# Patient Record
Sex: FEMALE | Race: WHITE | Employment: UNEMPLOYED | URBAN - METROPOLITAN AREA
[De-identification: names, ages, dates, MRNs, and addresses within clinical notes are randomized per-mention and may not be internally consistent; named-entity substitution may affect disease eponyms.]

---

## 2017-08-18 ENCOUNTER — HOSPITAL ENCOUNTER (OUTPATIENT)
Dept: SLEEP MEDICINE | Age: 47
Discharge: HOME | End: 2017-08-18
Payer: MEDICARE

## 2017-08-18 PROCEDURE — 95806 SLEEP STUDY UNATT&RESP EFFT: CPT

## 2017-10-05 ENCOUNTER — HOSPITAL ENCOUNTER (OUTPATIENT)
Dept: SLEEP MEDICINE | Age: 47
Discharge: HOME | End: 2017-10-05
Payer: MEDICARE

## 2017-10-05 PROCEDURE — 95806 SLEEP STUDY UNATT&RESP EFFT: CPT

## 2018-07-06 PROBLEM — E55.9 VITAMIN D DEFICIENCY: Status: ACTIVE | Noted: 2018-07-06

## 2018-12-17 ENCOUNTER — TELEPHONE (OUTPATIENT)
Dept: DIABETES SERVICES | Age: 48
End: 2018-12-17

## 2018-12-17 NOTE — TELEPHONE ENCOUNTER
No show today for diabetes assessment. Tried calling twice but voice mail is not set up. Emailed pt with our phone number asking for a return call to r/s.

## 2019-01-02 ENCOUNTER — TELEPHONE (OUTPATIENT)
Dept: DIABETES SERVICES | Age: 49
End: 2019-01-02

## 2019-01-02 NOTE — TELEPHONE ENCOUNTER
Patient was a now show for her Diabetes Assessment middle of December. We have attempted to reach patient to reschedule with no response. Will close diabetes file.

## 2019-01-02 NOTE — TELEPHONE ENCOUNTER
Patient contacted  after seeing Reyes Counts and now wants Diabetes Education. Diabetes File reopen as appointment was made for 1-.

## 2019-01-29 RX ORDER — INSULIN ASPART 100 [IU]/ML
INJECTION, SOLUTION INTRAVENOUS; SUBCUTANEOUS
COMMUNITY
End: 2019-02-25 | Stop reason: SDUPTHER

## 2019-01-29 RX ORDER — GLUCOSAMINE SULFATE 1500 MG
5000 POWDER IN PACKET (EA) ORAL
COMMUNITY
End: 2019-02-25 | Stop reason: SDUPTHER

## 2019-01-30 ENCOUNTER — HOSPITAL ENCOUNTER (OUTPATIENT)
Dept: CARDIAC CATH/INVASIVE PROCEDURES | Age: 49
Discharge: HOME | End: 2019-01-30
Attending: INTERNAL MEDICINE | Admitting: INTERNAL MEDICINE
Payer: MEDICARE

## 2019-01-30 VITALS
DIASTOLIC BLOOD PRESSURE: 65 MMHG | HEART RATE: 87 BPM | WEIGHT: 230 LBS | BODY MASS INDEX: 39.27 KG/M2 | RESPIRATION RATE: 18 BRPM | TEMPERATURE: 98 F | HEIGHT: 64 IN | SYSTOLIC BLOOD PRESSURE: 140 MMHG | OXYGEN SATURATION: 96 %

## 2019-01-30 DIAGNOSIS — R60.9 EDEMA, UNSPECIFIED TYPE: ICD-10-CM

## 2019-01-30 LAB
ANION GAP SERPL CALC-SCNC: 11 MMOL/L (ref 7–16)
BUN SERPL-MCNC: 11 MG/DL (ref 6–23)
CALCIUM SERPL-MCNC: 8.6 MG/DL (ref 8.3–10.4)
CHLORIDE SERPL-SCNC: 108 MMOL/L (ref 98–107)
CO2 SERPL-SCNC: 24 MMOL/L (ref 21–32)
CREAT SERPL-MCNC: 0.65 MG/DL (ref 0.6–1)
ERYTHROCYTE [DISTWIDTH] IN BLOOD BY AUTOMATED COUNT: 12.9 % (ref 11.9–14.6)
GLUCOSE SERPL-MCNC: 153 MG/DL (ref 65–100)
HCT VFR BLD AUTO: 42.4 % (ref 35.8–46.3)
HGB BLD-MCNC: 14 G/DL (ref 11.7–15.4)
INR PPP: 0.9
MCH RBC QN AUTO: 28.6 PG (ref 26.1–32.9)
MCHC RBC AUTO-ENTMCNC: 33 G/DL (ref 31.4–35)
MCV RBC AUTO: 86.5 FL (ref 79.6–97.8)
NRBC # BLD: 0 K/UL (ref 0–0.2)
PLATELET # BLD AUTO: 320 K/UL (ref 150–450)
PMV BLD AUTO: 8.9 FL (ref 9.4–12.3)
POTASSIUM SERPL-SCNC: 3.9 MMOL/L (ref 3.5–5.1)
PROTHROMBIN TIME: 11.8 SEC (ref 11.7–14.5)
RBC # BLD AUTO: 4.9 M/UL (ref 4.05–5.2)
SODIUM SERPL-SCNC: 143 MMOL/L (ref 136–145)
WBC # BLD AUTO: 8.4 K/UL (ref 4.3–11.1)

## 2019-01-30 PROCEDURE — C1769 GUIDE WIRE: HCPCS

## 2019-01-30 PROCEDURE — 77030004534 HC CATH ANGI DX INFN CARD -A

## 2019-01-30 PROCEDURE — 80048 BASIC METABOLIC PNL TOTAL CA: CPT

## 2019-01-30 PROCEDURE — 99152 MOD SED SAME PHYS/QHP 5/>YRS: CPT

## 2019-01-30 PROCEDURE — 85610 PROTHROMBIN TIME: CPT

## 2019-01-30 PROCEDURE — 74011000250 HC RX REV CODE- 250: Performed by: INTERNAL MEDICINE

## 2019-01-30 PROCEDURE — 74011250636 HC RX REV CODE- 250/636: Performed by: INTERNAL MEDICINE

## 2019-01-30 PROCEDURE — 77030015766

## 2019-01-30 PROCEDURE — 74011636320 HC RX REV CODE- 636/320: Performed by: INTERNAL MEDICINE

## 2019-01-30 PROCEDURE — 93458 L HRT ARTERY/VENTRICLE ANGIO: CPT

## 2019-01-30 PROCEDURE — 85027 COMPLETE CBC AUTOMATED: CPT

## 2019-01-30 PROCEDURE — 77030019569 HC BND COMPR RAD TERU -B

## 2019-01-30 PROCEDURE — 74011250636 HC RX REV CODE- 250/636

## 2019-01-30 PROCEDURE — C1894 INTRO/SHEATH, NON-LASER: HCPCS

## 2019-01-30 RX ORDER — GUAIFENESIN 100 MG/5ML
81-324 LIQUID (ML) ORAL
Status: DISCONTINUED | OUTPATIENT
Start: 2019-01-30 | End: 2019-01-30 | Stop reason: HOSPADM

## 2019-01-30 RX ORDER — LIDOCAINE HYDROCHLORIDE 10 MG/ML
2-10 INJECTION INFILTRATION; PERINEURAL
Status: DISCONTINUED | OUTPATIENT
Start: 2019-01-30 | End: 2019-01-30 | Stop reason: HOSPADM

## 2019-01-30 RX ORDER — FENTANYL CITRATE 50 UG/ML
25-100 INJECTION, SOLUTION INTRAMUSCULAR; INTRAVENOUS
Status: DISCONTINUED | OUTPATIENT
Start: 2019-01-30 | End: 2019-01-30 | Stop reason: HOSPADM

## 2019-01-30 RX ORDER — HEPARIN SODIUM 200 [USP'U]/100ML
2 INJECTION, SOLUTION INTRAVENOUS CONTINUOUS
Status: DISCONTINUED | OUTPATIENT
Start: 2019-01-30 | End: 2019-01-30 | Stop reason: HOSPADM

## 2019-01-30 RX ORDER — FUROSEMIDE 40 MG/1
40 TABLET ORAL 2 TIMES DAILY
Qty: 90 TAB | Refills: 1 | Status: SHIPPED | OUTPATIENT
Start: 2019-01-30 | End: 2019-02-25 | Stop reason: SDUPTHER

## 2019-01-30 RX ORDER — SODIUM CHLORIDE 9 MG/ML
75 INJECTION, SOLUTION INTRAVENOUS CONTINUOUS
Status: DISCONTINUED | OUTPATIENT
Start: 2019-01-30 | End: 2019-01-30 | Stop reason: HOSPADM

## 2019-01-30 RX ORDER — METOPROLOL TARTRATE 5 MG/5ML
5 INJECTION INTRAVENOUS
Status: DISCONTINUED | OUTPATIENT
Start: 2019-01-30 | End: 2019-01-30 | Stop reason: HOSPADM

## 2019-01-30 RX ORDER — SODIUM CHLORIDE 0.9 % (FLUSH) 0.9 %
5-40 SYRINGE (ML) INJECTION AS NEEDED
Status: DISCONTINUED | OUTPATIENT
Start: 2019-01-30 | End: 2019-01-30 | Stop reason: HOSPADM

## 2019-01-30 RX ORDER — SODIUM CHLORIDE 0.9 % (FLUSH) 0.9 %
5-40 SYRINGE (ML) INJECTION EVERY 8 HOURS
Status: DISCONTINUED | OUTPATIENT
Start: 2019-01-30 | End: 2019-01-30 | Stop reason: HOSPADM

## 2019-01-30 RX ORDER — MIDAZOLAM HYDROCHLORIDE 1 MG/ML
.5-2 INJECTION, SOLUTION INTRAMUSCULAR; INTRAVENOUS
Status: DISCONTINUED | OUTPATIENT
Start: 2019-01-30 | End: 2019-01-30 | Stop reason: HOSPADM

## 2019-01-30 RX ORDER — METOPROLOL SUCCINATE 50 MG/1
50 TABLET, EXTENDED RELEASE ORAL DAILY
Qty: 90 TAB | Refills: 3 | Status: SHIPPED | OUTPATIENT
Start: 2019-01-30 | End: 2019-02-25 | Stop reason: SDUPTHER

## 2019-01-30 RX ADMIN — LIDOCAINE HYDROCHLORIDE 3 ML: 10 INJECTION, SOLUTION INFILTRATION; PERINEURAL at 13:01

## 2019-01-30 RX ADMIN — SODIUM CHLORIDE 75 ML/HR: 900 INJECTION, SOLUTION INTRAVENOUS at 07:48

## 2019-01-30 RX ADMIN — HEPARIN SODIUM 2 ML/HR: 5000 INJECTION, SOLUTION INTRAVENOUS; SUBCUTANEOUS at 12:32

## 2019-01-30 RX ADMIN — IOPAMIDOL 90 ML: 755 INJECTION, SOLUTION INTRAVENOUS at 13:18

## 2019-01-30 RX ADMIN — MIDAZOLAM HYDROCHLORIDE 1 MG: 1 INJECTION, SOLUTION INTRAMUSCULAR; INTRAVENOUS at 13:07

## 2019-01-30 RX ADMIN — HEPARIN SODIUM 2 ML: 10000 INJECTION, SOLUTION INTRAVENOUS; SUBCUTANEOUS at 13:04

## 2019-01-30 RX ADMIN — FENTANYL CITRATE 25 MCG: 50 INJECTION, SOLUTION INTRAMUSCULAR; INTRAVENOUS at 13:07

## 2019-01-30 RX ADMIN — METOPROLOL TARTRATE 5 MG: 1 INJECTION, SOLUTION INTRAVENOUS at 13:09

## 2019-01-30 RX ADMIN — FENTANYL CITRATE 25 MCG: 50 INJECTION, SOLUTION INTRAMUSCULAR; INTRAVENOUS at 13:01

## 2019-01-30 RX ADMIN — MIDAZOLAM HYDROCHLORIDE 2 MG: 1 INJECTION, SOLUTION INTRAMUSCULAR; INTRAVENOUS at 13:01

## 2019-01-30 NOTE — PROGRESS NOTES
R band deflation completed. Right radial dressed with gauze and tegaderm using sterile technique. No bleeding or hematoma. Tolerated without difficulty

## 2019-01-30 NOTE — PROGRESS NOTES
Patient received to [address] [place] [address] room # 7  Ambulatory from [facility]. Patient scheduled for [facility] today with Dr Nova Lincoln. Procedure reviewed & questions answered, voiced good understanding consent obtained & placed on chart. All medications and medical history reviewed. Will prep patient per orders. Patient & family updated on plan of care. The patient has a fraility score of 3-MANAGING WELL, based on independent of ADLs/ambulation. Increased symptoms with exertion.

## 2019-01-30 NOTE — PROCEDURES
Brief Cardiac Procedure Note    Patient: Billy Soto MRN: [id number]  SSN: [id number]    YOB: 1970  Age: 53 y.o. Sex: female      Date of Procedure: 1/30/2019     Pre-procedure Diagnosis: Typical Angina    Post-procedure Diagnosis: Coronary Artery Disease    Reason for Procedure: New Onset Angina < or = 2 Months    Procedure: Left Heart Catheterization    Brief Description of Procedure: lhc via right radial, tr    Performed By: Bala King MD     Assistants: none    Anesthesia: Moderate Sedation    Estimated Blood Loss: Less than 10 mL      Specimens: None    Implants: None    Findings: moderate cad, nl lvef, edp 27- no change from 7055    Complications: None    Recommendations: Continue medical therapy. add toprol and lasix    Signed By: Bala King MD     January 30, 2019

## 2019-01-30 NOTE — PROCEDURES
[address]    Orlean Claude  MR#: [id number]  : 1970  ACCOUNT #: [de-identified]   DATE OF SERVICE: 2019    CLINICAL INFORMATION:  The patient with worsening chest pain and shortness of breath consistent with [place] class 4 angina, referred for catheterization. DESCRIPTION OF PROCEDURE:  After informed consent was obtained, a 6-Bruneian sheath was placed in the right radial artery. Radial cocktail was given by protocol. A 5-Bruneian Tiger catheter and angled pigtail were used for diagnostic angiography as well as a JL3.5. Conscious sedation was given by Elmer Roman RN beginning at 1:00 concluding at 1320. A total of 3 mg Versed and 50 mcg fentanyl were given. Vital signs and saturation were stable throughout. FINDINGS:  1. Left ventricle normal size, normal systolic function, ejection fraction 65%, left ventricular end diastolic pressure is 30 mmHg with an opening aortic pressure 185/100. No gradient across the aortic valve. The right coronary artery is a moderate size dominant vessel, normal anatomic position. 30% proximal, 30% mid narrowing. The distal PDA has an anatomic kinking of 30% narrowing, but this does not appear to be atherosclerotic or flow limiting. The left main coronary artery is in normal anatomic position, free of significant disease, bifurcates to LAD and circumflex systems. The LAD becomes quite small. In the mid portion, there is a 50% narrowing distal to the bifurcation of a small obtuse marginal branch. The obtuse marginal branch has 50-60% narrowing and appears unimpaired change from previous catheterization films. The circumflex has moderate midvessel narrowing up to 30-40% and there is a 50% narrowing in an obtuse marginal ostium that has an inferior takeoff and goes under the AV groove circumflex.   This appears to be a lot of motion artifact and hinge point in this vessel, but it does not appear to be flow limiting and once again is unchanged from previous catheterization films. CONCLUSION:  1. Moderate diffuse coronary atherosclerotic heart disease with no changes from catheterization from 2016. 2.  Hypertension with elevated filling pressures. RECOMMENDATIONS:  The patient's Lasix will be increased to 40 mg twice daily and Toprol-XL 50 mg will be added to her medical therapy and she will be followed in the outpatient setting.       MD ERICH Ruvalcaba / [place]  D: 01/30/2019 13:39     T: 01/30/2019 14:59  JOB #: [id number]

## 2019-01-30 NOTE — PROGRESS NOTES
TRANSFER - IN REPORT: 
 
Verbal report received from Corrine Mack RN(name) on Aleksandra William  being received from cath lab(unit) for routine progression of care Report consisted of patients Situation, Background, Assessment and  
Recommendations(SBAR). Information from the following report(s) Procedure Summary was reviewed with the receiving nurse. Opportunity for questions and clarification was provided. Assessment completed upon patients arrival to unit and care assumed.

## 2019-01-30 NOTE — PROGRESS NOTES
TRANSFER - OUT REPORT: 
 
Verbal report given to RN(name) on Jenelle Darling  being transferred to CPRU(unit) for routine progression of care Report consisted of patients Situation, Background, Assessment and  
Recommendations(SBAR). Information from the following report(s) Procedure Summary, MAR and Cardiac Rhythm NSR was reviewed with the receiving nurse. Lines:  
Peripheral IV 01/30/19 Left Antecubital (Active) Peripheral IV 01/30/19 Posterior;Right Hand (Active) Opportunity for questions and clarification was provided. Patient transported with: 
 Registered Nurse [facility] Dr Loyd Jensen Diagnostic only Right radial access - TR band @ 1320 w/ 14 ml in band = site C/D/i Lopressor 5mg IV Versed 3 mg IV Fent 50 mcg IV

## 2019-01-30 NOTE — DISCHARGE INSTRUCTIONS
HEART CATHETERIZATION/ANGIOGRAPHY DISCHARGE INSTRUCTIONS    1. Check puncture site frequently for swelling or bleeding. If there is any bleeding, lie down and apply pressure over the area with a clean towel or washcloth. Call 911. Notify your doctor for any redness, swelling, drainage, or oozing from the puncture site. Notify your doctor for any fever or chills. 2. If the extremity becomes cold, numb, or painful call [facility] at [phone].  3. Activity should be limited for the next 48 hours. Climb stairs as little as possible and avoid any stooping, bending, or strenuous activity for 48 hours. No heavy lifting (anything over 10 pounds) for 3 days. 4. You may resume your usual diet. Drink more fluids than usual.  5. Have a responsible person drive you home and stay with you for at least 24 hours after your heart catheterization/angiography. Do not drive for the next 24 hours. 6. You may remove bandage from your Right wrist in 24 hours. You may shower in 24 hours. No tub baths, hot tubs, or swimming for 1 week. Do not place any lotions, creams, powders, or ointments over puncture site for 1 week. You may place a clean band-aid over the puncture site each day for 5 days. Change daily. 7. Please continue your medications as prescribed by your physician. I have read the above instructions and have had the opportunity to ask questions.       Patient: ________________________   Date: 1/30/2019    Witness: _______________________   Date: 1/30/2019

## 2019-01-30 NOTE — PROGRESS NOTES
Patient pre-assessment complete for [facility] poss with Dr Valerie Blanton scheduled for 19 at 11am, arrival time 9am. Patient verified using . Patient instructed to bring all home medications in labeled bottles on the day of procedure. NPO status reinforced. Patient informed to take a full dose aspirin 325mg  or 81 mg x 4 on the day of procedure. Patient instructed to HOLD lasix tonight & take 1/2 insulin tonight & no insulin in am. Instructed they can take all other medications excluding vitamins & supplements. Patient verbalizes understanding of all instructions & denies any questions at this time.

## 2019-01-30 NOTE — PROGRESS NOTES
Assisted OOB & ambulated to BR & on unit. Tolerated activity without difficulty. Right radial without bleeding or hematoma

## 2019-02-11 ENCOUNTER — TELEPHONE (OUTPATIENT)
Dept: DIABETES SERVICES | Age: 49
End: 2019-02-11

## 2019-02-11 NOTE — TELEPHONE ENCOUNTER
She again was a no show again today for her Diabetes Assessment. We will not call and reschedule due to multiple no shows.

## 2019-02-27 PROBLEM — R53.83 OTHER FATIGUE: Status: ACTIVE | Noted: 2019-02-27

## 2019-02-27 PROBLEM — I50.32 DIASTOLIC CHF, CHRONIC (HCC): Status: ACTIVE | Noted: 2019-02-27

## 2019-03-05 ENCOUNTER — HOSPITAL ENCOUNTER (OUTPATIENT)
Dept: DIABETES SERVICES | Age: 49
Discharge: HOME | End: 2019-03-05
Payer: MEDICARE

## 2019-03-05 PROCEDURE — G0108 DIAB MANAGE TRN  PER INDIV: HCPCS

## 2019-03-05 NOTE — PROGRESS NOTES
Came for diabetes educational assessment today. Provided basic information on carbohydrates, proteins and fats. Educational need/plan: Will attend 2 nutrition/2 diabetes sessions to address the following: diabetes disease process, nutritional management, physical activity, using medications, preventing complications, psychosocial adjustment, goal setting, problem solving, monitoring, behavior change strategies. Has 70 % hearing loss right ear and wears glasses. Okay in group setting. When first diagnosed with diabetes, patient reports she ate correctly, but stopped. Reports old habits returned.

## 2019-03-11 ENCOUNTER — HOSPITAL ENCOUNTER (OUTPATIENT)
Dept: DIABETES SERVICES | Age: 49
Discharge: HOME | End: 2019-03-11
Payer: MEDICARE

## 2019-03-11 PROCEDURE — G0109 DIAB MANAGE TRN IND/GROUP: HCPCS

## 2019-04-01 ENCOUNTER — HOSPITAL ENCOUNTER (OUTPATIENT)
Dept: DIABETES SERVICES | Age: 49
Discharge: HOME | End: 2019-04-01
Payer: MEDICARE

## 2019-04-01 PROCEDURE — G0109 DIAB MANAGE TRN IND/GROUP: HCPCS

## 2019-04-01 NOTE — PROGRESS NOTES
Attended nutrition diabetes #2 group session today. Topics included: plate method for portion control; fiber and sodium guidelines; sugar substitutes; alcohol; eating out; recipe modification; label reading. Participant's nutrition goal: To live to see grandkids get , she will eat a 7 gram protein with carbs and re-evaluate in July 2019. Participant's nutrition support plan: Use the daily meal planning guide, read food labels and use the [employer]. She will join [employer] and attend diabetes follow up class. Barriers identified by participant: not to cheat Voiced/demonstrated understanding of material covered. Anticipated adherence is good. Plan for follow up is attend diabetes class.

## 2019-04-09 ENCOUNTER — HOSPITAL ENCOUNTER (OUTPATIENT)
Dept: DIABETES SERVICES | Age: 49
Discharge: HOME | End: 2019-04-09
Payer: MEDICARE

## 2019-04-09 PROCEDURE — G0109 DIAB MANAGE TRN IND/GROUP: HCPCS

## 2019-04-30 ENCOUNTER — HOSPITAL ENCOUNTER (OUTPATIENT)
Dept: DIABETES SERVICES | Age: 49
Discharge: HOME | End: 2019-04-30
Payer: MEDICARE

## 2019-04-30 PROCEDURE — G0109 DIAB MANAGE TRN IND/GROUP: HCPCS

## 2019-04-30 NOTE — PROGRESS NOTES
Participant attended Diabetes #2 session today. Topics included: Prevention/detection/treatment of chronic complications; sleep apnea; Developing strategies to promote health/change behavior/recommended screenings; Developing strategies to address psychosocial issues; Goal setting. Participants goal/support plan includes  Diabetes Goal:  To tests sugars more. I will test four times a day starting 5-1-19 for 6 month. I will join [employer] and eat healthier. I will exercise 2-3 times a week for one hour and increase as tolerated. Diabetes Plan: Have testing supplies and Same time to exercise. . Problems/barriers may be:none anticipated Plan for follow up/Recommendations: mail follow up survey in 3 months

## 2019-06-10 PROBLEM — R42 DIZZINESS: Status: ACTIVE | Noted: 2019-06-10

## 2019-06-10 PROBLEM — G43.809 VESTIBULAR MIGRAINE: Status: ACTIVE | Noted: 2019-06-10

## 2019-06-25 ENCOUNTER — HOSPITAL ENCOUNTER (OUTPATIENT)
Dept: DIABETES SERVICES | Age: 49
Discharge: HOME | End: 2019-06-25
Payer: MEDICARE

## 2019-06-25 PROCEDURE — G0109 DIAB MANAGE TRN IND/GROUP: HCPCS

## 2019-06-25 NOTE — PROGRESS NOTES
Attended diabetes follow up group class. Open forum for clients to ask questions based on entire diabetes self management education program. Education topics are driven in this class based on clients' individual questions and needs. Topics included: proteins, meal planning, label reading, weight loss, eating out, sugar substitutes, Hgb A1C, stress/depression, glucometer testing, and signs/symptoms high and low blood sugars and treatment.

## 2019-07-16 ENCOUNTER — PATIENT OUTREACH (OUTPATIENT)
Dept: CASE MANAGEMENT | Age: 49
End: 2019-07-16

## 2019-07-16 NOTE — PROGRESS NOTES
Medication Adherence Outreach    Date/Time of Call:  7/16/2019   3:50 pm    Name of medication and dosage:   * Lisinopril 10 mg 1 every day     * Atorvastatin 40 mg 1 every day    Does the patient know the purpose and dosage of medication? * Yes    Are you getting a #30 day or #90 day supply of your medication? * 30 day supply    * Discussed potential cost savings by obtaining a 90 day supply of medication. Mrs. Delmar Warner is in agreement that a 90 day supply of her medication would help her financially and also aide with her adherence. Are you still taking this medication? If not, why? * Yes ,Mrs. Delmar Warner states she was not taking her medication for about 6 weeks to 2 months  due to severe migraines  and nausea and vomiting. She resumed the medication 1 week ago and has been taking daily with no problems or noted side effects. Transportation issues or any problems paying for the medication or other reason? * No    What pharmacy are you using to fill your medication and do you know the last time that you got your medication filled? [id number]   * Last refill Lisinopril 6/26/2019   * Last refill Atorvastatin 5/4/2019   * Mrs. Delmar Warner has medication on hand due to inability  to take. Are you having any side effects from taking your medication? * No          Any other questions or concerns expressed by the patient. * No    Comments:  * Medication adherence discussed with Mrs. Delmar Warner and she states she has no current  barriers to prevent her from obtaining or taking her medication. She has an appointment scheduled for 7/29/2019 with Dr. Cher Kahn and will discuss obtaining a 90 day Rx at scheduled appointment .           Call Completed By:  Jewel Johnson

## 2019-09-10 PROBLEM — G43.019 INTRACTABLE MIGRAINE WITHOUT AURA AND WITHOUT STATUS MIGRAINOSUS: Status: ACTIVE | Noted: 2019-09-10

## 2020-01-16 ENCOUNTER — HOSPITAL ENCOUNTER (OUTPATIENT)
Dept: SLEEP MEDICINE | Age: 50
Discharge: HOME | End: 2020-01-16
Payer: COMMERCIAL

## 2020-01-16 PROCEDURE — 95806 SLEEP STUDY UNATT&RESP EFFT: CPT

## 2020-04-20 PROBLEM — G43.019 INTRACTABLE MIGRAINE WITHOUT AURA AND WITHOUT STATUS MIGRAINOSUS: Status: RESOLVED | Noted: 2019-09-10 | Resolved: 2020-04-20

## 2020-04-20 PROBLEM — Z79.899 ENCOUNTER FOR MEDICATION MANAGEMENT: Status: ACTIVE | Noted: 2020-04-20

## 2020-04-20 PROBLEM — G45.9 TIA (TRANSIENT ISCHEMIC ATTACK): Status: ACTIVE | Noted: 2020-04-20

## 2020-04-20 PROBLEM — R29.3 POSTURAL IMBALANCE: Status: ACTIVE | Noted: 2020-04-20

## 2020-06-11 ENCOUNTER — HOSPITAL ENCOUNTER (OUTPATIENT)
Dept: ULTRASOUND IMAGING | Age: 50
Discharge: HOME | End: 2020-06-11
Attending: INTERNAL MEDICINE
Payer: COMMERCIAL

## 2020-06-11 DIAGNOSIS — E04.1 THYROID NODULE: ICD-10-CM

## 2020-06-11 PROCEDURE — 76536 US EXAM OF HEAD AND NECK: CPT

## 2020-07-30 PROBLEM — G43.109 CHRONIC MIGRAINE WITH AURA: Status: ACTIVE | Noted: 2020-07-30

## 2020-09-08 ENCOUNTER — HOSPITAL ENCOUNTER (OUTPATIENT)
Dept: DIABETES SERVICES | Age: 50
Discharge: HOME | End: 2020-09-08
Payer: COMMERCIAL

## 2020-09-08 PROCEDURE — 95249 CONT GLUC MNTR PT PROV EQP: CPT

## 2020-09-08 NOTE — PROGRESS NOTES
Pt seen today for initial insertion of Dexcom G6 system. Pt instructed on overview of continuous glucose monitoring (CGM) device and use. Instructed on sensor, transmitter and . I-phone used. Also has Dexcom reciever for use at night. Download Dexcom aydin to phone as well as Clarity aydin. Time and date and transmitter ID verified. Instructed on troubleshooting, alerts, alarms, calibration, communication between sensor and , insertion of sensor and transmitter, starting sensor session, start up calibration, ending sensor session, removing sensor pod and transmitter and site rotation. Reviewed CGM guidelines and restrictions on use including no MRI, CT scans,or diathermy electrical treatments, bug spray and sun tanning lotions. All questions and concerns addressed. Provided Dexcom technical support phone number.

## 2020-11-04 ENCOUNTER — HOSPITAL ENCOUNTER (OUTPATIENT)
Dept: LAB | Age: 50
Discharge: HOME | End: 2020-11-04
Payer: COMMERCIAL

## 2020-11-04 DIAGNOSIS — I25.119 CORONARY ARTERY DISEASE INVOLVING NATIVE CORONARY ARTERY OF NATIVE HEART WITH ANGINA PECTORIS (HCC): ICD-10-CM

## 2020-11-04 DIAGNOSIS — I47.29 NSVT (NONSUSTAINED VENTRICULAR TACHYCARDIA): ICD-10-CM

## 2020-11-04 LAB
ANION GAP SERPL CALC-SCNC: 8 MMOL/L (ref 7–16)
BUN SERPL-MCNC: 11 MG/DL (ref 6–23)
CALCIUM SERPL-MCNC: 8.9 MG/DL (ref 8.3–10.4)
CHLORIDE SERPL-SCNC: 110 MMOL/L (ref 98–107)
CO2 SERPL-SCNC: 24 MMOL/L (ref 21–32)
CREAT SERPL-MCNC: 0.77 MG/DL (ref 0.6–1)
GLUCOSE SERPL-MCNC: 159 MG/DL (ref 65–100)
INR PPP: 1
MAGNESIUM SERPL-MCNC: 2.2 MG/DL (ref 1.8–2.4)
POTASSIUM SERPL-SCNC: 3.3 MMOL/L (ref 3.5–5.1)
PROTHROMBIN TIME: 13.2 SEC (ref 12.5–14.7)
SODIUM SERPL-SCNC: 142 MMOL/L (ref 138–145)

## 2020-11-04 PROCEDURE — 80048 BASIC METABOLIC PNL TOTAL CA: CPT

## 2020-11-04 PROCEDURE — 85610 PROTHROMBIN TIME: CPT

## 2020-11-04 PROCEDURE — 36415 COLL VENOUS BLD VENIPUNCTURE: CPT

## 2020-11-04 PROCEDURE — 83735 ASSAY OF MAGNESIUM: CPT

## 2020-11-05 ENCOUNTER — HOSPITAL ENCOUNTER (OUTPATIENT)
Dept: CARDIAC CATH/INVASIVE PROCEDURES | Age: 50
Discharge: HOME | End: 2020-11-05
Attending: INTERNAL MEDICINE | Admitting: INTERNAL MEDICINE
Payer: COMMERCIAL

## 2020-11-05 VITALS
WEIGHT: 235 LBS | OXYGEN SATURATION: 94 % | BODY MASS INDEX: 40.12 KG/M2 | TEMPERATURE: 98.1 F | HEIGHT: 64 IN | DIASTOLIC BLOOD PRESSURE: 77 MMHG | SYSTOLIC BLOOD PRESSURE: 144 MMHG | HEART RATE: 81 BPM

## 2020-11-05 DIAGNOSIS — I25.119 CORONARY ARTERY DISEASE INVOLVING NATIVE CORONARY ARTERY OF NATIVE HEART WITH ANGINA PECTORIS (HCC): ICD-10-CM

## 2020-11-05 LAB
ATRIAL RATE: 83 BPM
CALCULATED P AXIS, ECG09: 55 DEGREES
CALCULATED R AXIS, ECG10: 2 DEGREES
CALCULATED T AXIS, ECG11: 64 DEGREES
DIAGNOSIS, 93000: NORMAL
P-R INTERVAL, ECG05: 128 MS
Q-T INTERVAL, ECG07: 378 MS
QRS DURATION, ECG06: 78 MS
QTC CALCULATION (BEZET), ECG08: 444 MS
VENTRICULAR RATE, ECG03: 83 BPM

## 2020-11-05 PROCEDURE — C1887 CATHETER, GUIDING: HCPCS

## 2020-11-05 PROCEDURE — 93458 L HRT ARTERY/VENTRICLE ANGIO: CPT | Performed by: INTERNAL MEDICINE

## 2020-11-05 PROCEDURE — 77030019569 HC BND COMPR RAD TERU -B

## 2020-11-05 PROCEDURE — 74011250637 HC RX REV CODE- 250/637: Performed by: INTERNAL MEDICINE

## 2020-11-05 PROCEDURE — 99152 MOD SED SAME PHYS/QHP 5/>YRS: CPT

## 2020-11-05 PROCEDURE — 99153 MOD SED SAME PHYS/QHP EA: CPT

## 2020-11-05 PROCEDURE — 99152 MOD SED SAME PHYS/QHP 5/>YRS: CPT | Performed by: INTERNAL MEDICINE

## 2020-11-05 PROCEDURE — 77030016699 HC CATH ANGI DX INFN1 CARD -A

## 2020-11-05 PROCEDURE — C1894 INTRO/SHEATH, NON-LASER: HCPCS

## 2020-11-05 PROCEDURE — 93458 L HRT ARTERY/VENTRICLE ANGIO: CPT

## 2020-11-05 PROCEDURE — 77030015766

## 2020-11-05 PROCEDURE — 74011000250 HC RX REV CODE- 250: Performed by: INTERNAL MEDICINE

## 2020-11-05 PROCEDURE — 74011000636 HC RX REV CODE- 636: Performed by: INTERNAL MEDICINE

## 2020-11-05 PROCEDURE — C8929 TTE W OR WO FOL WCON,DOPPLER: HCPCS

## 2020-11-05 PROCEDURE — C1769 GUIDE WIRE: HCPCS

## 2020-11-05 PROCEDURE — 74011250636 HC RX REV CODE- 250/636: Performed by: INTERNAL MEDICINE

## 2020-11-05 PROCEDURE — 93005 ELECTROCARDIOGRAM TRACING: CPT | Performed by: INTERNAL MEDICINE

## 2020-11-05 RX ORDER — SODIUM CHLORIDE 9 MG/ML
75 INJECTION, SOLUTION INTRAVENOUS CONTINUOUS
Status: DISCONTINUED | OUTPATIENT
Start: 2020-11-05 | End: 2020-11-05 | Stop reason: HOSPADM

## 2020-11-05 RX ORDER — SODIUM CHLORIDE 0.9 % (FLUSH) 0.9 %
5-40 SYRINGE (ML) INJECTION AS NEEDED
Status: CANCELLED | OUTPATIENT
Start: 2020-11-05

## 2020-11-05 RX ORDER — HEPARIN SODIUM 200 [USP'U]/100ML
2 INJECTION, SOLUTION INTRAVENOUS CONTINUOUS
Status: DISCONTINUED | OUTPATIENT
Start: 2020-11-05 | End: 2020-11-05 | Stop reason: HOSPADM

## 2020-11-05 RX ORDER — HYDRALAZINE HYDROCHLORIDE 20 MG/ML
10-20 INJECTION INTRAMUSCULAR; INTRAVENOUS AS NEEDED
Status: DISCONTINUED | OUTPATIENT
Start: 2020-11-05 | End: 2020-11-05 | Stop reason: HOSPADM

## 2020-11-05 RX ORDER — MIDAZOLAM HYDROCHLORIDE 1 MG/ML
.5-2 INJECTION, SOLUTION INTRAMUSCULAR; INTRAVENOUS
Status: DISCONTINUED | OUTPATIENT
Start: 2020-11-05 | End: 2020-11-05 | Stop reason: HOSPADM

## 2020-11-05 RX ORDER — SODIUM CHLORIDE 0.9 % (FLUSH) 0.9 %
5-40 SYRINGE (ML) INJECTION EVERY 8 HOURS
Status: CANCELLED | OUTPATIENT
Start: 2020-11-05

## 2020-11-05 RX ORDER — FENTANYL CITRATE 50 UG/ML
25-50 INJECTION, SOLUTION INTRAMUSCULAR; INTRAVENOUS
Status: DISCONTINUED | OUTPATIENT
Start: 2020-11-05 | End: 2020-11-05 | Stop reason: HOSPADM

## 2020-11-05 RX ORDER — POTASSIUM CHLORIDE 1500 MG/1
20 TABLET, FILM COATED, EXTENDED RELEASE ORAL DAILY
Qty: 30 TAB | Refills: 11 | Status: SHIPPED | OUTPATIENT
Start: 2020-11-05 | End: 2021-02-04 | Stop reason: SDUPTHER

## 2020-11-05 RX ORDER — LIDOCAINE HYDROCHLORIDE 10 MG/ML
1-10 INJECTION, SOLUTION EPIDURAL; INFILTRATION; INTRACAUDAL; PERINEURAL ONCE
Status: COMPLETED | OUTPATIENT
Start: 2020-11-05 | End: 2020-11-05

## 2020-11-05 RX ORDER — SODIUM CHLORIDE 9 MG/ML
75 INJECTION, SOLUTION INTRAVENOUS CONTINUOUS
Status: CANCELLED | OUTPATIENT
Start: 2020-11-05

## 2020-11-05 RX ORDER — GUAIFENESIN 100 MG/5ML
81-324 LIQUID (ML) ORAL ONCE
Status: DISCONTINUED | OUTPATIENT
Start: 2020-11-05 | End: 2020-11-05 | Stop reason: HOSPADM

## 2020-11-05 RX ORDER — DIAZEPAM 5 MG/1
5 TABLET ORAL ONCE
Status: COMPLETED | OUTPATIENT
Start: 2020-11-05 | End: 2020-11-05

## 2020-11-05 RX ADMIN — MIDAZOLAM 1 MG: 1 INJECTION INTRAMUSCULAR; INTRAVENOUS at 13:21

## 2020-11-05 RX ADMIN — FENTANYL CITRATE 25 MCG: 50 INJECTION, SOLUTION INTRAMUSCULAR; INTRAVENOUS at 13:21

## 2020-11-05 RX ADMIN — LIDOCAINE HYDROCHLORIDE 3 ML: 10 INJECTION, SOLUTION EPIDURAL; INFILTRATION; INTRACAUDAL; PERINEURAL at 13:03

## 2020-11-05 RX ADMIN — MIDAZOLAM 1 MG: 1 INJECTION INTRAMUSCULAR; INTRAVENOUS at 13:39

## 2020-11-05 RX ADMIN — FENTANYL CITRATE 25 MCG: 50 INJECTION, SOLUTION INTRAMUSCULAR; INTRAVENOUS at 12:58

## 2020-11-05 RX ADMIN — VERAPAMIL HYDROCHLORIDE 2 ML: 2.5 INJECTION, SOLUTION INTRAVENOUS at 13:11

## 2020-11-05 RX ADMIN — FENTANYL CITRATE 25 MCG: 50 INJECTION, SOLUTION INTRAMUSCULAR; INTRAVENOUS at 13:39

## 2020-11-05 RX ADMIN — HYDRALAZINE HYDROCHLORIDE 10 MG: 20 INJECTION, SOLUTION INTRAMUSCULAR; INTRAVENOUS at 13:39

## 2020-11-05 RX ADMIN — PERFLUTREN 1 ML: 6.52 INJECTION, SUSPENSION INTRAVENOUS at 15:00

## 2020-11-05 RX ADMIN — MIDAZOLAM 2 MG: 1 INJECTION INTRAMUSCULAR; INTRAVENOUS at 12:58

## 2020-11-05 RX ADMIN — DIAZEPAM 5 MG: 5 TABLET ORAL at 11:00

## 2020-11-05 RX ADMIN — IOPAMIDOL 80 ML: 755 INJECTION, SOLUTION INTRAVENOUS at 13:40

## 2020-11-05 RX ADMIN — HEPARIN SODIUM 2 UNITS/HR: 200 INJECTION, SOLUTION INTRAVENOUS at 12:50

## 2020-11-05 NOTE — PROGRESS NOTES
TRANSFER - OUT REPORT:    R radial [facility] with Dr Gisel Lopez  Versed 4 mg  Fentanyl 75 mcg  Hydralazine 10 mg  CABG consult  TR band 12 ml  No bleeding or hematoma noted at site    Verbal report given to Katerina(name) on Barrera Cable  being transferred to CPRU(unit) for routine progression of care       Report consisted of patients Situation, Background, Assessment and   Recommendations(SBAR). Information from the following report(s) Procedure Summary and MAR was reviewed with the receiving nurse. Lines:   Peripheral IV 11/05/20 Right Antecubital (Active)        Opportunity for questions and clarification was provided.       Patient transported with:   Registered Nurse

## 2020-11-05 NOTE — DISCHARGE INSTRUCTIONS
HEART CATHETERIZATION/ANGIOGRAPHY DISCHARGE INSTRUCTIONS    1. Check puncture site frequently for swelling or bleeding. If there is any bleeding, lie down and apply pressure over the area with a clean towel or washcloth and call 911. Notify your doctor for any redness, swelling, drainage, or oozing from the puncture site. Notify your doctor for any fever or chills. 2. If the extremity becomes cold, numb, or painful call [facility] at [phone].  3. Activity should be limited for the next 48 hours. No heavy lifting (anything over 10 pounds) for 3 days. No pushing or pulling with right arm for the next three days. 4. You may resume your usual diet. Drink more fluids than usual.  5. Have a responsible person drive you home and stay with you for at least 24 hours after your heart catheterization/angiography. No pushing or pulling with right arm for the next three days. 6. You may remove bandage from your ARM in 24 hours. You may shower in 24 hours. No tub baths, hot tubs, or swimming for 1 week. Do not place any lotions, creams, powders, or ointments over puncture site for 1 week. You may place a clean band-aid over the puncture site each day for 5 days. Change daily. 7. Continue all medications as prescribed. I have read the above instructions and have had the opportunity to ask questions.       Patient: ________________________   Date: 11/5/2020    Witness: _______________________   Date: 11/5/2020

## 2020-11-05 NOTE — PROCEDURES
Brief Cardiac Procedure Note    Patient: Niko Black MRN: [id number]  SSN: [id number]    YOB: 1970  Age: 53 y.o. Sex: female      Date of Procedure: 11/5/2020     Pre-procedure Diagnosis: Typical Angina    Post-procedure Diagnosis: Coronary Artery Disease    Reason for Procedure: New Onset Angina < or = 2 Months    Procedure: Left Heart Catheterization    Brief Description of Procedure: LHC VIA RIGHT RADIAL, TR    Performed By: Grace Liu MD     Assistants: none    Anesthesia: Moderate Sedation    Estimated Blood Loss: Less than 10 mL      Specimens: None    Implants: None    Findings: diffuse 3 vessel cad, small vessels- normal lvef    Complications: None    Recommendations: Continue medical therapy.     Signed By: Grace Liu MD     November 5, 2020

## 2020-11-05 NOTE — PROGRESS NOTES
1610-patient ambulated to bath room with no difficulties. Right radial with no bleeding or hematoma. Rband removed and sterile dressing applied to site. 1615- all discharge instructions explained to patient and family. Time allowed for questions no. No questions and concerns at this time. 1620- right radial checked. Site with no redness or bleeding. pt discharged to home via Wheelchair with family.

## 2020-11-06 NOTE — PROCEDURES
[facility]  CARDIAC CATH    Name:  Sterling Carcamo  MR#:  [id number]  :  1970  ACCOUNT #:  [de-identified]  DATE OF SERVICE:  2020    PROCEDURE PERFORMED:  Left heart catheterization via the right radial artery with a 5-Bahamian Tiger catheter, 6-Bahamian XB-3.0 guide, and an angled pigtail. A TR band was placed with good hemostasis. PREOPERATIVE DIAGNOSIS:  Chest pain, nonsustained ventricular tachycardia. POSTOPERATIVE DIAGNOSIS:  Coronary artery disease. SURGEON:  Brett Hackett MD    ASSISTANT:  none    ESTIMATED BLOOD LOSS:  5 mL. SPECIMENS REMOVED:  None. COMPLICATIONS:  None. IMPLANTS:  None. ANESTHESIA:  Provided by Phyliss Lundborg, RN beginning at 12:58, concluding at 13:38. A total of 4 mg of Versed and 75 mcg of fentanyl were given. Vital signs and saturations were stable throughout. FINDINGS:  Left ventricle has normal size, normal systolic function. Ejection fraction is 65%. Left ventricular end-diastolic pressure is 19 mmHg with an opening aortic pressure of 167/93. No significant gradient across the aortic valve. The right coronary artery is a dominant vessel in normal anatomic position, a tortuous vessel, diffusely diseased proximally up to 20-30%. The distal vessel has a focal 70% narrowing before the takeoff of the PDA which has a 40-50% ostial narrowing. The left main coronary artery is a small vessel with no significant atherosclerotic narrowing. Although it is size-matched proximally, it is the same size as the proximal LAD. The LAD is diffusely diseased in the midportion, heavily calcified up to 85%. There is a diagonal branch with a 70% narrowing and then the ongoing LAD has a 70-80% narrowing after the takeoff of a diagonal branch. The circumflex has a midvessel obtuse marginal branch that has an 80% narrowing.   This vessel courses under the circumflex, is extremely tortuous, and a small vessel, not a good interventional target. The distal obtuse marginal branch is free of any high-grade narrowing. CONCLUSION:  Significant multivessel coronary artery disease with preserved LV systolic function in a diabetic. RECOMMENDATIONS:  Consideration for bypass surgery. Potassium will be added to her medical regimen with her nonsustained ventricular tachycardia between now and the surgery.       MD ZAHRA [id number]  D:  11/05/2020 14:59  T:  11/05/2020 20:07  JOB #:  [id number]

## 2020-11-07 ENCOUNTER — HOSPITAL ENCOUNTER (OUTPATIENT)
Dept: ULTRASOUND IMAGING | Age: 50
Discharge: HOME | End: 2020-11-07
Attending: PHYSICIAN ASSISTANT
Payer: COMMERCIAL

## 2020-11-07 DIAGNOSIS — I25.118 CORONARY ARTERY DISEASE OF NATIVE ARTERY OF NATIVE HEART WITH STABLE ANGINA PECTORIS (HCC): ICD-10-CM

## 2020-11-07 DIAGNOSIS — E11.9 TYPE 2 DIABETES MELLITUS WITHOUT COMPLICATION, WITH LONG-TERM CURRENT USE OF INSULIN (HCC): ICD-10-CM

## 2020-11-07 DIAGNOSIS — Z79.4 TYPE 2 DIABETES MELLITUS WITHOUT COMPLICATION, WITH LONG-TERM CURRENT USE OF INSULIN (HCC): ICD-10-CM

## 2020-11-07 PROCEDURE — 93970 EXTREMITY STUDY: CPT

## 2020-11-13 ENCOUNTER — ANESTHESIA EVENT (OUTPATIENT)
Dept: SURGERY | Age: 50
DRG: 236 | End: 2020-11-13
Payer: COMMERCIAL

## 2020-11-17 ENCOUNTER — HOSPITAL ENCOUNTER (OUTPATIENT)
Dept: SURGERY | Age: 50
Discharge: HOME | DRG: 236 | End: 2020-11-17
Payer: COMMERCIAL

## 2020-11-17 ENCOUNTER — HOSPITAL ENCOUNTER (OUTPATIENT)
Dept: ULTRASOUND IMAGING | Age: 50
Discharge: HOME | DRG: 236 | End: 2020-11-17
Attending: PHYSICIAN ASSISTANT
Payer: COMMERCIAL

## 2020-11-17 ENCOUNTER — HOSPITAL ENCOUNTER (OUTPATIENT)
Dept: GENERAL RADIOLOGY | Age: 50
Discharge: HOME | DRG: 236 | End: 2020-11-17
Attending: PHYSICIAN ASSISTANT
Payer: COMMERCIAL

## 2020-11-17 VITALS
TEMPERATURE: 98.4 F | HEART RATE: 86 BPM | DIASTOLIC BLOOD PRESSURE: 76 MMHG | WEIGHT: 233.9 LBS | OXYGEN SATURATION: 94 % | RESPIRATION RATE: 18 BRPM | HEIGHT: 64 IN | BODY MASS INDEX: 39.93 KG/M2 | SYSTOLIC BLOOD PRESSURE: 140 MMHG

## 2020-11-17 LAB
APPEARANCE UR: CLEAR
BACTERIA SPEC CULT: NORMAL
BACTERIA URNS QL MICRO: 0 /HPF
BILIRUB UR QL: NEGATIVE
CASTS URNS QL MICRO: 0 /LPF
COLOR UR: YELLOW
EPI CELLS #/AREA URNS HPF: ABNORMAL /HPF
GLUCOSE BLD STRIP.AUTO-MCNC: 102 MG/DL (ref 65–100)
GLUCOSE UR STRIP.AUTO-MCNC: >1000 MG/DL
HGB UR QL STRIP: NEGATIVE
HISTORY CHECKED?,CKHIST: NORMAL
KETONES UR QL STRIP.AUTO: NEGATIVE MG/DL
LEUKOCYTE ESTERASE UR QL STRIP.AUTO: ABNORMAL
MAGNESIUM SERPL-MCNC: 2.2 MG/DL (ref 1.8–2.4)
NITRITE UR QL STRIP.AUTO: NEGATIVE
PH UR STRIP: 6 [PH] (ref 5–9)
PROT UR STRIP-MCNC: NEGATIVE MG/DL
RBC #/AREA URNS HPF: ABNORMAL /HPF
SERVICE CMNT-IMP: NORMAL
SP GR UR REFRACTOMETRY: 1.02 (ref 1–1.02)
UROBILINOGEN UR QL STRIP.AUTO: 1 EU/DL (ref 0.2–1)
WBC URNS QL MICRO: ABNORMAL /HPF

## 2020-11-17 PROCEDURE — 71046 X-RAY EXAM CHEST 2 VIEWS: CPT

## 2020-11-17 PROCEDURE — 93880 EXTRACRANIAL BILAT STUDY: CPT

## 2020-11-17 PROCEDURE — 86923 COMPATIBILITY TEST ELECTRIC: CPT

## 2020-11-17 PROCEDURE — 87641 MR-STAPH DNA AMP PROBE: CPT

## 2020-11-17 PROCEDURE — 82962 GLUCOSE BLOOD TEST: CPT

## 2020-11-17 PROCEDURE — 83735 ASSAY OF MAGNESIUM: CPT

## 2020-11-17 PROCEDURE — 94010 BREATHING CAPACITY TEST: CPT

## 2020-11-17 PROCEDURE — 86900 BLOOD TYPING SEROLOGIC ABO: CPT

## 2020-11-17 PROCEDURE — 87635 SARS-COV-2 COVID-19 AMP PRB: CPT

## 2020-11-17 PROCEDURE — 81001 URINALYSIS AUTO W/SCOPE: CPT

## 2020-11-17 RX ORDER — OXYCODONE HYDROCHLORIDE 5 MG/1
10 TABLET ORAL
Status: CANCELLED | OUTPATIENT
Start: 2020-11-17 | End: 2020-11-18

## 2020-11-17 RX ORDER — DIPHENHYDRAMINE HYDROCHLORIDE 50 MG/ML
12.5 INJECTION, SOLUTION INTRAMUSCULAR; INTRAVENOUS ONCE
Status: CANCELLED | OUTPATIENT
Start: 2020-11-17 | End: 2020-11-17

## 2020-11-17 RX ORDER — OXYCODONE HYDROCHLORIDE 5 MG/1
5 TABLET ORAL
Status: CANCELLED | OUTPATIENT
Start: 2020-11-17 | End: 2020-11-18

## 2020-11-17 RX ORDER — AMIODARONE HYDROCHLORIDE 200 MG/1
600 TABLET ORAL ONCE
COMMUNITY
End: 2020-11-24

## 2020-11-17 RX ORDER — NALOXONE HYDROCHLORIDE 0.4 MG/ML
0.1 INJECTION, SOLUTION INTRAMUSCULAR; INTRAVENOUS; SUBCUTANEOUS AS NEEDED
Status: CANCELLED | OUTPATIENT
Start: 2020-11-17 | End: 2020-11-17

## 2020-11-17 RX ORDER — SODIUM CHLORIDE, SODIUM LACTATE, POTASSIUM CHLORIDE, CALCIUM CHLORIDE 600; 310; 30; 20 MG/100ML; MG/100ML; MG/100ML; MG/100ML
75 INJECTION, SOLUTION INTRAVENOUS CONTINUOUS
Status: CANCELLED | OUTPATIENT
Start: 2020-11-17

## 2020-11-17 RX ORDER — HYDROMORPHONE HYDROCHLORIDE 2 MG/ML
0.5 INJECTION, SOLUTION INTRAMUSCULAR; INTRAVENOUS; SUBCUTANEOUS
Status: CANCELLED | OUTPATIENT
Start: 2020-11-17

## 2020-11-17 NOTE — PERIOP NOTES
PLEASE CONTINUE TAKING ALL PRESCRIPTION MEDICATIONS UP TO THE DAY OF SURGERY UNLESS OTHERWISE DIRECTED BELOW. DISCONTINUE all vitamins and supplements 7 days prior to surgery. DISCONTINUE Non-Steriodal Anti-Inflammatory (NSAIDS) such as Advil and Aleve 5 days prior to surgery. Home Medications to take  the day of surgery   Diazepam (Valium) as needed    Zofran as needed  Duloxetine (Cymbalta)                 Promethazine (Phenergan) as needed   Glucagon as needed  Pregabalin (Lyrica)   HumuLIN R-80 % of your dose the night before and day of surgery, PM dose 68 units and day of surgery am dose 136 units     Home Medications   to Hold   Hold Linzess (linaclotide), Novolog Insulin day of surgery        Comments   Bring your Albuterol Inhaler and Nitroglycerin bottle with you day of surgery. Bring your CPAP with you   *Visitor policy of 1 visitor per patient discussed.  your Amiodorone at your local pharmacy and take it after 4 pm today, the night before your surgery. Please do not bring home medications with you on the day of surgery unless otherwise directed by your nurse. If you are instructed to bring home medications, please give them to your nurse as they will be administered by the nursing staff. If you have any questions, please call [address] [place] [address] [phone] or  [facility] [phone]. A copy of this note was provided to the patient for reference.

## 2020-11-17 NOTE — PROCEDURES
[facility]  PROCEDURE NOTE    Name:  Eleanor Love  MR#:  [id number]  :  1970  ACCOUNT #:  [de-identified]  DATE OF SERVICE:  2020    PROCEDURE PERFORMED:  Baseline spirometry. FINDINGS:  Spirometry and flow volume loops are normal.    IMPRESSION:  Normal baseline spirometry.       Tucker Oneil MD      [id number]/  D:  2020 10:44  T:  2020 16:00  JOB #:  [id number]

## 2020-11-17 NOTE — PERIOP NOTES
Patient verified name and . Patient provided medical/health information and PTA medications to the best of their ability. TYPE  CASE: III  Order for consent was found in EHR and matches case posting. Labs per surgeon: UA, MRSA, Type and cross, Mg  Labs per anesthesia protocol: CBC and BMP on 2020 acceptable, POC glucose today and on DOS  EKG:  Done 2020 and acceptable, no further orders    A rapid COVID swab was performed prior to PFT. Result negative. Patient provided with and instructed on educaitonal handouts including Heart Guide to Surgery, blood transfusions, pain management, central line infection prevention, being on a ventilator and hand hygiene for the family and community, and [facility] brochure. Instructed that family must be present in building at all times. Incentive spirometry completed with return demonstration. FEV1 completed as ordered. Patient viewed pre-operative DVD. Instructed on chest-xray procedure and carotid ultrasound. Instructed on type and cross match procedure and not to remove green blood bank bracelet. Instructed on medications- Amiodarone with Rx called into  [facility]. Patient take Metoprolol as prescribed each evening. Surgical skin cleanser provided and instructions given per hospital policy. Original medication prescription bottles were not visualized during patient appointment. Patient teach back successful and patient demonstrates knowledge of instruction.

## 2020-11-17 NOTE — PERIOP NOTES
Recent Results (from the past 12 hour(s))   URINALYSIS W/ RFLX MICROSCOPIC    Collection Time: 11/17/20  7:57 AM   Result Value Ref Range    Color YELLOW      Appearance CLEAR      Specific gravity 1.017 1.001 - 1.023      pH (UA) 6.0 5.0 - 9.0      Protein Negative NEG mg/dL    Glucose >1,000 mg/dL    Ketone Negative NEG mg/dL    Bilirubin Negative NEG      Blood Negative NEG      Urobilinogen 1.0 0.2 - 1.0 EU/dL    Nitrites Negative NEG      Leukocyte Esterase SMALL (A) NEG      WBC 3-5 0 /hpf    RBC 0-3 0 /hpf    Epithelial cells 0-3 0 /hpf    Bacteria 0 0 /hpf    Casts 0 0 /lpf   MAGNESIUM    Collection Time: 11/17/20  8:01 AM   Result Value Ref Range    Magnesium 2.2 1.8 - 2.4 mg/dL   MSSA/MRSA SC BY PCR, NASAL SWAB    Collection Time: 11/17/20  8:01 AM    Specimen: Swab   Result Value Ref Range    Special Requests: NO SPECIAL REQUESTS      Culture result:        SA target not detected. A MRSA NEGATIVE, SA NEGATIVE test result does not preclude MRSA or SA nasal colonization.    SARS-COV-2    Collection Time: 11/17/20  8:01 AM   Result Value Ref Range    Specimen source Nasopharyngeal      COVID-19 rapid test Not detected NOTD      SARS CoV-2 PENDING    TYPE & SCREEN    Collection Time: 11/17/20  8:53 AM   Result Value Ref Range    Crossmatch Expiration 11/20/2020,2359     ABO/Rh(D) O POSITIVE     Antibody screen NEG     Unit number [id number]     Blood component type  LR     Unit division 00     Status of unit ALLOCATED     Crossmatch result Compatible     Unit number [id number]     Blood component type RC LR     Unit division 00     Status of unit ALLOCATED     Crossmatch result Compatible     Unit number [id number]     Blood component type  LR     Unit division 00     Status of unit ALLOCATED     Crossmatch result Compatible     Unit number [id number]     Blood component type  LR     Unit division 00     Status of unit ALLOCATED     Crossmatch result Compatible GLUCOSE, POC    Collection Time: 11/17/20  9:06 AM   Result Value Ref Range    Glucose (POC) 102 (H) 65 - 100 mg/dL   RBC, ALLOCATE    Collection Time: 11/17/20  9:26 AM   Result Value Ref Range    HISTORY CHECKED? Historical check performed      CXR Results  (Last 48 hours)               11/17/20 1050  XR CHEST PA LAT Final result    Impression:  IMPRESSION: Negative for acute abnormality. Narrative:  CHEST X-RAY, 2 views. HISTORY:  Proper evaluation for CABG surgery. TECHNIQUE: PA and lateral views. COMPARISON: 29 made 2015. Aragon Plana FINDINGS:    Lungs: are clear. Costophrenic angles: are sharp. Heart size: is normal.    Pulmonary vasculature: is unremarkable. Aorta: Unremarkable. Included portion of the upper abdomen: is unremarkable. Bones: Mild thoracic DJD. Other: None. Carotid ultrasound results returned. Labs routed to Dr. Billy Hilliard.

## 2020-11-18 ENCOUNTER — HOSPITAL ENCOUNTER (INPATIENT)
Age: 50
LOS: 6 days | Discharge: HOME HEALTH | DRG: 236 | End: 2020-11-24
Attending: THORACIC SURGERY (CARDIOTHORACIC VASCULAR SURGERY) | Admitting: THORACIC SURGERY (CARDIOTHORACIC VASCULAR SURGERY)
Payer: COMMERCIAL

## 2020-11-18 ENCOUNTER — APPOINTMENT (OUTPATIENT)
Dept: GENERAL RADIOLOGY | Age: 50
DRG: 236 | End: 2020-11-18
Attending: THORACIC SURGERY (CARDIOTHORACIC VASCULAR SURGERY)
Payer: COMMERCIAL

## 2020-11-18 ENCOUNTER — ANESTHESIA (OUTPATIENT)
Dept: SURGERY | Age: 50
DRG: 236 | End: 2020-11-18
Payer: COMMERCIAL

## 2020-11-18 DIAGNOSIS — I25.10 CORONARY ARTERY DISEASE DUE TO LIPID RICH PLAQUE: ICD-10-CM

## 2020-11-18 DIAGNOSIS — G47.33 OSA (OBSTRUCTIVE SLEEP APNEA): ICD-10-CM

## 2020-11-18 DIAGNOSIS — F31.9 BIPOLAR AFFECTIVE DISORDER, REMISSION STATUS UNSPECIFIED (HCC): ICD-10-CM

## 2020-11-18 DIAGNOSIS — I47.1 SVT (SUPRAVENTRICULAR TACHYCARDIA) (HCC): ICD-10-CM

## 2020-11-18 DIAGNOSIS — E78.49 OTHER HYPERLIPIDEMIA: ICD-10-CM

## 2020-11-18 DIAGNOSIS — G25.81 RLS (RESTLESS LEGS SYNDROME): ICD-10-CM

## 2020-11-18 DIAGNOSIS — E11.9 TYPE 2 DIABETES MELLITUS WITHOUT COMPLICATION, WITH LONG-TERM CURRENT USE OF INSULIN (HCC): Chronic | ICD-10-CM

## 2020-11-18 DIAGNOSIS — Z79.4 TYPE 2 DIABETES MELLITUS WITHOUT COMPLICATION, WITH LONG-TERM CURRENT USE OF INSULIN (HCC): Chronic | ICD-10-CM

## 2020-11-18 DIAGNOSIS — Z95.1 S/P CABG X 4: ICD-10-CM

## 2020-11-18 DIAGNOSIS — I10 ESSENTIAL HYPERTENSION: ICD-10-CM

## 2020-11-18 DIAGNOSIS — I48.0 PAROXYSMAL ATRIAL FIBRILLATION (HCC): ICD-10-CM

## 2020-11-18 DIAGNOSIS — I25.83 CORONARY ARTERY DISEASE DUE TO LIPID RICH PLAQUE: ICD-10-CM

## 2020-11-18 DIAGNOSIS — I25.119 CORONARY ARTERY DISEASE INVOLVING NATIVE CORONARY ARTERY OF NATIVE HEART WITH ANGINA PECTORIS (HCC): ICD-10-CM

## 2020-11-18 DIAGNOSIS — I25.110 ATHEROSCLEROSIS OF NATIVE CORONARY ARTERY OF NATIVE HEART WITH UNSTABLE ANGINA PECTORIS (HCC): ICD-10-CM

## 2020-11-18 DIAGNOSIS — Z99.11 ENCOUNTER FOR WEANING FROM VENTILATOR (HCC): ICD-10-CM

## 2020-11-18 DIAGNOSIS — E66.01 MORBID OBESITY (HCC): ICD-10-CM

## 2020-11-18 LAB
ANION GAP SERPL CALC-SCNC: 7 MMOL/L (ref 7–16)
APTT PPP: 24.9 SEC (ref 24.1–35.1)
ARTERIAL PATENCY WRIST A: YES
ARTERIAL PATENCY WRIST A: YES
ATRIAL RATE: 73 BPM
BASE DEFICIT BLD-SCNC: 2 MMOL/L
BASE DEFICIT BLD-SCNC: 5 MMOL/L
BDY SITE: ABNORMAL
BDY SITE: ABNORMAL
BUN SERPL-MCNC: 13 MG/DL (ref 6–23)
CA-I BLD-MCNC: 1.3 MMOL/L (ref 1.12–1.32)
CALCIUM SERPL-MCNC: 8 MG/DL (ref 8.3–10.4)
CALCULATED P AXIS, ECG09: 49 DEGREES
CALCULATED R AXIS, ECG10: 52 DEGREES
CALCULATED T AXIS, ECG11: 53 DEGREES
CHLORIDE SERPL-SCNC: 116 MMOL/L (ref 98–107)
CO2 BLD-SCNC: 25 MMOL/L
CO2 SERPL-SCNC: 24 MMOL/L (ref 21–32)
COLLECT TIME,HTIME: 1400
COLLECT TIME,HTIME: 1558
CREAT SERPL-MCNC: 0.71 MG/DL (ref 0.6–1)
DIAGNOSIS, 93000: NORMAL
ERYTHROCYTE [DISTWIDTH] IN BLOOD BY AUTOMATED COUNT: 12.8 % (ref 11.9–14.6)
EXHALED MINUTE VOLUME, VE: 6.1 L/MIN
EXHALED MINUTE VOLUME, VE: 9.1 L/MIN
FIBRINOGEN PPP-MCNC: 278 MG/DL (ref 190–501)
GAS FLOW.O2 O2 DELIVERY SYS: ABNORMAL L/MIN
GAS FLOW.O2 O2 DELIVERY SYS: ABNORMAL L/MIN
GAS FLOW.O2 SETTING OXYMISER: 16 BPM
GLUCOSE BLD STRIP.AUTO-MCNC: 107 MG/DL (ref 65–100)
GLUCOSE BLD STRIP.AUTO-MCNC: 114 MG/DL (ref 65–100)
GLUCOSE BLD STRIP.AUTO-MCNC: 114 MG/DL (ref 65–100)
GLUCOSE BLD STRIP.AUTO-MCNC: 120 MG/DL (ref 65–100)
GLUCOSE BLD STRIP.AUTO-MCNC: 120 MG/DL (ref 65–100)
GLUCOSE BLD STRIP.AUTO-MCNC: 122 MG/DL (ref 65–100)
GLUCOSE BLD STRIP.AUTO-MCNC: 124 MG/DL (ref 65–100)
GLUCOSE BLD STRIP.AUTO-MCNC: 125 MG/DL (ref 65–100)
GLUCOSE BLD STRIP.AUTO-MCNC: 131 MG/DL (ref 65–100)
GLUCOSE BLD STRIP.AUTO-MCNC: 133 MG/DL (ref 65–100)
GLUCOSE BLD STRIP.AUTO-MCNC: 134 MG/DL (ref 65–100)
GLUCOSE SERPL-MCNC: 126 MG/DL (ref 65–100)
HCO3 BLD-SCNC: 21.9 MMOL/L (ref 22–26)
HCO3 BLD-SCNC: 23.5 MMOL/L (ref 22–26)
HCT VFR BLD AUTO: 34.7 % (ref 35.8–46.3)
HCT VFR BLD AUTO: 36.2 % (ref 35.8–46.3)
HCT VFR BLD AUTO: 36.4 % (ref 35.8–46.3)
HGB BLD-MCNC: 11.4 G/DL (ref 11.7–15.4)
HGB BLD-MCNC: 11.6 G/DL (ref 11.7–15.4)
HGB BLD-MCNC: 11.7 G/DL (ref 11.7–15.4)
INR PPP: 1.2
INSPIRATION.DURATION SETTING TIME VENT: 1.24 SEC
MAGNESIUM SERPL-MCNC: 2.6 MG/DL (ref 1.8–2.4)
MAGNESIUM SERPL-MCNC: 2.7 MG/DL (ref 1.8–2.4)
MAGNESIUM SERPL-MCNC: 3 MG/DL (ref 1.8–2.4)
MCH RBC QN AUTO: 28.3 PG (ref 26.1–32.9)
MCHC RBC AUTO-ENTMCNC: 32 G/DL (ref 31.4–35)
MCV RBC AUTO: 88.3 FL (ref 79.6–97.8)
NRBC # BLD: 0 K/UL (ref 0–0.2)
O2/TOTAL GAS SETTING VFR VENT: 40 %
O2/TOTAL GAS SETTING VFR VENT: 60 %
P-R INTERVAL, ECG05: 132 MS
PCO2 BLD: 43.9 MMHG (ref 35–45)
PCO2 BLD: 49.7 MMHG (ref 35–45)
PEEP RESPIRATORY: 8 CMH2O
PEEP RESPIRATORY: 8 CMH2O
PH BLD: 7.25 [PH] (ref 7.35–7.45)
PH BLD: 7.34 [PH] (ref 7.35–7.45)
PLATELET # BLD AUTO: 248 K/UL (ref 150–450)
PMV BLD AUTO: 9 FL (ref 9.4–12.3)
PO2 BLD: 101 MMHG (ref 75–100)
PO2 BLD: 104 MMHG (ref 75–100)
POTASSIUM BLD-SCNC: 3.9 MMOL/L (ref 3.5–5.1)
POTASSIUM BLD-SCNC: 4.2 MMOL/L (ref 3.5–5.1)
POTASSIUM SERPL-SCNC: 4.3 MMOL/L (ref 3.5–5.1)
POTASSIUM SERPL-SCNC: 4.5 MMOL/L (ref 3.5–5.1)
POTASSIUM SERPL-SCNC: 4.6 MMOL/L (ref 3.5–5.1)
PRESSURE SUPPORT SETTING VENT: 8 CMH2O
PROTHROMBIN TIME: 15.8 SEC (ref 12.5–14.7)
Q-T INTERVAL, ECG07: 436 MS
QRS DURATION, ECG06: 92 MS
QTC CALCULATION (BEZET), ECG08: 480 MS
RBC # BLD AUTO: 4.1 M/UL (ref 4.05–5.2)
SAO2 % BLD: 97 % (ref 95–98)
SAO2 % BLD: 97 % (ref 95–98)
SERVICE CMNT-IMP: ABNORMAL
SODIUM BLD-SCNC: 144 MMOL/L (ref 136–145)
SODIUM SERPL-SCNC: 147 MMOL/L (ref 136–145)
SPECIMEN TYPE: ABNORMAL
SPECIMEN TYPE: ABNORMAL
TOTAL RESP. RATE, ITRR: 23
VENTILATION MODE VENT: ABNORMAL
VENTILATION MODE VENT: ABNORMAL
VENTRICULAR RATE, ECG03: 73 BPM
VOLUME SUPPORT, IVLSP: 400 ML
VT SETTING VENT: 400 ML
WBC # BLD AUTO: 25.1 K/UL (ref 4.3–11.1)

## 2020-11-18 PROCEDURE — P9047 ALBUMIN (HUMAN), 25%, 50ML: HCPCS

## 2020-11-18 PROCEDURE — 74011250636 HC RX REV CODE- 250/636: Performed by: THORACIC SURGERY (CARDIOTHORACIC VASCULAR SURGERY)

## 2020-11-18 PROCEDURE — 77030003010 HC SUT SURG STL J&J -B: Performed by: THORACIC SURGERY (CARDIOTHORACIC VASCULAR SURGERY)

## 2020-11-18 PROCEDURE — 77030018729 HC ELECTRD DEFIB PAD CARD -B: Performed by: THORACIC SURGERY (CARDIOTHORACIC VASCULAR SURGERY)

## 2020-11-18 PROCEDURE — 77030020751 HC FLTR TBNG TRNSFUS HAEM -A: Performed by: NURSE ANESTHETIST, CERTIFIED REGISTERED

## 2020-11-18 PROCEDURE — 85730 THROMBOPLASTIN TIME PARTIAL: CPT

## 2020-11-18 PROCEDURE — 77030010938 HC CLP LIG TELE -A: Performed by: THORACIC SURGERY (CARDIOTHORACIC VASCULAR SURGERY)

## 2020-11-18 PROCEDURE — 77030002987 HC SUT PROL J&J -B: Performed by: THORACIC SURGERY (CARDIOTHORACIC VASCULAR SURGERY)

## 2020-11-18 PROCEDURE — 93005 ELECTROCARDIOGRAM TRACING: CPT | Performed by: THORACIC SURGERY (CARDIOTHORACIC VASCULAR SURGERY)

## 2020-11-18 PROCEDURE — 74011636637 HC RX REV CODE- 636/637: Performed by: NURSE ANESTHETIST, CERTIFIED REGISTERED

## 2020-11-18 PROCEDURE — 83735 ASSAY OF MAGNESIUM: CPT

## 2020-11-18 PROCEDURE — P9045 ALBUMIN (HUMAN), 5%, 250 ML: HCPCS | Performed by: THORACIC SURGERY (CARDIOTHORACIC VASCULAR SURGERY)

## 2020-11-18 PROCEDURE — 84132 ASSAY OF SERUM POTASSIUM: CPT

## 2020-11-18 PROCEDURE — 74011000258 HC RX REV CODE- 258: Performed by: NURSE ANESTHETIST, CERTIFIED REGISTERED

## 2020-11-18 PROCEDURE — 65610000006 HC RM INTENSIVE CARE

## 2020-11-18 PROCEDURE — 77030002966 HC SUT PDS J&J -A: Performed by: THORACIC SURGERY (CARDIOTHORACIC VASCULAR SURGERY)

## 2020-11-18 PROCEDURE — 77030014007 HC SPNG HEMSTAT J&J -B: Performed by: THORACIC SURGERY (CARDIOTHORACIC VASCULAR SURGERY)

## 2020-11-18 PROCEDURE — 94002 VENT MGMT INPAT INIT DAY: CPT

## 2020-11-18 PROCEDURE — 02100Z9 BYPASS CORONARY ARTERY, ONE ARTERY FROM LEFT INTERNAL MAMMARY, OPEN APPROACH: ICD-10-PCS | Performed by: THORACIC SURGERY (CARDIOTHORACIC VASCULAR SURGERY)

## 2020-11-18 PROCEDURE — 77030013861 HC PNCH AORT CLNCUT QUES -B: Performed by: THORACIC SURGERY (CARDIOTHORACIC VASCULAR SURGERY)

## 2020-11-18 PROCEDURE — 77030009995: Performed by: THORACIC SURGERY (CARDIOTHORACIC VASCULAR SURGERY)

## 2020-11-18 PROCEDURE — 86580 TB INTRADERMAL TEST: CPT | Performed by: THORACIC SURGERY (CARDIOTHORACIC VASCULAR SURGERY)

## 2020-11-18 PROCEDURE — 74011250636 HC RX REV CODE- 250/636: Performed by: ANESTHESIOLOGY

## 2020-11-18 PROCEDURE — 77030009355 HC CRDPLG DEL SET QUES -C: Performed by: THORACIC SURGERY (CARDIOTHORACIC VASCULAR SURGERY)

## 2020-11-18 PROCEDURE — 82947 ASSAY GLUCOSE BLOOD QUANT: CPT

## 2020-11-18 PROCEDURE — 77030008477 HC STYL SATN SLP COVD -A: Performed by: ANESTHESIOLOGY

## 2020-11-18 PROCEDURE — 77030005401 HC CATH RAD ARRO -A: Performed by: NURSE ANESTHETIST, CERTIFIED REGISTERED

## 2020-11-18 PROCEDURE — 85384 FIBRINOGEN ACTIVITY: CPT

## 2020-11-18 PROCEDURE — 74011000250 HC RX REV CODE- 250: Performed by: THORACIC SURGERY (CARDIOTHORACIC VASCULAR SURGERY)

## 2020-11-18 PROCEDURE — C1729 CATH, DRAINAGE: HCPCS | Performed by: THORACIC SURGERY (CARDIOTHORACIC VASCULAR SURGERY)

## 2020-11-18 PROCEDURE — 77030039425 HC BLD LARYNG TRULITE DISP TELE -A: Performed by: ANESTHESIOLOGY

## 2020-11-18 PROCEDURE — 85610 PROTHROMBIN TIME: CPT

## 2020-11-18 PROCEDURE — P9045 ALBUMIN (HUMAN), 5%, 250 ML: HCPCS | Performed by: NURSE ANESTHETIST, CERTIFIED REGISTERED

## 2020-11-18 PROCEDURE — 77030020407 HC IV BLD WRMR ST 3M -A: Performed by: NURSE ANESTHETIST, CERTIFIED REGISTERED

## 2020-11-18 PROCEDURE — 77030025646 HC AUTOTRNSFUS KT TERU -C: Performed by: THORACIC SURGERY (CARDIOTHORACIC VASCULAR SURGERY)

## 2020-11-18 PROCEDURE — 77030018673: Performed by: THORACIC SURGERY (CARDIOTHORACIC VASCULAR SURGERY)

## 2020-11-18 PROCEDURE — 82803 BLOOD GASES ANY COMBINATION: CPT

## 2020-11-18 PROCEDURE — 77030020751 HC FLTR TBNG TRNSFUS HAEM -A: Performed by: THORACIC SURGERY (CARDIOTHORACIC VASCULAR SURGERY)

## 2020-11-18 PROCEDURE — 77030013794 HC KT TRNSDUC BLD EDWD -B: Performed by: NURSE ANESTHETIST, CERTIFIED REGISTERED

## 2020-11-18 PROCEDURE — 77030002996 HC SUT SLK J&J -A: Performed by: THORACIC SURGERY (CARDIOTHORACIC VASCULAR SURGERY)

## 2020-11-18 PROCEDURE — 77030018548 HC SUT ETHBND2 J&J -B: Performed by: THORACIC SURGERY (CARDIOTHORACIC VASCULAR SURGERY)

## 2020-11-18 PROCEDURE — 77030002986 HC SUT PROL J&J -A: Performed by: THORACIC SURGERY (CARDIOTHORACIC VASCULAR SURGERY)

## 2020-11-18 PROCEDURE — C1751 CATH, INF, PER/CENT/MIDLINE: HCPCS | Performed by: NURSE ANESTHETIST, CERTIFIED REGISTERED

## 2020-11-18 PROCEDURE — 99223 1ST HOSP IP/OBS HIGH 75: CPT | Performed by: INTERNAL MEDICINE

## 2020-11-18 PROCEDURE — 77030005518 HC CATH URETH FOL 2W BARD -B: Performed by: THORACIC SURGERY (CARDIOTHORACIC VASCULAR SURGERY)

## 2020-11-18 PROCEDURE — 5A09357 ASSISTANCE WITH RESPIRATORY VENTILATION, LESS THAN 24 CONSECUTIVE HOURS, CONTINUOUS POSITIVE AIRWAY PRESSURE: ICD-10-PCS | Performed by: THORACIC SURGERY (CARDIOTHORACIC VASCULAR SURGERY)

## 2020-11-18 PROCEDURE — 74011250637 HC RX REV CODE- 250/637: Performed by: PHYSICIAN ASSISTANT

## 2020-11-18 PROCEDURE — 77030010512 HC APPL CLP LIG J&J -C: Performed by: THORACIC SURGERY (CARDIOTHORACIC VASCULAR SURGERY)

## 2020-11-18 PROCEDURE — 77030040922 HC BLNKT HYPOTHRM STRY -A: Performed by: NURSE ANESTHETIST, CERTIFIED REGISTERED

## 2020-11-18 PROCEDURE — 77030008771 HC TU NG SALEM SUMP -A: Performed by: ANESTHESIOLOGY

## 2020-11-18 PROCEDURE — 5A1221Z PERFORMANCE OF CARDIAC OUTPUT, CONTINUOUS: ICD-10-PCS | Performed by: THORACIC SURGERY (CARDIOTHORACIC VASCULAR SURGERY)

## 2020-11-18 PROCEDURE — 77030034888 HC SUT PROL 2 J&J -B: Performed by: THORACIC SURGERY (CARDIOTHORACIC VASCULAR SURGERY)

## 2020-11-18 PROCEDURE — 77030019908 HC STETH ESOPH SIMS -A: Performed by: NURSE ANESTHETIST, CERTIFIED REGISTERED

## 2020-11-18 PROCEDURE — 021209W BYPASS CORONARY ARTERY, THREE ARTERIES FROM AORTA WITH AUTOLOGOUS VENOUS TISSUE, OPEN APPROACH: ICD-10-PCS | Performed by: THORACIC SURGERY (CARDIOTHORACIC VASCULAR SURGERY)

## 2020-11-18 PROCEDURE — P9045 ALBUMIN (HUMAN), 5%, 250 ML: HCPCS

## 2020-11-18 PROCEDURE — C1769 GUIDE WIRE: HCPCS | Performed by: THORACIC SURGERY (CARDIOTHORACIC VASCULAR SURGERY)

## 2020-11-18 PROCEDURE — 74011250636 HC RX REV CODE- 250/636

## 2020-11-18 PROCEDURE — 77030020230: Performed by: NURSE ANESTHETIST, CERTIFIED REGISTERED

## 2020-11-18 PROCEDURE — 76010000219 HC CV SURG 6 TO 6.5 HR INTENSV-TIER 1: Performed by: THORACIC SURGERY (CARDIOTHORACIC VASCULAR SURGERY)

## 2020-11-18 PROCEDURE — 77030031139 HC SUT VCRL2 J&J -A: Performed by: THORACIC SURGERY (CARDIOTHORACIC VASCULAR SURGERY)

## 2020-11-18 PROCEDURE — 77030013292 HC BOWL MX PRSM J&J -A: Performed by: NURSE ANESTHETIST, CERTIFIED REGISTERED

## 2020-11-18 PROCEDURE — 77030037088 HC TUBE ENDOTRACH ORAL NSL COVD-A: Performed by: ANESTHESIOLOGY

## 2020-11-18 PROCEDURE — 77030018547 HC SUT ETHBND1 J&J -B: Performed by: THORACIC SURGERY (CARDIOTHORACIC VASCULAR SURGERY)

## 2020-11-18 PROCEDURE — 74011000250 HC RX REV CODE- 250: Performed by: NURSE ANESTHETIST, CERTIFIED REGISTERED

## 2020-11-18 PROCEDURE — 74011636637 HC RX REV CODE- 636/637: Performed by: THORACIC SURGERY (CARDIOTHORACIC VASCULAR SURGERY)

## 2020-11-18 PROCEDURE — 85027 COMPLETE CBC AUTOMATED: CPT

## 2020-11-18 PROCEDURE — 77030012390 HC DRN CHST BTL GTNG -B: Performed by: THORACIC SURGERY (CARDIOTHORACIC VASCULAR SURGERY)

## 2020-11-18 PROCEDURE — 77030021678 HC GLIDESCP STAT DISP VERT -B: Performed by: ANESTHESIOLOGY

## 2020-11-18 PROCEDURE — 77030025827 HC BG BLD DNR AUTLG MEDT -A: Performed by: THORACIC SURGERY (CARDIOTHORACIC VASCULAR SURGERY)

## 2020-11-18 PROCEDURE — 2709999900 HC NON-CHARGEABLE SUPPLY: Performed by: THORACIC SURGERY (CARDIOTHORACIC VASCULAR SURGERY)

## 2020-11-18 PROCEDURE — 77030013797 HC KT TRNSDUC PRSSR EDWD -A: Performed by: THORACIC SURGERY (CARDIOTHORACIC VASCULAR SURGERY)

## 2020-11-18 PROCEDURE — 77030006689 HC BLD OPHTH BVR BD -A: Performed by: THORACIC SURGERY (CARDIOTHORACIC VASCULAR SURGERY)

## 2020-11-18 PROCEDURE — 77030005537 HC CATH URETH BARD -A: Performed by: THORACIC SURGERY (CARDIOTHORACIC VASCULAR SURGERY)

## 2020-11-18 PROCEDURE — 77030016564 HC BLD STRNL SAW4 CNMD -B: Performed by: THORACIC SURGERY (CARDIOTHORACIC VASCULAR SURGERY)

## 2020-11-18 PROCEDURE — 74011250637 HC RX REV CODE- 250/637: Performed by: THORACIC SURGERY (CARDIOTHORACIC VASCULAR SURGERY)

## 2020-11-18 PROCEDURE — 85018 HEMOGLOBIN: CPT

## 2020-11-18 PROCEDURE — 06BQ4ZZ EXCISION OF LEFT SAPHENOUS VEIN, PERCUTANEOUS ENDOSCOPIC APPROACH: ICD-10-PCS | Performed by: THORACIC SURGERY (CARDIOTHORACIC VASCULAR SURGERY)

## 2020-11-18 PROCEDURE — 36600 WITHDRAWAL OF ARTERIAL BLOOD: CPT

## 2020-11-18 PROCEDURE — 71045 X-RAY EXAM CHEST 1 VIEW: CPT

## 2020-11-18 PROCEDURE — 77030010514 HC APPL CLP LIG COVD -B: Performed by: THORACIC SURGERY (CARDIOTHORACIC VASCULAR SURGERY)

## 2020-11-18 PROCEDURE — 77030003037 HC SUT WRE STRNOTMY AEMC -B: Performed by: THORACIC SURGERY (CARDIOTHORACIC VASCULAR SURGERY)

## 2020-11-18 PROCEDURE — 77030018571 HC SUT PROL1 J&J -B: Performed by: THORACIC SURGERY (CARDIOTHORACIC VASCULAR SURGERY)

## 2020-11-18 PROCEDURE — 82962 GLUCOSE BLOOD TEST: CPT

## 2020-11-18 PROCEDURE — 77030013452 HC CLP LIGACLIP J&J -B: Performed by: THORACIC SURGERY (CARDIOTHORACIC VASCULAR SURGERY)

## 2020-11-18 PROCEDURE — B246ZZ4 ULTRASONOGRAPHY OF RIGHT AND LEFT HEART, TRANSESOPHAGEAL: ICD-10-PCS | Performed by: THORACIC SURGERY (CARDIOTHORACIC VASCULAR SURGERY)

## 2020-11-18 PROCEDURE — 77030012890

## 2020-11-18 PROCEDURE — 77030016687: Performed by: THORACIC SURGERY (CARDIOTHORACIC VASCULAR SURGERY)

## 2020-11-18 PROCEDURE — 74011250636 HC RX REV CODE- 250/636: Performed by: NURSE ANESTHETIST, CERTIFIED REGISTERED

## 2020-11-18 PROCEDURE — 74011000250 HC RX REV CODE- 250

## 2020-11-18 PROCEDURE — 77030010813: Performed by: THORACIC SURGERY (CARDIOTHORACIC VASCULAR SURGERY)

## 2020-11-18 PROCEDURE — 80048 BASIC METABOLIC PNL TOTAL CA: CPT

## 2020-11-18 PROCEDURE — 74011000302 HC RX REV CODE- 302: Performed by: THORACIC SURGERY (CARDIOTHORACIC VASCULAR SURGERY)

## 2020-11-18 PROCEDURE — 76060000043 HC ANESTHESIA 6 TO 6.5 HR: Performed by: THORACIC SURGERY (CARDIOTHORACIC VASCULAR SURGERY)

## 2020-11-18 PROCEDURE — 77030002520 HC INSRT CLMP LATIS STLTH AMR -B: Performed by: THORACIC SURGERY (CARDIOTHORACIC VASCULAR SURGERY)

## 2020-11-18 PROCEDURE — 74011250636 HC RX REV CODE- 250/636: Performed by: PHYSICIAN ASSISTANT

## 2020-11-18 PROCEDURE — 74011000258 HC RX REV CODE- 258

## 2020-11-18 PROCEDURE — 93010 ELECTROCARDIOGRAM REPORT: CPT | Performed by: INTERNAL MEDICINE

## 2020-11-18 PROCEDURE — 2709999900 HC NON-CHARGEABLE SUPPLY

## 2020-11-18 RX ORDER — MIDAZOLAM HYDROCHLORIDE 1 MG/ML
2 INJECTION, SOLUTION INTRAMUSCULAR; INTRAVENOUS
Status: DISCONTINUED | OUTPATIENT
Start: 2020-11-18 | End: 2020-11-18 | Stop reason: HOSPADM

## 2020-11-18 RX ORDER — SODIUM CHLORIDE, SODIUM LACTATE, POTASSIUM CHLORIDE, CALCIUM CHLORIDE 600; 310; 30; 20 MG/100ML; MG/100ML; MG/100ML; MG/100ML
INJECTION, SOLUTION INTRAVENOUS
Status: DISCONTINUED | OUTPATIENT
Start: 2020-11-18 | End: 2020-11-18 | Stop reason: HOSPADM

## 2020-11-18 RX ORDER — NOREPINEPHRINE BITARTRATE/D5W 4MG/250ML
.5-16 PLASTIC BAG, INJECTION (ML) INTRAVENOUS
Status: DISCONTINUED | OUTPATIENT
Start: 2020-11-18 | End: 2020-11-19

## 2020-11-18 RX ORDER — SODIUM CHLORIDE 9 MG/ML
25 INJECTION, SOLUTION INTRAVENOUS CONTINUOUS
Status: DISCONTINUED | OUTPATIENT
Start: 2020-11-18 | End: 2020-11-19

## 2020-11-18 RX ORDER — PAPAVERINE HYDROCHLORIDE 30 MG/ML
INJECTION INTRAMUSCULAR; INTRAVENOUS AS NEEDED
Status: DISCONTINUED | OUTPATIENT
Start: 2020-11-18 | End: 2020-11-18 | Stop reason: HOSPADM

## 2020-11-18 RX ORDER — DEXTROSE 50 % IN WATER (D50W) INTRAVENOUS SYRINGE
25 AS NEEDED
Status: DISCONTINUED | OUTPATIENT
Start: 2020-11-18 | End: 2020-11-19

## 2020-11-18 RX ORDER — AMIODARONE HYDROCHLORIDE 200 MG/1
200 TABLET ORAL EVERY 12 HOURS
Status: DISCONTINUED | OUTPATIENT
Start: 2020-11-18 | End: 2020-11-19

## 2020-11-18 RX ORDER — ALBUMIN HUMAN 50 G/1000ML
SOLUTION INTRAVENOUS AS NEEDED
Status: DISCONTINUED | OUTPATIENT
Start: 2020-11-18 | End: 2020-11-18 | Stop reason: HOSPADM

## 2020-11-18 RX ORDER — NITROGLYCERIN 20 MG/100ML
INJECTION INTRAVENOUS
Status: DISCONTINUED | OUTPATIENT
Start: 2020-11-18 | End: 2020-11-18 | Stop reason: HOSPADM

## 2020-11-18 RX ORDER — SUFENTANIL CITRATE 50 UG/ML
INJECTION EPIDURAL; INTRAVENOUS AS NEEDED
Status: DISCONTINUED | OUTPATIENT
Start: 2020-11-18 | End: 2020-11-18 | Stop reason: HOSPADM

## 2020-11-18 RX ORDER — PROPOFOL 10 MG/ML
0-50 VIAL (ML) INTRAVENOUS
Status: DISCONTINUED | OUTPATIENT
Start: 2020-11-18 | End: 2020-11-19

## 2020-11-18 RX ORDER — LIDOCAINE HYDROCHLORIDE 10 MG/ML
0.1 INJECTION INFILTRATION; PERINEURAL AS NEEDED
Status: DISCONTINUED | OUTPATIENT
Start: 2020-11-18 | End: 2020-11-18 | Stop reason: HOSPADM

## 2020-11-18 RX ORDER — SODIUM CHLORIDE, SODIUM LACTATE, POTASSIUM CHLORIDE, CALCIUM CHLORIDE 600; 310; 30; 20 MG/100ML; MG/100ML; MG/100ML; MG/100ML
1000 INJECTION, SOLUTION INTRAVENOUS CONTINUOUS
Status: DISCONTINUED | OUTPATIENT
Start: 2020-11-18 | End: 2020-11-18 | Stop reason: HOSPADM

## 2020-11-18 RX ORDER — MIDAZOLAM HYDROCHLORIDE 1 MG/ML
INJECTION, SOLUTION INTRAMUSCULAR; INTRAVENOUS AS NEEDED
Status: DISCONTINUED | OUTPATIENT
Start: 2020-11-18 | End: 2020-11-18 | Stop reason: HOSPADM

## 2020-11-18 RX ORDER — CEFAZOLIN SODIUM/WATER 2 G/20 ML
2 SYRINGE (ML) INTRAVENOUS ONCE
Status: COMPLETED | OUTPATIENT
Start: 2020-11-18 | End: 2020-11-18

## 2020-11-18 RX ORDER — GUAIFENESIN 100 MG/5ML
81 LIQUID (ML) ORAL DAILY
Status: DISCONTINUED | OUTPATIENT
Start: 2020-11-19 | End: 2020-11-19

## 2020-11-18 RX ORDER — DEXTROSE, SODIUM CHLORIDE, AND POTASSIUM CHLORIDE 5; .45; .15 G/100ML; G/100ML; G/100ML
25 INJECTION INTRAVENOUS CONTINUOUS
Status: DISCONTINUED | OUTPATIENT
Start: 2020-11-18 | End: 2020-11-19

## 2020-11-18 RX ORDER — MAGNESIUM SULFATE 1 G/100ML
1 INJECTION INTRAVENOUS AS NEEDED
Status: DISCONTINUED | OUTPATIENT
Start: 2020-11-18 | End: 2020-11-19

## 2020-11-18 RX ORDER — EPHEDRINE SULFATE/0.9% NACL/PF 50 MG/5 ML
SYRINGE (ML) INTRAVENOUS AS NEEDED
Status: DISCONTINUED | OUTPATIENT
Start: 2020-11-18 | End: 2020-11-18 | Stop reason: HOSPADM

## 2020-11-18 RX ORDER — CHLORHEXIDINE GLUCONATE 1.2 MG/ML
10 RINSE ORAL 2 TIMES DAILY
Status: DISCONTINUED | OUTPATIENT
Start: 2020-11-18 | End: 2020-11-19

## 2020-11-18 RX ORDER — INSULIN GLARGINE 100 [IU]/ML
40 INJECTION, SOLUTION SUBCUTANEOUS ONCE
Status: COMPLETED | OUTPATIENT
Start: 2020-11-18 | End: 2020-11-18

## 2020-11-18 RX ORDER — HEPARIN SODIUM 1000 [USP'U]/ML
INJECTION, SOLUTION INTRAVENOUS; SUBCUTANEOUS AS NEEDED
Status: DISCONTINUED | OUTPATIENT
Start: 2020-11-18 | End: 2020-11-18 | Stop reason: HOSPADM

## 2020-11-18 RX ORDER — OXYCODONE AND ACETAMINOPHEN 5; 325 MG/1; MG/1
1 TABLET ORAL
Status: DISCONTINUED | OUTPATIENT
Start: 2020-11-18 | End: 2020-11-19 | Stop reason: SDUPTHER

## 2020-11-18 RX ORDER — SODIUM BICARBONATE 84 MG/ML
50 INJECTION, SOLUTION INTRAVENOUS AS NEEDED
Status: DISCONTINUED | OUTPATIENT
Start: 2020-11-18 | End: 2020-11-19

## 2020-11-18 RX ORDER — HEPARIN SODIUM 200 [USP'U]/100ML
INJECTION, SOLUTION INTRAVENOUS
Status: COMPLETED | OUTPATIENT
Start: 2020-11-18 | End: 2020-11-18

## 2020-11-18 RX ORDER — VANCOMYCIN HYDROCHLORIDE 1 G/20ML
INJECTION, POWDER, LYOPHILIZED, FOR SOLUTION INTRAVENOUS AS NEEDED
Status: DISCONTINUED | OUTPATIENT
Start: 2020-11-18 | End: 2020-11-18 | Stop reason: HOSPADM

## 2020-11-18 RX ORDER — VECURONIUM BROMIDE FOR INJECTION 1 MG/ML
INJECTION, POWDER, LYOPHILIZED, FOR SOLUTION INTRAVENOUS AS NEEDED
Status: DISCONTINUED | OUTPATIENT
Start: 2020-11-18 | End: 2020-11-18 | Stop reason: HOSPADM

## 2020-11-18 RX ORDER — ALBUMIN HUMAN 50 G/1000ML
SOLUTION INTRAVENOUS
Status: COMPLETED | OUTPATIENT
Start: 2020-11-18 | End: 2020-11-18

## 2020-11-18 RX ORDER — NALOXONE HYDROCHLORIDE 0.4 MG/ML
0.4 INJECTION, SOLUTION INTRAMUSCULAR; INTRAVENOUS; SUBCUTANEOUS AS NEEDED
Status: DISCONTINUED | OUTPATIENT
Start: 2020-11-18 | End: 2020-11-19

## 2020-11-18 RX ORDER — MIDAZOLAM HYDROCHLORIDE 1 MG/ML
1 INJECTION, SOLUTION INTRAMUSCULAR; INTRAVENOUS
Status: DISCONTINUED | OUTPATIENT
Start: 2020-11-18 | End: 2020-11-19

## 2020-11-18 RX ORDER — MORPHINE SULFATE 10 MG/ML
3-5 INJECTION, SOLUTION INTRAMUSCULAR; INTRAVENOUS
Status: DISCONTINUED | OUTPATIENT
Start: 2020-11-18 | End: 2020-11-19

## 2020-11-18 RX ORDER — CEFAZOLIN SODIUM/WATER 2 G/20 ML
2 SYRINGE (ML) INTRAVENOUS EVERY 8 HOURS
Status: COMPLETED | OUTPATIENT
Start: 2020-11-18 | End: 2020-11-19

## 2020-11-18 RX ORDER — SODIUM CHLORIDE 0.9 % (FLUSH) 0.9 %
5-40 SYRINGE (ML) INJECTION EVERY 8 HOURS
Status: DISCONTINUED | OUTPATIENT
Start: 2020-11-18 | End: 2020-11-19

## 2020-11-18 RX ORDER — ROCURONIUM BROMIDE 10 MG/ML
INJECTION, SOLUTION INTRAVENOUS AS NEEDED
Status: DISCONTINUED | OUTPATIENT
Start: 2020-11-18 | End: 2020-11-18 | Stop reason: HOSPADM

## 2020-11-18 RX ORDER — PROTAMINE SULFATE 10 MG/ML
INJECTION, SOLUTION INTRAVENOUS AS NEEDED
Status: DISCONTINUED | OUTPATIENT
Start: 2020-11-18 | End: 2020-11-18 | Stop reason: HOSPADM

## 2020-11-18 RX ORDER — SODIUM CHLORIDE 0.9 % (FLUSH) 0.9 %
5-40 SYRINGE (ML) INJECTION AS NEEDED
Status: DISCONTINUED | OUTPATIENT
Start: 2020-11-18 | End: 2020-11-19

## 2020-11-18 RX ORDER — MUPIROCIN 20 MG/G
OINTMENT TOPICAL 2 TIMES DAILY
Status: DISCONTINUED | OUTPATIENT
Start: 2020-11-18 | End: 2020-11-19

## 2020-11-18 RX ORDER — METOPROLOL TARTRATE 25 MG/1
25 TABLET, FILM COATED ORAL EVERY 12 HOURS
Status: DISCONTINUED | OUTPATIENT
Start: 2020-11-19 | End: 2020-11-19

## 2020-11-18 RX ORDER — NITROGLYCERIN 20 MG/100ML
10-100 INJECTION INTRAVENOUS
Status: DISCONTINUED | OUTPATIENT
Start: 2020-11-18 | End: 2020-11-19

## 2020-11-18 RX ORDER — SODIUM CHLORIDE 9 MG/ML
INJECTION, SOLUTION INTRAVENOUS
Status: DISCONTINUED | OUTPATIENT
Start: 2020-11-18 | End: 2020-11-18 | Stop reason: HOSPADM

## 2020-11-18 RX ORDER — ATORVASTATIN CALCIUM 80 MG/1
80 TABLET, FILM COATED ORAL
Status: DISCONTINUED | OUTPATIENT
Start: 2020-11-18 | End: 2020-11-19

## 2020-11-18 RX ORDER — FENTANYL CITRATE 50 UG/ML
100 INJECTION, SOLUTION INTRAMUSCULAR; INTRAVENOUS AS NEEDED
Status: DISCONTINUED | OUTPATIENT
Start: 2020-11-18 | End: 2020-11-18 | Stop reason: HOSPADM

## 2020-11-18 RX ORDER — SUCCINYLCHOLINE CHLORIDE 20 MG/ML
INJECTION INTRAMUSCULAR; INTRAVENOUS AS NEEDED
Status: DISCONTINUED | OUTPATIENT
Start: 2020-11-18 | End: 2020-11-18 | Stop reason: HOSPADM

## 2020-11-18 RX ORDER — AMIODARONE HYDROCHLORIDE 200 MG/1
600 TABLET ORAL ONCE
Status: COMPLETED | OUTPATIENT
Start: 2020-11-18 | End: 2020-11-18

## 2020-11-18 RX ORDER — LIDOCAINE HCL/PF 100 MG/5ML
50-100 SYRINGE (ML) INTRAVENOUS
Status: ACTIVE | OUTPATIENT
Start: 2020-11-18 | End: 2020-11-19

## 2020-11-18 RX ORDER — POTASSIUM CHLORIDE 14.9 MG/ML
10 INJECTION INTRAVENOUS AS NEEDED
Status: ACTIVE | OUTPATIENT
Start: 2020-11-18 | End: 2020-11-19

## 2020-11-18 RX ADMIN — POTASSIUM CHLORIDE, DEXTROSE MONOHYDRATE AND SODIUM CHLORIDE 25 ML/HR: 150; 5; 450 INJECTION, SOLUTION INTRAVENOUS at 14:28

## 2020-11-18 RX ADMIN — MIDAZOLAM 1 MG: 1 INJECTION INTRAMUSCULAR; INTRAVENOUS at 07:37

## 2020-11-18 RX ADMIN — DEXTROSE MONOHYDRATE 16 MCG: 5 INJECTION, SOLUTION INTRAVENOUS at 08:17

## 2020-11-18 RX ADMIN — PHENYLEPHRINE HYDROCHLORIDE 120 MCG: 10 INJECTION INTRAVENOUS at 07:45

## 2020-11-18 RX ADMIN — SUFENTANIL CITRATE 50 MCG: 50 INJECTION EPIDURAL; INTRAVENOUS at 10:39

## 2020-11-18 RX ADMIN — ALBUMIN HUMAN 250 ML: 0.05 INJECTION, SOLUTION INTRAVENOUS at 12:47

## 2020-11-18 RX ADMIN — SODIUM CHLORIDE 1 G/HR: 900 INJECTION, SOLUTION INTRAVENOUS at 08:43

## 2020-11-18 RX ADMIN — VECURONIUM BROMIDE 3 MG: 1 INJECTION, POWDER, LYOPHILIZED, FOR SOLUTION INTRAVENOUS at 10:32

## 2020-11-18 RX ADMIN — VASOPRESSIN 1 UNITS: 20 INJECTION INTRAVENOUS at 12:32

## 2020-11-18 RX ADMIN — SODIUM CHLORIDE, SODIUM LACTATE, POTASSIUM CHLORIDE, AND CALCIUM CHLORIDE 1000 ML: 600; 310; 30; 20 INJECTION, SOLUTION INTRAVENOUS at 05:55

## 2020-11-18 RX ADMIN — ATORVASTATIN CALCIUM 80 MG: 80 TABLET, FILM COATED ORAL at 20:21

## 2020-11-18 RX ADMIN — ROCURONIUM BROMIDE 50 MG: 10 INJECTION, SOLUTION INTRAVENOUS at 11:52

## 2020-11-18 RX ADMIN — VECURONIUM BROMIDE 2 MG: 1 INJECTION, POWDER, LYOPHILIZED, FOR SOLUTION INTRAVENOUS at 09:37

## 2020-11-18 RX ADMIN — Medication 2 G: at 12:02

## 2020-11-18 RX ADMIN — HEPARIN SODIUM 34000 UNITS: 1000 INJECTION, SOLUTION INTRAVENOUS; SUBCUTANEOUS at 10:18

## 2020-11-18 RX ADMIN — HEPARIN SODIUM 3000 UNITS: 1000 INJECTION, SOLUTION INTRAVENOUS; SUBCUTANEOUS at 10:35

## 2020-11-18 RX ADMIN — DEXTROSE MONOHYDRATE 8 MCG: 5 INJECTION, SOLUTION INTRAVENOUS at 10:48

## 2020-11-18 RX ADMIN — DEXTROSE MONOHYDRATE 8 MCG: 5 INJECTION, SOLUTION INTRAVENOUS at 10:50

## 2020-11-18 RX ADMIN — PHENYLEPHRINE HYDROCHLORIDE 240 MCG: 10 INJECTION INTRAVENOUS at 08:00

## 2020-11-18 RX ADMIN — DEXTROSE MONOHYDRATE 16 MCG: 5 INJECTION, SOLUTION INTRAVENOUS at 08:21

## 2020-11-18 RX ADMIN — DEXTROSE MONOHYDRATE 8 MCG: 5 INJECTION, SOLUTION INTRAVENOUS at 12:29

## 2020-11-18 RX ADMIN — MIDAZOLAM 2 MG: 1 INJECTION INTRAMUSCULAR; INTRAVENOUS at 07:32

## 2020-11-18 RX ADMIN — Medication 2 G: at 08:15

## 2020-11-18 RX ADMIN — TUBERCULIN PURIFIED PROTEIN DERIVATIVE 5 UNITS: 5 INJECTION, SOLUTION INTRADERMAL at 20:20

## 2020-11-18 RX ADMIN — PHENYLEPHRINE HYDROCHLORIDE 20 MCG/MIN: 10 INJECTION INTRAVENOUS at 08:25

## 2020-11-18 RX ADMIN — Medication 10 ML: at 20:22

## 2020-11-18 RX ADMIN — SODIUM CHLORIDE 25 ML/HR: 900 INJECTION, SOLUTION INTRAVENOUS at 14:23

## 2020-11-18 RX ADMIN — INSULIN GLARGINE 40 UNITS: 100 INJECTION, SOLUTION SUBCUTANEOUS at 22:47

## 2020-11-18 RX ADMIN — PHENYLEPHRINE HYDROCHLORIDE 120 MCG: 10 INJECTION INTRAVENOUS at 07:47

## 2020-11-18 RX ADMIN — DEXTROSE MONOHYDRATE 16 MCG: 5 INJECTION, SOLUTION INTRAVENOUS at 08:13

## 2020-11-18 RX ADMIN — SODIUM CHLORIDE: 900 INJECTION, SOLUTION INTRAVENOUS at 08:03

## 2020-11-18 RX ADMIN — CHLORHEXIDINE GLUCONATE 10 ML: 1.2 RINSE ORAL at 17:21

## 2020-11-18 RX ADMIN — PHENYLEPHRINE HYDROCHLORIDE 120 MCG: 10 INJECTION INTRAVENOUS at 07:46

## 2020-11-18 RX ADMIN — SODIUM CHLORIDE, SODIUM LACTATE, POTASSIUM CHLORIDE, AND CALCIUM CHLORIDE: 600; 310; 30; 20 INJECTION, SOLUTION INTRAVENOUS at 07:32

## 2020-11-18 RX ADMIN — DEXTROSE MONOHYDRATE 4 MCG: 5 INJECTION, SOLUTION INTRAVENOUS at 08:37

## 2020-11-18 RX ADMIN — DEXTROSE MONOHYDRATE 8 MCG: 5 INJECTION, SOLUTION INTRAVENOUS at 08:06

## 2020-11-18 RX ADMIN — DEXTROSE MONOHYDRATE 16 MCG: 5 INJECTION, SOLUTION INTRAVENOUS at 08:25

## 2020-11-18 RX ADMIN — ALBUMIN HUMAN 250 ML: 0.05 INJECTION, SOLUTION INTRAVENOUS at 08:20

## 2020-11-18 RX ADMIN — MIDAZOLAM 5 MG: 1 INJECTION INTRAMUSCULAR; INTRAVENOUS at 11:52

## 2020-11-18 RX ADMIN — DEXTROSE MONOHYDRATE 2 MCG: 5 INJECTION, SOLUTION INTRAVENOUS at 08:43

## 2020-11-18 RX ADMIN — Medication 1 AMPULE: at 20:21

## 2020-11-18 RX ADMIN — Medication 3 AMPULE: at 05:56

## 2020-11-18 RX ADMIN — SUFENTANIL CITRATE 50 MCG: 50 INJECTION EPIDURAL; INTRAVENOUS at 09:37

## 2020-11-18 RX ADMIN — MIDAZOLAM 1 MG: 1 INJECTION INTRAMUSCULAR; INTRAVENOUS at 07:39

## 2020-11-18 RX ADMIN — Medication 10 ML: at 14:31

## 2020-11-18 RX ADMIN — DEXTROSE MONOHYDRATE 0.05 MCG/KG/MIN: 5 INJECTION, SOLUTION INTRAVENOUS at 08:50

## 2020-11-18 RX ADMIN — DEXTROSE MONOHYDRATE 4 MCG: 5 INJECTION, SOLUTION INTRAVENOUS at 08:03

## 2020-11-18 RX ADMIN — SUCCINYLCHOLINE CHLORIDE 160 MG: 20 INJECTION, SOLUTION INTRAMUSCULAR; INTRAVENOUS at 07:45

## 2020-11-18 RX ADMIN — SODIUM CHLORIDE 2 UNITS/HR: 900 INJECTION, SOLUTION INTRAVENOUS at 11:43

## 2020-11-18 RX ADMIN — CEFAZOLIN SODIUM 2 G: 100 INJECTION, POWDER, LYOPHILIZED, FOR SOLUTION INTRAVENOUS at 20:21

## 2020-11-18 RX ADMIN — VECURONIUM BROMIDE 2 MG: 1 INJECTION, POWDER, LYOPHILIZED, FOR SOLUTION INTRAVENOUS at 09:30

## 2020-11-18 RX ADMIN — ROCURONIUM BROMIDE 10 MG: 10 INJECTION, SOLUTION INTRAVENOUS at 07:45

## 2020-11-18 RX ADMIN — PHENYLEPHRINE HYDROCHLORIDE 120 MCG: 10 INJECTION INTRAVENOUS at 07:56

## 2020-11-18 RX ADMIN — OXYCODONE HYDROCHLORIDE AND ACETAMINOPHEN 1 TABLET: 5; 325 TABLET ORAL at 16:36

## 2020-11-18 RX ADMIN — SODIUM CHLORIDE, SODIUM LACTATE, POTASSIUM CHLORIDE, AND CALCIUM CHLORIDE: 600; 310; 30; 20 INJECTION, SOLUTION INTRAVENOUS at 12:35

## 2020-11-18 RX ADMIN — PROTAMINE SULFATE 320 MG: 10 INJECTION, SOLUTION INTRAVENOUS at 12:26

## 2020-11-18 RX ADMIN — DEXTROSE MONOHYDRATE 8 MCG: 5 INJECTION, SOLUTION INTRAVENOUS at 10:47

## 2020-11-18 RX ADMIN — SUFENTANIL CITRATE 50 MCG: 50 INJECTION EPIDURAL; INTRAVENOUS at 10:26

## 2020-11-18 RX ADMIN — NITROGLYCERIN 10 MCG/MIN: 200 INJECTION, SOLUTION INTRAVENOUS at 08:10

## 2020-11-18 RX ADMIN — VECURONIUM BROMIDE 2 MG: 1 INJECTION, POWDER, LYOPHILIZED, FOR SOLUTION INTRAVENOUS at 10:46

## 2020-11-18 RX ADMIN — Medication 5 MG: at 07:56

## 2020-11-18 RX ADMIN — SUFENTANIL CITRATE 5 MCG: 50 INJECTION EPIDURAL; INTRAVENOUS at 07:37

## 2020-11-18 RX ADMIN — DEXTROSE MONOHYDRATE 8 MCG: 5 INJECTION, SOLUTION INTRAVENOUS at 12:27

## 2020-11-18 RX ADMIN — OXYCODONE HYDROCHLORIDE AND ACETAMINOPHEN 1 TABLET: 5; 325 TABLET ORAL at 20:22

## 2020-11-18 RX ADMIN — ALBUMIN HUMAN 250 ML: 0.05 INJECTION, SOLUTION INTRAVENOUS at 12:50

## 2020-11-18 RX ADMIN — DEXTROSE MONOHYDRATE 4 MCG: 5 INJECTION, SOLUTION INTRAVENOUS at 10:36

## 2020-11-18 RX ADMIN — AMIODARONE HYDROCHLORIDE 200 MG: 200 TABLET ORAL at 20:22

## 2020-11-18 RX ADMIN — SUFENTANIL CITRATE 50 MCG: 50 INJECTION EPIDURAL; INTRAVENOUS at 09:33

## 2020-11-18 RX ADMIN — Medication 15 MG: at 08:00

## 2020-11-18 RX ADMIN — FAMOTIDINE 20 MG: 10 INJECTION INTRAVENOUS at 21:17

## 2020-11-18 RX ADMIN — DEXTROSE MONOHYDRATE 2 MCG: 5 INJECTION, SOLUTION INTRAVENOUS at 10:18

## 2020-11-18 RX ADMIN — MIDAZOLAM 1 MG: 1 INJECTION INTRAMUSCULAR; INTRAVENOUS at 07:35

## 2020-11-18 RX ADMIN — SUFENTANIL CITRATE 10 MCG: 50 INJECTION EPIDURAL; INTRAVENOUS at 07:45

## 2020-11-18 RX ADMIN — ROCURONIUM BROMIDE 40 MG: 10 INJECTION, SOLUTION INTRAVENOUS at 07:49

## 2020-11-18 RX ADMIN — SUFENTANIL CITRATE 5 MCG: 50 INJECTION EPIDURAL; INTRAVENOUS at 07:33

## 2020-11-18 RX ADMIN — SUFENTANIL CITRATE 30 MCG: 50 INJECTION EPIDURAL; INTRAVENOUS at 08:33

## 2020-11-18 RX ADMIN — DEXTROSE MONOHYDRATE 10 G: 5 INJECTION, SOLUTION INTRAVENOUS at 08:10

## 2020-11-18 RX ADMIN — AMIODARONE HYDROCHLORIDE 600 MG: 200 TABLET ORAL at 05:54

## 2020-11-18 RX ADMIN — DEXTROSE MONOHYDRATE 4 MCG: 5 INJECTION, SOLUTION INTRAVENOUS at 10:40

## 2020-11-18 NOTE — ANESTHESIA PROCEDURE NOTES
Central Line Placement    Start time: 11/18/2020 7:46 AM  End time: 11/18/2020 8:00 AM  Performed by: Angela Lee MD  Authorized by: Angela Lee MD     Indications: vascular access, central pressure monitoring and need for vasopressors  Preanesthetic Checklist: patient identified, risks and benefits discussed, anesthesia consent, site marked, patient being monitored and timeout performed    Timeout Time: 07:46       Pre-procedure: All elements of maximal sterile barrier technique followed? Yes    2% Chlorhexidine for cutaneous antisepsis, Hand hygiene performed prior to catheter insertion and Ultrasound guidance    Sterile Ultrasound Technique followed?: Yes            Procedure:   Prep:  Chlorhexidine and ChloraPrep    Orientation:  Right  Patient position:  Trendelenburg  Catheter type:  Double lumen  Catheter size:  9 Fr    Number of attempts:  1  Successful placement: Yes      Assessment:   Post-procedure:  Catheter secured, sterile dressing applied and sterile dressing with CHG applied  Assessment:  Placement verified by x-ray, free fluid flow, blood return through all ports, other (comment) and guidewire removal verified  Insertion:  Uncomplicated  Patient tolerance:  Patient tolerated the procedure well with no immediate complications      Access site was examined with ultrasound and found to be patent and acceptable for placement of CVL. Internal Jugular vein and Carotid artery were identified. Needle path and vein access were visualized with real time ultrasound guidance. Catheter was advanced off needle, IV tubing connected to catheter, and veinous non pulsatile blood confirmed in tubing. Wire was advanced through catheter and image of wire in vessel was obtained with ultrasound. Image was saved, printed, and placed in chart.

## 2020-11-18 NOTE — PROGRESS NOTES
TRANSFER - IN REPORT:    Verbal report received from Keena Menendez CRNA (name) on [facility]  being received from [place] [id number] (unit) for routine post - op      Report consisted of patients Situation, Background, Assessment and   Recommendations(SBAR). Information from the following report(s) SBAR, Kardex, OR Summary, Intake/Output, MAR and Cardiac Rhythm NSR was reviewed with the receiving nurse. Per anesthesia on admission patient intubation Difficult    Collaborative team agrees of surgeon, anesthesia, RT, and RN agrees to proceed with CV weaning protocol        Opportunity for questions and clarification was provided. Assessment completed upon patients arrival to unit and care assumed.

## 2020-11-18 NOTE — PROGRESS NOTES
Dual skin assessment performed. No redness or skin breakdown noted. Michael Sales in place to midsternum. Left leg dressing clean dry and intact. Blood sugar monitor covered to right lower quad abdomen.

## 2020-11-18 NOTE — PERIOP NOTES
TRANSFER - OUT REPORT:    Verbal report given to JAIME Olivas on Melanie Hogan  being transferred to CVICU for routine progression of care       Report consisted of patients Situation, Background, Assessment and   Recommendations(SBAR). Information from the following report(s) OR Summary was reviewed with the receiving nurse. Lines:   Double Lumen 11/18/20 Right Internal jugular (Active)       Peripheral IV 11/18/20 Right Hand (Active)   Site Assessment Clean, dry, & intact 11/18/20 0552   Phlebitis Assessment 0 11/18/20 0552   Infiltration Assessment 0 11/18/20 0552   Dressing Status Clean, dry, & intact 11/18/20 0552   Dressing Type Tape;Transparent 11/18/20 0552   Hub Color/Line Status Pink 11/18/20 0552       Arterial Line 11/18/20 Left Radial artery (Active)        Opportunity for questions and clarification was provided.       Patient transported with:   Monitor  O2 @ 15 liters  Registered Nurse   CRNA  MDA

## 2020-11-18 NOTE — OP NOTES
[facility]  OPERATIVE REPORT    Name:  Gama Velez  MR#:  [id number]  :  1970  ACCOUNT #:  [de-identified]  DATE OF SERVICE:  2020    PREOPERATIVE DIAGNOSIS:  Severe multivessel atherosclerotic coronary artery disease. POSTOPERATIVE DIAGNOSIS:  Severe multivessel atherosclerotic coronary artery disease. PROCEDURES PERFORMED:  1. Endoscopic vein harvest.  2.  Coronary artery bypass grafting x4. Grafts consisting:  a. Left internal mammary artery to the LAD. b.  Reverse saphenous vein graft to diagonal.  c.  Reverse saphenous vein graft to OM1.  d.  Reverse saphenous vein graft to the PDA. SURGEON:  Diana Verdin MD    ASSISTANT:      ANESTHESIA:  Endotracheal.    COMPLICATIONS:  None. SPECIMENS REMOVED:  .    IMPLANTS:  .    ESTIMATED BLOOD LOSS:  Minimal.    OPERATIVE FINDINGS:  Saphenous vein 4 to 5 mm. LIMA 3 mm. Aorta was soft. There was noted to be massive amounts of mediastinal and epicardial adipose tissue. Target identification was quite challenging. LAD is 1.4 mm, severe distal disease. Diagonal is 1 mm, severe distal disease. OM1 is 1.3 mm, severe distal disease. PDA is 1.4 mm, severe distal disease. INDICATIONS:  The patient is a very pleasant 28-year-old lady with a long history of coronary artery disease who has been followed extensively by Dr. Silvana Bridges. She presented with worsening exertional dyspnea and chest pain associated with palpitations. Left heart cath showed significant multivessel disease with very small coronary arteries, also noted to have excellent LV function. DESCRIPTION OF PROCEDURE:  After informed consent was obtained for the above-mentioned procedure, the patient was then taken to the operating room. Appropriate monitoring lines were placed by the Anesthesia Department.   After administration of general endotracheal anesthesia, the patient was prepped and draped in the usual fashion with Betadine scrub and paint and Ioban cardiovascular drapes. The chest was then entered through a standard mediastinotomy incision while simultaneous harvesting of peripheral conduit was undertaken. After harvesting the conduit, it was inspected and noted to be adequate. The incisions were subsequently closed in a 2-layer fashion of 3-0 and 4-0 Vicryl. The left internal mammary harvest was then undertaken in pedicle fashion and was injected with Papaverine solution. After administration of heparin, the distal pedicle was incised and noted to bleed briskly. A thoracostomy tube was then placed. This was brought out through a separate stab incision inferiorly and affixed to the skin. The pericardium was then opened and tacked up into a pericardial well, revealing the heart. Pursestrings were then placed into the aorta, the right atrial appendage, and the right atrium. After adequate ACT was obtained, the patient was then cannulated through these pursestrings, at which point cardiopulmonary bypass was started. Target vessels were identified and marked. A crossclamp was then applied. Antegrade and retrograde cardioplegia was administered initially and then given intermittently throughout the case. A latticed heart support was placed to assist with the distal anastomoses, which were performed by using 7-0 Prolene for vein to artery anastomosis and 8-0 Prolene for artery-to-artery anastomosis. Proximal anastomoses to the aorta were also placed using 6-0 Prolene. At the conclusion of the final proximal anastomosis, the aorta and right ventricle were flushed of debris and the anastomosis was completed. The crossclamp was then removed. The heart then underwent meticulous de-airing. The patient was warmed and weaned from the cardiopulmonary bypass. The distal anastomoses were visualized and noted to be hemostatic. All grafts were dopplered and noted to have an excellent flow. The venous cannula was then removed. Ventricular and atrial pacing wires were then placed upon the heart, brought out through stab wounds inferiorly and affixed to the skin. A single straight mediastinal tube was placed, brought out through a separate stab incision inferiorly, and also affixed to the skin. After meticulous hemostasis was made, the sternum was reapproximated with heavy gauge stainless steel wire. The midline fascia was subsequently closed separately. Vicryl was then used in a running fashion for subcutaneous tissue and skin. Dressings were then applied to all wounds. The patient was then transported with monitors to the intensive care unit intubated and ventilated. All instruments and sponge counts were correct.         Nickolas Jacques MD      [id number]  D:  11/18/2020 13:43  T:  11/18/2020 15:53  JOB #:  [id number]

## 2020-11-18 NOTE — PROGRESS NOTES
Patient out from operating room and placed on the ventilator on documented settings. Patient is orally intubated with a # 7.0 ET Tube secured at the 22 cm katie at the lip. Breath sounds are diminished. Trachea is midline. Negative for subcutaneous air, chest excursion is symmetric. Negative for pitting edema. Patient is also Negative for cyanosis. Patient has a Left Radial arterial line. Patient has 2 Chest tubes. All alarms are set and audible. Resuscitation bag is at the head of the bed. Ventilator Settings  Mode FIO2 Rate Tidal Volume Pressure PEEP I:E Ratio   SIMV, Pressure support, PRVC  60 %   16 400 ml  10 cm H2O  8 cm H20  1:2.0      Peak airway pressure: 22 cm H2O   Minute ventilation: 6.1 l/min     ABG: No results for input(s): PH, PCO2, PO2, HCO3 in the last 72 hours.       Ora Hernandez

## 2020-11-18 NOTE — ANESTHESIA POSTPROCEDURE EVALUATION
Procedure(s):  CORONARY ARTERY BYPASS GRAFT (CABG x4), LIMA  VEIN HARVEST ENDOSCOPIC, GREATER SAPHENOUS  ESOPHAGEAL TRANS ECHOCARDIOGRAM.    general    Anesthesia Post Evaluation        Patient location during evaluation: ICU  Patient participation: complete - patient cannot participate  Post-procedure mental status: Sedated. Pain management: adequate  Airway patency: patent  Anesthetic complications: no  Cardiovascular status: hemodynamically stable  Respiratory status: intubated  Hydration status: stable  Comments: Pt transported to ICU intubated and sedated. VSS throughout transport and on arrival. Full report given to ICU staff at bedside        INITIAL Post-op Vital signs:   Vitals Value Taken Time   BP     Temp     Pulse 71 11/18/2020  1:51 PM   Resp 16 11/18/2020  1:51 PM   SpO2 96 % 11/18/2020  1:51 PM   Vitals shown include unvalidated device data.

## 2020-11-18 NOTE — ANESTHESIA PROCEDURE NOTES
Arterial Line Placement    Start time: 11/18/2020 7:39 AM  End time: 11/18/2020 7:42 AM  Performed by: Darya Caballero CRNA  Authorized by: Brett Dickinson MD     Pre-Procedure  Preanesthetic Checklist: patient identified, risks and benefits discussed, anesthesia consent, site marked, patient being monitored, timeout performed and patient being monitored    Timeout Time: 07:39        Procedure:   Prep:  ChloraPrep  Seldinger Technique?: Yes    Orientation:  Left  Location:  Radial artery  Catheter size:  20 G  Number of attempts:  1  Cont Cardiac Output Sensor: Yes      Assessment:   Post-procedure:  Line secured and sterile dressing applied  Patient Tolerance:  Patient tolerated the procedure well with no immediate complications  Comment:   Left arm prepped with ChloraPrep, 0.8ml of 1% lidocaine infiltrated at skin, ultrasound guided Seldinger technique, good blood return, good waveform. Potential access sites were examined with ultrasound and acceptable patent access site selected as noted above.

## 2020-11-18 NOTE — ANESTHESIA PROCEDURE NOTES
TORRES  Date/Time: 11/18/2020 1:11 PM  Ordering Provider: Berto Maldonado MD    Procedure Details: probe placement, image aquisition & interpretation    Site marked, Timeout performed, 09:01  Risks and benefits discussed with the patient and plans are to proceed    Procedure Note    Performed by: Fadi Reyez MD  Authorized by: Fadi Reyez MD       Indications: assessment of surgical repair  Modalities: 2D  Probe Type: biplane  Insertion: atraumatic  Patient Status: intubated and sedated    Echocardiographic and Doppler Measurements   Aorta  Size  Diam(cm)  Dissection PlaqueThick(mm)  Plaque Mobile    Ascending normal  No 0-3 No    Arch         Descending normal  No 0-3 No          Valves  Annulus  Stenosis  Area/Grad  Regurg  Leaflet   Morph  Leaflet   Motion    Aortic normal none  0 normal normal    Mitral normal none  1+ normal normal    Tricuspid normal none   normal normal          Atria  Size  SEC (smoke)  Thrombus  Tumor  Device    Rt Atrium normal No No No No    Lt Atrium normal No No No No     Interatrial Septum Morphology: normal    Interventricular Septum Morphology: normal    Ventricle  Cavity Size  Cavity Dimension Hypertrophy  Thrombus  Gloal FXN  EF    RV dilated  No no normal     LV normal   No normal        Regional Function  (1 = normal, 2 = mildly hypokinetic, 3 = severely hypokinetic, 4 = akinetic, 5 = dyskinetic) LAV - Long [place]   ME LAV = 0  ME LAV = 90  ME LAV = 130   Basal Sept:1 Basal Ant:1 Basal Post:1   Mid Sept:1 Mid Ant:1 Mid Post:1   Apical Sept:1 Apical Ant:1 Basal Ant Sept:1   Basal Lat:1 Basal Inf:1 Mid Ant Sept:1   Mid Lat:1 Mid Inf:1    Apical Lat:1 Apical Inf:1            Post Intervention Follow-up Study  Ventricular Global Function: unchanged  Ventricular Regional Function: unchanged     Valve  Function  Regurgitation  Area    Aortic no change      Mitral no change 1+     Tricuspid       Prosthetic        Complications: None  Comments: Basic TORRES performed for intraoperative hemodynamic monitoring    Probe placed atraumatically with single pass without complication    EF Grossly wnl with no notable WMA    Mitral and Aortic Valve wnl with no significant stenosis or regurgitation    No significant changes in inotropy, wall motion, or valves post pump     Probe removed without complication

## 2020-11-18 NOTE — PROGRESS NOTES
Respiratory Mechanics completed and are as follows:  Weaning Parameters  Spontaneous Breathing Trial Complete: Yes  Resp Rate Observed: 24  Ve: 9  VT: 404  RSBI: 38.7  Martell Agitation Sedation Scale (RASS): Alert and calm  Per Dr. Gisela Gregg, patient extubated to a full face CPAP with 4 liters bled into machine. Patient is able to communicate and is negative for stridor. Breath sounds are diminished. No complications with extubation. Airvo at standby if needed.     Bruno Bilberry

## 2020-11-18 NOTE — ROUTINE PROCESS
Guideline     Guideline Number: [id number]     Title: Management of the Patient with Mechanical Ventilation (including weaning) and ABCDE Bundle    Effective Date:  03/00    Revised Date: 02/09, 03/10, 7/12, 5/13,                                  10/13, 8/14  Reviewed Date: 07/2015, 04/2016, 06/2017       I. Policy:  Management of the patient requiring mechanical ventilation, including readiness to wean and weaning protocol. The information provided serves as a guideline for patient management. Included in this guidelines is the ABCDE Bundle to provide guidance to staff for evidence based management of pain, agitation/anxiety and delirium in the intensive care unit. The goals of critical care analgesia and sedation are to facilitate mechanical ventilation, to prevent patient and caregiver injury, and to avoid the psychological and physiologic consequences of inadequate treatment of pain, anxiety, agitation, and delirium by maintaining a light level of sedation. Pain occurs commonly in adult ICU patients, regardless of admitting diagnosis. Therefore, pain should be frequently assessed and analgesic medications titrated to prevent adverse effects associated with either inadequate or excessive analgesia. Once pain has been addressed, anxiolytic and/or antipsychotic medications can be utilized to treat unresolved agitation/anxiety and delirium, with the goal to prevent over- or under sedation by using the Martell Agitation Sedation Scale (RASS). Assertive management of these issues has been shown to reduce costs, improve ICU outcomes such as successful extubation and ICU length of stay, and allow for patients to participate in their own care. II. Purpose: The respiratory care practitioner and the critical care RN will utilize the following guideline to provide the most efficient and effective management of mechanical ventilators and weaning and extubation processes.     Goals of Treatment:  1. Adequate management of patients pain and discomfort while maintaining a light level of                   sedation (RASS score of 0 to -1)  2. Both chronic and acute sources of pain should be identified and treated. 3. Sedative agents should be considered if patient still expresses discomfort and/or is not at RASS         goal of 0 to -1 despite adequate management of pain. 4. Patients requiring neuromuscular blockage must have continuous infusions of analgesic and              sedative agents. III. Responsibility: Director Respiratory Care Services and all Respiratory Care Practitioners  with documented competency as well as Critical Care RN staff. General Guidelines    1. Introduction to Ventilator Plan Phase  a. Ventilator Management Phase, General Statement  -  The plan should be initiated in patients who have a secure airway/require invasive mechanical ventilation (endotracheal or tracheostomy) only  -   The provider determines the appropriate medications used for analgesia and agitation/anxiety along with the clinical pharmacist    2. Monitoring Levels of Comfort  a. Pain Assessment  - Pain is monitored using the numerical scales  - A pain assessment should be conducted, at a minimum, every 4 hours and as needed and per guidelines. - The level of pain should be determined as satisfactory by the patient based on patients baseline level of pain , considering any chronic pain that the patient may have. - If the patient is unable to communicate pain level, the nurse can assess for nonverbal indicators including facial grimacing, moaning, tachypnea, tachycardia, hypertension, diaphoresis, etc as a cue to begin further pain assessment.             b.  Sedation Assessment  - Sedation is monitored using the Martell Agitation Sedation Scale (RASS)  Target RASS RASS Description   +4 Combative, violent, danger to staff     +3 Pulls or removes tubes(s) or catheters; aggressive   +2 Frequent nonpurposeful movement, fights ventilator   +1 Anxious, apprehensive, but not aggressive   0 Alert and calm   -1 Awakens to voice (eye opening/contact) > 10 sec   -2 Light sedation, briefly awakens to voice (eye opening/contact) < 10 sec   -3 Moderate sedation, movement or eye opening. No eye contact   -4 Deep sedation, no response to voice, but movement or eye opening to physical stimulation   -5 Unarousable, no response to voice or physical stimulation     -  Goal RASS is 0 to -1, unless otherwise specified by providers order.  - Nursing staff should conduct the RASS every 4 hours and as needed to maintain goal RASS of 0 to -1.  - If RASS is outside of goal range, discuss treatment options with provider.  - A RASS score of +2 to +4 requires further assessment by the nurse. Causes of agitation/anxiety that should be considered include:  a. Pulmonary -   endotracheal tube malposition or patency, mode of ventilation, pneumothorax, hypoxemia, hypercarbia  b. Metabolic - hypoglycemia, hyponatremia, acute renal or hepatic failure  c. Emotional upset - with information and awareness of critical condition, prognosis, need for surgical or invasive procedures, other interventions or complications, family or personal stressors  - C. Sedation Assessment while using Neuromusclar Blocking Agents  - D. Delirium Assessment  a. the ICU ([facility] - ICU)   3. Analgesia  The incidence of significant pain has been reported to be 50% or higher in both medical and surgical ICU patients. These patients also experience discomfort during routine/procedural ICU care and at rest.  However, patients may be unable to self-report their pain (either verbally or with other signs) because of an altered level of consciousness, the use of mechanical ventilation, or high doses of sedative agents or neuromuscular blocking agents.   The short and long term consequences of unrelieved or inadequately treated pain are significant and include patient discomfort, decreased satisfaction with care by family and patient, delirium, agitation/anxiety, post traumatic stress disorder and depression. Therefore, routine assessment and treatment of pain should occur in all ICU patients. Causes and Treatment of Pain in the ICU  a. Acute pain (post-operative, procedural pain, discomfort with usual ICU care or other acute episodes of pain-related to underlying disease)  1. Consider use of PCA for alert and oriented patients with pain needs not met by PRN dosing or opoids. 2. Preemptive analgesia should occur prior to chest tube removal, and should be considered for other procedural pain such as turning and repositioning, would drain removal, wound dressing change, tracheal suctioning, femoral catheter removal or place of central venous catheter. 3. Appropriate analgesic medications for preemptive analgesia are short acting intravenous (IV) agents (i.e. fentanyl, morphine, hydromorphone)  a. Administration of analgesia before patient experiences noxious stimuli prevents amplification and hyperexcitability of the central nervous system. b. Analgesia for Mechanically Ventilated Patients:  1. The approach to sedation and analgesia management for mechanically ventilated patients favors use of analgesia first sedation. The primary goal of this strategy is to address pain and discomfort first, and then if necessary, add anxiolytic agent. 2. Analgesia first sedation reduces dose escalation of medications, decreases the duration of mechanical ventilation and the incidence of VAP, improves the probability of successful extubation, and ultimately shortens ICU length of stay. 3. For pain management, analgesic medications are determined by the provider. Intermittent dosing of the analgesic should be attempted first.    If the patient requires more than 3 doses within 1 hour then provider should be contacted to consider continuous infusion.   4.  Analgesic options for mechanically ventilated patients include:  a. Fentanyl which is considered the drug of choice for patients requiring continuous infusion. b.Morphine may be considered for those patients without renal dysfunction who are hemodynamically stable and require intermittent pain medication. Continuous infusions of morphine may be used for patientl who are receiving comfort care as part of end of life care. c.Hydromorphone is reserved for patients who are refractory to fentanyl or morphine and is typically admininstered by intermittent dosing. 4.  Agitation/Anxiety    Background  Agitation and anxiety frequently occur in ICU patients. Anxiolytic/sedation agents may be indicated to help relieve discomfort, improve synchrony with mechanical ventilation, and decrease the overall work of breathing. Pain control alone may be sufficient to make patients comfortable enough to require no anxiolytic/sedative agent. In addition, non-pharmacologic interventions such as repositioning or verbal assurance may be helpful to comfort or redirect an agitated patient. If these methods are unsuccessful, then anxiolytic/sedative medications such as propofol, dexmedetomidine, or benzodiazepines can be used. Selection of an anxiolytic should be based on the pharmacokinetic properties of the medication, patient specific characteristics, and sedation goal.   However, nonbenzodiazepine sedatives (ie propofol or dexmedetomidine) may be preferable over benzodiazepines (ie midazolam or larazepam) due to more favorable outcomes such as delirum. Causes and Treatment of Agitation/Anxiety  a. Possible underlying causes of agitation and anxiety include pain, delirium, hypoxemia, hypoglycemia, hypotension, or withdrawal from alcohol  and other drugs. b. Analgesia first sedation should be attempted initially to manage pain and provide sedation in appropriate patients. Analgesia alone may be adequate to reach RASS goal of 0 to -1.   If patient remains agitated or anxious despite adequate analgesia (ie RASS +2 to +4) then anxiolytic/sedative should be considered. c. The choice of anxiolytic should be based on desired levels of sedation (ie light sedation or deep sedation) with preference for the use of nonbenzodiazepines such as propofol or dexmedetomidine if appropriate. While light sedation (ie RASS 0 to -1) is preferred for most patients, there are instances when deep sedation (ie RASS -4 to -5) is desired. For example, in the setting of ventilator dysynchrony due to ARDS or for patients receiving NMB agents. d. Medications to maintain light sedation (ie RASS 0 to -1) include  1. Propofol continuous infusion can be considered for hemodynamically stable (ie SBP = 100, MAP = 65 and/or not requiring vasopressor support) patients requiring light sedation. Propofol has a quick onset (1-2 minutes) and offset of action, making it a good agent to assess neurological status and facilitate liberation from the mechanical ventilator. 2.Dexmedetomidine continuous infusion is a good option for hemodynamically stable patients requiring light sedation as it allows for a more awake, interactive patient is associated with less delirium. It has an intermediate onset of action (5-10 min). Therefore, abrupt titrations should be avoided, but use of prn haloperidol or benzodiazepine may be useful to manage agitation until the medication takes effect. 3. Antipsychotics are another option. In particular, haloperidol intermittently dosed may be useful for patients with symptoms of agitation/anxiety and delirium. 4.Benzodiazapines can also be considered for light sedation, but should be intermittently doses. Midazolam is an option for patients without renal dysfunction. It has a short onset of action (2-5 minutes) making it a good agent for acute agitation/anxiety, but short duration of action resulting in frequent dosing. Lorazepam is another option.   It has a longer onset of action (15-20 minutes) in comparison to midazolam, but longer duration of action. e. Medications to maintain deep sedation (RASS -4 to -5) include:  1) Propofol continuous infusion should be considered as a first line option for hemodynamically stable patients. 2)Benzodiazepines can be considered as second line options for deep sedation. Studies comparing these agents to other sedatives have shown that they lead to worse outcomes including delirium, oversedation, delayed extubation, and longer time to discharge. Midazolam is one option for patients without renal dysfunction and lorazepam is another options. If patient requires more than 3 doses within 1 hour then contact provider  to consider initiation of continuous infusion. 5.  Daily Sedation Awakening Trial (SAT) from IV Continuous Analgesia/Sedation  a. Patients are to have daily awakening from sedation while on continuous IV analgesia and/or sedation in the ICU. Continuous analgesia infusions may be maintained only if needed for active pain and RASS is at goal 0 - -1. Unit guideline is for the SAT to occur following ICP rounds each morning.    b. The sedation awakening trial (SAT) is done regardless if the patient meets criteria for spontaneous breathing trial (SBT). c. SAT safety screen is assessed and SAT should not be performed if sedation is being used for active seizures, alcohol withdrawal, hemodynamically unstable or requiring support of vasoactive medications , in conjunction with NMB agents, if ICP is greater than  20mmHg or if sedation is being used to control ICP, patients RASS is +3 or +4 (very agitated or combative). Other exclusion criteria are:  if there is documentation of myocardial ischemia in the past 24 hours; or patient is receiving high frequency oscillator ventilation (HFOV) , if the patient has an open chest /abdomen or is receiving comfort care.   d. Criteria for passing the SAT are the patient opened their eyes to verbal stimuli or tolerated sedative interruption without exhibiting failure criteria.  e. Patients fail the SAT if the develop sustained anxiety, agitation, or pain; a respiratory rate of 35 per minutes for 5 minutes or longer, an SpO2 less than 88% for 5 minutes or longer; an acute cardiac dysrhythmia; two or more signs of respiratory distress including tachycardia, bradycardia; use of accessory muscles; diaphoresis; marked dyspnea; or myocardial ischemia. f. Respiratory therapy staff must verify with the nurse that continuous IV analgesia (unless being use  for active pain) and sedation (unless patient is receiving dexmedetomidine) is off prior to placing patient on SBT. g. DO NOT interrupt infusion of analgesia and sedation medications if patient is receiving neuromuscular blockade.  h. Monitor level of wakefulness unless patient is awake and follows commands (RASS 0 to -1) or patient becomes uncomfortable or agitated (RASS +3 to +4)  i. If agitation prevents successful awakening , administer bolus of analgesia and/or sedation then resume infusion of the medication at ½ previous dose and titrate as needed.  j. If oversedation prevents successful awakening, hole infusion until at goal and resume ½ of prior infusion rate/dose if clinically indicated. 6. Delirium  Background  Delirium is characterized by the acute onset of cerebral dysfunction with a change or fluctuation baseline  mental status, inattention, and either disorganized thinking or an altered level of consciousness. It affects up to 80% of mechanically ventilated adult ICU patients, and is associated with increased mortality,   and treatment is important and may in turn allow for a patient to be conscious yet cooperative enough to participate   in ventilator weaning trials and early mobilization efforts.     Delirium can only be assessed in patients who are able to sufficiently interact and communicate with bedside  clinicians (ie RASS -3 to +4). IV. Procedure:  A. Assessment: The following criteria must be assessed prior to the initiation of weaning from mechanical ventilation. Note: The criteria are general guidelines and must be individualized for each patient. The patients primary nurse will be responsible, in coordination with the RT, the Spontaneous Awakening Trial). The RT will perform the SBT. B. Spontaneous Awakening Trials (SATs - also referred to as Sedation Vacation) and Spontaneous Breathing Trials (SBTs) performed to determine readiness to wean. 1. For patients who meet established criteria, such as those without active seizures, alcohol withdrawal and agitation, myocardial ischemia or those requiring cardiac support devices, without increased intracranial pressure and those not receiving neuromuscular blockade, the nurse will reduce the infusions of sedative by 50% of current used for sedation that was used to achieve a level of light sedation (Lucas Score 2 or RASS score of 0 to -1) and evaluate patient response to reduction of sedation. Analgesics required for pain control are continued during the test.  Obtain MD order to cover no SAT for that time period if patient has any exclusion criteria as described above. 2. Failure of the spontaneous awakening trial occurs when the patient shows symptoms such as increased agitation, anxiety, pain or signs of respiratory distress including respiratory rate >35/min or oxygen saturation <88% as well as development of acute cardiac arrhythmias. If these symptoms develop during the SAT, the nurse then restarts sedation at 75% of the previous dose and titrates the medications until the patient is comfortable and/or symptoms have abated. 3.  If the patient passes the SAT then the patient moves on to the Spontaneous Breathing Trials as performed by the RT.    The SBT Safety Screen included the following:  No agitation, oxygen saturation > 88%, FIO2 < 50%, PEEP < 7.5 cm H20, no myocardial ischemia, no vasopressor use, and with inspiratory efforts. 4. Patients who pass the spontaneous awakening trial but fail the spontaneous breathing trial are placed back on full ventilator support and reassessed the next day. 5. Failure of the SBT (spontaneous breathing trail) includes any of the following:  Respiratory rate > 35/min, respiratory rate < 8/min, oxygen saturation < 88%, respiratory distress, mental status change, acute cardiac arrhythmia. 6. Extubation is considered for patients who tolerate the spontaneous awakening trial and pass the spontaneous breathing trial.    C. Can the cause of respiratory failure be reversed (i.e. absence of high spinal cord injury or advanced ALS)? D. Is gas exchange adequate? 1. PaO2/FIO2 ratio > 150 - 200,  2. PEEP < 8 cm H20  3. FIO2 < 50  4. pH > 7.30  5. Rapid shallow breathing index (f/VT) < 105  E. Is patient hemodynamically stable? 1. Absence of clinically significant hypotension (minimal vasopressors such as Dopamine < 5mcg/kg/minute)? F. Is there evidence of intact respiratory drive (NIP/NIF >-84 [id number], stable VC02)? G. Does patient have an adequate cough, airway clearance ability? H. Is there absence of excessive secretions? V. Initiation:     A. The therapist shall consult with RN to determine if sedation can be discontinued or significantly decreased. If this can be achieved, the therapist shall implement the                     followin. Identify patient and verify name and account number via ID [email or web address]. 2. Perform hand hygiene per hospital policy utilizing Standard Precautions for all patients and following transmission-based isolation as indicated per hospital policy. 3. Perform a ONE-MINUTE SPONTANEOUS TRIAL AND ASSESSMENT. 4. Measure Rapid Shallow Breathing Index (RSBI) and monitor SpO2 and cardiovascular parameters during the spontaneous breathing assessment.   5. If SpO2 and cardiovascular parameters are stable, continue spontaneous breathing trial for at least 30 minutes and up to 120 minutes, as patient tolerates. 6. Monitor ventilatory status, SpO2, and cardiovascular status during spontaneous breathing trial.  7. If patient has a successful trial, consider patient as a candidate for extubation and obtain order. 8. If patient fails the weaning trial, place back on ventilator and adjust settings to provide a non-fatiguing form of ventilatory support for the remainder of the day and night. 9. One attempt at weaning shall be performed each day until successful weaning occurs. The RCP will make every attempt to begin the spontaneous breathing trials between 0500 and 0600 to provide documentation of the trial when the pulmonologist makes rounds. B.  Assessment of SBT or PST:  1. Is gas exchange acceptable? 2. PaO2 > 60 mm Hg. 3. PH > 7.30  4. Increase in PaCO2  < 10 mm Hg  C. Is patient hemodynamically stable? 1. HR < 120 beats/minute  2. HR  < 20%   3. Systolic BP < 873 and > 90 mmHg  4. BP  < 20%, no vasopressors required  D. Does patient have stable ventilatory pattern? 1. Sustained RR < 30 breaths per minute  2. Normal and stable VCO2  3. Patient is not demonstrating any signs of increased work of breathing, such as increased use of accessory muscles. E. Mental status stable throughout trial?  1. Absence of changes such as somnolence, excessive agitation or anxiety  2. Absence of diaphoresis during trial?  IV. Safety:    A. The RCP shall monitor patient according to the above guidelines. If at any time during the weaning process, the respiratory therapist or nurse feels that the patient is not tolerating weaning, the therapist shall place patient back on previous ventilator settings. B. The patient shall be reassessed and the weaning process should be continued the following day. V. Reportable Conditions:    A.  The therapist shall notify the physician, as appropriate, for any of the following conditions:  1. FIO2 increase (sustained) at 10% or greater  2. Poor patient/ventilator interface in spite of adjustments  3. Need for increased sedation for respiratory distress  4. Need for increasing ventilating pressures (i.e. PEEP, PIP, MAP)  5. ABG results meeting panic value criteria or other clinical signs indicating deterioration of patients condition. 6. Unplanned extubation. 7. Unexplained sustained increase in PIP greater than 10 cm H2O.  8. Assessment results regarding ventilator discontinuance process. VI. Ventilator protocol management   A. The following items should be maintained for patients who are being mechanically           ventilated. 1. Obtain STAT Chest X-Ray for ET tube placement after insertion. 2. Chest X-Ray q a.m. while on ventilator. 3. ABGs 30 - 60 minutes after being stable on the ventilator. 4. ABG's q a.m. while on ventilator and prn.  5. Do spontaneous breathing trials when patient is hemodynamically stable, responsive, and without fever. 6. Terminate trials if patient exhibits signs of respiratory distress. 7. Therapists should maintain ABG s as follows:      a. pH -  7.30 - 7.50                   b. PaO2 -   60 - 100        8. Racemic Epinephrine (0.5cc) for post extubation stridor (2 UA treatments max.)    VII.   Early Mobilization    Mobility Level Criteria Start at Level 1 if:   PaO2/FIO2 <250   Positive end-expiratory Pressure (PEEP) >=10 cm H2O   O2 saturation <90%   Respiratory Rate (RR) Not within 10-30 per min   Cardiac arrhythmias or ischemia New onset   Heart Rate  (HR) <60 or >120 beats per min   Mean arterial pressure (MAP) <55 or >152 mmHg   Systolic blood pressure (SBP) <90 or > 180 mmHg   Vasopressor infusion New or increasing   Martell Agitation Scale (RASS) < - 3       Level I:   Breathe  (Rass -5 to -3)  HOB Angle - improve VAP protocol compliance   Visually confirm the [facility] is elevated >= 30 degrees to comply with VAP prevention protocols   The Centers for Disease Control and Prevention recommends an HOB angle of 30-45 degrees , unless contraindicated  Additional activities to be implemented   Every 2 hour turning   Passive range of motion   Up to 20 degrees reverse trendelenburg with lower extremity exercise/retracting footboard   Continuous lateral rotation therapy can be considered part of early mobility therapy in patients who are at high risk for pulmonary complications  Move to Level 2 when the patient  - Has acceptable oxygenation/hemodynamics  - Tolerates q 2 turning  - Tolerates HOB > 30 degrees or up to 20 degrees reverse trendelenburg    Level 2 :Tilt  Patient Assessment Rass > -3  (eg, opens eyes, may have profound weakness)  Up to 20 degrees Reverse Trendelenburg position and 10 degrees minimum HOB  - Reverse Trendelenburg positioning allows for orthostatic position in fragile patients  - If available , use in conjunction with retracting foot section to allow for partial weight bearing prior to sitting up in the bed or getting out of bed  Additional activities to be implemented  -  Maintain HOB >/= 30 degrees  - Q 2 hour turning  - Passive/active range of motion  - Legs dependent  - PT consultation       Move to Level 3 when the patient . .    -Tolerates active- assistance exercises 2 times per day    -Tolerates lower extremity exercises against footboard/Up to 20 degrees Reverse Trendelenburg    -Tolerates legs dependent /HOB 45 degrees  Level 3 :  SIT  (Rass >- 1 (eg , weak but may move arms/legs independently)   Full chair position (footboard on)   Full upright positioning allows for diaphragmatic excursion and lung expansion   Sitting with legs in a dependent position facilitates gas exchange  Additional activities to be implemented  - Maintain HOB >= 30 degrees  - Q 2 hour turning (assisted)  - Active range of motion - PT/OT actively involved  - Encourage activities of daily living  - Dangling, if patient can move arm against gravity  Move to Level 4 when the patient . .  - Tolerates increasing active exercise in bed  - Actively assists with every- 2- hour turning or turns independently  - Tolerates full chair position 3 times/day  Level 4:  Stand ( RASS >0 (eg, weak but may tolerate increased activity)      Stand Attempts   Full chair position (footboard off/feet on the floor)   Consider using a sit-to-stand lift   Pivot to chair, it tolerates partial weight bearing  Additional activities to be implemented  - Maintain head of bed >= 30 degrees  - Q 2 hr turning (self/assisted)  - Active range of motion  - Encourage activities of daily living  - PT/OT actively involved      Move to Level 5 when the patient .  - Can successfully comply with all activities  - Tolerates trial periods of full chair position (footboard off/feet on floor) 3 times per day  - Tolerates partial weight-bearing stand and pivots to chair    Level 5 :  Move  (RASS > 0    (eg, weak but may tolerate increased activity)   Achieve out of bed   Utilize mobile floor life to ambulate to bedside chair  Additional activities to be implemented  - Maintain HOB > = 30 degrees  - Q 2 hour turning (self/assisted)  - Active range of motion  - Patient stands/bears weight > 1 minute  - Patient marches in place  - PT/OT actively involved    Patient continues to ambulate progressively longer distances as tolerated until they consistently participate and move independently.         E Approved by [address] 2-19-09   N [facility] Clinical Guidelines             [facility] Flowsheet Content Variables to select when addressing section Comments   DEN Initiated  Yes/No  RN to address minimum q 24 hours (day shift)   Target RASS  0 = alert and oriented   -1 = drowsy   -2 = light sedation   -3= moderate sedation   -4= deep sedation Target on standard ventilator setting should be -2; -4 with oscillator   CAM -ICU  Positive   Negative   Unable to assess Delirium assessment   SAT Safety Screen Passed  Yes   No Select yes if proceeding on to the sedation vacation (reduction of continuous sedative drip by directed by MD)  Select no if your patient has any of the below reasons for not proceeding on to the daily awakening sedation vacation trial   SAT Screen for Failure  Active seizures   Acute delirium tremors   Agitation that threatens accidental line/tube removal   On paralytics   MI (24-48hr)   Abnormal ICP   Open abdomen Select one of the options when the patient will not undergo the sedation vacation - ALSO MUST OBTAIN AN ORDER FOR gordon Mcmillan written under nursing miscellaneous for now by either the NP or Intensivist   Daily sedation Vacation/assessment of   Yes   No   Not applicable If yes, MUST see the reduction in sedation on the [facility] and please place in the comment section of the sedative sedation vacation started   SBT Safety Screen Passed  Yes   No Select Yes if the patient has none of the below listed reasons for not proceeding on to the SBT following reduction of sedation   SBT Screen Reason for Failure  Agitation   O2 Sat < or = 88%   FIO2 > 50%   PEEP >7   MI   Vasopressor Use   Bilevel setting on Vent   Oscillator in use   Increased resp effort Select reason as appropriate for NOT proceeding on to the SBT                                               Wake Up and Breathe Protocol

## 2020-11-18 NOTE — CONSULTS
Cardiovascular ICU Consult Note: 2020  Jocelynn Salgado  Admission Date: 2020     The patient's chart is reviewed and the patient is discussed with the staff. Subjective:     Patient is seen at the request of Dr. Madeleine Malin for respiratory management status post cardiac surgery. Patient had CABG x 4. Currently is sedated in CV-ICU and orally intubated receiving  mechanical ventilation. Pt has a history of JOLEEN(AHI 5, desats to 80%) on APAP 5-12cm H2O,RLS, asthma, Bipolar disorder, PUD, chronic diastolic CHF, DM2, and HLD. Pt was seen by Dr. Min Michael, cardiologist, on 2020 with complaints of chest pain and dyspnea on exertion. Pt had a LHC on  with MVCAD. She was seen by Dr. Madeleine Malin on  and deemed appropriate for CABG. We have been asked to see in the CV-ICU for mechanical ventilation management and weaning. Prior to Admission Medications   Prescriptions Last Dose Informant Patient Reported? Taking? Blood Glucose Control, High (ONETOUCH VERIO HIGH CONTROL) soln   No No   Sig: Use to calibrate glucometer   DISABLED PLACARD (DISABLED PLACARD) DMV   No No   Sig: AN INABILITY TO ORDINARILY WALK 100 FT WITH OUT INCREASE IN PAIN. DULoxetine (CYMBALTA) 60 mg capsule 2020 at Unknown time  No Yes   Sig: Take 1 Cap by mouth daily. Take 30mg once in morning for 1 week then 60mg once in morning   GLUCAGON EMERGENCY KIT, HUMAN, 1 mg injection   No No   Si mL by IntraMUSCular route as needed for Hypoglycemia. HYDROcodone-acetaminophen (NORCO)  mg tablet   No No   Sig: Take 1 Tab by mouth every eight (8) hours as needed for Pain for up to 30 days. Max Daily Amount: 3 Tabs. HumuLIN R U-500, Conc, Kwikpen 500 unit/mL (3 mL) inpn subQ pen   No No   Si units before breakfast, 170 units before lunch, 85 units before supper   Invokana 100 mg tablet 2020 at Unknown time  No Yes   Sig: Take 1 Tab by mouth Daily (before breakfast).    Patient taking differently: Take 100 mg by mouth every evening. Verito Pen Needle 32 gauge x \" ndle   No No   Sig: Use with insulin up to 4 times daily, E11.65   NovoLOG Flexpen U-100 Insulin 100 unit/mL (3 mL) inpn   No No   Sig: Correction  4 units for every 50 over 150, bedtime >200 max daily dose 30 units   Trulicity 1.5 AF/2.0 mL sub-q pen   No No   Si.5 mL by SubCUTAneous route every seven (7) days. Patient taking differently: 1.5 mg by SubCUTAneous route every seven (7) days. On    Vitamin D2 1,250 mcg (50,000 unit) capsule 2020 at Unknown time  No Yes   Sig: Once weekly   albuterol (ProAir HFA) 90 mcg/actuation inhaler   No No   Sig: Take 1 Puff by inhalation every four (4) hours as needed for Wheezing or Shortness of Breath. albuterol-ipratropium (DUO-NEB) 2.5 mg-0.5 mg/3 ml nebu   No No   Sig: INHALE 3ML VIA NEBULIZER EVERY 6 HOURS   amiodarone (CORDARONE) 200 mg tablet 2020 at 1615  Yes Yes   Sig: Take 600 mg by mouth once. Take after 4 pm the day before your surgery   aspirin delayed-release 81 mg tablet 2020 at Unknown time  Yes Yes   Sig: Take 81 mg by mouth every evening. atorvastatin (LIPITOR) 40 mg tablet 2020 at Unknown time  No Yes   Sig: Take 1 Tab by mouth daily. Patient taking differently: Take 40 mg by mouth every evening. cholecalciferol (Vitamin D3) 25 mcg (1,000 unit) cap 2020 at Unknown time  No Yes   Sig: Take 1 Cap by mouth nightly. coenzyme q10 10 mg cap 2020 at Unknown time  Yes Yes   Sig: Take  by mouth every evening. dexlansoprazole (Dexilant) 60 mg CpDB capsule (delayed release) 2020 at Unknown time  No Yes   Sig: Take 1 Cap by mouth nightly. TAKE 1 CAPSULE EACH DAY. diazePAM (VALIUM) 2 mg tablet   No No   Sig: Take 1 Tab by mouth two (2) times daily as needed for Anxiety (dizziness with migraine).  Max Daily Amount: 4 mg.   docosahexaenoic acid/epa (FISH OIL PO) 2020 at Unknown time  Yes Yes   Sig: Take  by mouth every evening. furosemide (LASIX) 40 mg tablet 2020 at Unknown time  No Yes   Sig: Take 1 Tab by mouth daily. Patient taking differently: Take 40 mg by mouth every evening.   galcanezumab-gnlm (Emgality Pen) 120 mg/mL injection   No No   Si mL by SubCUTAneous route every thirty (30) days. lamoTRIgine (LaMICtaL) 100 mg tablet 2020 at Unknown time  No Yes   Sig: Take 1 Tab by mouth two (2) times a day. Patient taking differently: Take 100 mg by mouth two (2) times a day. Not taking \"for awhile\"   linaCLOtide (LINZESS) 145 mcg cap capsule 2020 at Unknown time  No Yes   Sig: Take 1 Cap by mouth daily. Patient taking differently: Take 145 mcg by mouth. Every other day, , Tuesday, thursday, saturday   lisinopriL (PRINIVIL, ZESTRIL) 40 mg tablet 2020 at Unknown time  No Yes   Sig: Take 1 Tab by mouth daily. Patient taking differently: Take 40 mg by mouth every evening. meloxicam (MOBIC) 15 mg tablet 2020 at Unknown time  No Yes   Sig: TAKE 1 TABLET DAILY WITH FOOD. Patient taking differently: every evening. TAKE 1 TABLET DAILY WITH FOOD. mesalamine (Pentasa) 250 mg CR capsule 2020 at Unknown time  Yes Yes   Sig: Take 250 mg by mouth four (4) times daily. metoprolol succinate (TOPROL-XL) 50 mg XL tablet 2020 at 1615  No Yes   Sig: TAKE 1 TABLET BY MOUTH EACH DAY. Patient taking differently: 50 mg every evening. TAKE 1 TABLET BY MOUTH EACH DAY. naloxone (NARCAN) 4 mg/actuation nasal spray   No No   Sig: Use 1 spray intranasally, then discard. Repeat with new spray every 2 min as needed for opioid overdose symptoms, alternating nostrils. nitroglycerin (NITROSTAT) 0.4 mg SL tablet   No No   Sig: PLACE ONE TABLET UNDER TONGUE AS NEEDED FOR CHEST PAIN. MAY REPEAT EVERY 5 MINUTES FOR 2 MORE DOSES. CALL 911 ON SECOND DOSE.   ondansetron (ZOFRAN ODT) 8 mg disintegrating tablet   No No   Sig: Take 1 Tab by mouth every eight (8) hours as needed for Nausea. potassium chloride SR (K-TAB) 20 mEq tablet 2020 at Unknown time  No Yes   Sig: Take 1 Tab by mouth daily. Patient taking differently: Take 20 mEq by mouth every evening. pregabalin (LYRICA) 50 mg capsule 2020 at 0430  No Yes   Sig: Take 1 Cap by mouth three (3) times daily. Max Daily Amount: 150 mg.   promethazine (PHENERGAN) 25 mg tablet   No No   Sig: Take 1 Tab by mouth every six (6) hours as needed for Nausea. ubrogepant Ángela Phlegm) 50 mg tablet   No No   Sig: Take 1 tablet by mouth at onset of migraine, repeat in two hours if needed. Maximum 2/day up to 4/week. Facility-Administered Medications: None       Review of Systems  Review of systems not obtained due to patient factors.     Past Medical History:   Diagnosis Date    Arrhythmia     palpatations    Asthma     Bipolar disorder     Coronary artery disease     cardiac cath 2020    Diabetic neuropathy     Fatty liver     GERD (gastroesophageal reflux disease)     Hypercholesterolemia     Hypertension     Hypertriglyceridemia     Meniere's disease     Migraine     Morbid obesity     Obstructive sleep apnea     CPAP    Palpitations     Peptic ulcer disease 2009    Psychiatric disorder     bipolar    Stroke ([facility] [address]     Syncope and collapse      Past Surgical History:   Procedure Laterality Date    HX BREAST LUMPECTOMY      HX  SECTION      HX CHOLECYSTECTOMY  2004    HX GYN  2010    ovaries rem    HX HEART CATHETERIZATION  2009    x 4    HX ORTHOPAEDIC      left wrist surgery,back surgery(discectomy-removed)    HX BISHOP AND BSO      HX TONSILLECTOMY      HX UROLOGICAL      bladder sling    NEUROLOGICAL PROCEDURE UNLISTED      x3, lumbar region, cervical     Social History     Socioeconomic History    Marital status:      Spouse name: Not on file    Number of children: Not on file    Years of education: Not on file    Highest education level: Not on file   Occupational History    Not on file   Social Needs    Financial resource strain: Not on file    Food insecurity     Worry: Not on file     Inability: Not on file    Transportation needs     Medical: Not on file     Non-medical: Not on file   Tobacco Use    Smoking status: Never Smoker    Smokeless tobacco: Never Used   Substance and Sexual Activity    Alcohol use: Yes     Comment: 1 drink/month    Drug use: No    Sexual activity: Not on file   Lifestyle    Physical activity     Days per week: Not on file     Minutes per session: Not on file    Stress: Not on file   Relationships    Social connections     Talks on phone: Not on file     Gets together: Not on file     Attends Yazidi service: Not on file     Active member of club or organization: Not on file     Attends meetings of clubs or organizations: Not on file     Relationship status: Not on file    Intimate partner violence     Fear of current or ex partner: Not on file     Emotionally abused: Not on file     Physically abused: Not on file     Forced sexual activity: Not on file   Other Topics Concern    Not on file   Social History Narrative    Not on file     Family History   Problem Relation Age of Onset    Coronary Artery Disease Mother     Diabetes Mother     Coronary Artery Disease Father     Diabetes Brother     Coronary Artery Disease Paternal Grandfather     Breast Cancer Sister     Diabetes Sister      Allergies   Allergen Reactions    Pcn [Penicillins] Rash, Itching and Nausea and Vomiting    Tape [Adhesive] Rash     PAPER TAPE       Current Facility-Administered Medications   Medication Dose Route Frequency    alcohol 62% (NOZIN) nasal  1 Ampule  1 Ampule Topical Q12H    0.9% sodium chloride infusion  25 mL/hr IntraVENous CONTINUOUS    dextrose 5% - 0.45% NaCl with KCl 20 mEq/L infusion  25 mL/hr IntraVENous CONTINUOUS    sodium chloride (NS) flush 5-40 mL  5-40 mL IntraVENous Q8H    sodium chloride (NS) flush 5-40 mL  5-40 mL IntraVENous PRN    oxyCODONE-acetaminophen (PERCOCET) 5-325 mg per tablet 1 Tab  1 Tab Oral Q4H PRN    morphine 10 mg/ml injection 3-5 mg  3-5 mg IntraVENous Q1H PRN    naloxone (NARCAN) injection 0.4 mg  0.4 mg IntraVENous PRN    mupirocin (BACTROBAN) 2 % ointment   Both Nostrils BID    ceFAZolin (ANCEF) 2 g/20 mL in sterile water IV syringe  2 g IntraVENous Q8H    sodium bicarbonate (8.4%) injection 50 mEq  50 mEq IntraVENous PRN    EPINEPHrine (ADRENALIN) 4 mg in 0.9% sodium chloride 250 mL infusion  0.01-0.5 mcg/kg/min IntraVENous TITRATE    nitroglycerin (Tridil) 200 mcg/ml infusion   mcg/min IntraVENous TITRATE    PHENYLephrine (JHONY-SYNEPHRINE) 30 mg in 0.9% sodium chloride 250 mL infusion   mcg/min IntraVENous TITRATE    lidocaine (PF) (XYLOCAINE) 100 mg/5 mL (2 %) injection syringe  mg   mg IntraVENous ONCE PRN    niCARdipine in Saline (CARDENE) 25 MG/250 mL infusion kit  5-15 mg/hr IntraVENous TITRATE    amiodarone (CORDARONE) tablet 200 mg  200 mg Oral Q12H    [START ON 11/19/2020] metoprolol tartrate (LOPRESSOR) tablet 25 mg  25 mg Oral Q12H    atorvastatin (LIPITOR) tablet 80 mg  80 mg Oral QHS    insulin regular (NOVOLIN R, HUMULIN R) 100 Units in 0.9% sodium chloride 100 mL infusion  1 Units/hr IntraVENous TITRATE    dextrose (D50W) injection syrg 12.5 g  25 mL IntraVENous PRN    [START ON 11/19/2020] aspirin chewable tablet 81 mg  81 mg Oral DAILY    magnesium sulfate 1 g/100 ml IVPB (premix or compounded)  1 g IntraVENous PRN    potassium chloride 10 mEq in 50 ml IVPB  10 mEq IntraVENous PRN    midazolam (VERSED) injection 1 mg  1 mg IntraVENous Q1H PRN    propofol (DIPRIVAN) 10 mg/mL infusion  0-50 mcg/kg/min IntraVENous TITRATE    meperidine (DEMEROL) injection 25 mg  25 mg IntraVENous Q1H PRN    chlorhexidine (PERIDEX) 0.12 % mouthwash 10 mL  10 mL Oral BID    tuberculin injection 5 Units  5 Units IntraDERMal ONCE    NOREPINephrine (LEVOPHED) 4 mg in 5% dextrose 250 mL infusion  0.5-16 mcg/min IntraVENous TITRATE         Objective:     Vitals:    20 1430 20 1445 20 1500 20 1524   BP:       Pulse: 73 72 74 71   Resp:    Temp:  97.1 °F (36.2 °C) 97.5 °F (36.4 °C)    SpO2: 95% 97% 97% 97%   Weight:       Height:           Intake and Output:   No intake/output data recorded.  07 -  190  In: 2300 [I.V.:]  Out: 1855 [Urine:1800]    Physical Exam:          Constitutional:  Sedated, orally intubated and mechanically ventilated. EENMT:  Sclera clear, pupils equal, oral mucosa moist and orally intubated  Respiratory: distant breath sounds anteriorly   Cardiovascular:  RRR with no M,G,R;  Gastrointestinal:  soft; no bowel sounds present  Musculoskeletal:  warm with no cyanosis, no lower extremity edema. [place] site L leg with ace wrap. Sedated with no movements. SKIN:  no jaundice or ecchymosis   Neurologic:  sedated but no gross neuro deficits  Psychiatric:  sedated and unable to assess at this time    CXR:      Comparison:2020     There are recent median sternotomy changes. Endotracheal tube 3 cm above the  rachel. NG tube courses toward the stomach with the tip of projecting over the  fundus. Right jugular central line tip projects over the superior vena cava. There are chest tubes/mediastinal drains     Low lung volumes. Bilateral perihilar atelectasis. No pneumothorax.     IMPRESSION  Impression:Recent median sternotomy changes with support apparatus as detailed  above. Low lung volumes with perihilar atelectasis.       PFT :    Name:  Aly Velazquez  MR#:  [id number]  :  1970  ACCOUNT #:  [de-identified]  DATE OF SERVICE:  2020     PROCEDURE PERFORMED:  Baseline spirometry.     FINDINGS:  Spirometry and flow volume loops are normal.     IMPRESSION:  Normal baseline spirometry          LINES:    ETT, khan, swan rosy, arterial line, chest tubes times 3 in epigastric area without air leak.    DRIPS:  Nitro, Levo, Amicar, Insulin     CI:  2.8    Ventilator Settings  Mode FIO2 Rate Tidal Volume Pressure PEEP   Pressure support(Weaned due to patient waking up.)  41 %    400 ml  8 cm H2O  8 cm H20      Peak airway pressure: 16.6 cm H2O   Minute ventilation: 9.3 l/min     ABG:   Recent Labs     11/18/20  1403   PHI 7.25*   PCO2I 49.7*   PO2I 104*   HCO3I 21.9*        LAB  Recent Labs     11/18/20  1411   WBC 25.1*   HGB 11.6*   HCT 36.2      INR 1.2     Recent Labs     11/18/20  1411 11/17/20  0801   *  --    K 4.3  --    *  --    CO2 24  --    *  --    BUN 13  --    CREA 0.71  --    MG 3.0* 2.2   CA 8.0*  --      No results for input(s): LCAD, LAC in the last 72 hours. Assessment and Plan :  (Medical Decision Making)     Hospital Problems  Date Reviewed: 11/18/2020          Codes Class Noted POA    Atherosclerosis of native coronary artery of native heart with unstable angina pectoris [facility]) ICD-10-CM: I25.110  ICD-9-CM: 414.01, 411.1  11/18/2020 Unknown    S/p CABG     Encounter for weaning from ventilator [facility]) ICD-10-CM: Z99.11  ICD-9-CM: V46.13  11/18/2020 Unknown    Wean per protocol     [facility] ICD-10-CM: F31.9  ICD-9-CM: 296.80  11/18/2020 Unknown    Chronic, resume home meds (lamictal, cymbalta, and valium)    S/P CABG x 4 ICD-10-CM: Z95.1  ICD-9-CM: V45.81  11/18/2020 Unknown    Per primary     RLS (restless legs syndrome) ICD-10-CM: G25.81  ICD-9-CM: 333.94  11/23/2016 Yes    Chronic     JOLEEN (obstructive sleep apnea) ICD-10-CM: G47.33  ICD-9-CM: 327.23  Unknown Yes    Will need to resume CPAP after extubation     CAD (coronary artery disease) ICD-10-CM: I25.10  ICD-9-CM: 414.00  Unknown Unknown    Overview Signed 11/7/2011  4:46 PM by SHAMA Carvalho     cath 2009                   Plan:     --Wean mechanical ventilation per protocol. --titrate drips to improve hemodynamics  --Bronchodilators per protocol.   --Incentive spirometry every hour post extubation. --Review CXR and labs in am  --resume Lamictal, Cymbalta, Valium once able to take PO  --resume CPAP 5-15 cm once extubated (pt has not been compliant over the last 30 days)    More than 50% of the time documented was spent in face-to-face contact with the patient and in the care of the patient on the floor/unit where the patient is located. Thank you for this referral.  We appreciate the opportunity to participate in this patient's care. Will follow along with you.     Margo Isaacs MD

## 2020-11-18 NOTE — BRIEF OP NOTE
Brief Postoperative Note    Patient: Edel Pineda  YOB: 1970  MRN: [id number]    Date of Procedure: 11/18/2020     Pre-Op Diagnosis: Atherosclerosis of native coronary artery of native heart with unstable angina pectoris ([facility] [address] [I25.110]    Post-Op Diagnosis: Same as preoperative diagnosis. Procedure(s):  CORONARY ARTERY BYPASS GRAFT (CABG x4), LIMA  VEIN HARVEST ENDOSCOPIC, GREATER SAPHENOUS  ESOPHAGEAL TRANS ECHOCARDIOGRAM    Surgeon(s):   Ninfa Mata MD    Surgical Assistant: None    Anesthesia: General     Estimated Blood Loss (mL): Minimal    Complications: None    Specimens: * No specimens in log *     Implants: * No implants in log *    Drains:   Orogastric Tube 11/18/20 (Active)       Findings: cad, massive adipose tissue    Electronically Signed by Thomas Rock MD on 11/18/2020 at 1:32 PM

## 2020-11-18 NOTE — PROGRESS NOTES
Pt extubated by RT to CPAP @ 4 L/min. Bilateral breath sounds auscultated. No stridor noted. No other complications noted at this time. Will continue to monitor.

## 2020-11-19 ENCOUNTER — APPOINTMENT (OUTPATIENT)
Dept: GENERAL RADIOLOGY | Age: 50
DRG: 236 | End: 2020-11-19
Attending: THORACIC SURGERY (CARDIOTHORACIC VASCULAR SURGERY)
Payer: COMMERCIAL

## 2020-11-19 LAB
ANION GAP SERPL CALC-SCNC: 3 MMOL/L (ref 7–16)
ATRIAL RATE: 83 BPM
BUN SERPL-MCNC: 11 MG/DL (ref 6–23)
CALCIUM SERPL-MCNC: 8 MG/DL (ref 8.3–10.4)
CALCULATED P AXIS, ECG09: 40 DEGREES
CALCULATED R AXIS, ECG10: 17 DEGREES
CALCULATED T AXIS, ECG11: 20 DEGREES
CHLORIDE SERPL-SCNC: 115 MMOL/L (ref 98–107)
CO2 SERPL-SCNC: 27 MMOL/L (ref 21–32)
COVID-19 RAPID TEST, COVR: NOT DETECTED
CREAT SERPL-MCNC: 0.59 MG/DL (ref 0.6–1)
DIAGNOSIS, 93000: NORMAL
ERYTHROCYTE [DISTWIDTH] IN BLOOD BY AUTOMATED COUNT: 13.1 % (ref 11.9–14.6)
GLUCOSE BLD STRIP.AUTO-MCNC: 102 MG/DL (ref 65–100)
GLUCOSE BLD STRIP.AUTO-MCNC: 108 MG/DL (ref 65–100)
GLUCOSE BLD STRIP.AUTO-MCNC: 112 MG/DL (ref 65–100)
GLUCOSE BLD STRIP.AUTO-MCNC: 119 MG/DL (ref 65–100)
GLUCOSE BLD STRIP.AUTO-MCNC: 130 MG/DL (ref 65–100)
GLUCOSE BLD STRIP.AUTO-MCNC: 130 MG/DL (ref 65–100)
GLUCOSE BLD STRIP.AUTO-MCNC: 131 MG/DL (ref 65–100)
GLUCOSE BLD STRIP.AUTO-MCNC: 159 MG/DL (ref 65–100)
GLUCOSE BLD STRIP.AUTO-MCNC: 201 MG/DL (ref 65–100)
GLUCOSE BLD STRIP.AUTO-MCNC: 97 MG/DL (ref 65–100)
GLUCOSE BLD STRIP.AUTO-MCNC: 97 MG/DL (ref 65–100)
GLUCOSE SERPL-MCNC: 103 MG/DL (ref 65–100)
HCT VFR BLD AUTO: 33.6 % (ref 35.8–46.3)
HGB BLD-MCNC: 11 G/DL (ref 11.7–15.4)
MAGNESIUM SERPL-MCNC: 2.6 MG/DL (ref 1.8–2.4)
MCH RBC QN AUTO: 29.2 PG (ref 26.1–32.9)
MCHC RBC AUTO-ENTMCNC: 32.7 G/DL (ref 31.4–35)
MCV RBC AUTO: 89.1 FL (ref 79.6–97.8)
MM INDURATION POC: 0 MM (ref 0–5)
NRBC # BLD: 0 K/UL (ref 0–0.2)
P-R INTERVAL, ECG05: 132 MS
PLATELET # BLD AUTO: 243 K/UL (ref 150–450)
PMV BLD AUTO: 9 FL (ref 9.4–12.3)
POTASSIUM SERPL-SCNC: 4.3 MMOL/L (ref 3.5–5.1)
PPD POC: NEGATIVE NEGATIVE
Q-T INTERVAL, ECG07: 378 MS
QRS DURATION, ECG06: 84 MS
QTC CALCULATION (BEZET), ECG08: 444 MS
RBC # BLD AUTO: 3.77 M/UL (ref 4.05–5.2)
SARS COV-2, XPGCVT: NEGATIVE
SODIUM SERPL-SCNC: 145 MMOL/L (ref 136–145)
SOURCE, COVRS: NORMAL
VENTRICULAR RATE, ECG03: 83 BPM
WBC # BLD AUTO: 15.7 K/UL (ref 4.3–11.1)

## 2020-11-19 PROCEDURE — 74011250637 HC RX REV CODE- 250/637: Performed by: PHYSICIAN ASSISTANT

## 2020-11-19 PROCEDURE — 74011000250 HC RX REV CODE- 250: Performed by: THORACIC SURGERY (CARDIOTHORACIC VASCULAR SURGERY)

## 2020-11-19 PROCEDURE — 93005 ELECTROCARDIOGRAM TRACING: CPT | Performed by: THORACIC SURGERY (CARDIOTHORACIC VASCULAR SURGERY)

## 2020-11-19 PROCEDURE — 2709999900 HC NON-CHARGEABLE SUPPLY

## 2020-11-19 PROCEDURE — 83735 ASSAY OF MAGNESIUM: CPT

## 2020-11-19 PROCEDURE — 71045 X-RAY EXAM CHEST 1 VIEW: CPT

## 2020-11-19 PROCEDURE — 99233 SBSQ HOSP IP/OBS HIGH 50: CPT | Performed by: INTERNAL MEDICINE

## 2020-11-19 PROCEDURE — 82962 GLUCOSE BLOOD TEST: CPT

## 2020-11-19 PROCEDURE — 77030027138 HC INCENT SPIROMETER -A

## 2020-11-19 PROCEDURE — 80048 BASIC METABOLIC PNL TOTAL CA: CPT

## 2020-11-19 PROCEDURE — 74011250637 HC RX REV CODE- 250/637: Performed by: THORACIC SURGERY (CARDIOTHORACIC VASCULAR SURGERY)

## 2020-11-19 PROCEDURE — 85027 COMPLETE CBC AUTOMATED: CPT

## 2020-11-19 PROCEDURE — 36600 WITHDRAWAL OF ARTERIAL BLOOD: CPT

## 2020-11-19 PROCEDURE — 74011636637 HC RX REV CODE- 636/637: Performed by: THORACIC SURGERY (CARDIOTHORACIC VASCULAR SURGERY)

## 2020-11-19 PROCEDURE — 74011250636 HC RX REV CODE- 250/636: Performed by: THORACIC SURGERY (CARDIOTHORACIC VASCULAR SURGERY)

## 2020-11-19 PROCEDURE — 65660000004 HC RM CVT STEPDOWN

## 2020-11-19 PROCEDURE — 74011250636 HC RX REV CODE- 250/636

## 2020-11-19 RX ORDER — ONDANSETRON 2 MG/ML
INJECTION INTRAMUSCULAR; INTRAVENOUS
Status: COMPLETED
Start: 2020-11-19 | End: 2020-11-19

## 2020-11-19 RX ORDER — TRAMADOL HYDROCHLORIDE 50 MG/1
50 TABLET ORAL
Status: DISCONTINUED | OUTPATIENT
Start: 2020-11-19 | End: 2020-11-21

## 2020-11-19 RX ORDER — MUPIROCIN 20 MG/G
OINTMENT TOPICAL 2 TIMES DAILY
Status: DISCONTINUED | OUTPATIENT
Start: 2020-11-19 | End: 2020-11-19

## 2020-11-19 RX ORDER — FUROSEMIDE 40 MG/1
40 TABLET ORAL DAILY
Status: COMPLETED | OUTPATIENT
Start: 2020-11-20 | End: 2020-11-22

## 2020-11-19 RX ORDER — ONDANSETRON 2 MG/ML
4 INJECTION INTRAMUSCULAR; INTRAVENOUS
Status: DISCONTINUED | OUTPATIENT
Start: 2020-11-19 | End: 2020-11-19

## 2020-11-19 RX ORDER — ACETAMINOPHEN 325 MG/1
650 TABLET ORAL
Status: DISCONTINUED | OUTPATIENT
Start: 2020-11-19 | End: 2020-11-24 | Stop reason: HOSPADM

## 2020-11-19 RX ORDER — FAMOTIDINE 20 MG/1
20 TABLET, FILM COATED ORAL 2 TIMES DAILY
Status: DISCONTINUED | OUTPATIENT
Start: 2020-11-19 | End: 2020-11-24 | Stop reason: HOSPADM

## 2020-11-19 RX ORDER — AMIODARONE HYDROCHLORIDE 200 MG/1
200 TABLET ORAL EVERY 12 HOURS
Status: DISCONTINUED | OUTPATIENT
Start: 2020-11-19 | End: 2020-11-23

## 2020-11-19 RX ORDER — POTASSIUM CHLORIDE 20 MEQ/1
40 TABLET, EXTENDED RELEASE ORAL
Status: DISCONTINUED | OUTPATIENT
Start: 2020-11-19 | End: 2020-11-24 | Stop reason: HOSPADM

## 2020-11-19 RX ORDER — ATORVASTATIN CALCIUM 80 MG/1
80 TABLET, FILM COATED ORAL
Status: DISCONTINUED | OUTPATIENT
Start: 2020-11-19 | End: 2020-11-24 | Stop reason: HOSPADM

## 2020-11-19 RX ORDER — DULOXETIN HYDROCHLORIDE 30 MG/1
60 CAPSULE, DELAYED RELEASE ORAL DAILY
Status: DISCONTINUED | OUTPATIENT
Start: 2020-11-20 | End: 2020-11-21

## 2020-11-19 RX ORDER — NITROGLYCERIN 0.4 MG/1
0.4 TABLET SUBLINGUAL
Status: DISCONTINUED | OUTPATIENT
Start: 2020-11-19 | End: 2020-11-24 | Stop reason: HOSPADM

## 2020-11-19 RX ORDER — DOCUSATE SODIUM 100 MG/1
100 CAPSULE, LIQUID FILLED ORAL DAILY
Status: DISCONTINUED | OUTPATIENT
Start: 2020-11-20 | End: 2020-11-20

## 2020-11-19 RX ORDER — LANOLIN ALCOHOL/MO/W.PET/CERES
400 CREAM (GRAM) TOPICAL
Status: DISCONTINUED | OUTPATIENT
Start: 2020-11-19 | End: 2020-11-24 | Stop reason: HOSPADM

## 2020-11-19 RX ORDER — OXYCODONE AND ACETAMINOPHEN 10; 325 MG/1; MG/1
1 TABLET ORAL
Status: DISCONTINUED | OUTPATIENT
Start: 2020-11-19 | End: 2020-11-24 | Stop reason: HOSPADM

## 2020-11-19 RX ORDER — METOPROLOL TARTRATE 25 MG/1
25 TABLET, FILM COATED ORAL EVERY 12 HOURS
Status: DISCONTINUED | OUTPATIENT
Start: 2020-11-19 | End: 2020-11-22

## 2020-11-19 RX ORDER — MAG HYDROX/ALUMINUM HYD/SIMETH 200-200-20
30 SUSPENSION, ORAL (FINAL DOSE FORM) ORAL
Status: DISCONTINUED | OUTPATIENT
Start: 2020-11-19 | End: 2020-11-24 | Stop reason: HOSPADM

## 2020-11-19 RX ORDER — ASPIRIN 81 MG/1
81 TABLET ORAL DAILY
Status: DISCONTINUED | OUTPATIENT
Start: 2020-11-20 | End: 2020-11-24 | Stop reason: HOSPADM

## 2020-11-19 RX ORDER — ONDANSETRON 2 MG/ML
4 INJECTION INTRAMUSCULAR; INTRAVENOUS
Status: DISCONTINUED | OUTPATIENT
Start: 2020-11-19 | End: 2020-11-24 | Stop reason: HOSPADM

## 2020-11-19 RX ORDER — POTASSIUM CHLORIDE 20 MEQ/1
20 TABLET, EXTENDED RELEASE ORAL
Status: DISCONTINUED | OUTPATIENT
Start: 2020-11-19 | End: 2020-11-24 | Stop reason: HOSPADM

## 2020-11-19 RX ORDER — FUROSEMIDE 10 MG/ML
20 INJECTION INTRAMUSCULAR; INTRAVENOUS ONCE
Status: COMPLETED | OUTPATIENT
Start: 2020-11-19 | End: 2020-11-19

## 2020-11-19 RX ORDER — SODIUM CHLORIDE 0.9 % (FLUSH) 0.9 %
5-40 SYRINGE (ML) INJECTION AS NEEDED
Status: DISCONTINUED | OUTPATIENT
Start: 2020-11-19 | End: 2020-11-24 | Stop reason: HOSPADM

## 2020-11-19 RX ORDER — SODIUM CHLORIDE 0.9 % (FLUSH) 0.9 %
5-40 SYRINGE (ML) INJECTION EVERY 8 HOURS
Status: DISCONTINUED | OUTPATIENT
Start: 2020-11-19 | End: 2020-11-24 | Stop reason: HOSPADM

## 2020-11-19 RX ORDER — PREGABALIN 50 MG/1
50 CAPSULE ORAL 3 TIMES DAILY
Status: DISCONTINUED | OUTPATIENT
Start: 2020-11-19 | End: 2020-11-24 | Stop reason: HOSPADM

## 2020-11-19 RX ORDER — POTASSIUM CHLORIDE 750 MG/1
10 TABLET, EXTENDED RELEASE ORAL DAILY
Status: COMPLETED | OUTPATIENT
Start: 2020-11-20 | End: 2020-11-22

## 2020-11-19 RX ORDER — INSULIN LISPRO 100 [IU]/ML
INJECTION, SOLUTION INTRAVENOUS; SUBCUTANEOUS
Status: DISCONTINUED | OUTPATIENT
Start: 2020-11-19 | End: 2020-11-20

## 2020-11-19 RX ADMIN — AMIODARONE HYDROCHLORIDE 200 MG: 200 TABLET ORAL at 08:33

## 2020-11-19 RX ADMIN — ASPIRIN 81 MG: 81 TABLET, CHEWABLE ORAL at 08:33

## 2020-11-19 RX ADMIN — ATORVASTATIN CALCIUM 80 MG: 80 TABLET, FILM COATED ORAL at 21:34

## 2020-11-19 RX ADMIN — FAMOTIDINE 20 MG: 20 TABLET, FILM COATED ORAL at 21:34

## 2020-11-19 RX ADMIN — METOPROLOL TARTRATE 25 MG: 25 TABLET, FILM COATED ORAL at 21:34

## 2020-11-19 RX ADMIN — PREGABALIN 50 MG: 50 CAPSULE ORAL at 21:34

## 2020-11-19 RX ADMIN — ONDANSETRON 4 MG: 2 INJECTION INTRAMUSCULAR; INTRAVENOUS at 04:49

## 2020-11-19 RX ADMIN — INSULIN LISPRO 4 UNITS: 100 INJECTION, SOLUTION INTRAVENOUS; SUBCUTANEOUS at 21:35

## 2020-11-19 RX ADMIN — Medication 10 ML: at 04:50

## 2020-11-19 RX ADMIN — TAPENTADOL HYDROCHLORIDE 100 MG: 50 TABLET, FILM COATED ORAL at 08:33

## 2020-11-19 RX ADMIN — FAMOTIDINE 20 MG: 10 INJECTION INTRAVENOUS at 08:33

## 2020-11-19 RX ADMIN — AMIODARONE HYDROCHLORIDE 200 MG: 200 TABLET ORAL at 21:34

## 2020-11-19 RX ADMIN — Medication 10 ML: at 21:35

## 2020-11-19 RX ADMIN — OXYCODONE HYDROCHLORIDE AND ACETAMINOPHEN 1 TABLET: 5; 325 TABLET ORAL at 00:20

## 2020-11-19 RX ADMIN — OXYCODONE HYDROCHLORIDE AND ACETAMINOPHEN 1 TABLET: 10; 325 TABLET ORAL at 15:35

## 2020-11-19 RX ADMIN — Medication 1 AMPULE: at 08:33

## 2020-11-19 RX ADMIN — OXYCODONE HYDROCHLORIDE AND ACETAMINOPHEN 1 TABLET: 10; 325 TABLET ORAL at 21:34

## 2020-11-19 RX ADMIN — CEFAZOLIN SODIUM 2 G: 100 INJECTION, POWDER, LYOPHILIZED, FOR SOLUTION INTRAVENOUS at 04:00

## 2020-11-19 RX ADMIN — Medication 10 ML: at 16:12

## 2020-11-19 RX ADMIN — OXYCODONE HYDROCHLORIDE AND ACETAMINOPHEN 1 TABLET: 5; 325 TABLET ORAL at 04:41

## 2020-11-19 RX ADMIN — Medication 1 AMPULE: at 21:34

## 2020-11-19 RX ADMIN — METOPROLOL TARTRATE 25 MG: 25 TABLET, FILM COATED ORAL at 08:33

## 2020-11-19 RX ADMIN — FUROSEMIDE 20 MG: 10 INJECTION, SOLUTION INTRAMUSCULAR; INTRAVENOUS at 08:34

## 2020-11-19 NOTE — PROGRESS NOTES
Bedside Report and care received from [facility] RN(off going nurse)  via H&P, SBAR, Emar and Kardex. VSS, assessment to follow.

## 2020-11-19 NOTE — PROGRESS NOTES
TRANSFER - OUT REPORT:    Verbal report given to Cande Alegre RN(name) on Surendra Fraser  being transferred to [facility](unit) for routine progression of care       Report consisted of patients Situation, Background, Assessment and   Recommendations(SBAR). Information from the following report(s) OR Summary, Procedure Summary and Intake/Output was reviewed with the receiving nurse. Lines:   Peripheral IV 11/18/20 Right Hand (Active)   Site Assessment Clean, dry, & intact 11/19/20 1601   Phlebitis Assessment 0 11/19/20 1601   Infiltration Assessment 0 11/19/20 1601   Dressing Status Clean, dry, & intact 11/19/20 1601   Dressing Type Transparent;Tape 11/19/20 1601   Hub Color/Line Status Infusing;Patent 11/19/20 1601   Action Taken Open ports on tubing capped 11/19/20 1601        Opportunity for questions and clarification was provided.       Patient transported with:   Monitor  O2 @ 2 liters  Registered Nurse

## 2020-11-19 NOTE — PROGRESS NOTES
Cardiovascular ICU Pulmonary Daily Progress Note: 2020  Leslie Perkins  Admission Date: 2020     Patient is seen at the request of Dr. Des Reyes for respiratory management status post cardiac surgery. Patient had CABG x 4. Currently is sedated in CV-ICU and orally intubated receiving  mechanical ventilation. Pt has a history of JOLEEN(AHI 5, desats to 80%) on APAP 5-12cm H2O,RLS, asthma, Bipolar disorder, PUD, chronic diastolic CHF, DM2, and HLD. Pt was seen by Dr. Ilene Herron, cardiologist, on 2020 with complaints of chest pain and dyspnea on exertion. Pt had a LHC on  with MVCAD. She was seen by Dr. Des Reyes on  and deemed appropriate for CABG. We have been asked to see in the CV-ICU for mechanical ventilation management and weaning. The patient's chart is reviewed and the patient is discussed with the staff. Subjective:     Patient today is on 4 LPM now. Was on our APAP last nigth with 4 LPM. In Chair. No issues overnight. She reports she does not really take the meds for Bipolar even if prescribed. Review of Systems:  -Fever  -Headaches  -Chest pain  -Dyspnea, -wheezing, -cough  -Abdominal pain, -constipation  -Leg swelling  All other organ systems grossly normal.      Prior to Admission Medications   Prescriptions Last Dose Informant Patient Reported? Taking? Blood Glucose Control, High (ONETOUCH VERIO HIGH CONTROL) soln   No No   Sig: Use to calibrate glucometer   DISABLED PLACARD (DISABLED PLACARD) DMV   No No   Sig: AN INABILITY TO ORDINARILY WALK 100 FT WITH OUT INCREASE IN PAIN. DULoxetine (CYMBALTA) 60 mg capsule 2020 at Unknown time  No Yes   Sig: Take 1 Cap by mouth daily. Take 30mg once in morning for 1 week then 60mg once in morning   GLUCAGON EMERGENCY KIT, HUMAN, 1 mg injection   No No   Si mL by IntraMUSCular route as needed for Hypoglycemia.    HYDROcodone-acetaminophen (NORCO)  mg tablet   No No   Sig: Take 1 Tab by mouth every eight (8) hours as needed for Pain for up to 30 days. Max Daily Amount: 3 Tabs. HumuLIN R U-500, Conc, Kwikpen 500 unit/mL (3 mL) inpn subQ pen   No No   Si units before breakfast, 170 units before lunch, 85 units before supper   Invokana 100 mg tablet 2020 at Unknown time  No Yes   Sig: Take 1 Tab by mouth Daily (before breakfast). Patient taking differently: Take 100 mg by mouth every evening. Verito Pen Needle 32 gauge x \" ndle   No No   Sig: Use with insulin up to 4 times daily, E11.65   NovoLOG Flexpen U-100 Insulin 100 unit/mL (3 mL) inpn   No No   Sig: Correction  4 units for every 50 over 150, bedtime >200 max daily dose 30 units   Trulicity 1.5 HL/4.7 mL sub-q pen   No No   Si.5 mL by SubCUTAneous route every seven (7) days. Patient taking differently: 1.5 mg by SubCUTAneous route every seven (7) days. On    Vitamin D2 1,250 mcg (50,000 unit) capsule 2020 at Unknown time  No Yes   Sig: Once weekly   albuterol (ProAir HFA) 90 mcg/actuation inhaler   No No   Sig: Take 1 Puff by inhalation every four (4) hours as needed for Wheezing or Shortness of Breath. albuterol-ipratropium (DUO-NEB) 2.5 mg-0.5 mg/3 ml nebu   No No   Sig: INHALE 3ML VIA NEBULIZER EVERY 6 HOURS   amiodarone (CORDARONE) 200 mg tablet 2020 at 1615  Yes Yes   Sig: Take 600 mg by mouth once. Take after 4 pm the day before your surgery   aspirin delayed-release 81 mg tablet 2020 at Unknown time  Yes Yes   Sig: Take 81 mg by mouth every evening. atorvastatin (LIPITOR) 40 mg tablet 2020 at Unknown time  No Yes   Sig: Take 1 Tab by mouth daily. Patient taking differently: Take 40 mg by mouth every evening. cholecalciferol (Vitamin D3) 25 mcg (1,000 unit) cap 2020 at Unknown time  No Yes   Sig: Take 1 Cap by mouth nightly. coenzyme q10 10 mg cap 2020 at Unknown time  Yes Yes   Sig: Take  by mouth every evening.    dexlansoprazole (Dexilant) 60 mg CpDB capsule (delayed release) 2020 at Unknown time  No Yes   Sig: Take 1 Cap by mouth nightly. TAKE 1 CAPSULE EACH DAY. diazePAM (VALIUM) 2 mg tablet   No No   Sig: Take 1 Tab by mouth two (2) times daily as needed for Anxiety (dizziness with migraine). Max Daily Amount: 4 mg.   docosahexaenoic acid/epa (FISH OIL PO) 2020 at Unknown time  Yes Yes   Sig: Take  by mouth every evening. furosemide (LASIX) 40 mg tablet 2020 at Unknown time  No Yes   Sig: Take 1 Tab by mouth daily. Patient taking differently: Take 40 mg by mouth every evening.   galcanezumab-gnlm (Emgality Pen) 120 mg/mL injection   No No   Si mL by SubCUTAneous route every thirty (30) days. lamoTRIgine (LaMICtaL) 100 mg tablet 2020 at Unknown time  No Yes   Sig: Take 1 Tab by mouth two (2) times a day. Patient taking differently: Take 100 mg by mouth two (2) times a day. Not taking \"for awhile\"   linaCLOtide (LINZESS) 145 mcg cap capsule 2020 at Unknown time  No Yes   Sig: Take 1 Cap by mouth daily. Patient taking differently: Take 145 mcg by mouth. Every other day, , Tuesday, thursday, saturday   lisinopriL (PRINIVIL, ZESTRIL) 40 mg tablet 2020 at Unknown time  No Yes   Sig: Take 1 Tab by mouth daily. Patient taking differently: Take 40 mg by mouth every evening. meloxicam (MOBIC) 15 mg tablet 2020 at Unknown time  No Yes   Sig: TAKE 1 TABLET DAILY WITH FOOD. Patient taking differently: every evening. TAKE 1 TABLET DAILY WITH FOOD. mesalamine (Pentasa) 250 mg CR capsule 2020 at Unknown time  Yes Yes   Sig: Take 250 mg by mouth four (4) times daily. metoprolol succinate (TOPROL-XL) 50 mg XL tablet 2020 at 1615  No Yes   Sig: TAKE 1 TABLET BY MOUTH EACH DAY. Patient taking differently: 50 mg every evening. TAKE 1 TABLET BY MOUTH EACH DAY. naloxone (NARCAN) 4 mg/actuation nasal spray   No No   Sig: Use 1 spray intranasally, then discard.  Repeat with new spray every 2 min as needed for opioid overdose symptoms, alternating nostrils. nitroglycerin (NITROSTAT) 0.4 mg SL tablet   No No   Sig: PLACE ONE TABLET UNDER TONGUE AS NEEDED FOR CHEST PAIN. MAY REPEAT EVERY 5 MINUTES FOR 2 MORE DOSES. CALL 911 ON SECOND DOSE.   ondansetron (ZOFRAN ODT) 8 mg disintegrating tablet   No No   Sig: Take 1 Tab by mouth every eight (8) hours as needed for Nausea. potassium chloride SR (K-TAB) 20 mEq tablet 2020 at Unknown time  No Yes   Sig: Take 1 Tab by mouth daily. Patient taking differently: Take 20 mEq by mouth every evening. pregabalin (LYRICA) 50 mg capsule 2020 at 0430  No Yes   Sig: Take 1 Cap by mouth three (3) times daily. Max Daily Amount: 150 mg.   promethazine (PHENERGAN) 25 mg tablet   No No   Sig: Take 1 Tab by mouth every six (6) hours as needed for Nausea. ubrogepant Danette Sida) 50 mg tablet   No No   Sig: Take 1 tablet by mouth at onset of migraine, repeat in two hours if needed. Maximum 2/day up to 4/week.       Facility-Administered Medications: None        Past Medical History:   Diagnosis Date    Arrhythmia     palpatations    Asthma     Bipolar disorder     Coronary artery disease     cardiac cath 2020    Diabetic neuropathy     Fatty liver     GERD (gastroesophageal reflux disease)     Hypercholesterolemia     Hypertension     Hypertriglyceridemia     Meniere's disease     Migraine     Morbid obesity     Obstructive sleep apnea     CPAP    Palpitations     Peptic ulcer disease 2009    Psychiatric disorder     bipolar    Stroke ([facility] [address]     Syncope and collapse      Past Surgical History:   Procedure Laterality Date    HX BREAST LUMPECTOMY      HX  SECTION      HX CHOLECYSTECTOMY  2004    HX GYN  2010    ovaries rem    HX HEART CATHETERIZATION  2009    x 4    HX ORTHOPAEDIC      left wrist surgery,back surgery(discectomy-removed)    HX BISHOP AND BSO      HX TONSILLECTOMY      HX UROLOGICAL      bladder sling    NEUROLOGICAL PROCEDURE UNLISTED      x3, lumbar region, cervical     Social History     Socioeconomic History    Marital status:      Spouse name: Not on file    Number of children: Not on file    Years of education: Not on file    Highest education level: Not on file   Occupational History    Not on file   Social Needs    Financial resource strain: Not on file    Food insecurity     Worry: Not on file     Inability: Not on file    Transportation needs     Medical: Not on file     Non-medical: Not on file   Tobacco Use    Smoking status: Never Smoker    Smokeless tobacco: Never Used   Substance and Sexual Activity    Alcohol use: Yes     Comment: 1 drink/month    Drug use: No    Sexual activity: Not on file   Lifestyle    Physical activity     Days per week: Not on file     Minutes per session: Not on file    Stress: Not on file   Relationships    Social connections     Talks on phone: Not on file     Gets together: Not on file     Attends Denominational service: Not on file     Active member of club or organization: Not on file     Attends meetings of clubs or organizations: Not on file     Relationship status: Not on file    Intimate partner violence     Fear of current or ex partner: Not on file     Emotionally abused: Not on file     Physically abused: Not on file     Forced sexual activity: Not on file   Other Topics Concern    Not on file   Social History Narrative    Not on file     Family History   Problem Relation Age of Onset    Coronary Artery Disease Mother     Diabetes Mother     Coronary Artery Disease Father     Diabetes Brother     Coronary Artery Disease Paternal Grandfather     Breast Cancer Sister     Diabetes Sister      Allergies   Allergen Reactions    Pcn [Penicillins] Rash, Itching and Nausea and Vomiting    Tape [Adhesive] Rash     PAPER TAPE       Current Facility-Administered Medications   Medication Dose Route Frequency    ondansetron (ZOFRAN) injection 4 mg  4 mg IntraVENous Q6H PRN    alcohol 62% (NOZIN) nasal  1 Ampule  1 Ampule Topical Q12H    0.9% sodium chloride infusion  25 mL/hr IntraVENous CONTINUOUS    dextrose 5% - 0.45% NaCl with KCl 20 mEq/L infusion  25 mL/hr IntraVENous CONTINUOUS    sodium chloride (NS) flush 5-40 mL  5-40 mL IntraVENous Q8H    sodium chloride (NS) flush 5-40 mL  5-40 mL IntraVENous PRN    oxyCODONE-acetaminophen (PERCOCET) 5-325 mg per tablet 1 Tab  1 Tab Oral Q4H PRN    morphine 10 mg/ml injection 3-5 mg  3-5 mg IntraVENous Q1H PRN    naloxone (NARCAN) injection 0.4 mg  0.4 mg IntraVENous PRN    mupirocin (BACTROBAN) 2 % ointment   Both Nostrils BID    sodium bicarbonate (8.4%) injection 50 mEq  50 mEq IntraVENous PRN    EPINEPHrine (ADRENALIN) 4 mg in 0.9% sodium chloride 250 mL infusion  0.01-0.5 mcg/kg/min IntraVENous TITRATE    nitroglycerin (Tridil) 200 mcg/ml infusion   mcg/min IntraVENous TITRATE    PHENYLephrine (JHONY-SYNEPHRINE) 30 mg in 0.9% sodium chloride 250 mL infusion   mcg/min IntraVENous TITRATE    lidocaine (PF) (XYLOCAINE) 100 mg/5 mL (2 %) injection syringe  mg   mg IntraVENous ONCE PRN    niCARdipine in Saline (CARDENE) 25 MG/250 mL infusion kit  5-15 mg/hr IntraVENous TITRATE    amiodarone (CORDARONE) tablet 200 mg  200 mg Oral Q12H    metoprolol tartrate (LOPRESSOR) tablet 25 mg  25 mg Oral Q12H    atorvastatin (LIPITOR) tablet 80 mg  80 mg Oral QHS    insulin regular (NOVOLIN R, HUMULIN R) 100 Units in 0.9% sodium chloride 100 mL infusion  1 Units/hr IntraVENous TITRATE    dextrose (D50W) injection syrg 12.5 g  25 mL IntraVENous PRN    aspirin chewable tablet 81 mg  81 mg Oral DAILY    magnesium sulfate 1 g/100 ml IVPB (premix or compounded)  1 g IntraVENous PRN    potassium chloride 10 mEq in 50 ml IVPB  10 mEq IntraVENous PRN    midazolam (VERSED) injection 1 mg  1 mg IntraVENous Q1H PRN    propofol (DIPRIVAN) 10 mg/mL infusion  0-50 mcg/kg/min IntraVENous TITRATE    meperidine (DEMEROL) injection 25 mg  25 mg IntraVENous Q1H PRN    chlorhexidine (PERIDEX) 0.12 % mouthwash 10 mL  10 mL Oral BID    tuberculin injection 5 Units  5 Units IntraDERMal ONCE    NOREPINephrine (LEVOPHED) 4 mg in 5% dextrose 250 mL infusion  0.5-16 mcg/min IntraVENous TITRATE    famotidine (PF) (PEPCID) 20 mg in 0.9% sodium chloride 10 mL injection  20 mg IntraVENous Q12H         Objective:     Vitals:    20 0336 20 0401 20 0431 20 0501   BP: (!) 108/56 (!) 115/59 126/60 122/62   Pulse: 81 90 89 80   Resp: 16 24 (!) 32 19   Temp: 99.8 °F (37.7 °C) 99.8 °F (37.7 °C) 99.8 °F (37.7 °C) 99.8 °F (37.7 °C)   SpO2: 95% 94% 96% 98%   Weight:    239 lb 6.7 oz (108.6 kg)   Height:    5' 4\" (1.626 m)       Intake and Output:    0701 -  1900  In: 2596.3 [I.V.:2296.3]  Out: 2532 [Urine:2285]   190 -  0700  In: 258.5 [I.V.:258.5]  Out: 900 [Urine:750]    Physical Exam:          Constitutional:  Obese WF in chair and not in distress on 3 LPM  EENMT:  Sclera clear, pupils equal, oral mucosa moist   Respiratory: distant breath sounds anteriorly   Cardiovascular:  RRR with no M,G,R;  Gastrointestinal:  soft; no bowel sounds present  Musculoskeletal:  warm with no cyanosis, no lower extremity edema. [place] site L leg with ace wrap. Sedated with no movements. SKIN:  no jaundice or ecchymosis   Neurologic:   no gross neuro deficits  Psychiatric:  Calm and awake    CXR:  Pending, was done but not in sytem yet         PFT :    Name:  Jazmin Gardner  MR#:  [id number]  :  1970  ACCOUNT #:  [de-identified]  DATE OF SERVICE:  2020     PROCEDURE PERFORMED:  Baseline spirometry.     FINDINGS:  Spirometry and flow volume loops are normal.     IMPRESSION:  Normal baseline spirometry          LINES:      Shane,  chest tubes times 3 in epigastric area without air leak.     DRIPS:   Insulin          ABG:   Recent Labs     20  1558 20  1403 PHI 7.34* 7.25*   PCO2I 43.9 49.7*   PO2I 101* 104*   HCO3I 23.5 21.9*        LAB  Recent Labs     11/19/20 0331 11/18/20 2230 11/18/20 1815 11/18/20  1411   WBC 15.7*  --   --  25.1*   HGB 11.0* 11.4* 11.7 11.6*   HCT 33.6* 34.7* 36.4 36.2     --   --  248   INR  --   --   --  1.2     Recent Labs     11/19/20 0332 11/18/20 2230 11/18/20 1815 11/18/20  1411     --   --  147*   K 4.3 4.6 4.5 4.3   *  --   --  116*   CO2 27  --   --  24   *  --   --  126*   BUN 11  --   --  13   CREA 0.59*  --   --  0.71   MG 2.6* 2.6* 2.7* 3.0*   CA 8.0*  --   --  8.0*     No results for input(s): LCAD, LAC in the last 72 hours. Assessment and Plan :  (Medical Decision Making)     Hospital Problems  Date Reviewed: 11/18/2020          Codes Class Noted POA    Atherosclerosis of native coronary artery of native heart with unstable angina pectoris [facility]) ICD-10-CM: I25.110  ICD-9-CM: 414.01, 411.1  11/18/2020 Unknown    S/p CABG     Encounter for weaning from ventilator [facility]) ICD-10-CM: Z99.11  ICD-9-CM: V46.13  11/18/2020 Unknown    Wean per protocol     Bipolar disorder ([facility] [address] ICD-10-CM: F31.9  ICD-9-CM: 296.80  11/18/2020 Unknown    Chronic, resume home meds (lamictal, cymbalta, and valium)    S/P CABG x 4 ICD-10-CM: Z95.1  ICD-9-CM: V45.81  11/18/2020 Unknown    Per primary     RLS (restless legs syndrome) ICD-10-CM: G25.81  ICD-9-CM: 333.94  11/23/2016 Yes    Chronic     JOLEEN (obstructive sleep apnea) ICD-10-CM: G47.33  ICD-9-CM: 327.23  Unknown Yes    Needs to get better with CPAP    CAD (coronary artery disease) ICD-10-CM: I25.10  ICD-9-CM: 414.00  Unknown Unknown    Overview Signed 11/7/2011  4:46 PM by SHAMA Bowers     cath 2009                 patinet with bipolar disorder/cad/joleen on CPAP s/p CABG x4, doing well post op  Plan:     --taper oxygen  --chest tubes per PMD  --taper drips, only on insulin now  --Incentive spirometry every hour post extubation.   --f/u bipolar meds with patient --> she reports she does not take them but they are on her list.   --did well on our APAP and she will bring in her machine. She is aware needs to improve her home compilance with it. See her compliance above  --mobilze  --f/u CXR when loading.  --continue remaining treatment per PMD    More than 50% of the time documented was spent in face-to-face contact with the patient and in the care of the patient on the floor/unit where the patient is located.       Kasey Harris MD

## 2020-11-19 NOTE — PROGRESS NOTES
Chest tubes removed without difficulty. Purse strings secured and [employer] gauze 4x4 and paper tape to site.

## 2020-11-19 NOTE — PROGRESS NOTES
POD 1 Day Post-Op    Procedure:  Procedure(s):  CORONARY ARTERY BYPASS GRAFT (CABG x4), LIMA  VEIN HARVEST ENDOSCOPIC, GREATER SAPHENOUS  ESOPHAGEAL TRANS ECHOCARDIOGRAM      Subjective:     Patient has No significant medical complaints      Objective:     Patient Vitals for the past 8 hrs:   BP Temp Pulse Resp SpO2 Height Weight   20 0631 (!) 122/56 -- 83 (!) 7 97 % -- --   20 0601 127/60 -- 80 16 97 % -- --   20 0531 (!) 124/59 -- 86 29 97 % -- --   20 0501 122/62 99.8 °F (37.7 °C) 80 19 98 % 5' 4\" (1.626 m) 239 lb 6.7 oz (108.6 kg)   20 0431 126/60 99.8 °F (37.7 °C) 89 (!) 32 96 % -- --   20 0401 (!) 115/59 99.8 °F (37.7 °C) 90 24 94 % -- --   20 0336 (!) 108/56 99.8 °F (37.7 °C) 81 16 95 % -- --   20 0330 -- 99.8 °F (37.7 °C) 81 16 95 % -- --   20 0315 -- 100 °F (37.8 °C) 81 16 95 % -- --   20 0301 (!) 108/58 100 °F (37.8 °C) 81 16 95 % -- --   20 0245 -- 100.2 °F (37.9 °C) 80 17 95 % -- --   20 0230 -- 100.2 °F (37.9 °C) 82 17 95 % -- --   20 0215 -- 100.2 °F (37.9 °C) 89 24 94 % -- --   20 0201 (!) 108/55 100.2 °F (37.9 °C) 82 17 95 % -- --   20 0145 -- 100.4 °F (38 °C) 83 16 94 % -- --   20 0130 -- 100.4 °F (38 °C) 82 16 94 % -- --   20 0115 -- 100.4 °F (38 °C) 81 17 95 % -- --   20 0101 (!) 104/55 100.4 °F (38 °C) 81 17 93 % -- --   20 0045 -- (!) 100.5 °F (38.1 °C) 82 16 94 % -- --   20 0030 -- (!) 100.7 °F (38.2 °C) 82 20 93 % -- --   20 0015 -- (!) 100.5 °F (38.1 °C) 83 19 95 % -- --   20 0001 (!) 110/55 (!) 100.5 °F (38.1 °C) 83 18 94 % -- --   20 2345 -- (!) 100.5 °F (38.1 °C) 82 19 95 % -- --     Temp (24hrs), Av.6 °F (37.6 °C), Min:97.1 °F (36.2 °C), Max:100.7 °F (38.2 °C)        Hemodynamics    PAP  CO (l/min): 6.8 l/min CO  CI (l/min/m2): 3.3 l/min/m2 CI    No intake/output data recorded.    1901 -  0700  In: 3226.8 [I.V.:2926.8]  Out: 8877 [Urine:3085]    CT Drainage              total of all CT's    Heart:  regular rate and rhythm, S1, S2 normal, no murmur, click, rub or gallop  Lung:  clear to auscultation bilaterally  Neuro: Grossly non focal  Incisions: Clean, dry, and intact    Labs:  Recent Results (from the past 24 hour(s))   GLUCOSE, POC    Collection Time: 11/18/20  2:01 PM   Result Value Ref Range    Glucose (POC) 120 (H) 65 - 100 mg/dL   POC CG8I    Collection Time: 11/18/20  2:03 PM   Result Value Ref Range    Device: VENT      FIO2 (POC) 60 %    pH (POC) 7.25 (L) 7.35 - 7.45      pCO2 (POC) 49.7 (H) 35 - 45 MMHG    pO2 (POC) 104 (H) 75 - 100 MMHG    HCO3 (POC) 21.9 (L) 22 - 26 MMOL/L    sO2 (POC) 97 95 - 98 %    Base deficit (POC) 5 mmol/L    Mode SIMV      Tidal volume 400 ml    Set Rate 16 bpm    PEEP/CPAP (POC) 8 cmH2O    Allens test (POC) YES      Inspiratory Time 1.24 sec    Site DRAWN FROM ARTERIAL LINE      Specimen type (POC) ARTERIAL      Sodium,  136 - 145 MMOL/L    Potassium, POC 4.2 3.5 - 5.1 MMOL/L    Glucose,  (H) 65 - 100 MG/DL    Calcium, ionized (POC) 1.30 1.12 - 1.32 mmol/L    Performed by Delonte     Respiratory comment: NurseNotified     Exhaled minute volume 6.10 L/min    COLLECT TIME 9,146     METABOLIC PANEL, BASIC    Collection Time: 11/18/20  2:11 PM   Result Value Ref Range    Sodium 147 (H) 136 - 145 mmol/L    Potassium 4.3 3.5 - 5.1 mmol/L    Chloride 116 (H) 98 - 107 mmol/L    CO2 24 21 - 32 mmol/L    Anion gap 7 7 - 16 mmol/L    Glucose 126 (H) 65 - 100 mg/dL    BUN 13 6 - 23 MG/DL    Creatinine 0.71 0.6 - 1.0 MG/DL    GFR est AA >60 >60 ml/min/1.73m2    GFR est non-AA >60 >60 ml/min/1.73m2    Calcium 8.0 (L) 8.3 - 10.4 MG/DL   MAGNESIUM    Collection Time: 11/18/20  2:11 PM   Result Value Ref Range    Magnesium 3.0 (H) 1.8 - 2.4 mg/dL   CBC W/O DIFF    Collection Time: 11/18/20  2:11 PM   Result Value Ref Range    WBC 25.1 (H) 4.3 - 11.1 K/uL    RBC 4.10 4.05 - 5.2 M/uL    HGB 11.6 (L) 11.7 - 15.4 g/dL    HCT 36.2 35.8 - 46.3 %    MCV 88.3 79.6 - 97.8 FL    MCH 28.3 26.1 - 32.9 PG    MCHC 32.0 31.4 - 35.0 g/dL    RDW 12.8 11.9 - 14.6 %    PLATELET 300 069 - 216 K/uL    MPV 9.0 (L) 9.4 - 12.3 FL    ABSOLUTE NRBC 0.00 0.0 - 0.2 K/uL   PTT    Collection Time: 11/18/20  2:11 PM   Result Value Ref Range    aPTT 24.9 24.1 - 35.1 SEC   PROTHROMBIN TIME + INR    Collection Time: 11/18/20  2:11 PM   Result Value Ref Range    Prothrombin time 15.8 (H) 12.5 - 14.7 sec    INR 1.2     FIBRINOGEN    Collection Time: 11/18/20  2:11 PM   Result Value Ref Range    Fibrinogen 278 190 - 501 mg/dL   GLUCOSE, POC    Collection Time: 11/18/20  2:55 PM   Result Value Ref Range    Glucose (POC) 124 (H) 65 - 100 mg/dL   EKG, 12 LEAD, INITIAL    Collection Time: 11/18/20  3:14 PM   Result Value Ref Range    Ventricular Rate 73 BPM    Atrial Rate 73 BPM    P-R Interval 132 ms    QRS Duration 92 ms    Q-T Interval 436 ms    QTC Calculation (Bezet) 480 ms    Calculated P Axis 49 degrees    Calculated R Axis 52 degrees    Calculated T Axis 53 degrees    Diagnosis       Normal sinus rhythm  Prolonged QT  Abnormal ECG    Confirmed by GABRIEL CLARK (), [facility] ([id number]) on 11/18/2020 2:44:50 PM     POC G3    Collection Time: 11/18/20  3:58 PM   Result Value Ref Range    Device: VENT      FIO2 (POC) 40 %    pH (POC) 7.34 (L) 7.35 - 7.45      pCO2 (POC) 43.9 35 - 45 MMHG    pO2 (POC) 101 (H) 75 - 100 MMHG    HCO3 (POC) 23.5 22 - 26 MMOL/L    sO2 (POC) 97 95 - 98 %    Base deficit (POC) 2 mmol/L    Mode VENTILATOR/SPONTANEOUS/PRESSURE SUPPORT      PEEP/CPAP (POC) 8 cmH2O    Pressure support 8 cmH2O    Allens test (POC) YES      Total resp.  rate 23      Site DRAWN FROM ARTERIAL LINE      Specimen type (POC) ARTERIAL      Performed by WoodruffElizabethRT     CO2, POC 25 MMOL/L    Volume support 400 mL    Respiratory comment: NurseNotified     Exhaled minute volume 9.10 L/min    COLLECT TIME 1,558     GLUCOSE, POC    Collection Time: 11/18/20  4:09 PM   Result Value Ref Range    Glucose (POC) 133 (H) 65 - 100 mg/dL   GLUCOSE, POC    Collection Time: 11/18/20  5:14 PM   Result Value Ref Range    Glucose (POC) 134 (H) 65 - 100 mg/dL   GLUCOSE, POC    Collection Time: 11/18/20  6:10 PM   Result Value Ref Range    Glucose (POC) 131 (H) 65 - 100 mg/dL   HGB & HCT    Collection Time: 11/18/20  6:15 PM   Result Value Ref Range    HGB 11.7 11.7 - 15.4 g/dL    HCT 36.4 35.8 - 46.3 %   MAGNESIUM    Collection Time: 11/18/20  6:15 PM   Result Value Ref Range    Magnesium 2.7 (H) 1.8 - 2.4 mg/dL   POTASSIUM    Collection Time: 11/18/20  6:15 PM   Result Value Ref Range    Potassium 4.5 3.5 - 5.1 mmol/L   GLUCOSE, POC    Collection Time: 11/18/20  8:37 PM   Result Value Ref Range    Glucose (POC) 122 (H) 65 - 100 mg/dL   GLUCOSE, POC    Collection Time: 11/18/20  9:29 PM   Result Value Ref Range    Glucose (POC) 120 (H) 65 - 100 mg/dL   MAGNESIUM    Collection Time: 11/18/20 10:30 PM   Result Value Ref Range    Magnesium 2.6 (H) 1.8 - 2.4 mg/dL   POTASSIUM    Collection Time: 11/18/20 10:30 PM   Result Value Ref Range    Potassium 4.6 3.5 - 5.1 mmol/L   HGB & HCT    Collection Time: 11/18/20 10:30 PM   Result Value Ref Range    HGB 11.4 (L) 11.7 - 15.4 g/dL    HCT 34.7 (L) 35.8 - 46.3 %   GLUCOSE, POC    Collection Time: 11/18/20 10:35 PM   Result Value Ref Range    Glucose (POC) 114 (H) 65 - 100 mg/dL   GLUCOSE, POC    Collection Time: 11/18/20 11:42 PM   Result Value Ref Range    Glucose (POC) 114 (H) 65 - 100 mg/dL   GLUCOSE, POC    Collection Time: 11/19/20  1:18 AM   Result Value Ref Range    Glucose (POC) 97 65 - 100 mg/dL   GLUCOSE, POC    Collection Time: 11/19/20  2:19 AM   Result Value Ref Range    Glucose (POC) 97 65 - 100 mg/dL   CBC W/O DIFF    Collection Time: 11/19/20  3:31 AM   Result Value Ref Range    WBC 15.7 (H) 4.3 - 11.1 K/uL    RBC 3.77 (L) 4.05 - 5.2 M/uL    HGB 11.0 (L) 11.7 - 15.4 g/dL    HCT 33.6 (L) 35.8 - 46.3 %    MCV 89.1 79.6 - 97.8 FL    MCH 29.2 26.1 - 32.9 PG    MCHC 32.7 31.4 - 35.0 g/dL    RDW 13.1 11.9 - 14.6 %    PLATELET 090 545 - 429 K/uL    MPV 9.0 (L) 9.4 - 12.3 FL    ABSOLUTE NRBC 0.00 0.0 - 0.2 K/uL   MAGNESIUM    Collection Time: 11/19/20  3:32 AM   Result Value Ref Range    Magnesium 2.6 (H) 1.8 - 2.4 mg/dL   METABOLIC PANEL, BASIC    Collection Time: 11/19/20  3:32 AM   Result Value Ref Range    Sodium 145 136 - 145 mmol/L    Potassium 4.3 3.5 - 5.1 mmol/L    Chloride 115 (H) 98 - 107 mmol/L    CO2 27 21 - 32 mmol/L    Anion gap 3 (L) 7 - 16 mmol/L    Glucose 103 (H) 65 - 100 mg/dL    BUN 11 6 - 23 MG/DL    Creatinine 0.59 (L) 0.6 - 1.0 MG/DL    GFR est AA >60 >60 ml/min/1.73m2    GFR est non-AA >60 >60 ml/min/1.73m2    Calcium 8.0 (L) 8.3 - 10.4 MG/DL   GLUCOSE, POC    Collection Time: 11/19/20  3:34 AM   Result Value Ref Range    Glucose (POC) 102 (H) 65 - 100 mg/dL   GLUCOSE, POC    Collection Time: 11/19/20  4:38 AM   Result Value Ref Range    Glucose (POC) 108 (H) 65 - 100 mg/dL   GLUCOSE, POC    Collection Time: 11/19/20  5:46 AM   Result Value Ref Range    Glucose (POC) 130 (H) 65 - 100 mg/dL   GLUCOSE, POC    Collection Time: 11/19/20  6:30 AM   Result Value Ref Range    Glucose (POC) 119 (H) 65 - 100 mg/dL       Assessment:     Active Problems:    CAD (coronary artery disease) ()      Overview: cath 2009      JOLEEN (obstructive sleep apnea) ()      RLS (restless legs syndrome) (11/23/2016)      Atheros[facility] native coronary artery of [facility] (11/18/2020)      Encounter for weaning from ventilator ([facility] (11/18/2020)      Bipolar disorder ([facility] (11/18/2020)      S/P CABG x 4 (11/18/2020)        Plan/Recommendations/Medical Decision Making:     Discontinue:  chest tubes    See orders    Stable, to floor, protocol

## 2020-11-20 ENCOUNTER — APPOINTMENT (OUTPATIENT)
Dept: GENERAL RADIOLOGY | Age: 50
DRG: 236 | End: 2020-11-20
Attending: THORACIC SURGERY (CARDIOTHORACIC VASCULAR SURGERY)
Payer: COMMERCIAL

## 2020-11-20 LAB
ANION GAP SERPL CALC-SCNC: 8 MMOL/L (ref 7–16)
BUN SERPL-MCNC: 14 MG/DL (ref 6–23)
CALCIUM SERPL-MCNC: 8.6 MG/DL (ref 8.3–10.4)
CHLORIDE SERPL-SCNC: 107 MMOL/L (ref 98–107)
CO2 SERPL-SCNC: 24 MMOL/L (ref 21–32)
CREAT SERPL-MCNC: 0.63 MG/DL (ref 0.6–1)
ERYTHROCYTE [DISTWIDTH] IN BLOOD BY AUTOMATED COUNT: 12.9 % (ref 11.9–14.6)
GLUCOSE BLD STRIP.AUTO-MCNC: 202 MG/DL (ref 65–100)
GLUCOSE BLD STRIP.AUTO-MCNC: 221 MG/DL (ref 65–100)
GLUCOSE BLD STRIP.AUTO-MCNC: 229 MG/DL (ref 65–100)
GLUCOSE BLD STRIP.AUTO-MCNC: 239 MG/DL (ref 65–100)
GLUCOSE BLD STRIP.AUTO-MCNC: 249 MG/DL (ref 65–100)
GLUCOSE SERPL-MCNC: 195 MG/DL (ref 65–100)
HCT VFR BLD AUTO: 37 % (ref 35.8–46.3)
HGB BLD-MCNC: 11.6 G/DL (ref 11.7–15.4)
MAGNESIUM SERPL-MCNC: 2.5 MG/DL (ref 1.8–2.4)
MCH RBC QN AUTO: 28.6 PG (ref 26.1–32.9)
MCHC RBC AUTO-ENTMCNC: 31.4 G/DL (ref 31.4–35)
MCV RBC AUTO: 91.1 FL (ref 79.6–97.8)
MM INDURATION POC: 0 MM (ref 0–5)
NRBC # BLD: 0 K/UL (ref 0–0.2)
PLATELET # BLD AUTO: 243 K/UL (ref 150–450)
PMV BLD AUTO: 9.3 FL (ref 9.4–12.3)
POTASSIUM SERPL-SCNC: 5 MMOL/L (ref 3.5–5.1)
PPD POC: NEGATIVE NEGATIVE
RBC # BLD AUTO: 4.06 M/UL (ref 4.05–5.2)
SODIUM SERPL-SCNC: 139 MMOL/L (ref 136–145)
WBC # BLD AUTO: 20.2 K/UL (ref 4.3–11.1)

## 2020-11-20 PROCEDURE — 82962 GLUCOSE BLOOD TEST: CPT

## 2020-11-20 PROCEDURE — 74011250637 HC RX REV CODE- 250/637: Performed by: PHYSICIAN ASSISTANT

## 2020-11-20 PROCEDURE — 74011250637 HC RX REV CODE- 250/637: Performed by: THORACIC SURGERY (CARDIOTHORACIC VASCULAR SURGERY)

## 2020-11-20 PROCEDURE — 2709999900 HC NON-CHARGEABLE SUPPLY

## 2020-11-20 PROCEDURE — 77030012890

## 2020-11-20 PROCEDURE — 85027 COMPLETE CBC AUTOMATED: CPT

## 2020-11-20 PROCEDURE — 99232 SBSQ HOSP IP/OBS MODERATE 35: CPT | Performed by: INTERNAL MEDICINE

## 2020-11-20 PROCEDURE — 65660000004 HC RM CVT STEPDOWN

## 2020-11-20 PROCEDURE — 74011636637 HC RX REV CODE- 636/637: Performed by: THORACIC SURGERY (CARDIOTHORACIC VASCULAR SURGERY)

## 2020-11-20 PROCEDURE — 83735 ASSAY OF MAGNESIUM: CPT

## 2020-11-20 PROCEDURE — 74011250636 HC RX REV CODE- 250/636: Performed by: THORACIC SURGERY (CARDIOTHORACIC VASCULAR SURGERY)

## 2020-11-20 PROCEDURE — 80048 BASIC METABOLIC PNL TOTAL CA: CPT

## 2020-11-20 PROCEDURE — 97530 THERAPEUTIC ACTIVITIES: CPT

## 2020-11-20 PROCEDURE — 71046 X-RAY EXAM CHEST 2 VIEWS: CPT

## 2020-11-20 PROCEDURE — 36415 COLL VENOUS BLD VENIPUNCTURE: CPT

## 2020-11-20 PROCEDURE — 97161 PT EVAL LOW COMPLEX 20 MIN: CPT

## 2020-11-20 PROCEDURE — 74011636637 HC RX REV CODE- 636/637: Performed by: PHYSICIAN ASSISTANT

## 2020-11-20 RX ORDER — GUAIFENESIN 600 MG/1
600 TABLET, EXTENDED RELEASE ORAL EVERY 12 HOURS
Status: DISCONTINUED | OUTPATIENT
Start: 2020-11-20 | End: 2020-11-24 | Stop reason: HOSPADM

## 2020-11-20 RX ORDER — LISINOPRIL 20 MG/1
20 TABLET ORAL DAILY
Status: DISCONTINUED | OUTPATIENT
Start: 2020-11-20 | End: 2020-11-23

## 2020-11-20 RX ORDER — AMOXICILLIN 250 MG
2 CAPSULE ORAL EVERY 12 HOURS
Status: DISCONTINUED | OUTPATIENT
Start: 2020-11-20 | End: 2020-11-24 | Stop reason: HOSPADM

## 2020-11-20 RX ORDER — CLOPIDOGREL BISULFATE 75 MG/1
75 TABLET ORAL DAILY
Status: DISCONTINUED | OUTPATIENT
Start: 2020-11-21 | End: 2020-11-24 | Stop reason: HOSPADM

## 2020-11-20 RX ORDER — INSULIN LISPRO 100 [IU]/ML
INJECTION, SOLUTION INTRAVENOUS; SUBCUTANEOUS
Status: DISCONTINUED | OUTPATIENT
Start: 2020-11-20 | End: 2020-11-22

## 2020-11-20 RX ORDER — INSULIN GLARGINE 100 [IU]/ML
20 INJECTION, SOLUTION SUBCUTANEOUS
Status: DISCONTINUED | OUTPATIENT
Start: 2020-11-20 | End: 2020-11-22

## 2020-11-20 RX ADMIN — GUAIFENESIN 600 MG: 600 TABLET ORAL at 21:35

## 2020-11-20 RX ADMIN — INSULIN LISPRO 6 UNITS: 100 INJECTION, SOLUTION INTRAVENOUS; SUBCUTANEOUS at 22:34

## 2020-11-20 RX ADMIN — GUAIFENESIN 600 MG: 600 TABLET ORAL at 12:03

## 2020-11-20 RX ADMIN — AMIODARONE HYDROCHLORIDE 200 MG: 200 TABLET ORAL at 21:35

## 2020-11-20 RX ADMIN — PREGABALIN 50 MG: 50 CAPSULE ORAL at 17:07

## 2020-11-20 RX ADMIN — PREGABALIN 50 MG: 50 CAPSULE ORAL at 09:57

## 2020-11-20 RX ADMIN — INSULIN LISPRO 6 UNITS: 100 INJECTION, SOLUTION INTRAVENOUS; SUBCUTANEOUS at 12:04

## 2020-11-20 RX ADMIN — FAMOTIDINE 20 MG: 20 TABLET, FILM COATED ORAL at 17:06

## 2020-11-20 RX ADMIN — OXYCODONE HYDROCHLORIDE AND ACETAMINOPHEN 1 TABLET: 10; 325 TABLET ORAL at 09:57

## 2020-11-20 RX ADMIN — ONDANSETRON 4 MG: 2 INJECTION INTRAMUSCULAR; INTRAVENOUS at 17:06

## 2020-11-20 RX ADMIN — OXYCODONE HYDROCHLORIDE AND ACETAMINOPHEN 1 TABLET: 10; 325 TABLET ORAL at 03:38

## 2020-11-20 RX ADMIN — ATORVASTATIN CALCIUM 80 MG: 80 TABLET, FILM COATED ORAL at 21:35

## 2020-11-20 RX ADMIN — AMIODARONE HYDROCHLORIDE 200 MG: 200 TABLET ORAL at 09:58

## 2020-11-20 RX ADMIN — LISINOPRIL 20 MG: 20 TABLET ORAL at 12:04

## 2020-11-20 RX ADMIN — OXYCODONE HYDROCHLORIDE AND ACETAMINOPHEN 1 TABLET: 10; 325 TABLET ORAL at 21:35

## 2020-11-20 RX ADMIN — POTASSIUM CHLORIDE 10 MEQ: 750 TABLET, EXTENDED RELEASE ORAL at 10:02

## 2020-11-20 RX ADMIN — Medication 10 ML: at 14:41

## 2020-11-20 RX ADMIN — OXYCODONE HYDROCHLORIDE AND ACETAMINOPHEN 1 TABLET: 10; 325 TABLET ORAL at 14:40

## 2020-11-20 RX ADMIN — FUROSEMIDE 40 MG: 40 TABLET ORAL at 09:57

## 2020-11-20 RX ADMIN — INSULIN GLARGINE 20 UNITS: 100 INJECTION, SOLUTION SUBCUTANEOUS at 22:34

## 2020-11-20 RX ADMIN — SENNOSIDES AND DOCUSATE SODIUM 2 TABLET: 8.6; 5 TABLET ORAL at 21:35

## 2020-11-20 RX ADMIN — PREGABALIN 50 MG: 50 CAPSULE ORAL at 21:35

## 2020-11-20 RX ADMIN — FAMOTIDINE 20 MG: 20 TABLET, FILM COATED ORAL at 09:57

## 2020-11-20 RX ADMIN — INSULIN LISPRO 6 UNITS: 100 INJECTION, SOLUTION INTRAVENOUS; SUBCUTANEOUS at 17:07

## 2020-11-20 RX ADMIN — ASPIRIN 81 MG: 81 TABLET ORAL at 09:57

## 2020-11-20 RX ADMIN — Medication 10 ML: at 22:35

## 2020-11-20 RX ADMIN — Medication 1 AMPULE: at 21:35

## 2020-11-20 RX ADMIN — Medication 10 ML: at 06:28

## 2020-11-20 RX ADMIN — Medication 1 AMPULE: at 09:57

## 2020-11-20 RX ADMIN — METOPROLOL TARTRATE 25 MG: 25 TABLET, FILM COATED ORAL at 21:35

## 2020-11-20 RX ADMIN — SENNOSIDES AND DOCUSATE SODIUM 2 TABLET: 8.6; 5 TABLET ORAL at 10:01

## 2020-11-20 RX ADMIN — INSULIN LISPRO 4 UNITS: 100 INJECTION, SOLUTION INTRAVENOUS; SUBCUTANEOUS at 08:13

## 2020-11-20 RX ADMIN — METOPROLOL TARTRATE 25 MG: 25 TABLET, FILM COATED ORAL at 10:02
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
## 2020-11-20 NOTE — PROGRESS NOTES
Pacing wires pulled per and by [employer]. Patient instructed to one hour bedrest with every 15 minute blood pressure checks for one hour. Pt voices understanding.
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
## 2020-11-20 NOTE — PROGRESS NOTES
Today's Date: 11/20/2020  Date of Admission: 11/18/2020    Chart Reviewed. Subjective:     Patient feels ok. Appetite fair. No BM yet, she is passing gas. She is coughing but states she can't get anything up. Medications Reviewed. Objective:     Vitals:    11/20/20 0337 11/20/20 0339 11/20/20 0350 11/20/20 0657   BP:  (!) 176/74 (!) 145/57 (!) 141/75   Pulse:  96  85   Resp:  24  21   Temp:  97.5 °F (36.4 °C)  97.4 °F (36.3 °C)   SpO2:  91%  94%   Weight: 231 lb 9.6 oz (105.1 kg)      Height:           Intake and Output  Current Shift: 11/20 0701 - 11/20 1900  In: 120 [P.O.:120]  Out: -    Last 3 Shifts: 11/18 1901 - 11/20 0700  In: 1110.5 [P.O.:480; I.V.:630.5]  Out: 3870 [Urine:3650]    Physical Exam:  General: Well Developed, Obese, No Acute Distress, Alert & Oriented x 3, Appropriate mood  Neck: supple, no JVD  Heart: S1S2 with RRR without murmurs or gallops  Lungs: decreased at bases  Abd: soft, nontender, nondistended, with good bowel sounds  Ext: mild edema bilaterally  Sternal incision: clean, dry, and intact  Skin: warm and dry    LABS  Data Review:   Recent Labs     11/20/20  0501 11/19/20  0332 11/19/20  0331  11/18/20  1411    145  --   --  147*   K 5.0 4.3  --    < > 4.3   MG 2.5* 2.6*  --    < > 3.0*   BUN 14 11  --   --  13   CREA 0.63 0.59*  --   --  0.71   * 103*  --   --  126*   WBC 20.2*  --  15.7*  --  25.1*   HGB 11.6*  --  11.0*   < > 11.6*   HCT 37.0  --  33.6*   < > 36.2     --  243  --  248   INR  --   --   --   --  1.2    < > = values in this interval not displayed. Estimated Creatinine Clearance: 115 mL/min (by C-G formula based on SCr of 0.63 mg/dL).       Assessment/Plan:     Principal Problem:    S/P CABG x 4 (11/18/2020)  On ASA, BB, statin, resume ACE-I, on O2 by NC, pacing wires removed, continue PT    Active Problems:    Hypertension ()  BP elevated, resume ACE-I, will titrate as needed      CAD (coronary artery disease) ()  S/p CABG, continue medical therapy       DM (diabetes mellitus) ([facility] (11/8/2011)  On SSI, start Lantus, will titrate as appetite improves      JOLEEN (obstructive sleep apnea) ()  Pulmonary following       RLS (restless legs syndrome) (11/23/2016)  Continue home meds      Atherosclerosis of native coronary artery of native heart with unstable angina pectoris ([facility] (11/18/2020)  S/p CABG       [facility] (11/18/2020)      Hypoxia  On O2 by NC, will wean as tolerated      B[facility] disorder [facility] (11/18/2020)  Continue home meds           Jaz Ferguson PA-C

## 2020-11-20 NOTE — PROGRESS NOTES
Bedside and Verbal shift change report given to self (oncoming nurse) by Delma Sender (offgoing nurse). Report included the following information SBAR, Kardex and MAR.

## 2020-11-20 NOTE — PROGRESS NOTES
Care Management Interventions  PCP Verified by CM: Yes(Dajuan Babb)  Mode of Transport at Discharge: Other (see comment)(spouse)  Transition of Care Consult (CM Consult): Discharge Planning, Home Health  Discharge Durable Medical Equipment: No  Physical Therapy Consult: Yes  Occupational Therapy Consult: No  Speech Therapy Consult: No  Current Support Network: Own Home, Lives with Spouse  Confirm Follow Up Transport: Family  The Plan for Transition of Care is Related to the Following Treatment Goals : home with home health   The Patient and/or Patient Representative was Provided with a Choice of Provider and Agrees with the Discharge Plan?: Yes  Name of the Patient Representative Who was Provided with a Choice of Provider and Agrees with the Discharge Plan: Mrs. Mann Gale of Choice List was Provided with Basic Dialogue that Supports the Patient's Individualized Plan of Care/Goals, Treatment Preferences and Shares the Quality Data Associated with the Providers?: Yes  [place] Resource Information Provided?: No  Discharge Location  Discharge Placement: Home with home health      This CM met with pt and spouse this day to complete assessment. Pt verified her PCP, insurance, emergency contact, and home address. She reports no difficulty obtaining her medications in the community. She lives at home with spouse, daughter, and 3 grandchildren with 5 steps to enter. Her home DME includes a CPAP. Pt confirms that at baseline including bathing, dressing, cooking, and driving. We discussed discharge planning this day. Pt plans on returning home with spouse when stable for discharge. We discussed the role and recommendation of home health at discharge - pt is agreeable to referral.  [facility] is agreeable [facility]. Referral to be sent. No additional CM needs at this time. Will continue to follow and update as needed.

## 2020-11-20 NOTE — PROGRESS NOTES
Problem: Mobility Impaired (Adult and Pediatric)  Goal: *Acute Goals and Plan of Care (Insert Text)  Outcome: Progressing Towards Goal  Note: LTG:  (1.)Ms. Carlita Mejia will move from supine to sit and sit to supine , scoot up and down, and roll side to side in bed with INDEPENDENT within 7 treatment day(s). (2.)Ms. Carlita Mejia will transfer from bed to chair and chair to bed with INDEPENDENT using the least restrictive device within 7 treatment day(s). (3.)Ms. Carlita Mejia will ambulate with INDEPENDENT for 500+ feet with the least restrictive device within 7 treatment day(s). (4.)Ms. Carlita Mejia will perform 5 stairs with HR and SBA within 7 treatment days for ascending and descending stairs for home.   ________________________________________________________________________________________________       PHYSICAL THERAPY: Initial Assessment and PM 11/20/2020  INPATIENT: PT Visit Days : 1  Payor: Modesta Landau / [facility] [place] [facility] / Product Type: Managed Care Medicare /       NAME/AGE/GENDER: Aida Messer is a 53 y.o. female   PRIMARY DIAGNOSIS: Atherosclerosis of native coronary artery of native heart with unstable angina pectoris [facility] [I25.110]  CAD (coronary artery disease) [I25.10]  Atherosclerosis of native coronary artery of native heart with unstable angina pectoris (HCC) [I25.110] S/P CABG x 4 S/P CABG x 4  Procedure(s) (LRB):  CORONARY ARTERY BYPASS GRAFT (CABG x4), LIMA (N/A)  VEIN HARVEST ENDOSCOPIC, GREATER SAPHENOUS (Left)  ESOPHAGEAL TRANS ECHOCARDIOGRAM (N/A)  2 Days Post-Op  ICD-10: Treatment Diagnosis:    Generalized Muscle Weakness (M62.81)  Difficulty in walking, Not elsewhere classified (R26.2)   Precaution/Allergies:  Pcn [penicillins] and Tape [adhesive]      ASSESSMENT:     Ms. Carlita Mejia presents with decreased bed mobility, transfers, ambulation, balance, activity tolerance, strength and overall general functional mobility s/p hospital admission on 11/18/20 with CABG x 4. Pt A & O x 4 this date, on room air on arrival, O2 stats 87%. Pt states took off for shower, has been on 4 L/min O2 via nc. Pt placed back on 4 L, O2 increased to 94%. Pt reports lives with spouse and daughter/grandchildren in mobile home, 5 steps to enter with HR. Pt reports independent at baseline, does not work, RA at home, CPAP. Pt reports pain 5/10, RN made aware. Pt CGA for transfers, ambulated into hallway without assistive device for 200', 4 L O2, CGA to SBA. Pt with 1 standing rest break during ambulation. Pt returned to room, seated in chair with needs in reach, all with CGA to SBA. Pt overall doing well with activity, anticipate no needs at discharge for therapy. PT to cont to follow for acute care needs to address deficits noted above. This section established at most recent assessment   PROBLEM LIST (Impairments causing functional limitations):  Decreased Strength  Decreased ADL/Functional Activities  Decreased Transfer Abilities  Decreased Ambulation Ability/Technique  Decreased Balance  Increased Pain  Decreased Activity Tolerance  Decreased Loiza with Home Exercise Program   INTERVENTIONS PLANNED: (Benefits and precautions of physical therapy have been discussed with the patient.)  Balance Exercise  Bed Mobility  Family Education  Gait Training  Home Exercise Program (HEP)  Therapeutic Activites  Therapeutic Exercise/Strengthening  Transfer Training     TREATMENT PLAN: Frequency/Duration: twice daily for duration of hospital stay  Rehabilitation Potential For Stated Goals: Good     REHAB RECOMMENDATIONS (at time of discharge pending progress):    Placement: It is my opinion, based on this patient's performance to date, that Ms. Rodri Collado may benefit from being discharged with NO further skilled therapy due to the high likelihood of returning to baseline.   Equipment:   None at this time              HISTORY:   History of Present Injury/Illness (Reason for Referral):  S/p CABG x 4, generalized weakness  Past Medical History/Comorbidities:   Ms. Judit Mark  has a past medical history of Arrhythmia, Asthma, Bipolar disorder, Coronary artery disease, Diabetic neuropathy, Fatty liver, GERD (gastroesophageal reflux disease), Hypercholesterolemia, Hypertension, Hypertriglyceridemia, Meniere's disease, Migraine, Morbid obesity, Obstructive sleep apnea, Palpitations, Peptic ulcer disease (), Psychiatric disorder, Stroke [facility]), and Syncope and collapse. Ms. Judit Mark  has a past surgical history that includes hx cholecystectomy (); hx tonsillectomy; hx gyn (); hx  section; hx breast lumpectomy; hx urological; hx orthopaedic; hx jenifer and bso (); hx heart catheterization (); and neurological procedure unlisted. Social History/Living Environment:   Home Environment: Trailer/mobile home  # Steps to Enter: 5  Rails to Enter: Yes  Hand Rails : Bilateral  One/Two Story Residence: One story  Living Alone: No  Support Systems: Family member(s), Spouse/Significant Other/Partner  Patient Expects to be Discharged to[de-identified] Trailer/mobile home  Current DME Used/Available at Home: None  Tub or Shower Type: Tub/Shower combination  Prior Level of Function/Work/Activity:  Lives spouse, independent at baseline, does not work,drives  Personal Factors:          Sex:  female        Age:  53 y.o. Overall Behavior:  agreeable   Number of Personal Factors/Comorbidities that affect the Plan of Care:  CAD, HTN, DM 3+: HIGH COMPLEXITY   EXAMINATION:   Most Recent Physical Functioning:   Gross Assessment:  AROM: Within functional limits(B LE)  Strength:  Within functional limits(B LE)  Coordination: Within functional limits               Posture:  Posture (WDL): Within defined limits  Balance:  Sitting: Intact  Standing: Impaired  Standing - Static: Good  Standing - Dynamic : Good;Fair Bed Mobility:  Supine to Sit: (up in chair)  Sit to Supine: (left up in chair)  Scooting: Stand-by assistance  Wheelchair Mobility:     Transfers:  Sit to Stand: Contact guard assistance  Stand to Sit: Contact guard assistance  Gait:     Base of Support: Widened  Speed/Yolande: Slow  Step Length: Left shortened;Right shortened  Swing Pattern: Left symmetrical;Right symmetrical  Gait Abnormalities: Antalgic;Decreased step clearance  Distance (ft): 200 Feet (ft)(4 L/min O2, stats 94%)  Assistive Device: (none)  Ambulation - Level of Assistance: Contact guard assistance  Interventions: Tactile cues; Verbal cues      Body Structures Involved:  Heart  Lungs  Muscles Body Functions Affected:  Cardio  Respiratory  Movement Related Activities and Participation Affected:  General Tasks and Demands  Mobility  Self Care   Number of elements that affect the Plan of Care: 4+: HIGH COMPLEXITY   CLINICAL PRESENTATION:   Presentation: Evolving clinical presentation with changing clinical characteristics: MODERATE COMPLEXITY   CLINICAL DECISION MAKIN [facility] Short Form  How much difficulty does the patient currently have. .. Unable A Lot A Little None   1. Turning over in bed (including adjusting bedclothes, sheets and blankets)? [] 1   [] 2   [x] 3   [] 4   2. Sitting down on and standing up from a chair with arms ( e.g., wheelchair, bedside commode, etc.)   [] 1   [] 2   [x] 3   [] 4   3. Moving from lying on back to sitting on the side of the bed? [] 1   [] 2   [x] 3   [] 4   How much help from another person does the patient currently need. .. Total A Lot A Little None   4. Moving to and from a bed to a chair (including a wheelchair)? [] 1   [] 2   [x] 3   [] 4   5. Need to walk in hospital room? [] 1   [] 2   [x] 3   [] 4   6. Climbing 3-5 steps with a railing? [] 1   [] 2   [x] 3   [] 4   © , Trustees of [address] [place] [address] [address] [address], under license to [employer].  All rights reserved      Score:  Initial: 18 Most Recent: X (Date: -- )    Interpretation of Tool:  Represents activities that are increasingly more difficult (i.e. Bed mobility, Transfers, Gait). Medical Necessity:     Patient is expected to demonstrate progress in   strength, range of motion, balance, and coordination   to   decrease assistance required with overall functional ambulation, transfers  . Patient demonstrates   good   rehab potential due to higher previous functional level. Reason for Services/Other Comments:  Patient   continues to require present interventions due to patient's inability to perform bed mobility, transfers, ambulation safely and effectively at prior level of function of independent. .   Use of outcome tool(s) and clinical judgement create a POC that gives a: Clear prediction of patient's progress: LOW COMPLEXITY            TREATMENT:   (In addition to Assessment/Re-Assessment sessions the following treatments were rendered)   Pre-treatment Symptoms/Complaints:  \"I am ok\"  Pain: Initial:   Pain Intensity 1: 5  Pain Location 1: Chest  Pain Intervention(s) 1: Nurse notified  Post Session:  states improving, does not rate     Therapeutic Activity: (    10 min): Therapeutic activities including Chair transfers, Ambulation on level ground, and weight shifting, sternal precautions, posture, pursed lip breathing to improve mobility, strength, balance, and coordination. Required minimal Tactile cues; Verbal cues to promote static and dynamic balance in standing and promote coordination of bilateral, lower extremity(s). Braces/Orthotics/Lines/Etc:   O2 Device: Hi flow nasal cannula  Treatment/Session Assessment:    Response to Treatment:  tolerated well  Interdisciplinary Collaboration:   Physical Therapist  Registered Nurse  After treatment position/precautions:   Up in chair  Bed/Chair-wheels locked  Bed in low position  Call light within reach  RN notified  Family at bedside   Compliance with Program/Exercises:  Will assess as treatment progresses  Recommendations/Intent for next treatment session: \"Next visit will focus on advancements to more challenging activities\".   Total Treatment Duration:  PT Patient Time In/Time Out  Time In: 1429  Time Out: 3500 Norma Hinton PT

## 2020-11-21 LAB
ABO + RH BLD: NORMAL
BLD PROD TYP BPU: NORMAL
BLOOD GROUP ANTIBODIES SERPL: NORMAL
BPU ID: NORMAL
CROSSMATCH RESULT,%XM: NORMAL
GLUCOSE BLD STRIP.AUTO-MCNC: 201 MG/DL (ref 65–100)
GLUCOSE BLD STRIP.AUTO-MCNC: 208 MG/DL (ref 65–100)
GLUCOSE BLD STRIP.AUTO-MCNC: 226 MG/DL (ref 65–100)
GLUCOSE BLD STRIP.AUTO-MCNC: 332 MG/DL (ref 65–100)
MAGNESIUM SERPL-MCNC: 2.6 MG/DL (ref 1.8–2.4)
MM INDURATION POC: 0 MM (ref 0–5)
POTASSIUM SERPL-SCNC: 4.3 MMOL/L (ref 3.5–5.1)
PPD POC: NEGATIVE NEGATIVE
SPECIMEN EXP DATE BLD: NORMAL
STATUS OF UNIT,%ST: NORMAL
UNIT DIVISION, %UDIV: 0

## 2020-11-21 PROCEDURE — 74011250637 HC RX REV CODE- 250/637: Performed by: THORACIC SURGERY (CARDIOTHORACIC VASCULAR SURGERY)

## 2020-11-21 PROCEDURE — 94640 AIRWAY INHALATION TREATMENT: CPT

## 2020-11-21 PROCEDURE — 97110 THERAPEUTIC EXERCISES: CPT

## 2020-11-21 PROCEDURE — 77030012890

## 2020-11-21 PROCEDURE — 99232 SBSQ HOSP IP/OBS MODERATE 35: CPT | Performed by: INTERNAL MEDICINE

## 2020-11-21 PROCEDURE — 97530 THERAPEUTIC ACTIVITIES: CPT

## 2020-11-21 PROCEDURE — 94760 N-INVAS EAR/PLS OXIMETRY 1: CPT

## 2020-11-21 PROCEDURE — 2709999900 HC NON-CHARGEABLE SUPPLY

## 2020-11-21 PROCEDURE — 36415 COLL VENOUS BLD VENIPUNCTURE: CPT

## 2020-11-21 PROCEDURE — 82962 GLUCOSE BLOOD TEST: CPT

## 2020-11-21 PROCEDURE — 74011636637 HC RX REV CODE- 636/637: Performed by: PHYSICIAN ASSISTANT

## 2020-11-21 PROCEDURE — 74011250637 HC RX REV CODE- 250/637: Performed by: PHYSICIAN ASSISTANT

## 2020-11-21 PROCEDURE — 65660000004 HC RM CVT STEPDOWN

## 2020-11-21 PROCEDURE — 83735 ASSAY OF MAGNESIUM: CPT

## 2020-11-21 PROCEDURE — 84132 ASSAY OF SERUM POTASSIUM: CPT

## 2020-11-21 PROCEDURE — 74011000250 HC RX REV CODE- 250: Performed by: THORACIC SURGERY (CARDIOTHORACIC VASCULAR SURGERY)

## 2020-11-21 RX ORDER — TRAMADOL HYDROCHLORIDE 50 MG/1
50 TABLET ORAL
Status: DISCONTINUED | OUTPATIENT
Start: 2020-11-21 | End: 2020-11-24 | Stop reason: HOSPADM

## 2020-11-21 RX ORDER — OXYCODONE AND ACETAMINOPHEN 5; 325 MG/1; MG/1
1 TABLET ORAL
Status: DISCONTINUED | OUTPATIENT
Start: 2020-11-21 | End: 2020-11-24 | Stop reason: HOSPADM

## 2020-11-21 RX ORDER — IPRATROPIUM BROMIDE AND ALBUTEROL SULFATE 2.5; .5 MG/3ML; MG/3ML
3 SOLUTION RESPIRATORY (INHALATION)
Status: DISCONTINUED | OUTPATIENT
Start: 2020-11-21 | End: 2020-11-24 | Stop reason: HOSPADM

## 2020-11-21 RX ADMIN — GUAIFENESIN 600 MG: 600 TABLET ORAL at 09:27

## 2020-11-21 RX ADMIN — INSULIN LISPRO 6 UNITS: 100 INJECTION, SOLUTION INTRAVENOUS; SUBCUTANEOUS at 16:11

## 2020-11-21 RX ADMIN — Medication 1 AMPULE: at 09:25

## 2020-11-21 RX ADMIN — POTASSIUM CHLORIDE 10 MEQ: 750 TABLET, EXTENDED RELEASE ORAL at 09:27

## 2020-11-21 RX ADMIN — FAMOTIDINE 20 MG: 20 TABLET, FILM COATED ORAL at 09:26

## 2020-11-21 RX ADMIN — AMIODARONE HYDROCHLORIDE 200 MG: 200 TABLET ORAL at 22:10

## 2020-11-21 RX ADMIN — LISINOPRIL 20 MG: 20 TABLET ORAL at 09:26

## 2020-11-21 RX ADMIN — GUAIFENESIN 600 MG: 600 TABLET ORAL at 22:10

## 2020-11-21 RX ADMIN — CLOPIDOGREL BISULFATE 75 MG: 75 TABLET ORAL at 09:27

## 2020-11-21 RX ADMIN — OXYCODONE HYDROCHLORIDE AND ACETAMINOPHEN 1 TABLET: 5; 325 TABLET ORAL at 22:14

## 2020-11-21 RX ADMIN — FAMOTIDINE 20 MG: 20 TABLET, FILM COATED ORAL at 16:12

## 2020-11-21 RX ADMIN — OXYCODONE HYDROCHLORIDE AND ACETAMINOPHEN 1 TABLET: 10; 325 TABLET ORAL at 06:31

## 2020-11-21 RX ADMIN — IPRATROPIUM BROMIDE AND ALBUTEROL SULFATE 3 ML: .5; 3 SOLUTION RESPIRATORY (INHALATION) at 21:19

## 2020-11-21 RX ADMIN — INSULIN LISPRO 6 UNITS: 100 INJECTION, SOLUTION INTRAVENOUS; SUBCUTANEOUS at 09:25

## 2020-11-21 RX ADMIN — Medication 1 AMPULE: at 22:10

## 2020-11-21 RX ADMIN — SENNOSIDES AND DOCUSATE SODIUM 2 TABLET: 8.6; 5 TABLET ORAL at 22:10

## 2020-11-21 RX ADMIN — PREGABALIN 50 MG: 50 CAPSULE ORAL at 09:27

## 2020-11-21 RX ADMIN — DULOXETINE HYDROCHLORIDE 60 MG: 30 CAPSULE, DELAYED RELEASE ORAL at 09:26

## 2020-11-21 RX ADMIN — FUROSEMIDE 40 MG: 40 TABLET ORAL at 09:26

## 2020-11-21 RX ADMIN — INSULIN LISPRO 12 UNITS: 100 INJECTION, SOLUTION INTRAVENOUS; SUBCUTANEOUS at 22:08

## 2020-11-21 RX ADMIN — Medication 10 ML: at 14:00

## 2020-11-21 RX ADMIN — OXYCODONE HYDROCHLORIDE AND ACETAMINOPHEN 1 TABLET: 5; 325 TABLET ORAL at 16:12

## 2020-11-21 RX ADMIN — SENNOSIDES AND DOCUSATE SODIUM 2 TABLET: 8.6; 5 TABLET ORAL at 09:27

## 2020-11-21 RX ADMIN — AMIODARONE HYDROCHLORIDE 200 MG: 200 TABLET ORAL at 09:27

## 2020-11-21 RX ADMIN — PREGABALIN 50 MG: 50 CAPSULE ORAL at 16:12

## 2020-11-21 RX ADMIN — ASPIRIN 81 MG: 81 TABLET ORAL at 09:26

## 2020-11-21 RX ADMIN — METOPROLOL TARTRATE 25 MG: 25 TABLET, FILM COATED ORAL at 09:26

## 2020-11-21 RX ADMIN — Medication 10 ML: at 06:32

## 2020-11-21 RX ADMIN — Medication 10 ML: at 22:19

## 2020-11-21 RX ADMIN — PREGABALIN 50 MG: 50 CAPSULE ORAL at 22:10

## 2020-11-21 RX ADMIN — INSULIN GLARGINE 20 UNITS: 100 INJECTION, SOLUTION SUBCUTANEOUS at 22:08

## 2020-11-21 RX ADMIN — ATORVASTATIN CALCIUM 80 MG: 80 TABLET, FILM COATED ORAL at 22:10

## 2020-11-21 RX ADMIN — INSULIN LISPRO 6 UNITS: 100 INJECTION, SOLUTION INTRAVENOUS; SUBCUTANEOUS at 11:52

## 2020-11-21 RX ADMIN — METOPROLOL TARTRATE 25 MG: 25 TABLET, FILM COATED ORAL at 22:10

## 2020-11-21 NOTE — PROGRESS NOTES
Verbal bedside report given to oncoming RN, Shauna Ross. Patient's situation, background, assessment and recommendations provided. Opportunity for questions provided. Oncoming RN assumed care of patient.

## 2020-11-21 NOTE — PROGRESS NOTES
Problem: Mobility Impaired (Adult and Pediatric)  Goal: *Acute Goals and Plan of Care (Insert Text)  Outcome: Progressing Towards Goal  Note: LTG:  (1.)Ms. Denisse Núñez will move from supine to sit and sit to supine , scoot up and down, and roll side to side in bed with INDEPENDENT within 7 treatment day(s). (2.)Ms. Denisse Núñez will transfer from bed to chair and chair to bed with INDEPENDENT using the least restrictive device within 7 treatment day(s). (3.)Ms. Denisse Núñez will ambulate with INDEPENDENT for 500+ feet with the least restrictive device within 7 treatment day(s). (4.)Ms. Denisse Núñez will perform 5 stairs with HR and SBA within 7 treatment days for ascending and descending stairs for home.   ________________________________________________________________________________________________       PHYSICAL THERAPY: Daily Note and PM 11/21/2020  INPATIENT: PT Visit Days : 2  Payor: Judith Cabrales / [place] [employer] [place] / Product Type: Managed Care Medicare /       NAME/AGE/GENDER: Edel Pineda is a 53 y.o. female   PRIMARY DIAGNOSIS: Atherosclerosis of native coronary artery of native heart with unstable angina pectoris ([facility] [I25.110]  CAD (coronary artery disease) [I25.10]  Atherosclerosis of native coronary artery of native heart with unstable angina pectoris (HCC) [I25.110] S/P CABG x 4 S/P CABG x 4  Procedure(s) (LRB):  CORONARY ARTERY BYPASS GRAFT (CABG x4), LIMA (N/A)  VEIN HARVEST ENDOSCOPIC, GREATER SAPHENOUS (Left)  ESOPHAGEAL TRANS ECHOCARDIOGRAM (N/A)  3 Days Post-Op  ICD-10: Treatment Diagnosis:    · Generalized Muscle Weakness (M62.81)  · Difficulty in walking, Not elsewhere classified (R26.2)   Precaution/Allergies:  Pcn [penicillins] and Tape [adhesive]      ASSESSMENT:     Ms. Denisse Núñez presents sitting in the recliner and agreeable to therapy.  present and is very helpful.   Pt reports lives with spouse and daughter/grandchildren in mobile home, 5 steps to enter with HR. Pt reports independent at baseline, does not work, RA at home, CPAP. Patient mobility is with supervision. Gait training without an assistive device x 200 feet with good amanda. Patient is returned to the recliner and after a short rest break the patient was instructed in therapeutic exercises. Tolerated well. Making progress towards. Permission given to the  to ambulate the patient in the hallway. Pt overall doing well with activity. PT to cont to follow for acute care needs to address deficits noted above. Home with HHPT. Will continue PT efforts. PM note:  Patient is agreeable to therapy. She is on 1L O2. Patient is able to maintain gait distance and continue exercises. Patient is left sitting in the recliner with needs within reach and  at bedside. Will continue PT efforts. This section established at most recent assessment   PROBLEM LIST (Impairments causing functional limitations):  1. Decreased Strength  2. Decreased ADL/Functional Activities  3. Decreased Transfer Abilities  4. Decreased Ambulation Ability/Technique  5. Decreased Balance  6. Increased Pain  7. Decreased Activity Tolerance  8. Decreased [place] with Home Exercise Program   INTERVENTIONS PLANNED: (Benefits and precautions of physical therapy have been discussed with the patient.)  1. Balance Exercise  2. Bed Mobility  3. Family Education  4. Gait Training  5. Home Exercise Program (HEP)  6. Therapeutic Activites  7. Therapeutic Exercise/Strengthening  8. Transfer Training     TREATMENT PLAN: Frequency/Duration: twice daily for duration of hospital stay  Rehabilitation Potential For Stated Goals: Good     REHAB RECOMMENDATIONS (at time of discharge pending progress):    Placement: It is my opinion, based on this patient's performance to date, that Ms. Lisandro Mendez may benefit from [address] after discharge due to the functional deficits listed above that are likely to improve with skilled rehabilitation because he/she has multiple medical issues that affect his/her functional mobility in the community. Equipment:    None at this time              HISTORY:   History of Present Injury/Illness (Reason for Referral):  S/p CABG x 4, generalized weakness  Past Medical History/Comorbidities:   Ms. Radha Thapa  has a past medical history of Arrhythmia, Asthma, Bipolar disorder, Coronary artery disease, Diabetic neuropathy, Fatty liver, GERD (gastroesophageal reflux disease), Hypercholesterolemia, Hypertension, Hypertriglyceridemia, Meniere's disease, Migraine, Morbid obesity, Obstructive sleep apnea, Palpitations, Peptic ulcer disease (), Psychiatric disorder, Stroke ([facility] and Syncope and collapse. Ms. Radha Thapa  has a past surgical history that includes hx cholecystectomy (); hx tonsillectomy; hx gyn (); hx  section; hx breast lumpectomy; hx urological; hx orthopaedic; hx jenifer and bso (); hx heart catheterization (); and neurological procedure unlisted. Social History/Living Environment:   Home Environment: Trailer/mobile home  # Steps to Enter: 5  Rails to Enter: Yes  Hand Rails : Bilateral  One/Two Story Residence: One story  Living Alone: No  Support Systems: Family member(s), Spouse/Significant Other/Partner  Patient Expects to be Discharged to[de-identified] Trailer/mobile home  Current DME Used/Available at Home: None  Tub or Shower Type: Tub/Shower combination  Prior Level of Function/Work/Activity:  Lives spouse, independent at baseline, does not work,drives  Personal Factors:          Sex:  female        Age:  53 y.o.         Overall Behavior:  agreeable   Number of Personal Factors/Comorbidities that affect the Plan of Care:  CAD, HTN, DM 3+: HIGH COMPLEXITY   EXAMINATION:   Most Recent Physical Functioning:   Gross Assessment:                  Posture:     Balance:  Sitting: Intact  Standing: Intact  Standing - Static: Good  Standing - Dynamic : Good Bed Mobility: Wheelchair Mobility:     Transfers:  Sit to Stand: Supervision  Stand to Sit: Supervision  Gait:     Speed/Yolande: Slow  Distance (ft): 200 Feet (ft)  Assistive Device: Other (comment)(no assistive device )  Ambulation - Level of Assistance: Supervision;Stand-by assistance      Body Structures Involved:  1. Heart  2. Lungs  3. Muscles Body Functions Affected:  1. Cardio  2. Respiratory  3. Movement Related Activities and Participation Affected:  1. General Tasks and Demands  2. Mobility  3. Self Care   Number of elements that affect the Plan of Care: 4+: HIGH COMPLEXITY   CLINICAL PRESENTATION:   Presentation: Evolving clinical presentation with changing clinical characteristics: MODERATE COMPLEXITY   CLINICAL DECISION MAKIN [facility] Inpatient Short Form  How much difficulty does the patient currently have. .. Unable A Lot A Little None   1. Turning over in bed (including adjusting bedclothes, sheets and blankets)? [] 1   [] 2   [x] 3   [] 4   2. Sitting down on and standing up from a chair with arms ( e.g., wheelchair, bedside commode, etc.)   [] 1   [] 2   [x] 3   [] 4   3. Moving from lying on back to sitting on the side of the bed? [] 1   [] 2   [x] 3   [] 4   How much help from another person does the patient currently need. .. Total A Lot A Little None   4. Moving to and from a bed to a chair (including a wheelchair)? [] 1   [] 2   [x] 3   [] 4   5. Need to walk in hospital room? [] 1   [] 2   [x] 3   [] 4   6. Climbing 3-5 steps with a railing? [] 1   [] 2   [x] 3   [] 4   © , Trustees of [address] [place] [address] [address] [address], under license to [employer]. All rights reserved      Score:  Initial: 18 Most Recent: X (Date: -- )    Interpretation of Tool:  Represents activities that are increasingly more difficult (i.e. Bed mobility, Transfers, Gait).     Medical Necessity:     · Patient is expected to demonstrate progress in   · strength, range of motion, balance, and coordination  ·  to   · decrease assistance required with overall functional ambulation, transfers  · .  · Patient demonstrates   · good  ·  rehab potential due to higher previous functional level. Reason for Services/Other Comments:  · Patient   · continues to require present interventions due to patient's inability to perform bed mobility, transfers, ambulation safely and effectively at prior level of function of independent. · .   Use of outcome tool(s) and clinical judgement create a POC that gives a: Clear prediction of patient's progress: LOW COMPLEXITY            TREATMENT:   (In addition to Assessment/Re-Assessment sessions the following treatments were rendered)   Pre-treatment Symptoms/Complaints: \"Hello\"  Pain: Initial:   Pain Intensity 1: 0  Post Session:  0/10     Therapeutic Activity: (    13 min): Therapeutic activities including Chair transfers, Ambulation on level ground, and weight shifting, sternal precautions, posture, pursed lip breathing to improve mobility, strength, balance, and coordination. Required minimal   to promote static and dynamic balance in standing and promote coordination of bilateral, lower extremity(s). Therapeutic Exercise: ( 11 minutes):  Exercises per grid below to improve mobility, strength, balance and coordination. Required minimum  verbal cues    Progressed repetitions and complexity of movement as indicated.        Date:  11/21/20 Date:   Date:     ACTIVITY/EXERCISE AM PM AM PM AM PM   LAQ 15 15       Shoulder shrugs 15 15       Gluteal sets 15 15       marching 15 15       Ankle pumps 15 15       abduction 15 15                B = bilateral; AA = active assistive; A = active; P = passive    Braces/Orthotics/Lines/Etc:   · O2 Device: Hi flow nasal cannula 1L  Treatment/Session Assessment:    · Response to Treatment:  tolerated well  · Interdisciplinary Collaboration:   o Physical Therapy Assistant  o Registered Nurse  · After treatment position/precautions:   o Up in chair  o Bed/Chair-wheels locked  o Call light within reach  o RN notified  o Family at bedside   · Compliance with Program/Exercises: Compliant all of the time  · Recommendations/Intent for next treatment session: \"Next visit will focus on advancements to more challenging activities\".   Total Treatment Duration:  PT Patient Time In/Time Out  Time In: 1226  Time Out: 1250    Shawna Serrano, PTA

## 2020-11-21 NOTE — PROGRESS NOTES
Namita Oconnor  Admission Date: 11/18/2020             Daily Progress Note: 11/21/2020    The patient's chart is reviewed and the patient is discussed with the staff. Patient had CABG x 4 on 11/18. Pt has a history of JOLEEN(AHI 5, desats to 80%) on APAP 5-12cm H2O,RLS, asthma, Bipolar disorder, PUD, chronic diastolic CHF, DM2, and HLD. Pt was seen by Dr. Karsten Montano, cardiologist, on 11/4/2020 with complaints of chest pain and dyspnea on exertion. Pt had a LHC on 11/5 with MVCAD. Successful extubation post op.  Using her home CPAP.        Subjective:      She is sitting up in the chair and did not use CPAP last night  On NC 4 LPM    Current Facility-Administered Medications   Medication Dose Route Frequency    senna-docusate (PERICOLACE) 8.6-50 mg per tablet 2 Tab  2 Tab Oral Q12H    guaiFENesin ER (MUCINEX) tablet 600 mg  600 mg Oral Q12H    lisinopriL (PRINIVIL, ZESTRIL) tablet 20 mg  20 mg Oral DAILY    insulin glargine (LANTUS) injection 20 Units  20 Units SubCUTAneous QHS    insulin lispro (HUMALOG) injection   SubCUTAneous AC&HS    clopidogreL (PLAVIX) tablet 75 mg  75 mg Oral DAILY    lip protectant (BLISTEX) ointment 1 Each  1 Each Topical PRN    DULoxetine (CYMBALTA) capsule 60 mg  60 mg Oral DAILY    pregabalin (LYRICA) capsule 50 mg  50 mg Oral TID    sodium chloride (NS) flush 5-40 mL  5-40 mL IntraVENous Q8H    sodium chloride (NS) flush 5-40 mL  5-40 mL IntraVENous PRN    metoprolol tartrate (LOPRESSOR) tablet 25 mg  25 mg Oral Q12H    furosemide (LASIX) tablet 40 mg  40 mg Oral DAILY    alum-mag hydroxide-simeth (MYLANTA) oral suspension 30 mL  30 mL Oral Q4H PRN    famotidine (PEPCID) tablet 20 mg  20 mg Oral BID    ondansetron (ZOFRAN) injection 4 mg  4 mg IntraVENous Q6H PRN    acetaminophen (TYLENOL) tablet 650 mg  650 mg Oral Q4H PRN    atorvastatin (LIPITOR) tablet 80 mg  80 mg Oral QHS    amiodarone (CORDARONE) tablet 200 mg  200 mg Oral Q12H  aspirin delayed-release tablet 81 mg  81 mg Oral DAILY    magnesium oxide (MAG-OX) tablet 400 mg  400 mg Oral TID PRN    magnesium oxide (MAG-OX) tablet 400 mg  400 mg Oral QID PRN    potassium chloride (KLOR-CON) tablet 10 mEq  10 mEq Oral DAILY    potassium chloride (K-DUR, KLOR-CON) SR tablet 20 mEq  20 mEq Oral BID PRN    potassium chloride (K-DUR, KLOR-CON) SR tablet 40 mEq  40 mEq Oral BID PRN    nitroglycerin (NITROSTAT) tablet 0.4 mg  0.4 mg SubLINGual Q5MIN PRN    traMADoL (ULTRAM) tablet 50 mg  50 mg Oral Q6H PRN    oxyCODONE-acetaminophen (PERCOCET 10)  mg per tablet 1 Tab  1 Tab Oral Q4H PRN    alcohol 62% (NOZIN) nasal  1 Ampule  1 Ampule Topical Q12H         Objective:     Vitals:    11/20/20 2144 11/20/20 2330 11/21/20 0402 11/21/20 0650   BP: 137/65 (!) 102/51 (!) 104/51    Pulse: 83 71 72    Resp:  18 20    Temp:  97.9 °F (36.6 °C) 98.6 °F (37 °C)    SpO2:  96% 95%    Weight:    231 lb 6.4 oz (105 kg)   Height:         Intake and Output:   11/19 1901 - 11/21 0700  In: 1530 [P.O.:1530]  Out: 2020 [Urine:2020]  No intake/output data recorded. Physical Exam:   Constitutional:  the patient is well developed and in no acute distress  HEENT:  Sclera clear, pupils equal, oral mucosa moist  Lungs: clear but overall decreased. Wearing 4 liter high flow cannula  Cardiovascular:  RRR without M,G,R. Sinus per telemetry  Abd/GI: soft and non-tender; with positive bowel sounds. Ext: warm without cyanosis. There is a trace amount of lower leg edema. Musculoskeletal: moves all four extremities with equal strength  Skin:  no jaundice or rashes, chest and leg wounds   Neuro: no gross neuro deficits   Musculoskeletal: can ambulate. No deformity  Psychiatric: Calm.      Review of Systems - General ROS: positive for  - fatigue  Respiratory ROS: positive for - cough - non productive   Cardiovascular ROS: positive for - chest discomfort following surgery  Gastrointestinal ROS: positive for - appetite loss  Musculoskeletal ROS: positive for - muscular weakness   Lines: peripheral IV    CHEST XRAY:       LAB  Recent Labs     11/20/20  0501 11/19/20  0331 11/18/20  2230 11/18/20  1815 11/18/20  1411   WBC 20.2* 15.7*  --   --  25.1*   HGB 11.6* 11.0* 11.4* 11.7 11.6*   HCT 37.0 33.6* 34.7* 36.4 36.2    243  --   --  248   INR  --   --   --   --  1.2     Recent Labs     11/21/20  0334 11/20/20  0501 11/19/20  0332  11/18/20  1411   NA  --  139 145  --  147*   K 4.3 5.0 4.3   < > 4.3   CL  --  107 115*  --  116*   CO2  --  24 27  --  24   GLU  --  195* 103*  --  126*   BUN  --  14 11  --  13   CREA  --  0.63 0.59*  --  0.71   MG 2.6* 2.5* 2.6*   < > 3.0*    < > = values in this interval not displayed.        Assessment:  (Medical Decision Making)     Hospital Problems  Date Reviewed: 11/18/2020          Codes Class Noted POA    Atherosclerosis of native coronary artery of native heart with unstable angina pectoris ([facility] ICD-10-CM: I25.110  ICD-9-CM: 414.01, 411.1  11/18/2020 Unknown    S/p CABG    Bipolar disorder ([facility] ICD-10-CM: F31.9  ICD-9-CM: 296.80  11/18/2020 Unknown    On home meds    * (Principal) S/P CABG x 4 ICD-10-CM: Z95.1  ICD-9-CM: V45.81  11/18/2020 Unknown    Rhythm stable, minimal edema    RLS (restless legs syndrome) ICD-10-CM: G25.81  ICD-9-CM: 333.94  11/23/2016 Yes        JOLEEN (obstructive sleep apnea) ICD-10-CM: D86.50  ICD-9-CM: 327.23  Unknown Yes    Using home CPAP     DM (diabetes mellitus) (HCC) (Chronic) ICD-10-CM: E11.9  ICD-9-CM: 250.00  11/8/2011 Yes    BS to upper 100s    Hypertension ICD-10-CM: I10  ICD-9-CM: 401.9  Unknown Yes    Overview Signed 11/7/2011  4:45 PM by SHAMA Lisa     controlled w meds         controlled    CAD (coronary artery disease) ICD-10-CM: I25.10  ICD-9-CM: 414.00  Unknown Unknown    Overview Signed 11/7/2011  4:46 PM by SHAMA Lisa     cath 2009         As above          Plan:  (Medical Decision Making)     Control pain and continue to work with IS. Using home CPAP with sleep but need to add O2 to CPAP  Daily lasix. Mobilize  Wean O2       More than 50% of time documented was spent face-to-face contact with the patient and in the care of the patient on the floor/unit where the patient is located.

## 2020-11-21 NOTE — PROGRESS NOTES
Today's Date: 11/21/2020  Date of Admission: 11/18/2020    Chart Reviewed. Subjective:     Patient feels ok. Appetite fair. No BM yet, she is passing gas. On 4L NC. Glucose in 200s. Medications Reviewed. Objective:     Vitals:    11/20/20 2135 11/20/20 2144 11/20/20 2330 11/21/20 0402   BP: 137/65 137/65 (!) 102/51 (!) 104/51   Pulse: 91 83 71 72   Resp:   18 20   Temp:   97.9 °F (36.6 °C) 98.6 °F (37 °C)   SpO2:   96% 95%   Weight:       Height:           Intake and Output  Current Shift: 11/20 1901 - 11/21 0700  In: 450 [P.O.:450]  Out: 320 [Urine:320]   Last 3 Shifts: 11/19 0701 - 11/20 1900  In: 1080 [P.O.:1080]  Out: 3700 [Urine:3650]    Physical Exam:  General: Well Developed, Obese, No Acute Distress, Alert & Oriented x 3, Appropriate mood  Neck: supple, no JVD  Heart: S1S2 with RRR without murmurs or gallops  Lungs: decreased at bases  Abd: soft, nontender, nondistended, with good bowel sounds  Ext: mild edema bilaterally  Sternal incision: clean, dry, and intact  Skin: warm and dry    LABS  Data Review:   Recent Labs     11/21/20  0334 11/20/20  0501 11/19/20  0332 11/19/20  0331  11/18/20  1411   NA  --  139 145  --   --  147*   K 4.3 5.0 4.3  --    < > 4.3   MG 2.6* 2.5* 2.6*  --    < > 3.0*   BUN  --  14 11  --   --  13   CREA  --  0.63 0.59*  --   --  0.71   GLU  --  195* 103*  --   --  126*   WBC  --  20.2*  --  15.7*  --  25.1*   HGB  --  11.6*  --  11.0*   < > 11.6*   HCT  --  37.0  --  33.6*   < > 36.2   PLT  --  243  --  243  --  248   INR  --   --   --   --   --  1.2    < > = values in this interval not displayed. Estimated Creatinine Clearance: 115 mL/min (by C-G formula based on SCr of 0.63 mg/dL).       Assessment/Plan:     Principal Problem:    S/P CABG x 4 (11/18/2020)  On ASA, BB, statin, resume ACE-I, on O2 by NC, pacing wires removed, continue PT    Active Problems:    Hypertension ()  BP under better control, resume ACE-I, will titrate as needed      CAD (coronary artery disease) ()  S/p CABG, continue medical therapy       DM (diabetes mellitus) ([address] (11/8/2011)  On SSI, start Lantus, will titrate as appetite improves. Hospitalist consult to help manage glucose.       JOLEEN (obstructive sleep apnea) ()  Pulmonary following       RLS (restless legs syndrome) (11/23/2016)  Continue home meds      Atherosclerosis of native coronary artery of native heart with unstable angina pectoris ([address] (11/18/2020)  S/p CABG       Encounter for weaning from ventilator ([address] (11/18/2020)      Hypoxia  On 4L O2 by NC, will wean as tolerated      Bipolar disorder ([address] (11/18/2020)  Continue home meds           Avani Swanson MD

## 2020-11-21 NOTE — PROGRESS NOTES
Problem: Mobility Impaired (Adult and Pediatric)  Goal: *Acute Goals and Plan of Care (Insert Text)  Outcome: Progressing Towards Goal  Note: LTG:  (1.)Ms. Teagan Cortes will move from supine to sit and sit to supine , scoot up and down, and roll side to side in bed with INDEPENDENT within 7 treatment day(s). (2.)Ms. Teagan Cortes will transfer from bed to chair and chair to bed with INDEPENDENT using the least restrictive device within 7 treatment day(s). (3.)Ms. Teagan Cortes will ambulate with INDEPENDENT for 500+ feet with the least restrictive device within 7 treatment day(s). (4.)Ms. Teagan Cortes will perform 5 stairs with HR and SBA within 7 treatment days for ascending and descending stairs for home.   ________________________________________________________________________________________________       PHYSICAL THERAPY: Daily Note and AM 11/21/2020  INPATIENT: PT Visit Days : 2  Payor: [place] / Plan: [place] [facility] MEDICARE SNP / Product Type: Managed Care Medicare /       NAME/AGE/GENDER: Vilma Cullen is a 53 y.o. female   PRIMARY DIAGNOSIS: Atherosclerosis of native coronary artery of native heart with unstable angina pectoris ([facility] [I25.110]  CAD (coronary artery disease) [I25.10]  Atherosclerosis of native coronary artery of native heart with unstable angina pectoris (HCC) [I25.110] S/P CABG x 4 S/P CABG x 4  Procedure(s) (LRB):  CORONARY ARTERY BYPASS GRAFT (CABG x4), LIMA (N/A)  VEIN HARVEST ENDOSCOPIC, GREATER SAPHENOUS (Left)  ESOPHAGEAL TRANS ECHOCARDIOGRAM (N/A)  3 Days Post-Op  ICD-10: Treatment Diagnosis:    · Generalized Muscle Weakness (M62.81)  · Difficulty in walking, Not elsewhere classified (R26.2)   Precaution/Allergies:  Pcn [penicillins] and Tape [adhesive]      ASSESSMENT:     Ms. Teagan Cortes presents sitting in the recliner and agreeable to therapy.  present and is very helpful.   Pt reports lives with spouse and daughter/grandchildren in mobile home, 5 steps to enter with HR. Pt reports independent at baseline, does not work, RA at home, CPAP. Patient mobility is with supervision. Gait training without an assistive device x 200 feet with good amanda. Patient is returned to the recliner and after a short rest break the patient was instructed in therapeutic exercises. Tolerated well. Making progress towards. Permission given to the  to ambulate the patient in the hallway. Pt overall doing well with activity. PT to cont to follow for acute care needs to address deficits noted above. Home with HHPT. Will continue PT efforts. This section established at most recent assessment   PROBLEM LIST (Impairments causing functional limitations):  1. Decreased Strength  2. Decreased ADL/Functional Activities  3. Decreased Transfer Abilities  4. Decreased Ambulation Ability/Technique  5. Decreased Balance  6. Increased Pain  7. Decreased Activity Tolerance  8. Decreased [place] with Home Exercise Program   INTERVENTIONS PLANNED: (Benefits and precautions of physical therapy have been discussed with the patient.)  1. Balance Exercise  2. Bed Mobility  3. Family Education  4. Gait Training  5. Home Exercise Program (HEP)  6. Therapeutic Activites  7. Therapeutic Exercise/Strengthening  8. Transfer Training     TREATMENT PLAN: Frequency/Duration: twice daily for duration of hospital stay  Rehabilitation Potential For Stated Goals: Good     REHAB RECOMMENDATIONS (at time of discharge pending progress):    Placement: It is my opinion, based on this patient's performance to date, that Ms. Kevin Manjarrez may benefit from [address] after discharge due to the functional deficits listed above that are likely to improve with skilled rehabilitation because he/she has multiple medical issues that affect his/her functional mobility in the community.   Equipment:    None at this time              HISTORY:   History of Present Injury/Illness (Reason for Referral):  S/p CABG x 4, generalized weakness  Past Medical History/Comorbidities:   Ms. Kevin Manjarrez  has a past medical history of Arrhythmia, Asthma, Bipolar disorder, Coronary artery disease, Diabetic neuropathy, Fatty liver, GERD (gastroesophageal reflux disease), Hypercholesterolemia, Hypertension, Hypertriglyceridemia, Meniere's disease, Migraine, Morbid obesity, Obstructive sleep apnea, Palpitations, Peptic ulcer disease (), Psychiatric disorder, Stroke [facility]), and Syncope and collapse. Ms. Kevin Manjarrez  has a past surgical history that includes hx cholecystectomy (); hx tonsillectomy; hx gyn (); hx  section; hx breast lumpectomy; hx urological; hx orthopaedic; hx jenifer and bso (); hx heart catheterization (); and neurological procedure unlisted. Social History/Living Environment:   Home Environment: Trailer/mobile home  # Steps to Enter: 5  Rails to Enter: Yes  Hand Rails : Bilateral  One/Two Story Residence: One story  Living Alone: No  Support Systems: Family member(s), Spouse/Significant Other/Partner  Patient Expects to be Discharged to[de-identified] Trailer/mobile home  Current DME Used/Available at Home: None  Tub or Shower Type: Tub/Shower combination  Prior Level of Function/Work/Activity:  Lives spouse, independent at baseline, does not work,drives  Personal Factors:          Sex:  female        Age:  53 y.o.         Overall Behavior:  agreeable   Number of Personal Factors/Comorbidities that affect the Plan of Care:  CAD, HTN, DM 3+: HIGH COMPLEXITY   EXAMINATION:   Most Recent Physical Functioning:   Gross Assessment:                  Posture:     Balance:  Sitting: Intact  Standing: Intact  Standing - Static: Good  Standing - Dynamic : Good Bed Mobility:     Wheelchair Mobility:     Transfers:  Sit to Stand: Supervision  Stand to Sit: Supervision  Gait:     Speed/Yolande: Slow  Distance (ft): 200 Feet (ft)  Assistive Device: Other (comment)(no assistive device )  Ambulation - Level of Assistance: Supervision;Stand-by assistance      Body Structures Involved:  1. Heart  2. Lungs  3. Muscles Body Functions Affected:  1. Cardio  2. Respiratory  3. Movement Related Activities and Participation Affected:  1. General Tasks and Demands  2. Mobility  3. Self Care   Number of elements that affect the Plan of Care: 4+: HIGH COMPLEXITY   CLINICAL PRESENTATION:   Presentation: Evolving clinical presentation with changing clinical characteristics: MODERATE COMPLEXITY   CLINICAL DECISION MAKIN [facility] Mobility Inpatient Short Form  How much difficulty does the patient currently have. .. Unable A Lot A Little None   1. Turning over in bed (including adjusting bedclothes, sheets and blankets)? [] 1   [] 2   [x] 3   [] 4   2. Sitting down on and standing up from a chair with arms ( e.g., wheelchair, bedside commode, etc.)   [] 1   [] 2   [x] 3   [] 4   3. Moving from lying on back to sitting on the side of the bed? [] 1   [] 2   [x] 3   [] 4   How much help from another person does the patient currently need. .. Total A Lot A Little None   4. Moving to and from a bed to a chair (including a wheelchair)? [] 1   [] 2   [x] 3   [] 4   5. Need to walk in hospital room? [] 1   [] 2   [x] 3   [] 4   6. Climbing 3-5 steps with a railing? [] 1   [] 2   [x] 3   [] 4   © , Trustees of [address] [place] [address], under license to [employer]. All rights reserved      Score:  Initial: 18 Most Recent: X (Date: -- )    Interpretation of Tool:  Represents activities that are increasingly more difficult (i.e. Bed mobility, Transfers, Gait). Medical Necessity:     · Patient is expected to demonstrate progress in   · strength, range of motion, balance, and coordination  ·  to   · decrease assistance required with overall functional ambulation, transfers  · .  · Patient demonstrates   · good  ·  rehab potential due to higher previous functional level.   Reason for Services/Other Comments:  · Patient   · continues to require present interventions due to patient's inability to perform bed mobility, transfers, ambulation safely and effectively at prior level of function of independent. · .   Use of outcome tool(s) and clinical judgement create a POC that gives a: Clear prediction of patient's progress: LOW COMPLEXITY            TREATMENT:   (In addition to Assessment/Re-Assessment sessions the following treatments were rendered)   Pre-treatment Symptoms/Complaints: \"Hello\"  Pain: Initial:   Pain Intensity 1: 0  Post Session:  0/10     Therapeutic Activity: (    13 min): Therapeutic activities including Chair transfers, Ambulation on level ground, and weight shifting, sternal precautions, posture, pursed lip breathing to improve mobility, strength, balance, and coordination. Required minimal   to promote static and dynamic balance in standing and promote coordination of bilateral, lower extremity(s). Therapeutic Exercise: ( 11 minutes):  Exercises per grid below to improve mobility, strength, balance and coordination. Required minimum  verbal cues    Progressed repetitions and complexity of movement as indicated. Date:  11/21/20 Date:   Date:     ACTIVITY/EXERCISE AM PM AM PM AM PM   LAQ 15        Shoulder shrugs 15        Gluteal sets 15        marching 15        Ankle pumps 15        abduction 15                 B = bilateral; AA = active assistive; A = active; P = passive    Braces/Orthotics/Lines/Etc:   · O2 Device: Hi flow nasal cannula 2L  Treatment/Session Assessment:    · Response to Treatment:  tolerated well  · Interdisciplinary Collaboration:   o Physical Therapy Assistant  o Registered Nurse  · After treatment position/precautions:   o Up in chair  o Bed/Chair-wheels locked  o Call light within reach  o RN notified  o Family at bedside   · Compliance with Program/Exercises: Will assess as treatment progresses  · Recommendations/Intent for next treatment session:   \"Next visit will focus on advancements to more challenging activities\".   Total Treatment Duration:  PT Patient Time In/Time Out  Time In: 1006  Time Out: 56    Urvashi Clinton-Brigid, PTA

## 2020-11-22 ENCOUNTER — APPOINTMENT (OUTPATIENT)
Dept: GENERAL RADIOLOGY | Age: 50
DRG: 236 | End: 2020-11-22
Attending: INTERNAL MEDICINE
Payer: COMMERCIAL

## 2020-11-22 LAB
BASOPHILS # BLD: 0.1 K/UL (ref 0–0.2)
BASOPHILS NFR BLD: 1 % (ref 0–2)
DIFFERENTIAL METHOD BLD: ABNORMAL
EOSINOPHIL # BLD: 0.4 K/UL (ref 0–0.8)
EOSINOPHIL NFR BLD: 3 % (ref 0.5–7.8)
ERYTHROCYTE [DISTWIDTH] IN BLOOD BY AUTOMATED COUNT: 12.6 % (ref 11.9–14.6)
GLUCOSE BLD STRIP.AUTO-MCNC: 190 MG/DL (ref 65–100)
GLUCOSE BLD STRIP.AUTO-MCNC: 195 MG/DL (ref 65–100)
GLUCOSE BLD STRIP.AUTO-MCNC: 245 MG/DL (ref 65–100)
GLUCOSE BLD STRIP.AUTO-MCNC: 272 MG/DL (ref 65–100)
HCT VFR BLD AUTO: 31.1 % (ref 35.8–46.3)
HGB BLD-MCNC: 10 G/DL (ref 11.7–15.4)
IMM GRANULOCYTES # BLD AUTO: 0.1 K/UL (ref 0–0.5)
IMM GRANULOCYTES NFR BLD AUTO: 1 % (ref 0–5)
LYMPHOCYTES # BLD: 2.9 K/UL (ref 0.5–4.6)
LYMPHOCYTES NFR BLD: 23 % (ref 13–44)
MAGNESIUM SERPL-MCNC: 2.5 MG/DL (ref 1.8–2.4)
MCH RBC QN AUTO: 28.7 PG (ref 26.1–32.9)
MCHC RBC AUTO-ENTMCNC: 32.2 G/DL (ref 31.4–35)
MCV RBC AUTO: 89.4 FL (ref 79.6–97.8)
MONOCYTES # BLD: 1.1 K/UL (ref 0.1–1.3)
MONOCYTES NFR BLD: 9 % (ref 4–12)
NEUTS SEG # BLD: 7.9 K/UL (ref 1.7–8.2)
NEUTS SEG NFR BLD: 63 % (ref 43–78)
NRBC # BLD: 0 K/UL (ref 0–0.2)
PLATELET # BLD AUTO: 298 K/UL (ref 150–450)
PMV BLD AUTO: 9.2 FL (ref 9.4–12.3)
POTASSIUM SERPL-SCNC: 4 MMOL/L (ref 3.5–5.1)
RBC # BLD AUTO: 3.48 M/UL (ref 4.05–5.2)
WBC # BLD AUTO: 12.4 K/UL (ref 4.3–11.1)

## 2020-11-22 PROCEDURE — 74011250637 HC RX REV CODE- 250/637: Performed by: THORACIC SURGERY (CARDIOTHORACIC VASCULAR SURGERY)

## 2020-11-22 PROCEDURE — 71046 X-RAY EXAM CHEST 2 VIEWS: CPT

## 2020-11-22 PROCEDURE — 99232 SBSQ HOSP IP/OBS MODERATE 35: CPT | Performed by: INTERNAL MEDICINE

## 2020-11-22 PROCEDURE — 74011250637 HC RX REV CODE- 250/637: Performed by: INTERNAL MEDICINE

## 2020-11-22 PROCEDURE — 65660000004 HC RM CVT STEPDOWN

## 2020-11-22 PROCEDURE — 82962 GLUCOSE BLOOD TEST: CPT

## 2020-11-22 PROCEDURE — 97110 THERAPEUTIC EXERCISES: CPT

## 2020-11-22 PROCEDURE — 97530 THERAPEUTIC ACTIVITIES: CPT

## 2020-11-22 PROCEDURE — 94760 N-INVAS EAR/PLS OXIMETRY 1: CPT

## 2020-11-22 PROCEDURE — 2709999900 HC NON-CHARGEABLE SUPPLY

## 2020-11-22 PROCEDURE — 74011000250 HC RX REV CODE- 250: Performed by: THORACIC SURGERY (CARDIOTHORACIC VASCULAR SURGERY)

## 2020-11-22 PROCEDURE — 83735 ASSAY OF MAGNESIUM: CPT

## 2020-11-22 PROCEDURE — 77030012890

## 2020-11-22 PROCEDURE — 94640 AIRWAY INHALATION TREATMENT: CPT

## 2020-11-22 PROCEDURE — 36415 COLL VENOUS BLD VENIPUNCTURE: CPT

## 2020-11-22 PROCEDURE — 99222 1ST HOSP IP/OBS MODERATE 55: CPT | Performed by: INTERNAL MEDICINE

## 2020-11-22 PROCEDURE — 74011636637 HC RX REV CODE- 636/637: Performed by: NURSE PRACTITIONER

## 2020-11-22 PROCEDURE — 77030040393 HC DRSG OPTIFOAM GENT MDII -B

## 2020-11-22 PROCEDURE — 74011250637 HC RX REV CODE- 250/637: Performed by: PHYSICIAN ASSISTANT

## 2020-11-22 PROCEDURE — 85025 COMPLETE CBC W/AUTO DIFF WBC: CPT

## 2020-11-22 PROCEDURE — 84132 ASSAY OF SERUM POTASSIUM: CPT

## 2020-11-22 PROCEDURE — 77010033678 HC OXYGEN DAILY

## 2020-11-22 RX ORDER — INSULIN LISPRO 100 [IU]/ML
INJECTION, SOLUTION INTRAVENOUS; SUBCUTANEOUS
Status: DISCONTINUED | OUTPATIENT
Start: 2020-11-22 | End: 2020-11-22

## 2020-11-22 RX ORDER — METOPROLOL TARTRATE 25 MG/1
25 TABLET, FILM COATED ORAL 3 TIMES DAILY
Status: DISCONTINUED | OUTPATIENT
Start: 2020-11-22 | End: 2020-11-24

## 2020-11-22 RX ORDER — METOPROLOL TARTRATE 5 MG/5ML
5 INJECTION INTRAVENOUS ONCE
Status: COMPLETED | OUTPATIENT
Start: 2020-11-22 | End: 2020-11-22

## 2020-11-22 RX ORDER — BISACODYL 5 MG
5 TABLET, DELAYED RELEASE (ENTERIC COATED) ORAL DAILY PRN
Status: DISCONTINUED | OUTPATIENT
Start: 2020-11-22 | End: 2020-11-24 | Stop reason: HOSPADM

## 2020-11-22 RX ORDER — METOPROLOL TARTRATE 50 MG/1
50 TABLET ORAL 3 TIMES DAILY
Status: DISCONTINUED | OUTPATIENT
Start: 2020-11-22 | End: 2020-11-22

## 2020-11-22 RX ADMIN — GUAIFENESIN 600 MG: 600 TABLET ORAL at 21:28

## 2020-11-22 RX ADMIN — Medication 10 ML: at 21:29

## 2020-11-22 RX ADMIN — METOPROLOL TARTRATE 25 MG: 25 TABLET, FILM COATED ORAL at 21:29

## 2020-11-22 RX ADMIN — PREGABALIN 50 MG: 50 CAPSULE ORAL at 16:02

## 2020-11-22 RX ADMIN — GUAIFENESIN 600 MG: 600 TABLET ORAL at 08:59

## 2020-11-22 RX ADMIN — OXYCODONE HYDROCHLORIDE AND ACETAMINOPHEN 1 TABLET: 5; 325 TABLET ORAL at 05:46

## 2020-11-22 RX ADMIN — FAMOTIDINE 20 MG: 20 TABLET, FILM COATED ORAL at 17:07

## 2020-11-22 RX ADMIN — POTASSIUM CHLORIDE 20 MEQ: 20 TABLET, EXTENDED RELEASE ORAL at 05:46

## 2020-11-22 RX ADMIN — ATORVASTATIN CALCIUM 80 MG: 80 TABLET, FILM COATED ORAL at 21:28

## 2020-11-22 RX ADMIN — FAMOTIDINE 20 MG: 20 TABLET, FILM COATED ORAL at 08:59

## 2020-11-22 RX ADMIN — PREGABALIN 50 MG: 50 CAPSULE ORAL at 21:28

## 2020-11-22 RX ADMIN — INSULIN LISPRO 4 UNITS: 100 INJECTION, SOLUTION INTRAVENOUS; SUBCUTANEOUS at 11:31

## 2020-11-22 RX ADMIN — Medication 1 AMPULE: at 09:00

## 2020-11-22 RX ADMIN — BISACODYL 5 MG: 5 TABLET, COATED ORAL at 08:59

## 2020-11-22 RX ADMIN — FUROSEMIDE 40 MG: 40 TABLET ORAL at 08:59

## 2020-11-22 RX ADMIN — Medication 1 AMPULE: at 21:29

## 2020-11-22 RX ADMIN — Medication 10 ML: at 16:02

## 2020-11-22 RX ADMIN — OXYCODONE HYDROCHLORIDE AND ACETAMINOPHEN 1 TABLET: 5; 325 TABLET ORAL at 21:35

## 2020-11-22 RX ADMIN — METOPROLOL TARTRATE 5 MG: 5 INJECTION INTRAVENOUS at 09:32

## 2020-11-22 RX ADMIN — METOPROLOL TARTRATE 25 MG: 25 TABLET, FILM COATED ORAL at 16:02

## 2020-11-22 RX ADMIN — SENNOSIDES AND DOCUSATE SODIUM 2 TABLET: 8.6; 5 TABLET ORAL at 21:28

## 2020-11-22 RX ADMIN — SENNOSIDES AND DOCUSATE SODIUM 2 TABLET: 8.6; 5 TABLET ORAL at 09:00

## 2020-11-22 RX ADMIN — Medication 10 ML: at 05:46

## 2020-11-22 RX ADMIN — METOPROLOL TARTRATE 25 MG: 25 TABLET, FILM COATED ORAL at 09:00

## 2020-11-22 RX ADMIN — IPRATROPIUM BROMIDE AND ALBUTEROL SULFATE 3 ML: .5; 3 SOLUTION RESPIRATORY (INHALATION) at 08:04

## 2020-11-22 RX ADMIN — ASPIRIN 81 MG: 81 TABLET ORAL at 09:00

## 2020-11-22 RX ADMIN — AMIODARONE HYDROCHLORIDE 200 MG: 200 TABLET ORAL at 09:00

## 2020-11-22 RX ADMIN — POTASSIUM CHLORIDE 20 MEQ: 20 TABLET, EXTENDED RELEASE ORAL at 18:12

## 2020-11-22 RX ADMIN — POTASSIUM CHLORIDE 10 MEQ: 750 TABLET, EXTENDED RELEASE ORAL at 09:01

## 2020-11-22 RX ADMIN — OXYCODONE HYDROCHLORIDE AND ACETAMINOPHEN 1 TABLET: 5; 325 TABLET ORAL at 10:41

## 2020-11-22 RX ADMIN — LISINOPRIL 20 MG: 20 TABLET ORAL at 09:00

## 2020-11-22 RX ADMIN — AMIODARONE HYDROCHLORIDE 200 MG: 200 TABLET ORAL at 21:29

## 2020-11-22 RX ADMIN — PREGABALIN 50 MG: 50 CAPSULE ORAL at 08:59

## 2020-11-22 RX ADMIN — CLOPIDOGREL BISULFATE 75 MG: 75 TABLET ORAL at 09:00

## 2020-11-22 RX ADMIN — IPRATROPIUM BROMIDE AND ALBUTEROL SULFATE 3 ML: .5; 3 SOLUTION RESPIRATORY (INHALATION) at 15:27

## 2020-11-22 RX ADMIN — IPRATROPIUM BROMIDE AND ALBUTEROL SULFATE 3 ML: .5; 3 SOLUTION RESPIRATORY (INHALATION) at 20:52

## 2020-11-22 NOTE — PROGRESS NOTES
Patient up to commode c/o difficulty coughing up phlegm and attempting to have a BM. Notified charge nurse that patient takes duonebs at home and is having difficulty coughing up phlegm.

## 2020-11-22 NOTE — PROGRESS NOTES
Problem: Mobility Impaired (Adult and Pediatric)  Goal: *Acute Goals and Plan of Care (Insert Text)  Outcome: Progressing Towards Goal  Note: LTG:  (1.)Ms. Melisa Gold will move from supine to sit and sit to supine , scoot up and down, and roll side to side in bed with INDEPENDENT within 7 treatment day(s). (2.)Ms. Melisa Gold will transfer from bed to chair and chair to bed with INDEPENDENT using the least restrictive device within 7 treatment day(s). Goal met 11/22/20  (3.)Ms. Melisa Gold will ambulate with INDEPENDENT for 500+ feet with the least restrictive device within 7 treatment day(s). (4.)Ms. Melisa Gold will perform 5 stairs with HR and SBA within 7 treatment days for ascending and descending stairs for home.   ________________________________________________________________________________________________       PHYSICAL THERAPY: Daily Note and AM 11/22/2020  INPATIENT: PT Visit Days : 3  Payor: Kami Hauser / [facility] [place] [facility] / Product Type: Managed Care Medicare /       NAME/AGE/GENDER: Melanie Hogan is a 53 y.o. female   PRIMARY DIAGNOSIS: Atherosclerosis of native coronary artery of native heart with unstable angina pectoris ([facility] [address] [I25.110]  CAD (coronary artery disease) [I25.10]  Atherosclerosis of native coronary artery of native heart with unstable angina pectoris (HCC) [I25.110] S/P CABG x 4 S/P CABG x 4  Procedure(s) (LRB):  CORONARY ARTERY BYPASS GRAFT (CABG x4), LIMA (N/A)  VEIN HARVEST ENDOSCOPIC, GREATER SAPHENOUS (Left)  ESOPHAGEAL TRANS ECHOCARDIOGRAM (N/A)  4 Days Post-Op  ICD-10: Treatment Diagnosis:    · Generalized Muscle Weakness (M62.81)  · Difficulty in walking, Not elsewhere classified (R26.2)   Precaution/Allergies:  Pcn [penicillins] and Tape [adhesive]      ASSESSMENT:     Ms. Melisa Gold presents supine in bed  and agreeable to therapy.  present but is sleeping.   Pt reports lives with spouse and daughter/grandchildren in mobile home, 5 steps to enter with HR. Pt reports independent at baseline, does not work, RA at home, CPAP. Patient bed  mobility is with stand by assist.   Gait training without an assistive device x 200 feet with good but slow amanda. As the patient is returning to the room she stops and states that she feel her heart racing, once in the room it is noted that the patients heart rate is 223, patient seated and RN arrives quickly. Treatment terminated. Pt overall doing well with activity prior to this episode. PT to cont to follow for acute care needs to address deficits noted above. Home with HHPT. Will continue PT efforts. This section established at most recent assessment   PROBLEM LIST (Impairments causing functional limitations):  1. Decreased Strength  2. Decreased ADL/Functional Activities  3. Decreased Transfer Abilities  4. Decreased Ambulation Ability/Technique  5. Decreased Balance  6. Increased Pain  7. Decreased Activity Tolerance  8. Decreased [place] with Home Exercise Program   INTERVENTIONS PLANNED: (Benefits and precautions of physical therapy have been discussed with the patient.)  1. Balance Exercise  2. Bed Mobility  3. Family Education  4. Gait Training  5. Home Exercise Program (HEP)  6. Therapeutic Activites  7. Therapeutic Exercise/Strengthening  8. Transfer Training     TREATMENT PLAN: Frequency/Duration: twice daily for duration of hospital stay  Rehabilitation Potential For Stated Goals: Good     REHAB RECOMMENDATIONS (at time of discharge pending progress):    Placement: It is my opinion, based on this patient's performance to date, that Ms. Lisandro Mendez may benefit from [address] after discharge due to the functional deficits listed above that are likely to improve with skilled rehabilitation because he/she has multiple medical issues that affect his/her functional mobility in the community.   Equipment:    None at this time              HISTORY:   History of Present Injury/Illness (Reason for Referral):  S/p CABG x 4, generalized weakness  Past Medical History/Comorbidities:   Ms. Carlita Mejia  has a past medical history of Arrhythmia, Asthma, Bipolar disorder, Coronary artery disease, Diabetic neuropathy, Fatty liver, GERD (gastroesophageal reflux disease), Hypercholesterolemia, Hypertension, Hypertriglyceridemia, Meniere's disease, Migraine, Morbid obesity, Obstructive sleep apnea, Palpitations, Peptic ulcer disease (), Psychiatric disorder, Stroke ([facility] [address] and Syncope and collapse. Ms. Carlita Mejia  has a past surgical history that includes hx cholecystectomy (); hx tonsillectomy; hx gyn (); hx  section; hx breast lumpectomy; hx urological; hx orthopaedic; hx jenifer and bso (); hx heart catheterization (); and neurological procedure unlisted. Social History/Living Environment:   Home Environment: Trailer/mobile home  # Steps to Enter: 5  Rails to Enter: Yes  Hand Rails : Bilateral  One/Two Story Residence: One story  Living Alone: No  Support Systems: Family member(s), Spouse/Significant Other/Partner  Patient Expects to be Discharged to[de-identified] Trailer/mobile home  Current DME Used/Available at Home: None  Tub or Shower Type: Tub/Shower combination  Prior Level of Function/Work/Activity:  Lives spouse, independent at baseline, does not work,drives  Personal Factors:          Sex:  female        Age:  53 y.o.         Overall Behavior:  agreeable   Number of Personal Factors/Comorbidities that affect the Plan of Care:  CAD, HTN, DM 3+: HIGH COMPLEXITY   EXAMINATION:   Most Recent Physical Functioning:   Gross Assessment:                  Posture:     Balance:  Sitting: Intact  Standing: Intact  Standing - Static: Good  Standing - Dynamic : Good Bed Mobility:     Wheelchair Mobility:     Transfers:  Sit to Stand: Supervision  Stand to Sit: Supervision  Gait:     Speed/Yolande: Slow  Distance (ft): 200 Feet (ft)  Assistive Device: Other (comment)(no assistive device )  Ambulation - Level of Assistance: Supervision      Body Structures Involved:  1. Heart  2. Lungs  3. Muscles Body Functions Affected:  1. Cardio  2. Respiratory  3. Movement Related Activities and Participation Affected:  1. General Tasks and Demands  2. Mobility  3. Self Care   Number of elements that affect the Plan of Care: 4+: HIGH COMPLEXITY   CLINICAL PRESENTATION:   Presentation: Evolving clinical presentation with changing clinical characteristics: MODERATE COMPLEXITY   CLINICAL DECISION MAKIN [facility] Inpatient Short Form  How much difficulty does the patient currently have. .. Unable A Lot A Little None   1. Turning over in bed (including adjusting bedclothes, sheets and blankets)? [] 1   [] 2   [x] 3   [] 4   2. Sitting down on and standing up from a chair with arms ( e.g., wheelchair, bedside commode, etc.)   [] 1   [] 2   [x] 3   [] 4   3. Moving from lying on back to sitting on the side of the bed? [] 1   [] 2   [x] 3   [] 4   How much help from another person does the patient currently need. .. Total A Lot A Little None   4. Moving to and from a bed to a chair (including a wheelchair)? [] 1   [] 2   [x] 3   [] 4   5. Need to walk in hospital room? [] 1   [] 2   [x] 3   [] 4   6. Climbing 3-5 steps with a railing? [] 1   [] 2   [x] 3   [] 4   © , Trustees of [address] [place] [address], under license to [employer]. All rights reserved      Score:  Initial: 18 Most Recent: X (Date: -- )    Interpretation of Tool:  Represents activities that are increasingly more difficult (i.e. Bed mobility, Transfers, Gait). Medical Necessity:     · Patient is expected to demonstrate progress in   · strength, range of motion, balance, and coordination  ·  to   · decrease assistance required with overall functional ambulation, transfers  · .  · Patient demonstrates   · good  ·  rehab potential due to higher previous functional level.   Reason for Services/Other Comments:  · Patient   · continues to require present interventions due to patient's inability to perform bed mobility, transfers, ambulation safely and effectively at prior level of function of independent. · .   Use of outcome tool(s) and clinical judgement create a POC that gives a: Clear prediction of patient's progress: LOW COMPLEXITY            TREATMENT:   (In addition to Assessment/Re-Assessment sessions the following treatments were rendered)   Pre-treatment Symptoms/Complaints:  \"Good morning\"  Pain: Initial:   Pain Intensity 1: 0  Post Session:  0/10     Therapeutic Activity: (    11 min): Therapeutic activities including Chair transfers, Ambulation on level ground, and weight shifting, sternal precautions, posture, pursed lip breathing to improve mobility, strength, balance, and coordination. Required close supervision   to promote static and dynamic balance in standing and promote coordination of bilateral, lower extremity(s). Therapeutic Exercise: (  minutes):  Exercises per grid below to improve mobility, strength, balance and coordination. Required minimum  verbal cues    Progressed repetitions and complexity of movement as indicated.        Date:  11/21/20 Date:   Date:     ACTIVITY/EXERCISE AM PM AM PM AM PM   LAQ 15        Shoulder shrugs 15        Gluteal sets 15        marching 15        Ankle pumps 15        abduction 15                 B = bilateral; AA = active assistive; A = active; P = passive    Braces/Orthotics/Lines/Etc:   · O2 Device: Hi flow nasal cannula 2L saturations 96%  Treatment/Session Assessment:    · Response to Treatment:  Increased heart rate noted  · Interdisciplinary Collaboration:   o Physical Therapy Assistant  o Registered Nurse  · After treatment position/precautions:   o Up in chair  o Bed/Chair-wheels locked  o Call light within reach  o RN notified  o Family at bedside   · Compliance with Program/Exercises: Compliant all of the time  · Recommendations/Intent for next treatment session: \"Next visit will focus on advancements to more challenging activities\".   Total Treatment Duration:  PT Patient Time In/Time Out  Time In: 0903  Time Out: 0914    Abebe Bowen, PTA

## 2020-11-22 NOTE — PROGRESS NOTES
Bedside shift change report given to Desmond Knowles RN (oncoming nurse) by Maya Palmer (offgoing nurse). Report included the following information SBAR, Kardex, OR Summary, Intake/Output, MAR, Recent Results and Cardiac Rhythm NSR.

## 2020-11-22 NOTE — PROGRESS NOTES
Today's Date: 11/22/2020  Date of Admission: 11/18/2020    Chart Reviewed. Subjective:     Patient feels ok. Appetite fair. On 2L NC. Glucose in 200s-300s. Coughing up phlegm. Medications Reviewed. Objective:     Vitals:    11/21/20 1956 11/21/20 2120 11/21/20 2217 11/22/20 0322   BP: 123/89  (!) 123/56 (!) 102/55   Pulse: 75  79 70   Resp: 18 18 18   Temp: 98.6 °F (37 °C)  98.7 °F (37.1 °C) 98.6 °F (37 °C)   SpO2: 92% 92% 91% 93%   Weight:       Height:           Intake and Output  Current Shift: 11/21 1901 - 11/22 0700  In: -   Out: 950 [Urine:950]   Last 3 Shifts: 11/20 0701 - 11/21 1900  In: 2130 [P.O.:2130]  Out: 5825 [LMARC:[id number]]    Physical Exam:  General: Well Developed, Obese, No Acute Distress, Alert & Oriented x 3, Appropriate mood  Neck: supple, no JVD  Heart: S1S2 with RRR without murmurs or gallops  Lungs: decreased at bases  Abd: soft, nontender, nondistended, with good bowel sounds  Ext: mild edema bilaterally  Sternal incision: clean, dry, and intact  Skin: warm and dry    LABS  Data Review:   Recent Labs     11/21/20  0334 11/20/20  0501   NA  --  139   K 4.3 5.0   MG 2.6* 2.5*   BUN  --  14   CREA  --  0.63   GLU  --  195*   WBC  --  20.2*   HGB  --  11.6*   HCT  --  37.0   PLT  --  243       Estimated Creatinine Clearance: 114.8 mL/min (by C-G formula based on SCr of 0.63 mg/dL). Assessment/Plan:     Principal Problem:    S/P CABG x 4 (11/18/2020)  On ASA, BB, statin, resume ACE-I, on O2 by NC, pacing wires removed, continue PT    Active Problems:    Hypertension ()  BP under better control, resume ACE-I, will titrate as needed      CAD (coronary artery disease) ()  S/p CABG, continue medical therapy       DM (diabetes mellitus) ([address] (11/8/2011)  On SSI, start Lantus, will titrate as appetite improves. Hospitalist consult to help manage glucose.       JOLEEN (obstructive sleep apnea) ()  Pulmonary following       RLS (restless legs syndrome) (11/23/2016)  Continue home meds      Atherosclerosis of native coronary artery of native heart with unstable angina pectoris ([facility] [address] (11/18/2020)  S/p CABG       Encounter for weaning from ventilator ([facility] [address] (11/18/2020)      Hypoxia  On 2L O2 by NC, will wean as tolerated. On mucinex, duonebs.       Bipolar disorder ([facility] [address] (11/18/2020)  Continue home meds           Kathy Diego MD

## 2020-11-22 NOTE — CONSULTS
[facility] CARDIOLOGY  [address] [place] [address], [address] [facility], [address] [place] [address]  PHONE: [phone]        20    NAME:  Ruby Aguillon  : 1970  MRN: [id number]     Referring Cardiologist: Pam Henao MD, Edson Casas MD, and Elvira Matute MD    Reason for Consultation: SVT     ASSESSMENT and PLAN:  Diagnoses and all orders for this visit:    1. Atherosclerosis of native coronary artery of native heart with unstable angina pectoris ([facility]    2. Encounter for weaning from ventilator ([facility]    3. JOLEEN (obstructive sleep apnea)    4. RLS (restless legs syndrome)    5. S/P CABG x 4    6. Bipolar affective disorder, remission status unspecified ([facility]    7. Morbid obesity ([facility]    8. SVT likely paroxysmal atrial fibrillation     53year old female with recent CABG now with rapid SVT likely pAF. She is on reasonable therapy but will increase her dose of beta blocker to 25 TID for now if her BP tolerates. Her rhythm seems to respond well to IV lopressor. Ideally she should be on [facility] prior to discharge once cleared from a CT perspective.     -Increase metoprolol to 25 mg po TID. -Use of [facility] if/when able per CTS. -Monitor rhythm, likely AF, if goes in again, get 12 lead ECG. -Breathing treatments per pulmonary.  -Encourage ongoing PT and post surgical care with cardiac rehab, weight loss. -OMT for CAD. Thank you for allowing me to participate in the electrophysiologic care of Ms. Ruby Aguillon. Please contact me if any questions or concerns were to arise. Torrie Garcia. Gerson CLARK, MS  Clinical Cardiac Electrophysiology  [facility] Cardiology  20  9:58 AM    ===================================================================  Chief Complant:  No chief complaint on file. Consultation is requested by No ref. provider found for evaluation of No chief complaint on file.       History:  Ruby Aguillon is a most pleasant 53 y.o. female with a past medical and cardiac history significant for obstructive CAD, obesity, JOLEEN with recent finding of multivessel CAD s/p CABG x 4 with Dr. Barbara Michael on 11/18. She has been extubated on the stepdown floor and went into a rapid SVT this morning while working with PT. The rhythm strips look like pAF although rates were very fast. She is on a beta blocker and amiodarone. To my knowledge, she did not have a previous diagnosis of pAF. She otherwise is stable since her surgery. The patient otherwise denies chest pain, dyspnea, presyncope, syncope or lateralizing symptoms. Cardiac PMH: (Old records have been reviewed and summarized below)    EKG:  (EKG has been independently visualized by me with interpretation below): NSR, normal axis, no ischemia. Monitor: 11/2020, NSVT     ECHO: 11/5/2020  -  Left ventricle: Systolic function was normal. Ejection fraction was  estimated in the range of 60 % to 65 %. There were no regional wall motion  abnormalities. Left ventricular diastolic function parameters were normal.     -  Mitral valve: There was trivial regurgitation.     -  Tricuspid valve: There was trivial regurgitation.     Previous Heart Catheterization: 11/5/2020  FINDINGS:  Left ventricle has normal size, normal systolic function. Ejection fraction is 65%. Left ventricular end-diastolic pressure is 19 mmHg with an opening aortic pressure of 167/93. No significant gradient across the aortic valve.     The right coronary artery is a dominant vessel in normal anatomic position, a tortuous vessel, diffusely diseased proximally up to 20-30%. The distal vessel has a focal 70% narrowing before the takeoff of the PDA which has a 40-50% ostial narrowing.     The left main coronary artery is a small vessel with no significant atherosclerotic narrowing. Although it is size-matched proximally, it is the same size as the proximal LAD. The LAD is diffusely diseased in the midportion, heavily calcified up to 85%.   There is a diagonal branch with a 70% narrowing and then the ongoing LAD has a 70-80% narrowing after the takeoff of a diagonal branch.     The circumflex has a midvessel obtuse marginal branch that has an 80% narrowing. This vessel courses under the circumflex, is extremely tortuous, and a small vessel, not a good interventional target. The distal obtuse marginal branch is free of any high-grade narrowing.     CONCLUSION:  Significant multivessel coronary artery disease with preserved LV systolic function in a diabetic. Stress Test: n/a     Past Medical History, Past Surgical History, Family history, Social History, and Medications were all reviewed with the patient today and updated as necessary.      Current Facility-Administered Medications   Medication Dose Route Frequency Provider Last Rate Last Dose    bisacodyL (DULCOLAX) tablet 5 mg  5 mg Oral DAILY PRN Emir Gibson MD   5 mg at 11/22/20 0859    insulin lispro (HUMALOG) injection   SubCUTAneous AC&HS Rey Jaquez NP        metoprolol tartrate (LOPRESSOR) tablet 50 mg  50 mg Oral TID Swathi Rodriguez MD        traMADoL Vincent Justice) tablet 50 mg  50 mg Oral Q6H PRN Jorje Beth MD        oxyCODONE-acetaminophen (PERCOCET) 5-325 mg per tablet 1 Tab  1 Tab Oral Q4H PRN Jorje Beth MD   1 Tab at 11/22/20 0546    albuterol-ipratropium (DUO-NEB) 2.5 MG-0.5 MG/3 ML  3 mL Nebulization QID RT Emir Gibson MD   3 mL at 11/22/20 0804    senna-docusate (PERICOLACE) 8.6-50 mg per tablet 2 Tab  2 Tab Oral Q12H Phyllis Mitchell PA   2 Tab at 11/22/20 0900    guaiFENesin ER (MUCINEX) tablet 600 mg  600 mg Oral Q12H Phyllis Mitchell PA   600 mg at 11/22/20 0859    lisinopriL (PRINIVIL, ZESTRIL) tablet 20 mg  20 mg Oral DAILY Phyllis Mitchell PA   20 mg at 11/22/20 0900    insulin glargine (LANTUS) injection 20 Units  20 Units SubCUTAneous QHS Felicitas Roman   20 Units at 11/21/20 2208    clopidogreL (PLAVIX) tablet 75 mg  75 mg Oral DAILY Ashleigh Jansen MD   75 mg at 11/22/20 0900    lip protectant (BLISTEX) ointment 1 Each  1 Each Topical PRN Ashleigh Jansen MD        pregabalin (LYRICA) capsule 50 mg  50 mg Oral TID Ashleigh Jansen MD   50 mg at 11/22/20 0859    sodium chloride (NS) flush 5-40 mL  5-40 mL IntraVENous Q8H Ashleigh Jansen MD   10 mL at 11/22/20 0546    sodium chloride (NS) flush 5-40 mL  5-40 mL IntraVENous PRN Ashleigh Jansen MD        alum-mag hydroxide-simeth (MYLANTA) oral suspension 30 mL  30 mL Oral Q4H PRN Ashleigh Jansen MD        famotidine (PEPCID) tablet 20 mg  20 mg Oral BID Ashleigh Jansen MD   20 mg at 11/22/20 0859    ondansetron (ZOFRAN) injection 4 mg  4 mg IntraVENous Q6H PRN Ashleigh Jansen MD   4 mg at 11/20/20 1706    acetaminophen (TYLENOL) tablet 650 mg  650 mg Oral Q4H PRN Ashleigh Jansen MD        atorvastatin (LIPITOR) tablet 80 mg  80 mg Oral QHS Ashleigh Jansen MD   80 mg at 11/21/20 2210    amiodarone (CORDARONE) tablet 200 mg  200 mg Oral Q12H Ashleigh Jansen MD   200 mg at 11/22/20 0900    aspirin delayed-release tablet 81 mg  81 mg Oral DAILY Ashleigh Jansen MD   81 mg at 11/22/20 0900    magnesium oxide (MAG-OX) tablet 400 mg  400 mg Oral TID PRN Ashleigh Jansen MD        magnesium oxide (MAG-OX) tablet 400 mg  400 mg Oral QID PRN Ashleigh Jansen MD        potassium chloride (K-DUR, KLOR-CON) SR tablet 20 mEq  20 mEq Oral BID PRN Ashleigh Jansen MD   20 mEq at 11/22/20 0546    potassium chloride (K-DUR, KLOR-CON) SR tablet 40 mEq  40 mEq Oral BID PRN Ashleigh Jansen MD        nitroglycerin (NITROSTAT) tablet 0.4 mg  0.4 mg SubLINGual Q5MIN PRN Ashleigh Jansen MD        oxyCODONE-acetaminophen (PERCOCET 10)  mg per tablet 1 Tab  1 Tab Oral Q4H PRN Phyllis Mitchell PA   1 Tab at 11/21/20 0631    alcohol 62% (NOZIN) nasal  1 Ampule  1 Ampule Topical Q12H Ashleigh Jansen MD   1 Ampule at 11/22/20 0900     Allergies   Allergen Reactions    Pcn [Penicillins] Rash, Itching and Nausea and Vomiting    Tape [Adhesive] Rash     PAPER TAPE     Patient Active Problem List    Diagnosis    Atherosclerosis of native coronary artery of native heart with unstable angina pectoris ([facility]    Encounter for weaning from ventilator ([facility]    Bipolar disorder ([facility]    S/P CABG x 4    Chronic migraine with aura    Postural imbalance    Encounter for medication management    TIA (transient ischemic attack)    Vestibular migraine    Dizziness    Diastolic CHF, chronic (HCC)    Other fatigue    Vitamin D deficiency    Uncontrolled diabetes mellitus with diabetic neuropathy, with long-term current use of insulin ([facility])    Hypersomnia    RLS (restless legs syndrome)    Bruxism, sleep-related    Persistent disorder of initiating or maintaining sleep    Lateral epicondylitis    Elevated cortisol level    JOLEEN (obstructive sleep apnea)    Hypercholesterolemia    Hypertriglyceridemia    Morbid obesity     Last Assessment & Plan:   Obesity stable. We discussed dietary modification and exercise.  Chest pain    Syncope and collapse    Generalized anxiety disorder    Idiopathic progressive polyneuropathy    Unspecified hearing loss    Depressive disorder, not elsewhere classified    Incontinence    Microalbuminuria     Last Assessment & Plan:   Encouraged better glycemic control to prevent progression. Recheck urine microalbumin today.       DM (diabetes mellitus) ([facility]    Displacement of cervical intervertebral disc without myelopathy    Hypertension     controlled w meds      GERD (gastroesophageal reflux disease)     controlled w meds      Psychiatric disorder     bipolar disorder,anxiety, depression      CAD (coronary artery disease)     cath 2009      PUD (peptic ulcer disease)    Fatty liver    Chronic pain     neck, shoulders      Unspecified adverse effect of anesthesia Oxygen level drops      Diabetic peripheral neuropathy associated with type 2 diabetes mellitus      bilat feet         Past Medical History:   Diagnosis Date    Arrhythmia     palpatations    Asthma     Bipolar disorder     Coronary artery disease     cardiac cath 2020    Diabetic neuropathy     Fatty liver     GERD (gastroesophageal reflux disease)     Hypercholesterolemia     Hypertension     Hypertriglyceridemia     Meniere's disease     Migraine     Morbid obesity     Obstructive sleep apnea     CPAP    Palpitations     Peptic ulcer disease 2009    Psychiatric disorder     bipolar    Stroke ([facility]     Syncope and collapse      Past Surgical History:   Procedure Laterality Date    HX BREAST LUMPECTOMY      HX  SECTION      HX CHOLECYSTECTOMY  2004    HX GYN  2010    ovaries rem    HX HEART CATHETERIZATION  2009    x 4    HX ORTHOPAEDIC      left wrist surgery,back surgery(discectomy-removed)    HX BISHOP AND BSO  2000    HX TONSILLECTOMY      HX UROLOGICAL      bladder sling    NEUROLOGICAL PROCEDURE UNLISTED      x3, lumbar region, cervical     Family History   Problem Relation Age of Onset    Coronary Artery Disease Mother     Diabetes Mother     Coronary Artery Disease Father     Diabetes Brother     Coronary Artery Disease Paternal Grandfather     Breast Cancer Sister     Diabetes Sister      Social History     Tobacco Use    Smoking status: Never Smoker    Smokeless tobacco: Never Used   Substance Use Topics    Alcohol use: Yes     Comment: 1 drink/month       ROS:  A comprehensive review of systems was performed with the pertinent positives and negatives as noted in the HPI in addition to:    Review of Systems   Constitutional: Negative. HENT: Negative. Eyes: Negative. Respiratory: Positive for shortness of breath. Cardiovascular: Positive for palpitations. Gastrointestinal: Negative. Genitourinary: Negative. Musculoskeletal: Negative. Skin: Negative. Neurological: Negative. Endo/Heme/Allergies: Negative. Psychiatric/Behavioral: Negative. PHYSICAL EXAM:     Visit Vitals  /81   Pulse 72   Temp 97.9 °F (36.6 °C)   Resp 18   Ht 5' 4\" (1.626 m)   Wt 232 lb (105.2 kg)   LMP  (LMP Unknown)   SpO2 95%   Breastfeeding No   BMI 39.82 kg/m²        Wt Readings from Last 3 Encounters:   11/22/20 232 lb (105.2 kg)   11/17/20 233 lb 14.4 oz (106.1 kg)   11/11/20 234 lb (106.1 kg)     BP Readings from Last 3 Encounters:   11/22/20 121/81   11/17/20 (!) 140/76   11/11/20 (!) 149/87       Gen: Well appearing, well developed, no acute distress, obese   Eyes: Pupils equal, round. Extraocular movements are intact  ENT: Oropharynx clear, no oral lesions, normal dentition  CV: S1S2, regular rate and rhythm, no murmurs, rubs or gallops, normal JVD, no carotid bruits, normal distal pulses, no TRESA  Pulm: Clear to auscultation bilaterally, no accessory muscle uses, no wheezes or rales  GI: Soft, NT, ND, +BS  Neuro: Alert and oriented, nonfocal  Psych: Appropriate affect  Skin: Normal color and skin turgor  MSK: Normal muscle bulk and tone    Medical problems and test results were reviewed with the patient today.          Lab Results   Component Value Date/Time    Potassium 4.0 11/22/2020 03:53 AM    Potassium, POC 4.2 11/18/2020 02:03 PM     Lab Results   Component Value Date/Time    Creatinine 0.63 11/20/2020 05:01 AM     Lab Results   Component Value Date/Time    HGB 10.0 (L) 11/22/2020 03:51 AM     Lab Results   Component Value Date/Time    INR 1.2 11/18/2020 02:11 PM    INR 1.0 11/04/2020 12:26 PM    INR 0.9 01/30/2019 07:47 AM    Prothrombin time 15.8 (H) 11/18/2020 02:11 PM    Prothrombin time 13.2 11/04/2020 12:26 PM    Prothrombin time 11.8 01/30/2019 07:47 AM

## 2020-11-22 NOTE — PROGRESS NOTES
Patient sitting in recliner no BM since surgery patient states she takes linzess at home will notify Dr. Teresa Angulo in am to possibly resume home med for IBS with constipation. Pizza box noted at bedside patient states she ate 2 pieces of pizza and several breadsticks.

## 2020-11-22 NOTE — PROGRESS NOTES
Patient's  came to nurses station and had a cup with a moderate amount of tan thick sputum and states the patient had coughed that up.

## 2020-11-22 NOTE — PROGRESS NOTES
Perfect serve message sent to Dr. Gregorio Cote requesting consult for diabetes management message has been read.

## 2020-11-22 NOTE — PROGRESS NOTES
Problem: Diabetes Self-Management  Goal: *Disease process and treatment process  Description: Define diabetes and identify own type of diabetes; list 3 options for treating diabetes. Outcome: Progressing Towards Goal  Goal: *Incorporating nutritional management into lifestyle  Description: Describe effect of type, amount and timing of food on blood glucose; list 3 methods for planning meals. Outcome: Progressing Towards Goal  Goal: *Incorporating physical activity into lifestyle  Description: State effect of exercise on blood glucose levels. Outcome: Progressing Towards Goal  Goal: *Developing strategies to promote health/change behavior  Description: Define the ABC's of diabetes; identify appropriate screenings, schedule and personal plan for screenings. Outcome: Progressing Towards Goal  Goal: *Using medications safely  Description: State effect of diabetes medications on diabetes; name diabetes medication taking, action and side effects. Outcome: Progressing Towards Goal  Goal: *Monitoring blood glucose, interpreting and using results  Description: Identify recommended blood glucose targets  and personal targets. Outcome: Progressing Towards Goal  Goal: *Prevention, detection, treatment of acute complications  Description: List symptoms of hyper- and hypoglycemia; describe how to treat low blood sugar and actions for lowering  high blood glucose level. Outcome: Progressing Towards Goal  Goal: *Prevention, detection and treatment of chronic complications  Description: Define the natural course of diabetes and describe the relationship of blood glucose levels to long term complications of diabetes.   Outcome: Progressing Towards Goal  Goal: *Developing strategies to address psychosocial issues  Description: Describe feelings about living with diabetes; identify support needed and support network  Outcome: Progressing Towards Goal  Goal: *Insulin pump training  Outcome: Progressing Towards Goal  Goal: *Sick day guidelines  Outcome: Progressing Towards Goal  Goal: *Patient Specific Goal (EDIT GOAL, INSERT TEXT)  Outcome: Progressing Towards Goal     Problem: Falls - Risk of  Goal: *Absence of Falls  Description: Document Kristin Fall Risk and appropriate interventions in the flowsheet.   Outcome: Progressing Towards Goal  Note: Fall Risk Interventions:  Mobility Interventions: Patient to call before getting OOB         Medication Interventions: Teach patient to arise slowly    Elimination Interventions: Call light in reach

## 2020-11-22 NOTE — PROGRESS NOTES
Bedside and Verbal shift change report given to self (oncoming nurse) by Jenna Tomlinson (offgoing nurse). Report included the following information SBAR, Kardex and MAR.

## 2020-11-22 NOTE — PROGRESS NOTES
Problem: Mobility Impaired (Adult and Pediatric)  Goal: *Acute Goals and Plan of Care (Insert Text)  Outcome: Progressing Towards Goal  Note: LTG:  (1.)Ms. Samira Velasquez will move from supine to sit and sit to supine , scoot up and down, and roll side to side in bed with INDEPENDENT within 7 treatment day(s). (2.)Ms. Samira Velasquez will transfer from bed to chair and chair to bed with INDEPENDENT using the least restrictive device within 7 treatment day(s). Goal met 11/22/20  (3.)Ms. Samira Velasquez will ambulate with INDEPENDENT for 500+ feet with the least restrictive device within 7 treatment day(s). (4.)Ms. Samira Velasquez will perform 5 stairs with HR and SBA within 7 treatment days for ascending and descending stairs for home.   ________________________________________________________________________________________________       PHYSICAL THERAPY: Daily Note and PM 11/22/2020  INPATIENT: PT Visit Days : 3  Payor: Gregory Kern / Plan: [place] [employer] [place] [employer] [place] / Product Type: Managed Care Medicare /       NAME/AGE/GENDER: Desmond Ibrahim is a 53 y.o. female   PRIMARY DIAGNOSIS: Atherosclerosis of native coronary artery of native heart with unstable angina pectoris ([address] [I25.110]  CAD (coronary artery disease) [I25.10]  Atherosclerosis of native coronary artery of native heart with unstable angina pectoris (HCC) [I25.110] S/P CABG x 4 S/P CABG x 4  Procedure(s) (LRB):  CORONARY ARTERY BYPASS GRAFT (CABG x4), LIMA (N/A)  VEIN HARVEST ENDOSCOPIC, GREATER SAPHENOUS (Left)  ESOPHAGEAL TRANS ECHOCARDIOGRAM (N/A)  4 Days Post-Op  ICD-10: Treatment Diagnosis:    · Generalized Muscle Weakness (M62.81)  · Difficulty in walking, Not elsewhere classified (R26.2)   Precaution/Allergies:  Pcn [penicillins] and Tape [adhesive]      ASSESSMENT:     Ms. Samira Velasquez presents supine in bed  and agreeable to therapy.  present but is sleeping.   Pt reports lives with spouse and daughter/grandchildren in mobile home, 5 steps to enter with HR. Pt reports independent at baseline, does not work, RA at home, CPAP. Patient bed  mobility is with stand by assist.   Gait training without an assistive device x 200 feet with good but slow amanda. As the patient is returning to the room she stops and states that she feel her heart racing, once in the room it is noted that the patients heart rate is 223, patient seated and RN arrives quickly. Treatment terminated. Pt overall doing well with activity prior to this episode. PT to cont to follow for acute care needs to address deficits noted above. Home with HHPT. Will continue PT efforts. PM note:  Patient is sitting in the recliner and agreeable to therapy. Concerned about heart rate increasing. RN okayed treatment. Patient was able to maintain her am gait distance. Returned to the recliner and participated in therapeutic exercises. Did very well, no significant increase in heart rate noted during this session. Left patient with NP. Will continue PT efforts. This section established at most recent assessment   PROBLEM LIST (Impairments causing functional limitations):  1. Decreased Strength  2. Decreased ADL/Functional Activities  3. Decreased Transfer Abilities  4. Decreased Ambulation Ability/Technique  5. Decreased Balance  6. Increased Pain  7. Decreased Activity Tolerance  8. Decreased Nicholas with Home Exercise Program   INTERVENTIONS PLANNED: (Benefits and precautions of physical therapy have been discussed with the patient.)  1. Balance Exercise  2. Bed Mobility  3. Family Education  4. Gait Training  5. Home Exercise Program (HEP)  6. Therapeutic Activites  7. Therapeutic Exercise/Strengthening  8. Transfer Training     TREATMENT PLAN: Frequency/Duration: twice daily for duration of hospital stay  Rehabilitation Potential For Stated Goals: Good     REHAB RECOMMENDATIONS (at time of discharge pending progress):    Placement:   It is my opinion, based on this patient's performance to date, that Ms. Page Leon may benefit from [address] after discharge due to the functional deficits listed above that are likely to improve with skilled rehabilitation because he/she has multiple medical issues that affect his/her functional mobility in the community. Equipment:    None at this time              HISTORY:   History of Present Injury/Illness (Reason for Referral):  S/p CABG x 4, generalized weakness  Past Medical History/Comorbidities:   Ms. Page Leon  has a past medical history of Arrhythmia, Asthma, Bipolar disorder, Coronary artery disease, Diabetic neuropathy, Fatty liver, GERD (gastroesophageal reflux disease), Hypercholesterolemia, Hypertension, Hypertriglyceridemia, Meniere's disease, Migraine, Morbid obesity, Obstructive sleep apnea, Palpitations, Peptic ulcer disease (), Psychiatric disorder, Stroke ([facility] [address] and Syncope and collapse. Ms. Page Leon  has a past surgical history that includes hx cholecystectomy (); hx tonsillectomy; hx gyn (); hx  section; hx breast lumpectomy; hx urological; hx orthopaedic; hx jenifer and bso (); hx heart catheterization (); and neurological procedure unlisted. Social History/Living Environment:   Home Environment: Trailer/mobile home  # Steps to Enter: 5  Rails to Enter: Yes  Hand Rails : Bilateral  One/Two Story Residence: One story  Living Alone: No  Support Systems: Family member(s), Spouse/Significant Other/Partner  Patient Expects to be Discharged to[de-identified] Trailer/mobile home  Current DME Used/Available at Home: None  Tub or Shower Type: Tub/Shower combination  Prior Level of Function/Work/Activity:  Lives spouse, independent at baseline, does not work,drives  Personal Factors:          Sex:  female        Age:  53 y.o.         Overall Behavior:  agreeable   Number of Personal Factors/Comorbidities that affect the Plan of Care:  CAD, HTN, DM 3+: HIGH COMPLEXITY   EXAMINATION:   Most Recent Physical Functioning:   Gross Assessment:                  Posture:     Balance:  Sitting: Intact  Standing: Intact  Standing - Static: Good  Standing - Dynamic : Good Bed Mobility:     Wheelchair Mobility:     Transfers:  Sit to Stand: Supervision  Stand to Sit: Supervision  Gait:     Speed/Yolande: Slow  Distance (ft): 200 Feet (ft)  Assistive Device: Other (comment)(no assistive device )  Ambulation - Level of Assistance: Supervision      Body Structures Involved:  1. Heart  2. Lungs  3. Muscles Body Functions Affected:  1. Cardio  2. Respiratory  3. Movement Related Activities and Participation Affected:  1. General Tasks and Demands  2. Mobility  3. Self Care   Number of elements that affect the Plan of Care: 4+: HIGH COMPLEXITY   CLINICAL PRESENTATION:   Presentation: Evolving clinical presentation with changing clinical characteristics: MODERATE COMPLEXITY   CLINICAL DECISION MAKIN [facility] Inpatient Short Form  How much difficulty does the patient currently have. .. Unable A Lot A Little None   1. Turning over in bed (including adjusting bedclothes, sheets and blankets)? [] 1   [] 2   [x] 3   [] 4   2. Sitting down on and standing up from a chair with arms ( e.g., wheelchair, bedside commode, etc.)   [] 1   [] 2   [x] 3   [] 4   3. Moving from lying on back to sitting on the side of the bed? [] 1   [] 2   [x] 3   [] 4   How much help from another person does the patient currently need. .. Total A Lot A Little None   4. Moving to and from a bed to a chair (including a wheelchair)? [] 1   [] 2   [x] 3   [] 4   5. Need to walk in hospital room? [] 1   [] 2   [x] 3   [] 4   6. Climbing 3-5 steps with a railing? [] 1   [] 2   [x] 3   [] 4   © , Trustees of [address] [place] [address] [address] [id number], under license to [employer].  All rights reserved      Score:  Initial: 18 Most Recent: X (Date: -- )    Interpretation of Tool:  Represents activities that are increasingly more difficult (i.e. Bed mobility, Transfers, Gait). Medical Necessity:     · Patient is expected to demonstrate progress in   · strength, range of motion, balance, and coordination  ·  to   · decrease assistance required with overall functional ambulation, transfers  · .  · Patient demonstrates   · good  ·  rehab potential due to higher previous functional level. Reason for Services/Other Comments:  · Patient   · continues to require present interventions due to patient's inability to perform bed mobility, transfers, ambulation safely and effectively at prior level of function of independent. · .   Use of outcome tool(s) and clinical judgement create a POC that gives a: Clear prediction of patient's progress: LOW COMPLEXITY            TREATMENT:   (In addition to Assessment/Re-Assessment sessions the following treatments were rendered)   Pre-treatment Symptoms/Complaints: \"Is it safe? \"  Pain: Initial:   Pain Intensity 1: 0  Post Session:  0/10     Therapeutic Activity: (    12 min): Therapeutic activities including Chair transfers, Ambulation on level ground, and weight shifting, sternal precautions, posture, pursed lip breathing to improve mobility, strength, balance, and coordination. Required close supervision   to promote static and dynamic balance in standing and promote coordination of bilateral, lower extremity(s). Therapeutic Exercise: ( 10 minutes):  Exercises per grid below to improve mobility, strength, balance and coordination. Required minimum  verbal cues    Progressed repetitions and complexity of movement as indicated.        Date:  11/21/20 Date:  11/22/20 Date:     ACTIVITY/EXERCISE AM PM AM PM AM PM   LAQ 15   15     Shoulder shrugs 15   15     Gluteal sets 15   15     marching 15   15     Ankle pumps 15   15     abduction 15   15              B = bilateral; AA = active assistive; A = active; P = passive    Braces/Orthotics/Lines/Etc:   · O2 Device: Hi flow nasal cannula 2L Treatment/Session Assessment:    · Response to Treatment:  No increase in heart rate noted  · Interdisciplinary Collaboration:   o Physical Therapy Assistant  o Registered Nurse  · After treatment position/precautions:   o Up in chair  o Bed/Chair-wheels locked  o Call light within reach  o RN notified  o Family at bedside   · Compliance with Program/Exercises: Compliant all of the time  · Recommendations/Intent for next treatment session: \"Next visit will focus on advancements to more challenging activities\".   Total Treatment Duration:  PT Patient Time In/Time Out  Time In: 1500  Time Out: 1522    Tete Guajardo, PTA

## 2020-11-22 NOTE — PROGRESS NOTES
Hospitalist consult ordered per T/O given by Dr. Veronica Collins.   Perfect serve message sent to Dr. Juan Barrera he messaged back for me to consult day shift covering MD after 7am.

## 2020-11-22 NOTE — CONSULTS
Chief Complaint: CHEST PAIN     We are asked to see this patient regarding Diabetes/hyperglycemia.      Patient Active Problem List    Diagnosis Date Noted    Atherosclerosis of native coronary artery of native heart with unstable angina pectoris ([facility] [address] [address] 11/18/2020    Encounter for weaning from ventilator ([facility] [address] 11/18/2020    B[facility] 11/18/2020    S/P CABG x 4 11/18/2020    Chronic migraine with aura 07/30/2020    Postural imbalance 04/20/2020    Encounter for medication management 04/20/2020    TIA (transient ischemic attack) 04/20/2020    Vestibular migraine 06/10/2019    Dizziness 85/79/3767    Diastolic CHF, chronic ([facility] [address] 02/27/2019    Other fatigue 02/27/2019    Vitamin D deficiency 07/06/2018    Uncontrolled diabetes mellitus with diabetic neuropathy, with long-term current use of insulin ([facility] 01/06/2017    Hypersomnia 11/23/2016    RLS (restless legs syndrome) 11/23/2016    Bruxism, sleep-related 11/23/2016    Persistent disorder of initiating or maintaining sleep 11/23/2016    Lateral epicondylitis 11/02/2016    Elevated cortisol level 10/11/2016    JOLEEN (obstructive sleep apnea)     Hypercholesterolemia     Hypertriglyceridemia     Morbid obesity     Chest pain     Syncope and collapse     Generalized anxiety disorder     Idiopathic progressive polyneuropathy     Unspecified hearing loss     Depressive disorder, not elsewhere classified     Incontinence 03/10/2014    Microalbuminuria 04/05/2012    DM (diabetes mellitus) ([facility] [address] 11/08/2011    Displacement of cervical intervertebral disc without myelopathy 11/07/2011    Hypertension     GERD (gastroesophageal reflux disease)     Psychiatric disorder     CAD (coronary artery disease)     PUD (peptic ulcer disease)     Fatty liver     Chronic pain     Unspecified adverse effect of anesthesia     Diabetic peripheral neuropathy associated with type 2 diabetes mellitus         History of Present Illness: Patient is a pleasant, alert, morbidly obese 53 y.o.  female who was initially seen by Dr Gokul Valverde 11/4 with complaints of chest pain and exertional dyspnea, found with nonsutained V Tach. She had a left heart cath on 11/5 with MVCAD. She then underwent 4 vessel CABG per Dr Darryle Butte on 11/18. Extubated later in the day. Up with walker. Chest tube removed 11/19. Pacing wires removed 1/20. PT in.  Glucose 200-300 on lantus and SSI, hospitalist consult requested for diabetes management. Patient diagnosed with DM II about 15 years ago. On po meds x 5 years, insulin for the last 10. She follows with Cait SESAY and Dr Sleene Cordon, Endocrinology. A1C down from 12.5 to 7.1 10/2020. Has dexcon. Current regime:  170 units Humulin R U5 100 ac breakfast and lunch. 85 units ac supper and covers with humalog 4 units prn glucose >200. She also takes trulicity 7.4/8.1: 0.5 q 7 days. This was due on 11/20, patient did not receive and now glucose readings are erratic and she is irritated that she is not being allowed to control her own glucose with own meds, which she has with her. Orders written and RN informed patient may utilize home insulin routine. To give trulicity now. Will consult DM manangement if patient remains hospitalized, she reports probable discharge tomorrow.       PAST MEDICAL HISTORY:  Past Medical History:   Diagnosis Date    Arrhythmia     palpatations    Asthma     Bipolar disorder     Coronary artery disease     cardiac cath 11/5/2020    Diabetic neuropathy     Fatty liver     GERD (gastroesophageal reflux disease)     Hypercholesterolemia     Hypertension     Hypertriglyceridemia     Meniere's disease     Migraine     Morbid obesity     Obstructive sleep apnea     CPAP    Palpitations     Peptic ulcer disease 2009    Psychiatric disorder     bipolar    Stroke ([facility] [address]     Syncope and collapse       PAST SURGICAL HISTORY:  Past Surgical History:   Procedure Laterality Date    HX BREAST LUMPECTOMY      HX  SECTION      HX CHOLECYSTECTOMY  2004    HX GYN  2010    ovaries rem    HX HEART CATHETERIZATION  2009    x 4    HX ORTHOPAEDIC      left wrist surgery,back surgery(discectomy-removed)    HX BISHOP AND BSO  2000    HX TONSILLECTOMY      HX UROLOGICAL      bladder sling    NEUROLOGICAL PROCEDURE UNLISTED      x3, lumbar region, cervical        PTA MEDICATIONS:  Prior to Admission medications    Medication Sig Start Date End Date Taking? Authorizing Provider   amiodarone (CORDARONE) 200 mg tablet Take 600 mg by mouth once. Take after 4 pm the day before your surgery   Yes Provider, Historical   mesalamine (Pentasa) 250 mg CR capsule Take 250 mg by mouth four (4) times daily. Yes Provider, Historical   potassium chloride SR (K-TAB) 20 mEq tablet Take 1 Tab by mouth daily. Patient taking differently: Take 20 mEq by mouth every evening. 20  Yes Marry Sims MD   atorvastatin (LIPITOR) 40 mg tablet Take 1 Tab by mouth daily. Patient taking differently: Take 40 mg by mouth every evening. 20  Yes Valerie Rosales MD   cholecalciferol (Vitamin D3) 25 mcg (1,000 unit) cap Take 1 Cap by mouth nightly. 20  Yes Valerie Rosales MD   dexlansoprazole (Dexilant) 60 mg CpDB capsule (delayed release) Take 1 Cap by mouth nightly. TAKE 1 CAPSULE EACH DAY. 20  Yes Valerie Rosales MD   furosemide (LASIX) 40 mg tablet Take 1 Tab by mouth daily. Patient taking differently: Take 40 mg by mouth every evening. 20  Yes Valerie Rosales MD   lamoTRIgine (LaMICtaL) 100 mg tablet Take 1 Tab by mouth two (2) times a day. Patient taking differently: Take 100 mg by mouth two (2) times a day. Not taking \"for awhile\" 20  Yes Valerie Rosales MD   linaCLOtide Virgle Ma) 145 mcg cap capsule Take 1 Cap by mouth daily. Patient taking differently: Take 145 mcg by mouth.  Every other day, , Tuesday, thursday, 20  Yes Cielo Johnston MD   lisinopriL (PRINIVIL, ZESTRIL) 40 mg tablet Take 1 Tab by mouth daily. Patient taking differently: Take 40 mg by mouth every evening. 11/4/20  Yes Cielo Johnstno MD   meloxicam (MOBIC) 15 mg tablet TAKE 1 TABLET DAILY WITH FOOD. Patient taking differently: every evening. TAKE 1 TABLET DAILY WITH FOOD. 11/4/20  Yes Cielo Johnston MD   Vitamin D2 1,250 mcg (50,000 unit) capsule Once weekly 11/4/20  Yes Cielo Johnston MD   pregabalin (LYRICA) 50 mg capsule Take 1 Cap by mouth three (3) times daily. Max Daily Amount: 150 mg. 11/4/20  Yes Cielo Johnston MD   Invokana 100 mg tablet Take 1 Tab by mouth Daily (before breakfast). Patient taking differently: Take 100 mg by mouth every evening. 8/19/20  Yes Melissa Griffith PA-C   metoprolol succinate (TOPROL-XL) 50 mg XL tablet TAKE 1 TABLET BY MOUTH EACH DAY. Patient taking differently: 50 mg every evening. TAKE 1 TABLET BY MOUTH EACH DAY. 8/4/20  Yes Cielo Johnston MD   docosahexaenoic acid/epa (FISH OIL PO) Take  by mouth every evening. Yes Provider, Historical   coenzyme q10 10 mg cap Take  by mouth every evening. Yes Provider, Historical   aspirin delayed-release 81 mg tablet Take 81 mg by mouth every evening. Yes Provider, Historical   nitroglycerin (NITROSTAT) 0.4 mg SL tablet PLACE ONE TABLET UNDER TONGUE AS NEEDED FOR CHEST PAIN. MAY REPEAT EVERY 5 MINUTES FOR 2 MORE DOSES. CALL 911 ON SECOND DOSE. 11/16/20   Vincent Trammell MD   NovoLOG Flexpen U-100 Insulin 100 unit/mL (3 mL) inpn Correction  4 units for every 50 over 150, bedtime 4/50>200 max daily dose 30 units 11/5/20   Malathi Griffith PA-C   diazePAM (VALIUM) 2 mg tablet Take 1 Tab by mouth two (2) times daily as needed for Anxiety (dizziness with migraine). Max Daily Amount: 4 mg.  11/4/20   Cielo Johnston MD   HYDROcodone-acetaminophen [facility])  mg tablet Take 1 Tab by mouth every eight (8) hours as needed for Pain for up to 30 days. Max Daily Amount: 3 Tabs. 1/4/21 2/3/21  Freya Coley MD   HumuLIN R U-500, Conc, Kwikpen 500 unit/mL (3 mL) inpn subQ pen 170 units before breakfast, 170 units before lunch, 85 units before supper 10/14/20   Iram Echevarria PA-C   Trulicity 1.5 MG/9.5 mL sub-q pen 0.5 mL by SubCUTAneous route every seven (7) days. Patient taking differently: 1.5 mg by SubCUTAneous route every seven (7) days. On firday 8/19/20   Iram Echevarria PA-C   Verito Pen Needle 32 gauge x 5/32\" ndle Use with insulin up to 4 times daily, E11.65 8/19/20   Sav DAI PA-C   albuterol (ProAir HFA) 90 mcg/actuation inhaler Take 1 Puff by inhalation every four (4) hours as needed for Wheezing or Shortness of Breath. 8/4/20   Freya Coley MD   albuterol-ipratropium (DUO-NEB) 2.5 mg-0.5 mg/3 ml nebu INHALE 3ML VIA NEBULIZER EVERY 6 HOURS 8/4/20   Freya Coley MD   ubrogepant Ashia [place]) 50 mg tablet Take 1 tablet by mouth at onset of migraine, repeat in two hours if needed. Maximum 2/day up to 4/week. 5/13/20   Chance Spence MD   galcanezumab-gnlm (Emgality Pen) 120 mg/mL injection 1 mL by SubCUTAneous route every thirty (30) days. 4/20/20   Chance Spence MD   ondansetron (ZOFRAN ODT) 8 mg disintegrating tablet Take 1 Tab by mouth every eight (8) hours as needed for Nausea. 4/20/20   Chance Spence MD   promethazine (PHENERGAN) 25 mg tablet Take 1 Tab by mouth every six (6) hours as needed for Nausea. 4/20/20   Chance Spence MD   naloxone [facility]) 4 mg/actuation nasal spray Use 1 spray intranasally, then discard. Repeat with new spray every 2 min as needed for opioid overdose symptoms, alternating nostrils. 4/3/20   Jazmin Ortiz NP   DISABLED PLACARD (DISABLED PLACARD) DMV AN INABILITY TO ORDINARILY WALK 100 FT WITH OUT INCREASE IN PAIN. 1/6/20   Freya Coley MD   GLUCAGON EMERGENCY KIT, HUMAN, 1 mg injection 1 mL by IntraMUSCular route as needed for Hypoglycemia.  10/7/19 Cherelle Davila PA-C   Blood Glucose Control, High (ONETOUCH VERIO HIGH CONTROL) soln Use to calibrate glucometer 3/1/19   Arcenio Griffith PA-C     CURRENT INPATIENT MEDICATIONS:  Current Facility-Administered Medications   Medication Dose Route Frequency    bisacodyL (DULCOLAX) tablet 5 mg  5 mg Oral DAILY PRN    insulin lispro (HUMALOG) injection   SubCUTAneous AC&HS    traMADoL (ULTRAM) tablet 50 mg  50 mg Oral Q6H PRN    oxyCODONE-acetaminophen (PERCOCET) 5-325 mg per tablet 1 Tab  1 Tab Oral Q4H PRN    albuterol-ipratropium (DUO-NEB) 2.5 MG-0.5 MG/3 ML  3 mL Nebulization QID RT    senna-docusate (PERICOLACE) 8.6-50 mg per tablet 2 Tab  2 Tab Oral Q12H    guaiFENesin ER (MUCINEX) tablet 600 mg  600 mg Oral Q12H    lisinopriL (PRINIVIL, ZESTRIL) tablet 20 mg  20 mg Oral DAILY    insulin glargine (LANTUS) injection 20 Units  20 Units SubCUTAneous QHS    clopidogreL (PLAVIX) tablet 75 mg  75 mg Oral DAILY    lip protectant (BLISTEX) ointment 1 Each  1 Each Topical PRN    pregabalin (LYRICA) capsule 50 mg  50 mg Oral TID    sodium chloride (NS) flush 5-40 mL  5-40 mL IntraVENous Q8H    sodium chloride (NS) flush 5-40 mL  5-40 mL IntraVENous PRN    metoprolol tartrate (LOPRESSOR) tablet 25 mg  25 mg Oral Q12H    furosemide (LASIX) tablet 40 mg  40 mg Oral DAILY    alum-mag hydroxide-simeth (MYLANTA) oral suspension 30 mL  30 mL Oral Q4H PRN    famotidine (PEPCID) tablet 20 mg  20 mg Oral BID    ondansetron (ZOFRAN) injection 4 mg  4 mg IntraVENous Q6H PRN    acetaminophen (TYLENOL) tablet 650 mg  650 mg Oral Q4H PRN    atorvastatin (LIPITOR) tablet 80 mg  80 mg Oral QHS    amiodarone (CORDARONE) tablet 200 mg  200 mg Oral Q12H    aspirin delayed-release tablet 81 mg  81 mg Oral DAILY    magnesium oxide (MAG-OX) tablet 400 mg  400 mg Oral TID PRN    magnesium oxide (MAG-OX) tablet 400 mg  400 mg Oral QID PRN    potassium chloride (KLOR-CON) tablet 10 mEq  10 mEq Oral DAILY    potassium chloride (K-DUR, KLOR-CON) SR tablet 20 mEq  20 mEq Oral BID PRN    potassium chloride (K-DUR, KLOR-CON) SR tablet 40 mEq  40 mEq Oral BID PRN    nitroglycerin (NITROSTAT) tablet 0.4 mg  0.4 mg SubLINGual Q5MIN PRN    oxyCODONE-acetaminophen (PERCOCET 10)  mg per tablet 1 Tab  1 Tab Oral Q4H PRN    alcohol 62% (NOZIN) nasal  1 Ampule  1 Ampule Topical Q12H       ALLERGIES:  Allergies   Allergen Reactions    Pcn [Penicillins] Rash, Itching and Nausea and Vomiting    Tape [Adhesive] Rash     PAPER TAPE      FAMILY HISTORY:   Family History   Problem Relation Age of Onset    Coronary Artery Disease Mother     Diabetes Mother     Coronary Artery Disease Father     Diabetes Brother     Coronary Artery Disease Paternal Grandfather     Breast Cancer Sister     Diabetes Sister      SOCIAL HISTORY:  Social History     Tobacco Use    Smoking status: Never Smoker    Smokeless tobacco: Never Used   Substance Use Topics    Alcohol use: Yes     Comment: 1 drink/month      Social History     Substance and Sexual Activity   Drug Use No      Review of Systems  ROS were reviewed, and all are negative outside the HPI. Physical Examination:     Patient Vitals for the past 24 hrs:   BP Temp Pulse Resp SpO2 Weight   11/22/20 0804 -- -- -- -- 95 % --   11/22/20 0708 136/64 97.9 °F (36.6 °C) 74 18 95 % --   11/22/20 0534 -- -- -- -- -- 105.2 kg (232 lb)   11/22/20 0322 (!) 102/55 98.6 °F (37 °C) 70 18 93 % --   11/21/20 2217 (!) 123/56 98.7 °F (37.1 °C) 79 18 91 % --   11/21/20 2120 -- -- -- -- 92 % --   11/21/20 1956 123/89 98.6 °F (37 °C) 75 18 92 % --   11/21/20 1541 112/65 97.5 °F (36.4 °C) 76 16 92 % --   11/21/20 1207 -- -- -- -- 94 % --   11/21/20 1140 (!) 102/57 97.5 °F (36.4 °C) 69 14 98 % --     Height: 5' 4\" (162.6 cm)  Weight: 105.2 kg (232 lb)  BMI (calculated): 39.8  11/20 1901 - 11/22 0700  In: 0457 [P.O.:1530]  Out: 2170 [Urine:2170]    General appearance: Morbidly obese. Oriented and alert, cooperative, doing well post CABG   Head: Normocephalic, without obvious abnormality, atraumatic  Neck: supple, symmetrical, trachea midline, no JVD  Lungs: clear to auscultation bilaterally  Heart: regular rate and rhythm, S1, S2 normal, no murmur, click, rub or gallop. Dressing over midline sternal incision. Abdomen: soft, non-tender. Bowel sounds normal. No masses,  no organomegaly  Extremities: All extremities normal, atraumatic, no cyanosis or edema  Skin: Skin color, texture, turgor normal. No rashes or lesions  Neurologic: Grossly normal    Labs:  Recent Results (from the past 24 hour(s))   GLUCOSE, POC    Collection Time: 11/21/20 11:43 AM   Result Value Ref Range    Glucose (POC) 208 (H) 65 - 100 mg/dL   GLUCOSE, POC    Collection Time: 11/21/20  3:45 PM   Result Value Ref Range    Glucose (POC) 226 (H) 65 - 100 mg/dL   PLEASE READ & DOCUMENT PPD TEST IN 72 HRS    Collection Time: 11/21/20  8:21 PM   Result Value Ref Range    PPD Negative Negative    mm Induration 0 0 - 5 mm   GLUCOSE, POC    Collection Time: 11/21/20 10:04 PM   Result Value Ref Range    Glucose (POC) 332 (H) 65 - 100 mg/dL   CBC WITH AUTOMATED DIFF    Collection Time: 11/22/20  3:51 AM   Result Value Ref Range    WBC 12.4 (H) 4.3 - 11.1 K/uL    RBC 3.48 (L) 4.05 - 5.2 M/uL    HGB 10.0 (L) 11.7 - 15.4 g/dL    HCT 31.1 (L) 35.8 - 46.3 %    MCV 89.4 79.6 - 97.8 FL    MCH 28.7 26.1 - 32.9 PG    MCHC 32.2 31.4 - 35.0 g/dL    RDW 12.6 11.9 - 14.6 %    PLATELET 570 961 - 322 K/uL    MPV 9.2 (L) 9.4 - 12.3 FL    ABSOLUTE NRBC 0.00 0.0 - 0.2 K/uL    DF AUTOMATED      NEUTROPHILS 63 43 - 78 %    LYMPHOCYTES 23 13 - 44 %    MONOCYTES 9 4.0 - 12.0 %    EOSINOPHILS 3 0.5 - 7.8 %    BASOPHILS 1 0.0 - 2.0 %    IMMATURE GRANULOCYTES 1 0.0 - 5.0 %    ABS. NEUTROPHILS 7.9 1.7 - 8.2 K/UL    ABS. LYMPHOCYTES 2.9 0.5 - 4.6 K/UL    ABS. MONOCYTES 1.1 0.1 - 1.3 K/UL    ABS. EOSINOPHILS 0.4 0.0 - 0.8 K/UL    ABS.  BASOPHILS 0.1 0.0 - 0.2 K/UL ABS. IMM. GRANS. 0.1 0.0 - 0.5 K/UL   MAGNESIUM    Collection Time: 11/22/20  3:53 AM   Result Value Ref Range    Magnesium 2.5 (H) 1.8 - 2.4 mg/dL   POTASSIUM    Collection Time: 11/22/20  3:53 AM   Result Value Ref Range    Potassium 4.0 3.5 - 5.1 mmol/L   GLUCOSE, POC    Collection Time: 11/22/20  5:49 AM   Result Value Ref Range    Glucose (POC) 190 (H) 65 - 100 mg/dL     Category Status:  Full  Endocrinology:  Dr Rashel Gómez and Plan:   S/P CABG x 4 without MI    Per primary     Morbid obesity:  Dietary restrictions     Hypertension:  Per primary    CAD:  As above     IDDM: last A1C: 7.1   Reinstate home routine: Humulin R U5 100: 170 units   ac breakfast and lunch, 85 units ac supper. Cover with humalog 4 units prn glucose >200. Reinstate trulicity 2.0/7.9: 0.5 q 7 days, missed 11/20,   Give today:  Patient has home meds with her, not    On formulary   Has appt with Dr Radha Davis 12/14. [facility] Allow patient to control glucose checks and insulin  administration    If patient staying longer than tomorrow will order    DM Management consult for assist.     JOLEEN:  CPAP    RLS:  Home meds     Atherosclerosis of native coronary artery of native heart with unstable angina pectoris:  Per primary team     Encounter for weaning from ventilator:  Per primary team    Bipolar disorder:  Home meds     Thank you for involving us in the care of this pleasant patient. Will follow.     Signed By: Krzysztof Granados NP     November 22, 2020

## 2020-11-22 NOTE — PROGRESS NOTES
Patient was walking with PT when her HR jumped to 220. We had her cough and vagal maneuver performed with no  success. Patient converted back into SR and then back into SVT 's MD Feliciano notified orders to consult cariology and give 5 mg IV Lopressor. Patient converted back to SR after Lopressor given and Gerson  notified of consult.

## 2020-11-22 NOTE — PROGRESS NOTES
Verbal bedside report given to oncoming RNDenilosn. Patient's situation, background, assessment and recommendations provided. Opportunity for questions provided. Oncoming RN assumed care of patient.

## 2020-11-22 NOTE — PROGRESS NOTES
Bedside shift change report given to [facility] (oncoming nurse) by Ibis Bowen RN (offgoing nurse). Report included the following information SBAR, Kardex, OR Summary, Intake/Output, MAR, Recent Results and Cardiac Rhythm NSR.

## 2020-11-22 NOTE — PROGRESS NOTES
[facility]  Admission Date: 11/18/2020             Daily Progress Note: 11/22/2020    The patient's chart is reviewed and the patient is discussed with the staff. Patient had CABG x 4 on 11/18. Pt has a history of JOLEEN(AHI 5, desats to 80%) on APAP 5-12cm H2O,RLS, asthma, Bipolar disorder, PUD, chronic diastolic CHF, DM2, and HLD. Pt was seen by Dr. Mau Heller, cardiologist, on 11/4/2020 with complaints of chest pain and dyspnea on exertion. Pt had a LHC on 11/5 with MVCAD. Successful extubation post op.  Using her home CPAP.        Subjective:      She is sitting up in bed and did used CPAP last night  On NC 2 LPM  Using IS    Current Facility-Administered Medications   Medication Dose Route Frequency    traMADoL (ULTRAM) tablet 50 mg  50 mg Oral Q6H PRN    oxyCODONE-acetaminophen (PERCOCET) 5-325 mg per tablet 1 Tab  1 Tab Oral Q4H PRN    albuterol-ipratropium (DUO-NEB) 2.5 MG-0.5 MG/3 ML  3 mL Nebulization QID RT    senna-docusate (PERICOLACE) 8.6-50 mg per tablet 2 Tab  2 Tab Oral Q12H    guaiFENesin ER (MUCINEX) tablet 600 mg  600 mg Oral Q12H    lisinopriL (PRINIVIL, ZESTRIL) tablet 20 mg  20 mg Oral DAILY    insulin glargine (LANTUS) injection 20 Units  20 Units SubCUTAneous QHS    insulin lispro (HUMALOG) injection   SubCUTAneous AC&HS    clopidogreL (PLAVIX) tablet 75 mg  75 mg Oral DAILY    lip protectant (BLISTEX) ointment 1 Each  1 Each Topical PRN    pregabalin (LYRICA) capsule 50 mg  50 mg Oral TID    sodium chloride (NS) flush 5-40 mL  5-40 mL IntraVENous Q8H    sodium chloride (NS) flush 5-40 mL  5-40 mL IntraVENous PRN    metoprolol tartrate (LOPRESSOR) tablet 25 mg  25 mg Oral Q12H    furosemide (LASIX) tablet 40 mg  40 mg Oral DAILY    alum-mag hydroxide-simeth (MYLANTA) oral suspension 30 mL  30 mL Oral Q4H PRN    famotidine (PEPCID) tablet 20 mg  20 mg Oral BID    ondansetron (ZOFRAN) injection 4 mg  4 mg IntraVENous Q6H PRN    acetaminophen (TYLENOL) tablet 650 mg  650 mg Oral Q4H PRN    atorvastatin (LIPITOR) tablet 80 mg  80 mg Oral QHS    amiodarone (CORDARONE) tablet 200 mg  200 mg Oral Q12H    aspirin delayed-release tablet 81 mg  81 mg Oral DAILY    magnesium oxide (MAG-OX) tablet 400 mg  400 mg Oral TID PRN    magnesium oxide (MAG-OX) tablet 400 mg  400 mg Oral QID PRN    potassium chloride (KLOR-CON) tablet 10 mEq  10 mEq Oral DAILY    potassium chloride (K-DUR, KLOR-CON) SR tablet 20 mEq  20 mEq Oral BID PRN    potassium chloride (K-DUR, KLOR-CON) SR tablet 40 mEq  40 mEq Oral BID PRN    nitroglycerin (NITROSTAT) tablet 0.4 mg  0.4 mg SubLINGual Q5MIN PRN    oxyCODONE-acetaminophen (PERCOCET 10)  mg per tablet 1 Tab  1 Tab Oral Q4H PRN    alcohol 62% (NOZIN) nasal  1 Ampule  1 Ampule Topical Q12H         Objective:     Vitals:    11/21/20 2120 11/21/20 2217 11/22/20 0322 11/22/20 0534   BP:  (!) 123/56 (!) 102/55    Pulse:  79 70    Resp:  18 18    Temp:  98.7 °F (37.1 °C) 98.6 °F (37 °C)    SpO2: 92% 91% 93%    Weight:    232 lb (105.2 kg)   Height:         Intake and Output:   11/20 0701 - 11/21 1900  In: 2130 [P.O.:2130]  Out: 4250 [Urine:1820]  11/21 1901 - 11/22 0700  In: -   Out: 1150 [Urine:1150]    Physical Exam:   Constitutional:  the patient is well developed and in no acute distress  HEENT:  Sclera clear, pupils equal, oral mucosa moist  Lungs: clear but overall decreased. Wearing 4 liter high flow cannula  Cardiovascular:  RRR without M,G,R. Sinus per telemetry  Abd/GI: soft and non-tender; with positive bowel sounds. Ext: warm without cyanosis. There is a trace amount of lower leg edema. Musculoskeletal: moves all four extremities with equal strength  Skin:  no jaundice or rashes, chest and leg wounds   Neuro: no gross neuro deficits   Musculoskeletal: can ambulate. No deformity  Psychiatric: Calm.      Review of Systems - General ROS: positive for  - fatigue  Respiratory ROS: positive for - cough - non productive   Cardiovascular ROS: positive for - chest discomfort following surgery  Gastrointestinal ROS: positive for - appetite loss  Musculoskeletal ROS: positive for - muscular weakness   Lines: peripheral IV    CHEST XRAY:       LAB  Recent Labs     11/22/20  0351 11/20/20  0501   WBC 12.4* 20.2*   HGB 10.0* 11.6*   HCT 31.1* 37.0    243     Recent Labs     11/22/20  0353 11/21/20  0334 11/20/20  0501   NA  --   --  139   K 4.0 4.3 5.0   CL  --   --  107   CO2  --   --  24   GLU  --   --  195*   BUN  --   --  14   CREA  --   --  0.63   MG 2.5* 2.6* 2.5*       Assessment:  (Medical Decision Making)     Hospital Problems  Date Reviewed: 11/18/2020          Codes Class Noted POA    Atherosclerosis of native coronary artery of native heart with unstable angina pectoris ([facility] ICD-10-CM: I25.110  ICD-9-CM: 414.01, 411.1  11/18/2020 Unknown    S/p CABG    Bipolar disorder ([facility] ICD-10-CM: F31.9  ICD-9-CM: 296.80  11/18/2020 Unknown    On home meds    * (Principal) S/P CABG x 4 ICD-10-CM: Z95.1  ICD-9-CM: V45.81  11/18/2020 Unknown    Rhythm stable, minimal edema    RLS (restless legs syndrome) ICD-10-CM: G25.81  ICD-9-CM: 333.94  11/23/2016 Yes        JOLEEN (obstructive sleep apnea) ICD-10-CM: G47.33  ICD-9-CM: 327.23  Unknown Yes    Using home CPAP     DM (diabetes mellitus) (HCC) (Chronic) ICD-10-CM: E11.9  ICD-9-CM: 250.00  11/8/2011 Yes        Hypertension ICD-10-CM: N76  ICD-9-CM: 401.9  Unknown Yes    Overview Signed 11/7/2011  4:45 PM by SHAMA Daly     controlled w meds         controlled    CAD (coronary artery disease) ICD-10-CM: I25.10  ICD-9-CM: 414.00  Unknown Unknown    Overview Signed 11/7/2011  4:46 PM by SHAMA Daly 2009         As above          Plan:  (Medical Decision Making)     Control pain and continue to work with IS. Using home CPAP with sleep   Daily lasix.    Check CXR  Mobilize  Wean O2       More than 50% of time documented was spent face-to-face contact with the patient and in the care of the patient on the floor/unit where the patient is located.

## 2020-11-22 NOTE — PROGRESS NOTES
Patient took her home dose of 85 units Humulin U5 100 insulin as directed by EFREN Lin. Patient did not want to take units of Humalog so this was not administered by patient.

## 2020-11-23 LAB
ERYTHROCYTE [DISTWIDTH] IN BLOOD BY AUTOMATED COUNT: 12.7 % (ref 11.9–14.6)
GLUCOSE BLD STRIP.AUTO-MCNC: 107 MG/DL (ref 65–100)
GLUCOSE BLD STRIP.AUTO-MCNC: 133 MG/DL (ref 65–100)
GLUCOSE BLD STRIP.AUTO-MCNC: 173 MG/DL (ref 65–100)
GLUCOSE BLD STRIP.AUTO-MCNC: 68 MG/DL (ref 65–100)
GLUCOSE BLD STRIP.AUTO-MCNC: 84 MG/DL (ref 65–100)
HCT VFR BLD AUTO: 31.8 % (ref 35.8–46.3)
HGB BLD-MCNC: 10.5 G/DL (ref 11.7–15.4)
MAGNESIUM SERPL-MCNC: 2.1 MG/DL (ref 1.8–2.4)
MCH RBC QN AUTO: 29.2 PG (ref 26.1–32.9)
MCHC RBC AUTO-ENTMCNC: 33 G/DL (ref 31.4–35)
MCV RBC AUTO: 88.3 FL (ref 79.6–97.8)
NRBC # BLD: 0 K/UL (ref 0–0.2)
PLATELET # BLD AUTO: 343 K/UL (ref 150–450)
PMV BLD AUTO: 8.8 FL (ref 9.4–12.3)
POTASSIUM SERPL-SCNC: 4 MMOL/L (ref 3.5–5.1)
RBC # BLD AUTO: 3.6 M/UL (ref 4.05–5.2)
WBC # BLD AUTO: 12.1 K/UL (ref 4.3–11.1)

## 2020-11-23 PROCEDURE — 74011250637 HC RX REV CODE- 250/637: Performed by: PHYSICIAN ASSISTANT

## 2020-11-23 PROCEDURE — 74011250637 HC RX REV CODE- 250/637: Performed by: THORACIC SURGERY (CARDIOTHORACIC VASCULAR SURGERY)

## 2020-11-23 PROCEDURE — 74011000250 HC RX REV CODE- 250: Performed by: THORACIC SURGERY (CARDIOTHORACIC VASCULAR SURGERY)

## 2020-11-23 PROCEDURE — 97110 THERAPEUTIC EXERCISES: CPT

## 2020-11-23 PROCEDURE — 83735 ASSAY OF MAGNESIUM: CPT

## 2020-11-23 PROCEDURE — 97530 THERAPEUTIC ACTIVITIES: CPT

## 2020-11-23 PROCEDURE — 82962 GLUCOSE BLOOD TEST: CPT

## 2020-11-23 PROCEDURE — 77030012890

## 2020-11-23 PROCEDURE — 65660000004 HC RM CVT STEPDOWN

## 2020-11-23 PROCEDURE — 94760 N-INVAS EAR/PLS OXIMETRY 1: CPT

## 2020-11-23 PROCEDURE — 94640 AIRWAY INHALATION TREATMENT: CPT

## 2020-11-23 PROCEDURE — 74011250637 HC RX REV CODE- 250/637: Performed by: INTERNAL MEDICINE

## 2020-11-23 PROCEDURE — 2709999900 HC NON-CHARGEABLE SUPPLY

## 2020-11-23 PROCEDURE — 36415 COLL VENOUS BLD VENIPUNCTURE: CPT

## 2020-11-23 PROCEDURE — 84132 ASSAY OF SERUM POTASSIUM: CPT

## 2020-11-23 PROCEDURE — 99232 SBSQ HOSP IP/OBS MODERATE 35: CPT | Performed by: INTERNAL MEDICINE

## 2020-11-23 PROCEDURE — 85027 COMPLETE CBC AUTOMATED: CPT

## 2020-11-23 RX ORDER — LISINOPRIL 20 MG/1
40 TABLET ORAL DAILY
Status: DISCONTINUED | OUTPATIENT
Start: 2020-11-24 | End: 2020-11-24 | Stop reason: HOSPADM

## 2020-11-23 RX ORDER — AMIODARONE HYDROCHLORIDE 200 MG/1
400 TABLET ORAL EVERY 12 HOURS
Status: DISCONTINUED | OUTPATIENT
Start: 2020-11-23 | End: 2020-11-24

## 2020-11-23 RX ADMIN — AMIODARONE HYDROCHLORIDE 400 MG: 200 TABLET ORAL at 21:44

## 2020-11-23 RX ADMIN — FAMOTIDINE 20 MG: 20 TABLET, FILM COATED ORAL at 08:33

## 2020-11-23 RX ADMIN — Medication 1 AMPULE: at 21:44

## 2020-11-23 RX ADMIN — LACTULOSE 30 ML: 20 SOLUTION ORAL at 08:33

## 2020-11-23 RX ADMIN — OXYCODONE HYDROCHLORIDE AND ACETAMINOPHEN 1 TABLET: 5; 325 TABLET ORAL at 10:27

## 2020-11-23 RX ADMIN — POTASSIUM CHLORIDE 20 MEQ: 20 TABLET, EXTENDED RELEASE ORAL at 06:09

## 2020-11-23 RX ADMIN — OXYCODONE HYDROCHLORIDE AND ACETAMINOPHEN 1 TABLET: 5; 325 TABLET ORAL at 01:24

## 2020-11-23 RX ADMIN — IPRATROPIUM BROMIDE AND ALBUTEROL SULFATE 3 ML: .5; 3 SOLUTION RESPIRATORY (INHALATION) at 11:28

## 2020-11-23 RX ADMIN — ATORVASTATIN CALCIUM 80 MG: 80 TABLET, FILM COATED ORAL at 21:44

## 2020-11-23 RX ADMIN — GUAIFENESIN 600 MG: 600 TABLET ORAL at 21:45

## 2020-11-23 RX ADMIN — Medication 10 ML: at 06:12

## 2020-11-23 RX ADMIN — PREGABALIN 50 MG: 50 CAPSULE ORAL at 08:34

## 2020-11-23 RX ADMIN — Medication 10 ML: at 15:25

## 2020-11-23 RX ADMIN — LACTULOSE 30 ML: 20 SOLUTION ORAL at 15:33

## 2020-11-23 RX ADMIN — LISINOPRIL 20 MG: 20 TABLET ORAL at 08:34

## 2020-11-23 RX ADMIN — OXYCODONE HYDROCHLORIDE AND ACETAMINOPHEN 1 TABLET: 5; 325 TABLET ORAL at 19:31

## 2020-11-23 RX ADMIN — PREGABALIN 50 MG: 50 CAPSULE ORAL at 15:24

## 2020-11-23 RX ADMIN — FAMOTIDINE 20 MG: 20 TABLET, FILM COATED ORAL at 18:07

## 2020-11-23 RX ADMIN — GUAIFENESIN 600 MG: 600 TABLET ORAL at 08:34

## 2020-11-23 RX ADMIN — OXYCODONE HYDROCHLORIDE AND ACETAMINOPHEN 1 TABLET: 5; 325 TABLET ORAL at 06:11

## 2020-11-23 RX ADMIN — PREGABALIN 50 MG: 50 CAPSULE ORAL at 21:45

## 2020-11-23 RX ADMIN — IPRATROPIUM BROMIDE AND ALBUTEROL SULFATE 3 ML: .5; 3 SOLUTION RESPIRATORY (INHALATION) at 20:11

## 2020-11-23 RX ADMIN — METOPROLOL TARTRATE 25 MG: 25 TABLET, FILM COATED ORAL at 21:44

## 2020-11-23 RX ADMIN — OXYCODONE HYDROCHLORIDE AND ACETAMINOPHEN 1 TABLET: 5; 325 TABLET ORAL at 15:33

## 2020-11-23 RX ADMIN — IPRATROPIUM BROMIDE AND ALBUTEROL SULFATE 3 ML: .5; 3 SOLUTION RESPIRATORY (INHALATION) at 15:11

## 2020-11-23 RX ADMIN — Medication 1 AMPULE: at 08:34

## 2020-11-23 RX ADMIN — IPRATROPIUM BROMIDE AND ALBUTEROL SULFATE 3 ML: .5; 3 SOLUTION RESPIRATORY (INHALATION) at 08:00

## 2020-11-23 RX ADMIN — AMIODARONE HYDROCHLORIDE 400 MG: 200 TABLET ORAL at 08:34

## 2020-11-23 RX ADMIN — OXYCODONE HYDROCHLORIDE AND ACETAMINOPHEN 1 TABLET: 5; 325 TABLET ORAL at 23:26

## 2020-11-23 RX ADMIN — ASPIRIN 81 MG: 81 TABLET ORAL at 08:34

## 2020-11-23 RX ADMIN — POTASSIUM CHLORIDE 20 MEQ: 20 TABLET, EXTENDED RELEASE ORAL at 18:07

## 2020-11-23 RX ADMIN — METOPROLOL TARTRATE 25 MG: 25 TABLET, FILM COATED ORAL at 08:33

## 2020-11-23 RX ADMIN — METOPROLOL TARTRATE 25 MG: 25 TABLET, FILM COATED ORAL at 15:24

## 2020-11-23 RX ADMIN — CLOPIDOGREL BISULFATE 75 MG: 75 TABLET ORAL at 08:34

## 2020-11-23 RX ADMIN — Medication 10 ML: at 21:46

## 2020-11-23 NOTE — DISCHARGE SUMMARY
Discharge Summary     Patient ID:  Bennie Clark  [id number]  73 y.o.  1970    Admit date: 11/18/2020    Discharge date:  11/24/2020    Admitting Physician: Ever Al MD     Discharge Physician: SHAMA Smith/Dr. Be Kulkarni    Admission Diagnoses: Atherosclerosis of native coronary artery of native heart with unstable angina pectoris ([facility] [I25.110]  CAD (coronary artery disease) [I25.10]  Atherosclerosis of native coronary artery of native heart with unstable angina pectoris [facility]) [I25.110]    Discharge Diagnoses:   Patient Active Problem List    Diagnosis Date Noted    Atherosclerosis of native coronary artery of native heart with unstable angina pectoris ([facility] 11/18/2020    Encounter for weaning from ventilator ([facility] 11/18/2020    Bipolar disorder ([facility] 11/18/2020    S/P CABG x 4 11/18/2020    Chronic migraine with aura 07/30/2020    Postural imbalance 04/20/2020    Encounter for medication management 04/20/2020    TIA (transient ischemic attack) 04/20/2020    Vestibular migraine 06/10/2019    Dizziness 77/59/2168    Diastolic CHF, chronic ([facility] 02/27/2019    Other fatigue 02/27/2019    Vitamin D deficiency 07/06/2018    Uncontrolled diabetes mellitus with diabetic neuropathy, with long-term current use of insulin ([facility] 01/06/2017    Hypersomnia 11/23/2016    RLS (restless legs syndrome) 11/23/2016    Bruxism, sleep-related 11/23/2016    Persistent disorder of initiating or maintaining sleep 11/23/2016    Lateral epicondylitis 11/02/2016    Elevated cortisol level 10/11/2016    JOLEEN (obstructive sleep apnea)     Hypercholesterolemia     Hypertriglyceridemia     Morbid obesity     Chest pain     Syncope and collapse     Generalized anxiety disorder     Idiopathic progressive polyneuropathy     Unspecified hearing loss     Depressive disorder, not elsewhere classified     Incontinence 03/10/2014    Microalbuminuria 04/05/2012    DM (diabetes mellitus) ([facility] 11/08/2011    Displacement of cervical intervertebral disc without myelopathy 11/07/2011    Hypertension     GERD (gastroesophageal reflux disease)     Psychiatric disorder     CAD (coronary artery disease)     PUD (peptic ulcer disease)     Fatty liver     Chronic pain     Unspecified adverse effect of anesthesia     Diabetic peripheral neuropathy associated with type 2 diabetes mellitus         Procedures this admission:  Coronary Artery Bypass Graft Surgery   Consults: Pulmonary/Intensive Care, Hospitalist, Cardiology    Hospital Course: Patient presented for elective CABG. She has been followed by Dr Cyndi Wheat in the office. She had noted worsening SEVILLA and chest pain associated with palpitations. An outpatient monitor showed NSVT. LHC was planned. She underwent cardiac catheterization that showed severe multivessel disease. Echocardiogram showed a normal LV EF with no WMA and no significant valvular disease. She wished to proceed with CABG surgery. She underwent CABG X 4 with LIMA to LAD, reverse SVG to the diagonal, reverse SVG to the OM1 and reverse SVG to the PDA on 11/18/20. She did well post operatively and was transferred to  stepdown on POD 1. Her BP was elevated and meds were resumed. She was gradually weaned off of O2. The hospitalist service was consulted for DM. She developed atrial fibrillation with RVR on 11/22. She converted to sinus rhythm spontaneously then had recurrent a-fib with RVR. She converted with IV Lopressor. She remained in sinus rhythm. Her BB dose and amiodarone were increased. She had post op constipation that eventually resolved. She progressed well with PT. No further A-fib was noted. The morning of 11/24/20, patient was feeling well and ambulating without any symptoms. Patient was determined stable and ready for discharge. Patient was instructed on the importance of medication compliance and outpatient follow up.   For maximized medical therapy for CAD, patient will continue ASA, BB, ACE-I, and statin as well. Statin dose was increased. She was started on Plavix as well. She is discharged on amiodarone taper. The patient will have close transitional care follow up with [address] [place] [address] [address] Gallo Read on December 2nd at 3:00pm [facility] [place]). The patient will follow up with Dr. Maxim Tang on December 16th at 2:20pm and has been referred to cardiac rehab. DISPOSITION: The patient is being discharged home with home health services in stable condition on a low saturated fat, low cholesterol and low salt diet. The patient is instructed to advance activities as tolerated to the limit of fatigue or shortness of breath. The patient is instructed to avoid all heavy lifting or activities that strain the chest or upper arm muscles. Strenuous activity should be avoided. The patient is instructed to watch for signs of infection which include: increasing area of redness, fever or purulent drainage at the incision site. The patient is instructed to call the office or return to the ER for immediate evaluation for any shortness of breath or chest pain not relieved by NTG.     Discharge Exam:   Visit Vitals  BP (!) 142/81   Pulse 79   Temp 98 °F (36.7 °C)   Resp 18   Ht 5' 4\" (1.626 m)   Wt 232 lb (105.2 kg)   LMP  (LMP Unknown)   SpO2 95%   Breastfeeding No   BMI 39.82 kg/m²         Physical Exam:  General: Well Developed, Obese, No Acute Distress, Alert & Oriented  Neck: supple, no JVD  Heart: S1S2 with RRR without murmurs or gallops  Lungs: Clear throughout auscultation bilaterally without adventitious sounds  Abd: soft, nontender, nondistended, with good bowel sounds  Ext: warm, no edema  Sternal incision: clean, dry, and intact  Skin: warm and dry      Recent Results (from the past 24 hour(s))   GLUCOSE, POC    Collection Time: 11/23/20 11:19 AM   Result Value Ref Range    Glucose (POC) 173 (H) 65 - 100 mg/dL   GLUCOSE, POC    Collection Time: 11/23/20  3:35 PM Result Value Ref Range    Glucose (POC) 133 (H) 65 - 100 mg/dL   GLUCOSE, POC    Collection Time: 11/23/20  9:43 PM   Result Value Ref Range    Glucose (POC) 68 65 - 100 mg/dL   GLUCOSE, POC    Collection Time: 11/23/20 10:08 PM   Result Value Ref Range    Glucose (POC) 84 65 - 100 mg/dL   CBC W/O DIFF    Collection Time: 11/24/20  3:40 AM   Result Value Ref Range    WBC 13.7 (H) 4.3 - 11.1 K/uL    RBC 3.86 (L) 4.05 - 5.2 M/uL    HGB 11.1 (L) 11.7 - 15.4 g/dL    HCT 34.1 (L) 35.8 - 46.3 %    MCV 88.3 79.6 - 97.8 FL    MCH 28.8 26.1 - 32.9 PG    MCHC 32.6 31.4 - 35.0 g/dL    RDW 13.0 11.9 - 14.6 %    PLATELET 091 120 - 718 K/uL    MPV 8.6 (L) 9.4 - 12.3 FL    ABSOLUTE NRBC 0.04 0.0 - 0.2 K/uL   POTASSIUM    Collection Time: 11/24/20  3:40 AM   Result Value Ref Range    Potassium 4.0 3.5 - 5.1 mmol/L   MAGNESIUM    Collection Time: 11/24/20  3:40 AM   Result Value Ref Range    Magnesium 1.9 1.8 - 2.4 mg/dL   GLUCOSE, POC    Collection Time: 11/24/20  6:16 AM   Result Value Ref Range    Glucose (POC) 94 65 - 100 mg/dL         Patient Instructions:   Current Discharge Medication List      START taking these medications    Details   clopidogreL (PLAVIX) 75 mg tab Take 1 Tab by mouth daily. Qty: 30 Tab, Refills: 11         CONTINUE these medications which have CHANGED    Details   amiodarone (CORDARONE) 200 mg tablet Take 1 Tab by mouth every twelve (12) hours for 7 days, THEN 1 Tab daily for 7 days. Qty: 21 Tab, Refills: 0      atorvastatin (LIPITOR) 80 mg tablet Take 1 Tab by mouth daily. Qty: 90 Tab, Refills: 4    Associated Diagnoses: Other hyperlipidemia      metoprolol succinate (TOPROL-XL) 50 mg XL tablet Take 1 Tab by mouth daily. Qty: 90 Tab, Refills: 3    Associated Diagnoses: Essential hypertension; Coronary artery disease due to lipid rich plaque         CONTINUE these medications which have NOT CHANGED    Details   mesalamine (Pentasa) 250 mg CR capsule Take 250 mg by mouth four (4) times daily. potassium chloride SR (K-TAB) 20 mEq tablet Take 1 Tab by mouth daily. Qty: 30 Tab, Refills: 11      cholecalciferol (Vitamin D3) 25 mcg (1,000 unit) cap Take 1 Cap by mouth nightly. Qty: 90 Cap, Refills: 1    Associated Diagnoses: Vitamin D deficiency      dexlansoprazole (Dexilant) 60 mg CpDB capsule (delayed release) Take 1 Cap by mouth nightly. TAKE 1 CAPSULE EACH DAY. Qty: 90 Cap, Refills: 3    Comments: PATIENT REQUESTING REFILLS, PLEASE AUTHORIZE. THANK YOU, ELISA HAYNES  Associated Diagnoses: Gastroesophageal reflux disease without esophagitis      furosemide (LASIX) 40 mg tablet Take 1 Tab by mouth daily. Qty: 90 Tab, Refills: 3    Associated Diagnoses: Edema, unspecified type      lamoTRIgine (LaMICtaL) 100 mg tablet Take 1 Tab by mouth two (2) times a day. Qty: 60 Tab, Refills: 3    Associated Diagnoses: Current moderate episode of major depressive disorder without prior episode ([facility])      linaCLOtide (LINZESS) 145 mcg cap capsule Take 1 Cap by mouth daily. Qty: 30 Cap, Refills: 3    Associated Diagnoses: Other constipation      lisinopriL (PRINIVIL, ZESTRIL) 40 mg tablet Take 1 Tab by mouth daily. Qty: 30 Tab, Refills: 3    Associated Diagnoses: Essential hypertension      meloxicam (MOBIC) 15 mg tablet TAKE 1 TABLET DAILY WITH FOOD. Qty: 30 Tab, Refills: 2    Comments: PATIENT REQUESTS REFILL, BRET GUILLEN  Associated Diagnoses: Neck pain; Left foot pain      Vitamin D2 1,250 mcg (50,000 unit) capsule Once weekly  Qty: 4 Cap, Refills: 3    Associated Diagnoses: Vitamin D deficiency      pregabalin (LYRICA) 50 mg capsule Take 1 Cap by mouth three (3) times daily. Max Daily Amount: 150 mg.  Qty: 90 Cap, Refills: 2    Associated Diagnoses: Diabetic peripheral neuropathy associated with type 2 diabetes mellitus (HCC)      Invokana 100 mg tablet Take 1 Tab by mouth Daily (before breakfast).   Qty: 30 Tab, Refills: 11    Associated Diagnoses: Type 2 diabetes mellitus with hyperglycemia, with long-term current use of insulin ([facility])      docosahexaenoic acid/epa (FISH OIL PO) Take  by mouth every evening. coenzyme q10 10 mg cap Take  by mouth every evening. aspirin delayed-release 81 mg tablet Take 81 mg by mouth every evening. nitroglycerin (NITROSTAT) 0.4 mg SL tablet PLACE ONE TABLET UNDER TONGUE AS NEEDED FOR CHEST PAIN. MAY REPEAT EVERY 5 MINUTES FOR 2 MORE DOSES. CALL 911 ON SECOND DOSE. Qty: 25 Tab, Refills: 3    Comments: PATIENT IS REQUESTING A REFILL. PLEASE AUTHORIZE. THANK YOU, ELISA HAYNES      NovoLOG Flexpen U-100 Insulin 100 unit/mL (3 mL) inpn Correction  4 units for every 50 over 150, bedtime 4/50>200 max daily dose 30 units  Qty: 90 Pen, Refills: 5    Associated Diagnoses: Type 2 diabetes mellitus with hyperglycemia, with long-term current use of insulin ([facility])      diazePAM (VALIUM) 2 mg tablet Take 1 Tab by mouth two (2) times daily as needed for Anxiety (dizziness with migraine). Max Daily Amount: 4 mg. Qty: 60 Tab, Refills: 1    Comments: PATIENT IS DUE FOR A REFILL. PLEASE AUTHORIZE. THANK YOU, ELISA HAYNES  Associated Diagnoses: Vestibular migraine      HYDROcodone-acetaminophen (NORCO)  mg tablet Take 1 Tab by mouth every eight (8) hours as needed for Pain for up to 30 days. Max Daily Amount: 3 Tabs. Qty: 90 Tab, Refills: 0    Associated Diagnoses: Neck pain; Left foot pain; Left hip pain      HumuLIN R U-500, Conc, Kwikpen 500 unit/mL (3 mL) inpn subQ pen 170 units before breakfast, 170 units before lunch, 85 units before supper  Qty: 9 Pen, Refills: 11    Comments: PLEASE FILL THIS HIGHER DOSE, 9 PEN PER MONTH RX INSTEAD OF THE EARLIER RX. Thanks!!! Associated Diagnoses: Type 2 diabetes mellitus with hyperglycemia, with long-term current use of insulin ([facility])      Trulicity 1.5 AM/5.5 mL sub-q pen 0.5 mL by SubCUTAneous route every seven (7) days.   Qty: 12 Syringe, Refills: 3    Associated Diagnoses: Type 2 diabetes mellitus with hyperglycemia, with long-term current use of insulin ([facility])      Verito Pen Needle 32 gauge x 5/32\" ndle Use with insulin up to 4 times daily, E11.65  Qty: 400 Pen Needle, Refills: 3      albuterol (ProAir HFA) 90 mcg/actuation inhaler Take 1 Puff by inhalation every four (4) hours as needed for Wheezing or Shortness of Breath. Qty: 1 Inhaler, Refills: 3    Associated Diagnoses: Mild intermittent asthma without complication      albuterol-ipratropium (DUO-NEB) 2.5 mg-0.5 mg/3 ml nebu INHALE 3ML VIA NEBULIZER EVERY 6 HOURS  Qty: 360 mL, Refills: 5    Comments: PT IS REQUESTING REFILL, PLEASE AUTHORIZE - THANK YOU, HUYEN GUY  Associated Diagnoses: Chronic obstructive pulmonary disease, unspecified COPD type ([facility])      ubrogepant (Ubrelvy) 50 mg tablet Take 1 tablet by mouth at onset of migraine, repeat in two hours if needed. Maximum 2/day up to 4/week. Qty: 12 Tab, Refills: 3    Associated Diagnoses: Intractable migraine without aura and without status migrainosus; TIA (transient ischemic attack)      galcanezumab-gnlm (Emgality Pen) 120 mg/mL injection 1 mL by SubCUTAneous route every thirty (30) days. Qty: 1 mL, Refills: 11    Associated Diagnoses: Intractable migraine with aura without status migrainosus      ondansetron (ZOFRAN ODT) 8 mg disintegrating tablet Take 1 Tab by mouth every eight (8) hours as needed for Nausea. Qty: 24 Tab, Refills: 2    Associated Diagnoses: Intractable migraine with aura without status migrainosus      promethazine (PHENERGAN) 25 mg tablet Take 1 Tab by mouth every six (6) hours as needed for Nausea. Qty: 24 Tab, Refills: 2    Associated Diagnoses: Intractable migraine with aura without status migrainosus      naloxone (NARCAN) 4 mg/actuation nasal spray Use 1 spray intranasally, then discard. Repeat with new spray every 2 min as needed for opioid overdose symptoms, alternating nostrils.   Qty: 4 Each, Refills: 1    Associated Diagnoses: Vestibular migraine      DISABLED PLACARD (DISABLED PLACARD) DMV AN INABILITY TO ORDINARILY WALK 100 FT WITH OUT INCREASE IN PAIN. Qty: 1 Each, Refills: 0    Associated Diagnoses: Chronic bilateral low back pain without sciatica      GLUCAGON EMERGENCY KIT, HUMAN, 1 mg injection 1 mL by IntraMUSCular route as needed for Hypoglycemia.   Qty: 1 Vial, Refills: 1    Associated Diagnoses: Uncontrolled type 2 diabetes mellitus without complication, with long-term current use of insulin      Blood Glucose Control, High (ONETOUCH VERIO HIGH CONTROL) soln Use to calibrate glucometer  Qty: 1 Each, Refills: 0    Associated Diagnoses: Uncontrolled type 2 diabetes mellitus without complication, with long-term current use of insulin             Signed:  Marlena Joaquin PA-C  11/24/2020

## 2020-11-23 NOTE — PROGRESS NOTES
Patient's BG is 195. Patient states that she does not administer Humalog to correct this unless over 200. Patient did not take units of Humalog.

## 2020-11-23 NOTE — ROUTINE PROCESS
Bedside report received from [facility]. Opportunity for questions, concerns. Patient involved.

## 2020-11-23 NOTE — PROGRESS NOTES
11/23/2020 7:58 AM    Admit Date: 11/18/2020    Admit Diagnosis: Atherosclerosis of native coronary artery of native heart with unstable angina pectoris (HCC) [I25.110];CAD (coronary artery disease) [I25.10]; Atherosclerosis of native coronary artery of native heart with unstable angina pectoris (HCC) [I25.110]      Subjective:   Cp appropriate- no sob      Objective:      Visit Vitals  /78   Pulse 70   Temp 97.4 °F (36.3 °C)   Resp 20   Ht 5' 4\" (1.626 m)   Wt 228 lb 1.6 oz (103.5 kg)   LMP  (LMP Unknown)   SpO2 93%   Breastfeeding No   BMI 39.15 kg/m²       Physical Exam:  Paul Ghazi, Well Nourished, No Acute Distress, Alert & Oriented x 3, appropriate mood. Neck- supple, no JVD  CV- regular rate and rhythm no MRG  Lung- clear bilaterally  Abd- soft, nontender, nondistended  Ext- no edema bilaterally. Skin- warm and dry        Data Review:   Recent Labs     11/23/20  0334   K 4.0   WBC 12.1*   HGB 10.5*   HCT 31.8*          Assessment/Plan:     Principal Problem:    S/P CABG x 4 (11/18/2020)Stable. Continue current medical therapy. Active Problems:    Hypertension ()Stable. Continue current medical therapy.         Overview: controlled w meds      CAD (coronary artery disease) ()      Overview: cath 2009      DM (diabetes mellitus) ([facility] (11/8/2011)      JOLEEN (obstructive sleep apnea) ()      RLS (restless legs syndrome) (11/23/2016)      Atherosclerosis of native coronary artery of native heart with unstable angina pectoris ([facility] (11/18/2020)      Encounter for weaning from ventilator ([facility] (11/18/2020)      Bipolar disorder ([facility] (11/18/2020)      PAf- short duration -in nsr- increase amio to 400mg bid and dc on 200mg bid

## 2020-11-23 NOTE — PROGRESS NOTES
Patient's BS was 173 she self administered 170 units of her home Humulin U5 100 insulin as per order

## 2020-11-23 NOTE — PROGRESS NOTES
CM continues to follow for discharge planning and/or CM needs. Plan remains at this time that pt will discharge home with spouse and family when stable. Pt remains agreeable with home health at discharge - referral made to [facility]. No additional CM needs at this time. Will continue to follow and update as needed.

## 2020-11-23 NOTE — PROGRESS NOTES
Patient took home dose of 170 units Humulin U5 100 insulin.  Her sugar on her machine was 232 after breakfast

## 2020-11-23 NOTE — PROGRESS NOTES
Delmy Dunne  Admission Date: 11/18/2020             Daily Progress Note: 11/23/2020    The patient's chart is reviewed and the patient is discussed with the staff. Patient had CABG x 4 on 11/18. Pt has a history of JOLEEN(AHI 5, desats to 80%) on APAP 5-12cm H2O,RLS, asthma, Bipolar disorder, PUD, chronic diastolic CHF, DM2, and HLD. Pt was seen by Dr. Kenia Blue, cardiologist, on 11/4/2020 with complaints of chest pain and dyspnea on exertion. Pt had a LHC on 11/5 with MVCAD. Successful extubation post op.  Using her home CPAP.        Subjective:      She is sitting up in bed and did used CPAP last night  On NC 1 LPM  Using IS  Still with no BM    Current Facility-Administered Medications   Medication Dose Route Frequency    bisacodyL (DULCOLAX) tablet 5 mg  5 mg Oral DAILY PRN    metoprolol tartrate (LOPRESSOR) tablet 25 mg  25 mg Oral TID    traMADoL (ULTRAM) tablet 50 mg  50 mg Oral Q6H PRN    oxyCODONE-acetaminophen (PERCOCET) 5-325 mg per tablet 1 Tab  1 Tab Oral Q4H PRN    albuterol-ipratropium (DUO-NEB) 2.5 MG-0.5 MG/3 ML  3 mL Nebulization QID RT    senna-docusate (PERICOLACE) 8.6-50 mg per tablet 2 Tab  2 Tab Oral Q12H    guaiFENesin ER (MUCINEX) tablet 600 mg  600 mg Oral Q12H    lisinopriL (PRINIVIL, ZESTRIL) tablet 20 mg  20 mg Oral DAILY    clopidogreL (PLAVIX) tablet 75 mg  75 mg Oral DAILY    lip protectant (BLISTEX) ointment 1 Each  1 Each Topical PRN    pregabalin (LYRICA) capsule 50 mg  50 mg Oral TID    sodium chloride (NS) flush 5-40 mL  5-40 mL IntraVENous Q8H    sodium chloride (NS) flush 5-40 mL  5-40 mL IntraVENous PRN    alum-mag hydroxide-simeth (MYLANTA) oral suspension 30 mL  30 mL Oral Q4H PRN    famotidine (PEPCID) tablet 20 mg  20 mg Oral BID    ondansetron (ZOFRAN) injection 4 mg  4 mg IntraVENous Q6H PRN    acetaminophen (TYLENOL) tablet 650 mg  650 mg Oral Q4H PRN    atorvastatin (LIPITOR) tablet 80 mg  80 mg Oral QHS    amiodarone (CORDARONE) tablet 200 mg  200 mg Oral Q12H    aspirin delayed-release tablet 81 mg  81 mg Oral DAILY    magnesium oxide (MAG-OX) tablet 400 mg  400 mg Oral TID PRN    magnesium oxide (MAG-OX) tablet 400 mg  400 mg Oral QID PRN    potassium chloride (K-DUR, KLOR-CON) SR tablet 20 mEq  20 mEq Oral BID PRN    potassium chloride (K-DUR, KLOR-CON) SR tablet 40 mEq  40 mEq Oral BID PRN    nitroglycerin (NITROSTAT) tablet 0.4 mg  0.4 mg SubLINGual Q5MIN PRN    oxyCODONE-acetaminophen (PERCOCET 10)  mg per tablet 1 Tab  1 Tab Oral Q4H PRN    alcohol 62% (NOZIN) nasal  1 Ampule  1 Ampule Topical Q12H         Objective:     Vitals:    11/22/20 2129 11/22/20 2240 11/23/20 0425 11/23/20 0602   BP: (!) 167/70 (!) 147/63 109/62    Pulse: 88 85 72    Resp:  18 18    Temp:  98.4 °F (36.9 °C) 97.3 °F (36.3 °C)    SpO2:  90% 90%    Weight:    228 lb 1.6 oz (103.5 kg)   Height:         Intake and Output:   11/21 0701 - 11/22 1900  In: 2280 [P.O.:2280]  Out: 2950 [Urine:2950]  11/22 1901 - 11/23 0700  In: 720 [P.O.:720]  Out: 400 [Urine:400]    Physical Exam:   Constitutional:  the patient is well developed and in no acute distress  HEENT:  Sclera clear, pupils equal, oral mucosa moist  Lungs: clear but overall decreased. Wearing 4 liter high flow cannula  Cardiovascular:  RRR without M,G,R. Sinus per telemetry  Abd/GI: soft and non-tender; with positive bowel sounds. Ext: warm without cyanosis. There is a trace amount of lower leg edema. Musculoskeletal: moves all four extremities with equal strength  Skin:  no jaundice or rashes, chest and leg wounds   Neuro: no gross neuro deficits   Musculoskeletal: can ambulate. No deformity  Psychiatric: Calm.      Review of Systems - General ROS: positive for  - fatigue  Respiratory ROS: positive for - cough - non productive   Cardiovascular ROS: positive for - chest discomfort following surgery  Gastrointestinal ROS: positive for - appetite loss  Musculoskeletal ROS: positive for - muscular weakness   Lines: peripheral IV    CHEST XRAY:         LAB  Recent Labs     11/23/20  0334 11/22/20  0351   WBC 12.1* 12.4*   HGB 10.5* 10.0*   HCT 31.8* 31.1*    298     Recent Labs     11/23/20  0334 11/22/20  0353 11/21/20  0334   K 4.0 4.0 4.3   MG 2.1 2.5* 2.6*       Assessment:  (Medical Decision Making)     Hospital Problems  Date Reviewed: 11/18/2020          Codes Class Noted POA    Atherosclerosis of native coronary artery of native heart with unstable angina pectoris ([facility] ICD-10-CM: I25.110  ICD-9-CM: 414.01, 411.1  11/18/2020 Unknown    S/p CABG    Bipolar disorder ([facility] ICD-10-CM: F31.9  ICD-9-CM: 296.80  11/18/2020 Unknown    On home meds    * (Principal) S/P CABG x 4 ICD-10-CM: Z95.1  ICD-9-CM: V45.81  11/18/2020 Unknown    Rhythm stable, minimal edema    RLS (restless legs syndrome) ICD-10-CM: G25.81  ICD-9-CM: 333.94  11/23/2016 Yes        JOLEEN (obstructive sleep apnea) ICD-10-CM: G47.33  ICD-9-CM: 327.23  Unknown Yes    Using home CPAP     DM (diabetes mellitus) (HCC) (Chronic) ICD-10-CM: E11.9  ICD-9-CM: 250.00  11/8/2011 Yes        Hypertension ICD-10-CM: R11  ICD-9-CM: 401.9  Unknown Yes    Overview Signed 11/7/2011  4:45 PM by SHAMA Cox     controlled w meds         controlled    CAD (coronary artery disease) ICD-10-CM: I25.10  ICD-9-CM: 414.00  Unknown Unknown    Overview Signed 11/7/2011  4:46 PM by SHAMA Cox     cath 2009         As above          Plan:  (Medical Decision Making)     Control pain and continue to work with IS.   laxative  Daily lasix. Mobilize  Wean O2       More than 50% of time documented was spent face-to-face contact with the patient and in the care of the patient on the floor/unit where the patient is located.

## 2020-11-23 NOTE — ROUTINE PROCESS
Cardiac Rehab: Spoke with patient regarding referral to cardiac rehab. Patient meets admission criteria based on CABGx4 (11/18/20). Written information about Cardiac Rehab given and reviewed with patient. Discussed lifestyle modifications to promote cardiac wellness. Patient indicated that she wants to participate in the cardiac rehab program and her orientation has been scheduled. Her Cardiologist is Dr. Myles Raman.       Thank you,  VASU Daily, RN  Cardiopulmonary Rehabilitation Nurse Liaison  [facility]

## 2020-11-23 NOTE — PROGRESS NOTES
Predecessor's note suggests pt going home today. DM seems controlled enough for now on home regimen. Cont at discharge and follow up with PCP. Do not feel we need to see/bill patient today.   Call with questions

## 2020-11-23 NOTE — PROGRESS NOTES
Problem: Mobility Impaired (Adult and Pediatric)  Goal: *Acute Goals and Plan of Care (Insert Text)  Outcome: Progressing Towards Goal  Note: LTG:  (1.)Ms. Madelyn Sears will move from supine to sit and sit to supine , scoot up and down, and roll side to side in bed with INDEPENDENT within 7 treatment day(s). (2.)Ms. Madelyn Sears will transfer from bed to chair and chair to bed with INDEPENDENT using the least restrictive device within 7 treatment day(s). Goal met 11/22/20  (3.)Ms. Madelyn Sears will ambulate with INDEPENDENT for 500+ feet with the least restrictive device within 7 treatment day(s). (4.)Ms. Madelyn Sears will perform 5 stairs with HR and SBA within 7 treatment days for ascending and descending stairs for home.   ________________________________________________________________________________________________       PHYSICAL THERAPY: Daily Note and PM 11/23/2020  INPATIENT: PT Visit Days : 4  Payor: Lemuel Babinski / Plan: [place] [employer] [place] [employer]IC[place] / Product Type: Managed Care Medicare /       NAME/AGE/GENDER: Alexandria Hammonds is a 53 y.o. female   PRIMARY DIAGNOSIS: Atherosclerosis of native coronary artery of native heart with unstable angina pectoris ([facility] [I25.110]  CAD (coronary artery disease) [I25.10]  Atherosclerosis of native coronary artery of native heart with unstable angina pectoris (HCC) [I25.110] S/P CABG x 4 S/P CABG x 4  Procedure(s) (LRB):  CORONARY ARTERY BYPASS GRAFT (CABG x4), LIMA (N/A)  VEIN HARVEST ENDOSCOPIC, GREATER SAPHENOUS (Left)  ESOPHAGEAL TRANS ECHOCARDIOGRAM (N/A)  5 Days Post-Op  ICD-10: Treatment Diagnosis:    · Generalized Muscle Weakness (M62.81)  · Difficulty in walking, Not elsewhere classified (R26.2)   Precaution/Allergies:  Pcn [penicillins] and Tape [adhesive]      ASSESSMENT:     Ms. Madelyn Sears presents sitting in the recliner  and agreeable to therapy.  present but is sleeping.   Pt reports lives with spouse and daughter/grandchildren in mobile home, 5 steps to enter with HR. Pt reports independent at baseline, does not work, RA at home, CPAP. Sit to stand with supervision. Gait training without an assistive device x 200 feet with good but slow amanda. Patient is returned to the recliner and after a short rest break the patient performs therapeutic exercises. Tolerated well. Good session and the patient is making good progress toward goals. Pt overall doing well with activity prior to this episode. PT to cont to follow for acute care needs to address deficits noted above. Home with HHPT. Will continue PT efforts. PM note:  Patient is sitting in the recliner and agreeable to therapy. Patient able to maintain her am gait distance and continue with his exercises. Patient is left sitting in the recliner with needs within reach.  at bedside. Making progress. Will continue Pt efforts. This section established at most recent assessment   PROBLEM LIST (Impairments causing functional limitations):  1. Decreased Strength  2. Decreased ADL/Functional Activities  3. Decreased Transfer Abilities  4. Decreased Ambulation Ability/Technique  5. Decreased Balance  6. Increased Pain  7. Decreased Activity Tolerance  8. Decreased [place] with Home Exercise Program   INTERVENTIONS PLANNED: (Benefits and precautions of physical therapy have been discussed with the patient.)  1. Balance Exercise  2. Bed Mobility  3. Family Education  4. Gait Training  5. Home Exercise Program (HEP)  6. Therapeutic Activites  7. Therapeutic Exercise/Strengthening  8. Transfer Training     TREATMENT PLAN: Frequency/Duration: twice daily for duration of hospital stay  Rehabilitation Potential For Stated Goals: Good     REHAB RECOMMENDATIONS (at time of discharge pending progress):    Placement: It is my opinion, based on this patient's performance to date, that Ms. Scott Galvan may benefit from [address] after discharge due to the functional deficits listed above that are likely to improve with skilled rehabilitation because he/she has multiple medical issues that affect his/her functional mobility in the community. Equipment:    None at this time              HISTORY:   History of Present Injury/Illness (Reason for Referral):  S/p CABG x 4, generalized weakness  Past Medical History/Comorbidities:   Ms. Alexsander Hayes  has a past medical history of Arrhythmia, Asthma, Bipolar disorder, Coronary artery disease, Diabetic neuropathy, Fatty liver, GERD (gastroesophageal reflux disease), Hypercholesterolemia, Hypertension, Hypertriglyceridemia, Meniere's disease, Migraine, Morbid obesity, Obstructive sleep apnea, Palpitations, Peptic ulcer disease (), Psychiatric disorder, Stroke ([facility] and Syncope and collapse. Ms. Alexsander Hayes  has a past surgical history that includes hx cholecystectomy (); hx tonsillectomy; hx gyn (); hx  section; hx breast lumpectomy; hx urological; hx orthopaedic; hx jenifer and bso (); hx heart catheterization (); and neurological procedure unlisted. Social History/Living Environment:   Home Environment: Trailer/mobile home  # Steps to Enter: 5  Rails to Enter: Yes  Hand Rails : Bilateral  One/Two Story Residence: One story  Living Alone: No  Support Systems: Family member(s), Spouse/Significant Other/Partner  Patient Expects to be Discharged to[de-identified] Trailer/mobile home  Current DME Used/Available at Home: None  Tub or Shower Type: Tub/Shower combination  Prior Level of Function/Work/Activity:  Lives spouse, independent at baseline, does not work,drives  Personal Factors:          Sex:  female        Age:  53 y.o.         Overall Behavior:  agreeable   Number of Personal Factors/Comorbidities that affect the Plan of Care:  CAD, HTN, DM 3+: HIGH COMPLEXITY   EXAMINATION:   Most Recent Physical Functioning:   Gross Assessment:                  Posture:     Balance:  Sitting: Intact  Standing: Intact  Standing - Static: Good  Standing - Dynamic : Good Bed Mobility:     Wheelchair Mobility:     Transfers:  Sit to Stand: Supervision  Stand to Sit: Supervision  Gait:     Distance (ft): 200 Feet (ft)  Assistive Device: Other (comment)(no assistive device )  Ambulation - Level of Assistance: Supervision      Body Structures Involved:  1. Heart  2. Lungs  3. Muscles Body Functions Affected:  1. Cardio  2. Respiratory  3. Movement Related Activities and Participation Affected:  1. General Tasks and Demands  2. Mobility  3. Self Care   Number of elements that affect the Plan of Care: 4+: HIGH COMPLEXITY   CLINICAL PRESENTATION:   Presentation: Evolving clinical presentation with changing clinical characteristics: MODERATE COMPLEXITY   CLINICAL DECISION MAKIN [facility] Inpatient Short Form  How much difficulty does the patient currently have. .. Unable A Lot A Little None   1. Turning over in bed (including adjusting bedclothes, sheets and blankets)? [] 1   [] 2   [x] 3   [] 4   2. Sitting down on and standing up from a chair with arms ( e.g., wheelchair, bedside commode, etc.)   [] 1   [] 2   [x] 3   [] 4   3. Moving from lying on back to sitting on the side of the bed? [] 1   [] 2   [x] 3   [] 4   How much help from another person does the patient currently need. .. Total A Lot A Little None   4. Moving to and from a bed to a chair (including a wheelchair)? [] 1   [] 2   [x] 3   [] 4   5. Need to walk in hospital room? [] 1   [] 2   [x] 3   [] 4   6. Climbing 3-5 steps with a railing? [] 1   [] 2   [x] 3   [] 4   © , Trustees of [address] [place] [id number], under license to [employer]. All rights reserved      Score:  Initial: 18 Most Recent: X (Date: -- )    Interpretation of Tool:  Represents activities that are increasingly more difficult (i.e. Bed mobility, Transfers, Gait).     Medical Necessity:     · Patient is expected to demonstrate progress in   · strength, range of motion, balance, and coordination  ·  to   · decrease assistance required with overall functional ambulation, transfers  · .  · Patient demonstrates   · good  ·  rehab potential due to higher previous functional level. Reason for Services/Other Comments:  · Patient   · continues to require present interventions due to patient's inability to perform bed mobility, transfers, ambulation safely and effectively at prior level of function of independent. · .   Use of outcome tool(s) and clinical judgement create a POC that gives a: Clear prediction of patient's progress: LOW COMPLEXITY            TREATMENT:   (In addition to Assessment/Re-Assessment sessions the following treatments were rendered)   Pre-treatment Symptoms/Complaints: \"Hello\"  Pain: Initial:   Pain Intensity 1: 2  Post Session:  0/10     Therapeutic Activity: (    12 min): Therapeutic activities including Chair transfers, Ambulation on level ground, and weight shifting, sternal precautions, posture, pursed lip breathing to improve mobility, strength, balance, and coordination. Required close supervision   to promote static and dynamic balance in standing and promote coordination of bilateral, lower extremity(s). Therapeutic Exercise: (  12 minutes):  Exercises per grid below to improve mobility, strength, balance and coordination. Required minimum  verbal cues    Progressed repetitions and complexity of movement as indicated.        Date:  11/21/20 Date:  11/23/20 Date:     ACTIVITY/EXERCISE AM PM AM PM AM PM   LAQ 15  15 15     Shoulder shrugs 15  15 15     Gluteal sets 15  15 15     marching 15  15 15     Ankle pumps 15  15 15     abduction 15  15 15              B = bilateral; AA = active assistive; A = active; P = passive    Braces/Orthotics/Lines/Etc:   · RA  Treatment/Session Assessment:    · Response to Treatment:  Doing well  · Interdisciplinary Collaboration:   o Physical Therapy Assistant  o Registered Nurse  · After treatment position/precautions:   o Up in chair  o Bed/Chair-wheels locked  o Call light within reach  o RN notified  o Family at bedside   · Compliance with Program/Exercises: Compliant all of the time  · Recommendations/Intent for next treatment session: \"Next visit will focus on advancements to more challenging activities\".   Total Treatment Duration:  PT Patient Time In/Time Out  Time In: 1412  Time Out: 46    Urvashi Clinton-Brigid, PTA

## 2020-11-23 NOTE — PROGRESS NOTES
Problem: Mobility Impaired (Adult and Pediatric)  Goal: *Acute Goals and Plan of Care (Insert Text)  Outcome: Progressing Towards Goal  Note: LTG:  (1.)Ms. Ashwin Muniz will move from supine to sit and sit to supine , scoot up and down, and roll side to side in bed with INDEPENDENT within 7 treatment day(s). (2.)Ms. Ashwin Muniz will transfer from bed to chair and chair to bed with INDEPENDENT using the least restrictive device within 7 treatment day(s). Goal met 11/22/20  (3.)Ms. Ashwin Muniz will ambulate with INDEPENDENT for 500+ feet with the least restrictive device within 7 treatment day(s). (4.)Ms. Ashwin Muniz will perform 5 stairs with HR and SBA within 7 treatment days for ascending and descending stairs for home.   ________________________________________________________________________________________________       PHYSICAL THERAPY: Daily Note and AM 11/23/2020  INPATIENT: PT Visit Days : 4  Payor: Gayle Hyde / Plan: [facility] MEDICARE SNP / Product Type: [employer] Medicare /       NAME/AGE/GENDER: Jocelynn Salgado is a 53 y.o. female   PRIMARY DIAGNOSIS: Atherosclerosis of native coronary artery of native heart with unstable angina pectoris ([facility] [address] [I25.110]  CAD (coronary artery disease) [I25.10]  Atherosclerosis of native coronary artery of native heart with unstable angina pectoris (HCC) [I25.110] S/P CABG x 4 S/P CABG x 4  Procedure(s) (LRB):  CORONARY ARTERY BYPASS GRAFT (CABG x4), LIMA (N/A)  VEIN HARVEST ENDOSCOPIC, GREATER SAPHENOUS (Left)  ESOPHAGEAL TRANS ECHOCARDIOGRAM (N/A)  5 Days Post-Op  ICD-10: Treatment Diagnosis:    · Generalized Muscle Weakness (M62.81)  · Difficulty in walking, Not elsewhere classified (R26.2)   Precaution/Allergies:  Pcn [penicillins] and Tape [adhesive]      ASSESSMENT:     Ms. Ashwin Muniz presents sitting in the recliner  and agreeable to therapy.  present but is sleeping.   Pt reports lives with spouse and daughter/grandchildren in mobile home, 5 steps to enter with HR. Pt reports independent at baseline, does not work, RA at home, CPAP. Sit to stand with supervision. Gait training without an assistive device x 200 feet with good but slow amanda. Patient is returned to the recliner and after a short rest break the patient performs therapeutic exercises. Tolerated well. Good session and the patient is making good progress toward goals. Pt overall doing well with activity prior to this episode. PT to cont to follow for acute care needs to address deficits noted above. Home with HHPT. Will continue PT efforts. This section established at most recent assessment   PROBLEM LIST (Impairments causing functional limitations):  1. Decreased Strength  2. Decreased ADL/Functional Activities  3. Decreased Transfer Abilities  4. Decreased Ambulation Ability/Technique  5. Decreased Balance  6. Increased Pain  7. Decreased Activity Tolerance  8. Decreased Brantley with Home Exercise Program   INTERVENTIONS PLANNED: (Benefits and precautions of physical therapy have been discussed with the patient.)  1. Balance Exercise  2. Bed Mobility  3. Family Education  4. Gait Training  5. Home Exercise Program (HEP)  6. Therapeutic Activites  7. Therapeutic Exercise/Strengthening  8. Transfer Training     TREATMENT PLAN: Frequency/Duration: twice daily for duration of hospital stay  Rehabilitation Potential For Stated Goals: Good     REHAB RECOMMENDATIONS (at time of discharge pending progress):    Placement: It is my opinion, based on this patient's performance to date, that Ms. Melisa Gold may benefit from [address] after discharge due to the functional deficits listed above that are likely to improve with skilled rehabilitation because he/she has multiple medical issues that affect his/her functional mobility in the community.   Equipment:    None at this time              HISTORY:   History of Present Injury/Illness (Reason for Referral):  S/p CABG x 4, generalized weakness  Past Medical History/Comorbidities:   Ms. Scott Galvan  has a past medical history of Arrhythmia, Asthma, Bipolar disorder, Coronary artery disease, Diabetic neuropathy, Fatty liver, GERD (gastroesophageal reflux disease), Hypercholesterolemia, Hypertension, Hypertriglyceridemia, Meniere's disease, Migraine, Morbid obesity, Obstructive sleep apnea, Palpitations, Peptic ulcer disease (), Psychiatric disorder, Stroke [facility]), and Syncope and collapse. Ms. Scott Galvan  has a past surgical history that includes hx cholecystectomy (); hx tonsillectomy; hx gyn (); hx  section; hx breast lumpectomy; hx urological; hx orthopaedic; hx jenifer and bso (); hx heart catheterization (); and neurological procedure unlisted. Social History/Living Environment:   Home Environment: Trailer/mobile home  # Steps to Enter: 5  Rails to Enter: Yes  Hand Rails : Bilateral  One/Two Story Residence: One story  Living Alone: No  Support Systems: Family member(s), Spouse/Significant Other/Partner  Patient Expects to be Discharged to[de-identified] Trailer/mobile home  Current DME Used/Available at Home: None  Tub or Shower Type: Tub/Shower combination  Prior Level of Function/Work/Activity:  Lives spouse, independent at baseline, does not work,drives  Personal Factors:          Sex:  female        Age:  53 y.o.         Overall Behavior:  agreeable   Number of Personal Factors/Comorbidities that affect the Plan of Care:  CAD, HTN, DM 3+: HIGH COMPLEXITY   EXAMINATION:   Most Recent Physical Functioning:   Gross Assessment:                  Posture:     Balance:  Sitting: Intact  Standing: Intact  Standing - Static: Good  Standing - Dynamic : Good Bed Mobility:     Wheelchair Mobility:     Transfers:  Sit to Stand: Supervision  Stand to Sit: Supervision  Gait:     Distance (ft): 200 Feet (ft)  Assistive Device: Other (comment)(no assistive device )  Ambulation - Level of Assistance: Supervision      Body Structures Involved:  1. Heart  2. Lungs  3. Muscles Body Functions Affected:  1. Cardio  2. Respiratory  3. Movement Related Activities and Participation Affected:  1. General Tasks and Demands  2. Mobility  3. Self Care   Number of elements that affect the Plan of Care: 4+: HIGH COMPLEXITY   CLINICAL PRESENTATION:   Presentation: Evolving clinical presentation with changing clinical characteristics: MODERATE COMPLEXITY   CLINICAL DECISION MAKIN [facility] Inpatient Short Form  How much difficulty does the patient currently have. .. Unable A Lot A Little None   1. Turning over in bed (including adjusting bedclothes, sheets and blankets)? [] 1   [] 2   [x] 3   [] 4   2. Sitting down on and standing up from a chair with arms ( e.g., wheelchair, bedside commode, etc.)   [] 1   [] 2   [x] 3   [] 4   3. Moving from lying on back to sitting on the side of the bed? [] 1   [] 2   [x] 3   [] 4   How much help from another person does the patient currently need. .. Total A Lot A Little None   4. Moving to and from a bed to a chair (including a wheelchair)? [] 1   [] 2   [x] 3   [] 4   5. Need to walk in hospital room? [] 1   [] 2   [x] 3   [] 4   6. Climbing 3-5 steps with a railing? [] 1   [] 2   [x] 3   [] 4   © , Trustees of [address] [place] [address], under license to [employer]. All rights reserved      Score:  Initial: 18 Most Recent: X (Date: -- )    Interpretation of Tool:  Represents activities that are increasingly more difficult (i.e. Bed mobility, Transfers, Gait). Medical Necessity:     · Patient is expected to demonstrate progress in   · strength, range of motion, balance, and coordination  ·  to   · decrease assistance required with overall functional ambulation, transfers  · .  · Patient demonstrates   · good  ·  rehab potential due to higher previous functional level.   Reason for Services/Other Comments:  · Patient · continues to require present interventions due to patient's inability to perform bed mobility, transfers, ambulation safely and effectively at prior level of function of independent. · .   Use of outcome tool(s) and clinical judgement create a POC that gives a: Clear prediction of patient's progress: LOW COMPLEXITY            TREATMENT:   (In addition to Assessment/Re-Assessment sessions the following treatments were rendered)   Pre-treatment Symptoms/Complaints:  \"Good morning\"  Pain: Initial:   Pain Intensity 1: 2  Post Session:  0/10     Therapeutic Activity: (    13 min): Therapeutic activities including Chair transfers, Ambulation on level ground, and weight shifting, sternal precautions, posture, pursed lip breathing to improve mobility, strength, balance, and coordination. Required close supervision   to promote static and dynamic balance in standing and promote coordination of bilateral, lower extremity(s). Therapeutic Exercise: (  12 minutes):  Exercises per grid below to improve mobility, strength, balance and coordination. Required minimum  verbal cues    Progressed repetitions and complexity of movement as indicated. Date:  11/21/20 Date:  11/23/20 Date:     ACTIVITY/EXERCISE AM PM AM PM AM PM   LAQ 15  15      Shoulder shrugs 15  15      Gluteal sets 15  15      marching 15  15      Ankle pumps 15  15      abduction 15  15               B = bilateral; AA = active assistive; A = active; P = passive    Braces/Orthotics/Lines/Etc:   · RA  Treatment/Session Assessment:    · Response to Treatment:  Doing well  · Interdisciplinary Collaboration:   o Physical Therapy Assistant  o Registered Nurse  · After treatment position/precautions:   o Up in chair  o Bed/Chair-wheels locked  o Call light within reach  o RN notified  o Family at bedside   · Compliance with Program/Exercises: Compliant all of the time  · Recommendations/Intent for next treatment session:   \"Next visit will focus on advancements to more challenging activities\".   Total Treatment Duration:  PT Patient Time In/Time Out  Time In: 1029  Time Out: 1054    Urvashi Clinton-Brigid, PTA

## 2020-11-23 NOTE — PROGRESS NOTES
Today's Date: 11/23/2020  Date of Admission: 11/18/2020    Chart Reviewed. Subjective:     Patient feels ok. She has not had BM yet and complains of some abdominal pain. Appetite is good. She denies any dyspnea. Medications Reviewed. Objective:     Vitals:    11/23/20 0425 11/23/20 0602 11/23/20 0700 11/23/20 0919   BP: 109/62  132/78    Pulse: 72  70    Resp: 18  20    Temp: 97.3 °F (36.3 °C)  97.4 °F (36.3 °C)    SpO2: 90%  93% 90%   Weight:  228 lb 1.6 oz (103.5 kg)     Height:           Intake and Output  Current Shift: No intake/output data recorded. Last 3 Shifts: 11/21 1901 - 11/23 0700  In: 1920 [P.O.:1920]  Out: 2650 [Urine:2650]    Physical Exam:  General: Well Developed, Obese, No Acute Distress, Alert & Oriented x 3, Appropriate mood  Neck: supple, no JVD  Heart: S1S2 with RRR without murmurs or gallops  Lungs: mostly clear throughout auscultation bilaterally  Abd: soft, nontender, nondistended, with good bowel sounds  Ext: no edema bilaterally  Sternal incision: clean, dry, and intact  Skin: warm and dry    LABS  Data Review:   Recent Labs     11/23/20  0334 11/22/20  0353 11/22/20  0351   K 4.0 4.0  --    MG 2.1 2.5*  --    WBC 12.1*  --  12.4*   HGB 10.5*  --  10.0*   HCT 31.8*  --  31.1*     --  298       Estimated Creatinine Clearance: 113.9 mL/min (by C-G formula based on SCr of 0.63 mg/dL).       Assessment/Plan:     Principal Problem:    S/P CABG x 4 (11/18/2020)  On ASA, BB, ACE-I, statin, currently stable on room air, pacing wires removed, continue PT, no recurrent a-fib overnight, no BM yet     Active Problems:    Hypertension  BP elevated at times, increase ACE-I to home dose       CAD (coronary artery disease) ()  S/p CABG, continue medical therapy        DM (diabetes mellitus) ([facility] [address] (11/8/2011)  On SSI, pt managing her own insulin       JOLEEN (obstructive sleep apnea) ()  Pulmonary following        RLS (restless legs syndrome) (11/23/2016)  Continue home meds    Atherosclerosis of native coronary artery of native heart with unstable angina pectoris ([facility] [address] (11/18/2020)  S/p CABG        Encounter for weaning from ventilator [facility]) (11/18/2020)       Hypoxia  Resolved, stable on room air        Bipolar disorder ([facility] [address] (11/18/2020)  Continue home meds     Dispo  Likely home with [facility] in [address] [place], [place] [address]KATHLEEN

## 2020-11-23 NOTE — PROGRESS NOTES
Bedside and Verbal shift change report given to self (oncoming nurse) by Nancy Bustillo (offgoing nurse). Report included the following information SBAR and Kardex.

## 2020-11-24 VITALS
TEMPERATURE: 98.4 F | WEIGHT: 232 LBS | RESPIRATION RATE: 18 BRPM | SYSTOLIC BLOOD PRESSURE: 151 MMHG | HEIGHT: 64 IN | OXYGEN SATURATION: 93 % | HEART RATE: 80 BPM | DIASTOLIC BLOOD PRESSURE: 64 MMHG | BODY MASS INDEX: 39.61 KG/M2

## 2020-11-24 LAB
ERYTHROCYTE [DISTWIDTH] IN BLOOD BY AUTOMATED COUNT: 13 % (ref 11.9–14.6)
GLUCOSE BLD STRIP.AUTO-MCNC: 140 MG/DL (ref 65–100)
GLUCOSE BLD STRIP.AUTO-MCNC: 94 MG/DL (ref 65–100)
HCT VFR BLD AUTO: 34.1 % (ref 35.8–46.3)
HGB BLD-MCNC: 11.1 G/DL (ref 11.7–15.4)
MAGNESIUM SERPL-MCNC: 1.9 MG/DL (ref 1.8–2.4)
MCH RBC QN AUTO: 28.8 PG (ref 26.1–32.9)
MCHC RBC AUTO-ENTMCNC: 32.6 G/DL (ref 31.4–35)
MCV RBC AUTO: 88.3 FL (ref 79.6–97.8)
NRBC # BLD: 0.04 K/UL (ref 0–0.2)
PLATELET # BLD AUTO: 389 K/UL (ref 150–450)
PMV BLD AUTO: 8.6 FL (ref 9.4–12.3)
POTASSIUM SERPL-SCNC: 4 MMOL/L (ref 3.5–5.1)
RBC # BLD AUTO: 3.86 M/UL (ref 4.05–5.2)
WBC # BLD AUTO: 13.7 K/UL (ref 4.3–11.1)

## 2020-11-24 PROCEDURE — 99232 SBSQ HOSP IP/OBS MODERATE 35: CPT | Performed by: INTERNAL MEDICINE

## 2020-11-24 PROCEDURE — 97110 THERAPEUTIC EXERCISES: CPT

## 2020-11-24 PROCEDURE — 82962 GLUCOSE BLOOD TEST: CPT

## 2020-11-24 PROCEDURE — 97530 THERAPEUTIC ACTIVITIES: CPT

## 2020-11-24 PROCEDURE — 36415 COLL VENOUS BLD VENIPUNCTURE: CPT

## 2020-11-24 PROCEDURE — 2709999900 HC NON-CHARGEABLE SUPPLY

## 2020-11-24 PROCEDURE — 74011250637 HC RX REV CODE- 250/637: Performed by: THORACIC SURGERY (CARDIOTHORACIC VASCULAR SURGERY)

## 2020-11-24 PROCEDURE — 85027 COMPLETE CBC AUTOMATED: CPT

## 2020-11-24 PROCEDURE — 94640 AIRWAY INHALATION TREATMENT: CPT

## 2020-11-24 PROCEDURE — 74011250637 HC RX REV CODE- 250/637: Performed by: PHYSICIAN ASSISTANT

## 2020-11-24 PROCEDURE — 74011000250 HC RX REV CODE- 250: Performed by: THORACIC SURGERY (CARDIOTHORACIC VASCULAR SURGERY)

## 2020-11-24 PROCEDURE — 83735 ASSAY OF MAGNESIUM: CPT

## 2020-11-24 PROCEDURE — 94760 N-INVAS EAR/PLS OXIMETRY 1: CPT

## 2020-11-24 PROCEDURE — 74011250637 HC RX REV CODE- 250/637: Performed by: INTERNAL MEDICINE

## 2020-11-24 PROCEDURE — 84132 ASSAY OF SERUM POTASSIUM: CPT

## 2020-11-24 RX ORDER — ATORVASTATIN CALCIUM 80 MG/1
80 TABLET, FILM COATED ORAL DAILY
Qty: 90 TAB | Refills: 4 | Status: SHIPPED | OUTPATIENT
Start: 2020-11-24 | End: 2021-02-04 | Stop reason: SDUPTHER

## 2020-11-24 RX ORDER — METOPROLOL SUCCINATE 50 MG/1
50 TABLET, EXTENDED RELEASE ORAL DAILY
Qty: 90 TAB | Refills: 3 | Status: SHIPPED | OUTPATIENT
Start: 2020-11-24 | End: 2021-02-04 | Stop reason: SDUPTHER

## 2020-11-24 RX ORDER — AMIODARONE HYDROCHLORIDE 200 MG/1
200 TABLET ORAL EVERY 12 HOURS
Status: DISCONTINUED | OUTPATIENT
Start: 2020-11-24 | End: 2020-11-24 | Stop reason: HOSPADM

## 2020-11-24 RX ORDER — CLOPIDOGREL BISULFATE 75 MG/1
75 TABLET ORAL DAILY
Qty: 30 TAB | Refills: 11 | Status: SHIPPED | OUTPATIENT
Start: 2020-11-25 | End: 2021-02-04 | Stop reason: SDUPTHER

## 2020-11-24 RX ORDER — AMIODARONE HYDROCHLORIDE 200 MG/1
TABLET ORAL
Qty: 21 TAB | Refills: 0 | Status: SHIPPED | OUTPATIENT
Start: 2020-11-24 | End: 2020-12-02 | Stop reason: ALTCHOICE

## 2020-11-24 RX ORDER — METOPROLOL TARTRATE 50 MG/1
50 TABLET ORAL 2 TIMES DAILY
Status: DISCONTINUED | OUTPATIENT
Start: 2020-11-24 | End: 2020-11-24 | Stop reason: HOSPADM

## 2020-11-24 RX ADMIN — AMIODARONE HYDROCHLORIDE 200 MG: 200 TABLET ORAL at 09:01

## 2020-11-24 RX ADMIN — OXYCODONE HYDROCHLORIDE AND ACETAMINOPHEN 1 TABLET: 5; 325 TABLET ORAL at 06:12

## 2020-11-24 RX ADMIN — OXYCODONE HYDROCHLORIDE AND ACETAMINOPHEN 1 TABLET: 5; 325 TABLET ORAL at 13:29

## 2020-11-24 RX ADMIN — Medication 1 AMPULE: at 09:01

## 2020-11-24 RX ADMIN — GUAIFENESIN 600 MG: 600 TABLET ORAL at 09:01

## 2020-11-24 RX ADMIN — IPRATROPIUM BROMIDE AND ALBUTEROL SULFATE 3 ML: .5; 3 SOLUTION RESPIRATORY (INHALATION) at 08:27

## 2020-11-24 RX ADMIN — Medication 10 ML: at 06:13

## 2020-11-24 RX ADMIN — ASPIRIN 81 MG: 81 TABLET ORAL at 09:01

## 2020-11-24 RX ADMIN — IPRATROPIUM BROMIDE AND ALBUTEROL SULFATE 3 ML: .5; 3 SOLUTION RESPIRATORY (INHALATION) at 11:13

## 2020-11-24 RX ADMIN — METOPROLOL TARTRATE 50 MG: 50 TABLET, FILM COATED ORAL at 09:01

## 2020-11-24 RX ADMIN — PREGABALIN 50 MG: 50 CAPSULE ORAL at 09:01

## 2020-11-24 RX ADMIN — CLOPIDOGREL BISULFATE 75 MG: 75 TABLET ORAL at 09:01

## 2020-11-24 RX ADMIN — FAMOTIDINE 20 MG: 20 TABLET, FILM COATED ORAL at 09:01

## 2020-11-24 RX ADMIN — LISINOPRIL 40 MG: 20 TABLET ORAL at 09:01

## 2020-11-24 RX ADMIN — SENNOSIDES AND DOCUSATE SODIUM 2 TABLET: 8.6; 5 TABLET ORAL at 09:01

## 2020-11-24 NOTE — PROGRESS NOTES
Verbal bedside report given to oncoming RN, Markell Marc. Patient's situation, background, assessment and recommendations provided. Opportunity for questions provided. Oncoming RN assumed care of patient.

## 2020-11-24 NOTE — DISCHARGE INSTRUCTIONS
Patient Education     Do not resume Mobic for 1 week. DISCHARGE SUMMARY from Nurse    PATIENT INSTRUCTIONS:    Report the following to your surgeon:  · Excessive pain, swelling, redness or odor of or around the surgical area  · Temperature over 100.5  · Nausea and vomiting lasting longer than 4 hours or if unable to take medications  · Any signs of decreased circulation or nerve impairment to extremity: change in color, persistent  numbness, tingling, coldness or increase pain  · Any questions    What to do at Home:  *  Please give a list of your current medications to your Primary Care Provider. *  Please update this list whenever your medications are discontinued, doses are      changed, or new medications (including over-the-counter products) are added. *  Please carry medication information at all times in case of emergency situations. These are general instructions for a healthy lifestyle:    No smoking/ No tobacco products/ Avoid exposure to second hand smoke  Surgeon General's Warning:  Quitting smoking now greatly reduces serious risk to your health. Obesity, smoking, and sedentary lifestyle greatly increases your risk for illness    A healthy diet, regular physical exercise & weight monitoring are important for maintaining a healthy lifestyle    You may be retaining fluid if you have a history of heart failure or if you experience any of the following symptoms:  Weight gain of 3 pounds or more overnight or 5 pounds in a week, increased swelling in our hands or feet or shortness of breath while lying flat in bed. Please call your doctor as soon as you notice any of these symptoms; do not wait until your next office visit. The discharge information has been reviewed with the patient and spouse. The patient and spouse verbalized understanding.   Discharge medications reviewed with the patient and spouse and appropriate educational materials and side effects teaching were provided. ___________________________________________________________________________________________________________________________________    Coronary Artery Bypass Graft: What to Expect at Home  Your Recovery     Coronary artery bypass graft (CABG) is surgery to treat coronary artery disease. The surgery helps blood make a detour, or bypass, around one or more narrowed or blocked coronary arteries. Coronary arteries are the blood vessels that bring blood to the heart. Your doctor did the surgery through a cut, called an incision, in your chest.  You will feel tired and sore for the first few weeks after surgery. You may have some brief, sharp pains on either side of your chest. Your chest, shoulders, and upper back may ache. The incision in your chest and the area where the healthy vein was taken may be sore or swollen. These symptoms usually get better after 4 to 6 weeks. You will probably be able to do many of your usual activities after 4 to 6 weeks. But for 2 to 3 months you will not be able to lift heavy objects or do activities that strain your chest or upper arm muscles. At first you may notice that you get tired easily and need to rest often. It may take 1 to 2 months to get your energy back. Some people find that they are more emotional after this surgery. You may cry easily or show emotion in ways that are unusual for you. This is common and may last for up to a year. Some people get depressed after the surgery. Talk with your doctor if you have sadness that continues or you are concerned about how you are feeling. Treatment and other support can help you feel better. Even though the surgery may improve your symptoms, you will still need to make changes in your lifestyle to lower your risk of a heart attack or stroke. It will be important to eat a heart-healthy diet, get regular exercise, not smoke, take your heart medicines, and reduce stress.   You will likely start a cardiac rehabilitation (rehab) program in the hospital. Cori Bran will continue with this rehab program after you go home to help you recover and prevent problems with your heart. Talk to your doctor about whether rehab is right for you. This care sheet gives you a general idea about how long it will take for you to recover. But each person recovers at a different pace. Follow the steps below to get better as quickly as possible. How can you care for yourself at home? Activity    · Rest when you feel tired. Getting enough sleep will help you recover. Try to sleep on your back for 4 to 6 weeks while your breastbone (sternum) heals. This usually takes about 4 to 6 weeks.     · Try to walk each day. Start by walking a little more than you did the day before. Bit by bit, increase the amount you walk. Walking boosts blood flow and helps prevent pneumonia and constipation.     · Avoid strenuous activities, such as bicycle riding, jogging, weight lifting, or heavy aerobic exercise, until your doctor says it is okay.     · For 3 months, avoid activities that strain your chest or upper arm muscles. This includes pushing a  or vacuum, mopping floors, or swinging a golf club or tennis racquet.     · For 2 to 3 months, avoid lifting anything that would make you strain. This may include a child, heavy grocery bags and milk containers, a heavy briefcase or backpack, or cat litter or dog food bags.     · Hold a pillow firmly over your chest incision when you cough or take deep breaths. This will support your chest and reduce your pain.     · Do breathing exercises at home as instructed by your doctor. This will help prevent pneumonia.     · Ask your doctor when you can drive again.     · You will probably need to take 4 to 12 weeks off from work. It depends on the type of work you do and how you feel.     · You may shower as usual. Pat the incision dry.  Do not take a bath for the first 3 weeks, or until your doctor tells you it is okay.     · Do not swim or use a hot tub for at least 1 month, or until your doctor says it is okay.     · Ask your doctor when it is okay for you to have sex. Diet    · Eat a heart-healthy diet. If you have not been eating this way, talk to your doctor. You also may want to talk to a dietitian. A dietitian can help you learn about healthy foods.     · Drink plenty of fluids (unless your doctor tells you not to).     · You may notice that your bowel movements are not regular right after your surgery. This is common. Try to avoid constipation and straining with bowel movements. You may want to take a fiber supplement every day. If you have not had a bowel movement after a couple of days, ask your doctor about taking a mild laxative. Medicines    · Your doctor will tell you if and when you can restart your medicines. He or she will also give you instructions about taking any new medicines.     · If you take aspirin or some other blood thinner, ask your doctor if and when to start taking it again. Make sure that you understand exactly what your doctor wants you to do.     · Your doctor may give you medicines to prevent blood clots, keep your heartbeat steady, and lower your blood pressure and cholesterol. Take your medicines exactly as prescribed. Call your doctor if you think you are having a problem with your medicine.     · Be safe with medicines. Take pain medicines exactly as directed. ? If the doctor gave you a prescription medicine for pain, take it as prescribed. ? If you are not taking a prescription pain medicine, ask your doctor if you can take an over-the-counter medicine. ? Do not take aspirin, ibuprofen (Advil, Motrin), naproxen (Aleve), or other nonsteroidal anti-inflammatory drugs (NSAIDs) unless your doctor says it is okay.     · If you think your pain medicine is making you sick to your stomach:  ? Take your medicine after meals (unless your doctor has told you not to). ?  Ask your doctor for a different pain medicine.     · If your doctor prescribed antibiotics, take them as directed. Do not stop taking them just because you feel better. You need to take the full course of antibiotics. Incision care    · If you have strips of tape on the incisions the doctor made, leave the tape on for a week or until it falls off.     · Wash the area daily with warm, soapy water, and pat it dry. Don't use hydrogen peroxide or alcohol, which can slow healing. You may cover the area with a gauze bandage if it weeps or rubs against clothing. Change the bandage every day.     · Keep the area clean and dry.     · Do not use any creams, lotions, powders, ointments, or oils unless your doctor tells you it is okay.     · If you have an incision in your leg:  ? Wear support stockings on your legs during the day for the first 2 weeks. You can take the stockings off at night while you sleep. ? Raise your legs above the level of your heart whenever you lie down for the first 4 to 6 weeks. Other instructions    · Keep track of your weight. Weigh yourself every day at the same time of day, on the same scale, in the same amount of clothing. A sudden increase in weight can be a sign of a problem with your heart. Tell your doctor if you suddenly gain weight, such as 3 pounds or more in 2 to 3 days.     · Do not smoke. Smoking can make it harder for you to recover. And it will raise the chances of your arteries narrowing again. If you need help quitting, talk to your doctor about stop-smoking programs and medicines. These can increase your chances of quitting for good. Follow-up care is a key part of your treatment and safety. Be sure to make and go to all appointments, and call your doctor if you are having problems. It's also a good idea to know your test results and keep a list of the medicines you take. When should you call for help? Call 911 anytime you think you may need emergency care.  For example, call if:    · You passed out (lost consciousness).     · You have severe trouble breathing.     · You have sudden chest pain and shortness of breath, or you cough up blood.     · You have severe pain in your chest.     · You have symptoms of a heart attack. These may include:  ? Chest pain or pressure, or a strange feeling in the chest.  ? Sweating. ? Shortness of breath. ? Nausea or vomiting. ? Pain, pressure, or a strange feeling in the back, neck, jaw, or upper belly or in one or both shoulders or arms. ? Lightheadedness or sudden weakness. ? A fast or irregular heartbeat. After you call 911, the  may tell you to chew 1 adult-strength or 2 to 4 low-dose aspirin. Wait for an ambulance. Do not try to drive yourself.     · You have angina symptoms (such as chest pain or pressure) that do not go away with rest or are not getting better within 5 minutes after you take a dose of nitroglycerin. Call your doctor now or seek immediate medical care if:    · You have pain that does not get better after you take pain medicine.     · You have a fever over 100°F.     · You have loose stitches, or your incision comes open.     · Bright red blood has soaked through the bandage over your incision.     · You have signs of infection, such as:  ? Increased pain, swelling, warmth, or redness. ? Red streaks leading from the incision. ? Pus draining from the incision. ? Swollen lymph nodes in your neck, armpits, or groin. ? A fever.     · You have signs of a blood clot in a leg. If you had a vein removed from your leg, you may have tenderness and swelling while your leg heals. But signs of a blood clot may be in a different part of your leg and may include:  ? Pain in your calf, back of the knee, thigh, or groin. ? Redness and swelling in your leg or groin.     · Your heartbeat feels very fast or slow, skips beats, or flutters.     · You are dizzy or lightheaded, or you feel like you may faint.     · You have new or increased shortness of breath. Watch closely for changes in your health, and be sure to contact your doctor if:    · You gain weight suddenly, such as 3 pounds or more in 2 to 3 days.     · You have increased swelling in your legs, ankles, or feet.     · You have any concerns about your incision.     · You feel very sad or have other signs of depression, such as trouble sleeping or eating.     · You have questions about diet, exercise, quitting smoking, or stress reduction after surgery. Where can you learn more? Go to http://www.gray.com/  Enter F759 in the search box to learn more about \"Coronary Artery Bypass Graft: What to Expect at Home. \"  Current as of: December 16, 2019               Content Version: 12.6  © 8880-1355 [employer]. Care instructions adapted under license by [employer] (which disclaims liability or warranty for this information). If you have questions about a medical condition or this instruction, always ask your healthcare professional. [place] [address] any warranty or liability for your use of this information. Coronary Artery Disease: Care Instructions  Your Care Instructions     The heart is a muscle, and like any muscle, it needs blood to work well. Coronary artery disease occurs when the arteries that bring oxygen-rich blood to your heart have a buildup of plaque--deposits of fats and other substances. Plaque can reduce blood flow to the heart muscle. This can cause angina symptoms such as chest pain or pressure. A heart attack can happen if blood flow is completely blocked. You can do a lot to improve your health and prevent a heart attack. Eating healthy food, not smoking, getting regular exercise, and taking your medicine are the main things you can do every day to stay healthy. Follow-up care is a key part of your treatment and safety. Be sure to make and go to all appointments, and call your doctor if you are having problems.  It's also a good idea to know your test results and keep a list of the medicines you take. How can you care for yourself at home? Medicines    · Be safe with medicines. Take your medicines exactly as prescribed. Call your doctor if you think you are having a problem with your medicine. You will get more details on the specific medicines your doctor prescribes.     · You will take medicines that lower your risk of a heart attack and lower your risk of dying early from heart disease. These medicines include:  ? Angiotensin-converting enzyme (ACE) inhibitors or angiotensin II receptor blockers (ARBs). They lower blood pressure. ? Aspirin and other blood thinners. They prevent blood clots that could cause a heart attack. ? Beta-blockers. They lower the heart's workload. ? Statins and other cholesterol medicines. They lower cholesterol.     · If your doctor has given you nitroglycerin for angina symptoms (such as chest pain or pressure) keep it with you at all times. If you have symptoms, sit down and rest, and take the first dose of nitroglycerin as directed. If your symptoms get worse or are not getting better within 5 minutes, call 911 right away. Stay on the phone. The emergency  will give you further instructions.     · Do not take any over-the-counter medicines, vitamins, or herbal products without talking to your doctor first.   Lifestyle  Ask your doctor if a cardiac rehab program is right for you. Cardiac rehab can help you make lifestyle changes. In cardiac rehab, a team of health professionals provides education and support to help you make new, healthy habits.    · Do not smoke. Avoid secondhand smoke too. Smoking can increase your risk of a heart attack or stroke. If you need help quitting, talk to your doctor about stop-smoking programs and medicines. These can increase your chances of quitting for good.     · Eat heart-healthy foods.  These include vegetables, fruits, nuts, beans, lean meat, fish, and whole grains. Limit saturated fat, sodium, and alcohol. Limit drinks and foods with added sugar.     · If your doctor recommends it, get more exercise. Ask your doctor what level of exercise is safe for you. Walking is a good choice. Bit by bit, increase the amount you walk every day. Try for at least 30 minutes on most days of the week. You also may want to swim, bike, or do other activities.     · Stay at a healthy weight. Lose weight if you need to.     · Manage other health problems. These include diabetes, high blood pressure, and high cholesterol. If you think you may have a problem with alcohol or drug use, talk to your doctor.     · If you have angina symptoms, pay attention to your symptoms. This can help you see what causes them and what is typical for you.     · Avoid colds and flu. Get a pneumococcal vaccine shot. If you have had one before, ask your doctor whether you need another dose. Get a flu vaccine every year. If you must be around people with colds or flu, wash your hands often.     · If you think you have symptoms of depression, talk to your doctor. Symptoms include feeling sad or hopeless most of the time, or losing interest in activities that used to make you happy. When should you call for help? Call 911 anytime you think you may need emergency care. For example, call if:    · You have symptoms of a heart attack. These may include:  ? Chest pain or pressure, or a strange feeling in the chest.  ? Sweating. ? Shortness of breath. ? Nausea or vomiting. ? Pain, pressure, or a strange feeling in the back, neck, jaw, or upper belly or in one or both shoulders or arms. ? Lightheadedness or sudden weakness. ? A fast or irregular heartbeat. After you call 911, the  may tell you to chew 1 adult-strength or 2 to 4 low-dose aspirin. Wait for an ambulance.  Do not try to drive yourself.     · You have angina symptoms (such as chest pain or pressure) that do not go away with rest or are not getting better within 5 minutes after you take a dose of nitroglycerin.     · You passed out (lost consciousness). Call your doctor now or seek immediate medical care if:    · You are having angina symptoms, such as chest pain or pressure, more often than usual, or they are different or worse than usual.     · You have new or increased shortness of breath.     · You are dizzy or lightheaded, or you feel like you may faint. Watch closely for changes in your health, and be sure to contact your doctor if you have any problems. Where can you learn more? Go to http://www.gray.com/  Enter S814 in the search box to learn more about \"Coronary Artery Disease: Care Instructions. \"  Current as of: December 16, 2019               Content Version: 12.6  © 1528-4430 [employer]. Care instructions adapted under license by [employer] (which disclaims liability or warranty for this information). If you have questions about a medical condition or this instruction, always ask your healthcare professional. [address] any warranty or liability for your use of this information. Heart-Healthy Diet: Care Instructions  Your Care Instructions     A heart-healthy diet has lots of vegetables, fruits, nuts, beans, and whole grains, and is low in salt. It limits foods that are high in saturated fat, such as meats, cheeses, and fried foods. It may be hard to change your diet, but even small changes can lower your risk of heart attack and heart disease. Follow-up care is a key part of your treatment and safety. Be sure to make and go to all appointments, and call your doctor if you are having problems. It's also a good idea to know your test results and keep a list of the medicines you take. How can you care for yourself at home? Watch your portions  · Learn what a serving is. A \"serving\" and a \"portion\" are not always the same thing.  Make sure that you are not eating larger portions than are recommended. For example, a serving of pasta is ½ cup. A serving size of meat is 2 to 3 ounces. A 3-ounce serving is about the size of a deck of cards. Measure serving sizes until you are good at [place]" them. Keep in mind that restaurants often serve portions that are 2 or 3 times the size of one serving. · To keep your energy level up and keep you from feeling hungry, eat often but in smaller portions. · Eat only the number of calories you need to stay at a healthy weight. If you need to lose weight, eat fewer calories than your body burns (through exercise and other physical activity). Eat more fruits and vegetables  · Eat a variety of fruit and vegetables every day. Dark green, deep orange, red, or yellow fruits and vegetables are especially good for you. Examples include spinach, carrots, peaches, and berries. · Keep carrots, celery, and other veggies handy for snacks. Buy fruit that is in season and store it where you can see it so that you will be tempted to eat it. · Cook dishes that have a lot of veggies in them, such as stir-fries and soups. Limit saturated and trans fat  · Read food labels, and try to avoid saturated and trans fats. They increase your risk of heart disease. · Use olive or canola oil when you cook. · Bake, broil, grill, or steam foods instead of frying them. · Choose lean meats instead of high-fat meats such as hot dogs and sausages. Cut off all visible fat when you prepare meat. · Eat fish, skinless poultry, and meat alternatives such as soy products instead of high-fat meats. Soy products, such as tofu, may be especially good for your heart. · Choose low-fat or fat-free milk and dairy products. Eat foods high in fiber  · Eat a variety of grain products every day. Include whole-grain foods that have lots of fiber and nutrients. Examples of whole-grain foods include oats, whole wheat bread, and brown rice.   · Buy whole-grain breads and cereals, instead of white bread or pastries. Limit salt and sodium  · Limit how much salt and sodium you eat to help lower your blood pressure. · Taste food before you salt it. Add only a little salt when you think you need it. With time, your taste buds will adjust to less salt. · Eat fewer snack items, fast foods, and other high-salt, processed foods. Check food labels for the amount of sodium in packaged foods. · Choose low-sodium versions of canned goods (such as soups, vegetables, and beans). Limit sugar  · Limit drinks and foods with added sugar. These include candy, desserts, and soda pop. Limit alcohol  · Limit alcohol to no more than 2 drinks a day for men and 1 drink a day for women. Too much alcohol can cause health problems. When should you call for help? Watch closely for changes in your health, and be sure to contact your doctor if:    · You would like help planning heart-healthy meals. Where can you learn more? Go to http://www.plata.com/  Enter V137 in the search box to learn more about \"Heart-Healthy Diet: Care Instructions. \"  Current as of: August 22, 2019               Content Version: 12.6  © 6566-5788 [employer]. Care instructions adapted under license by [employer] (which disclaims liability or warranty for this information). If you have questions about a medical condition or this instruction, always ask your healthcare professional. [place] [address] any warranty or liability for your use of this information.

## 2020-11-24 NOTE — PROGRESS NOTES
Pt with discharge orders this day. Pt to return home with spouse and family. Pt remains agreeable with home health referral - updated clinical's faxed to [facility] this day. Pt in need of new nebulizer at discharge - referral made to [facility] DME. DME delivered to pt room. No additional CM needs at discharge. Milestones met. Care Management Interventions  PCP Verified by CM: Yes(Dajuan Babb)  Mode of Transport at Discharge:  Other (see comment)(spouse)  Transition of Care Consult (CM Consult): Discharge Planning, Home Health  Discharge Durable Medical Equipment: Yes(Nebulizer from [facility])  Physical Therapy Consult: Yes  Occupational Therapy Consult: No  Speech Therapy Consult: No  Current Support Network: Own Home, Lives with Spouse  Confirm Follow Up Transport: Family  The Plan for Transition of Care is Related to the Following Treatment Goals : home with home health   The Patient and/or Patient Representative was Provided with a Choice of Provider and Agrees with the Discharge Plan?: Yes  Name of the Patient Representative Who was Provided with a Choice of Provider and Agrees with the Discharge Plan: Mrs. Yanez Light of Choice List was Provided with Basic Dialogue that Supports the Patient's Individualized Plan of Care/Goals, Treatment Preferences and Shares the Quality Data Associated with the Providers?: Yes  [place] Resource Information Provided?: No  Discharge Location  Discharge Placement: Home with home health

## 2020-11-24 NOTE — ROUTINE PROCESS
Bedside report received from [place]. Opportunity for questions, concerns. Patient involved.

## 2020-11-24 NOTE — PROGRESS NOTES
Patient's blood glucose was 68. Stated she felt \"tired and hungry\". Given christiano crackers with peanut butter and  provided her with a sandwich. After 20 minutes, blood glucose up to 84.

## 2020-11-24 NOTE — PROGRESS NOTES
Problem: Mobility Impaired (Adult and Pediatric)  Goal: *Acute Goals and Plan of Care (Insert Text)  Outcome: Progressing Towards Goal  Note: LTG:  (1.)Ms. Carlita Mejia will move from supine to sit and sit to supine , scoot up and down, and roll side to side in bed with INDEPENDENT within 7 treatment day(s). (2.)Ms. Carlita Mejia will transfer from bed to chair and chair to bed with INDEPENDENT using the least restrictive device within 7 treatment day(s). Goal met 11/22/20  (3.)Ms. Carlita Mejia will ambulate with INDEPENDENT for 500+ feet with the least restrictive device within 7 treatment day(s). (4.)Ms. Carlita Mejia will perform 5 stairs with HR and SBA within 7 treatment days for ascending and descending stairs for home.   ________________________________________________________________________________________________       PHYSICAL THERAPY: Daily Note and AM 11/24/2020  INPATIENT: PT Visit Days : 5  Payor: Modesta Landau / Plan: [place] [employer] [facility] MEDICARE SNP / Product Type: Managed Care Medicare /       NAME/AGE/GENDER: Aida Messer is a 53 y.o. female   PRIMARY DIAGNOSIS: Atherosclerosis of native coronary artery of native heart with unstable angina pectoris ([facility] [address] [I25.110]  CAD (coronary artery disease) [I25.10]  Atherosclerosis of native coronary artery of native heart with unstable angina pectoris (HCC) [I25.110] S/P CABG x 4 S/P CABG x 4  Procedure(s) (LRB):  CORONARY ARTERY BYPASS GRAFT (CABG x4), LIMA (N/A)  VEIN HARVEST ENDOSCOPIC, GREATER SAPHENOUS (Left)  ESOPHAGEAL TRANS ECHOCARDIOGRAM (N/A)  6 Days Post-Op  ICD-10: Treatment Diagnosis:    · Generalized Muscle Weakness (M62.81)  · Difficulty in walking, Not elsewhere classified (R26.2)   Precaution/Allergies:  Pcn [penicillins] and Tape [adhesive]      ASSESSMENT:     Ms. Carlita Mejia presents sitting in the recliner  and agreeable to therapy.  present but is sleeping.   Pt reports lives with spouse and daughter/grandchildren in mobile home, 5 steps to enter with HR. Pt reports independent at baseline, does not work, RA at home, CPAP. Sit to stand independently. Gait training without an assistive device x 200 feet with good but slow amanda. Patient is returned to the recliner and after a short rest break the patient performs therapeutic exercises. Tolerated well. Good session and the patient is making good progress toward goals. Pt overall doing well with activity prior to this episode. PT to cont to follow for acute care needs to address deficits noted above. Home with HHPT. Discharging home today. This section established at most recent assessment   PROBLEM LIST (Impairments causing functional limitations):  1. Decreased Strength  2. Decreased ADL/Functional Activities  3. Decreased Transfer Abilities  4. Decreased Ambulation Ability/Technique  5. Decreased Balance  6. Increased Pain  7. Decreased Activity Tolerance  8. Decreased [place] with Home Exercise Program   INTERVENTIONS PLANNED: (Benefits and precautions of physical therapy have been discussed with the patient.)  1. Balance Exercise  2. Bed Mobility  3. Family Education  4. Gait Training  5. Home Exercise Program (HEP)  6. Therapeutic Activites  7. Therapeutic Exercise/Strengthening  8. Transfer Training     TREATMENT PLAN: Frequency/Duration: twice daily for duration of hospital stay  Rehabilitation Potential For Stated Goals: Good     REHAB RECOMMENDATIONS (at time of discharge pending progress):    Placement: It is my opinion, based on this patient's performance to date, that Ms. Sylvester Sutherland may benefit from [address] after discharge due to the functional deficits listed above that are likely to improve with skilled rehabilitation because he/she has multiple medical issues that affect his/her functional mobility in the community.   Equipment:    None at this time              HISTORY:   History of Present Injury/Illness (Reason for Referral):  S/p CABG x 4, generalized weakness  Past Medical History/Comorbidities:   Ms. Viola Severin  has a past medical history of Arrhythmia, Asthma, Bipolar disorder, Coronary artery disease, Diabetic neuropathy, Fatty liver, GERD (gastroesophageal reflux disease), Hypercholesterolemia, Hypertension, Hypertriglyceridemia, Meniere's disease, Migraine, Morbid obesity, Obstructive sleep apnea, Palpitations, Peptic ulcer disease (), Psychiatric disorder, Stroke [facility]), and Syncope and collapse. Ms. Viola Severin  has a past surgical history that includes hx cholecystectomy (); hx tonsillectomy; hx gyn (); hx  section; hx breast lumpectomy; hx urological; hx orthopaedic; hx jenifer and bso (); hx heart catheterization (); and neurological procedure unlisted. Social History/Living Environment:   Home Environment: Trailer/mobile home  # Steps to Enter: 5  Rails to Enter: Yes  Hand Rails : Bilateral  One/Two Story Residence: One story  Living Alone: No  Support Systems: Family member(s), Spouse/Significant Other/Partner  Patient Expects to be Discharged to[de-identified] Trailer/mobile home  Current DME Used/Available at Home: None  Tub or Shower Type: Tub/Shower combination  Prior Level of Function/Work/Activity:  Lives spouse, independent at baseline, does not work,drives  Personal Factors:          Sex:  female        Age:  53 y.o.         Overall Behavior:  agreeable   Number of Personal Factors/Comorbidities that affect the Plan of Care:  CAD, HTN, DM 3+: HIGH COMPLEXITY   EXAMINATION:   Most Recent Physical Functioning:   Gross Assessment:                  Posture:     Balance:  Sitting: Intact  Standing: Intact  Standing - Static: Good  Standing - Dynamic : Good Bed Mobility:     Wheelchair Mobility:     Transfers:  Sit to Stand: Supervision  Stand to Sit: Supervision  Gait:     Distance (ft): 200 Feet (ft)  Assistive Device: Other (comment)(no assistive device )  Ambulation - Level of Assistance: Supervision      Body Structures Involved:  1. Heart  2. Lungs  3. Muscles Body Functions Affected:  1. Cardio  2. Respiratory  3. Movement Related Activities and Participation Affected:  1. General Tasks and Demands  2. Mobility  3. Self Care   Number of elements that affect the Plan of Care: 4+: HIGH COMPLEXITY   CLINICAL PRESENTATION:   Presentation: Evolving clinical presentation with changing clinical characteristics: MODERATE COMPLEXITY   CLINICAL DECISION MAKIN [facility] Mobility Inpatient Short Form  How much difficulty does the patient currently have. .. Unable A Lot A Little None   1. Turning over in bed (including adjusting bedclothes, sheets and blankets)? [] 1   [] 2   [x] 3   [] 4   2. Sitting down on and standing up from a chair with arms ( e.g., wheelchair, bedside commode, etc.)   [] 1   [] 2   [x] 3   [] 4   3. Moving from lying on back to sitting on the side of the bed? [] 1   [] 2   [x] 3   [] 4   How much help from another person does the patient currently need. .. Total A Lot A Little None   4. Moving to and from a bed to a chair (including a wheelchair)? [] 1   [] 2   [x] 3   [] 4   5. Need to walk in hospital room? [] 1   [] 2   [x] 3   [] 4   6. Climbing 3-5 steps with a railing? [] 1   [] 2   [x] 3   [] 4   © , Trustees of [address] [place] [address], under license to [employer]. All rights reserved      Score:  Initial: 18 Most Recent: X (Date: -- )    Interpretation of Tool:  Represents activities that are increasingly more difficult (i.e. Bed mobility, Transfers, Gait). Medical Necessity:     · Patient is expected to demonstrate progress in   · strength, range of motion, balance, and coordination  ·  to   · decrease assistance required with overall functional ambulation, transfers  · .  · Patient demonstrates   · good  ·  rehab potential due to higher previous functional level.   Reason for Services/Other Comments:  · Patient   · continues to require present interventions due to patient's inability to perform bed mobility, transfers, ambulation safely and effectively at prior level of function of independent. · .   Use of outcome tool(s) and clinical judgement create a POC that gives a: Clear prediction of patient's progress: LOW COMPLEXITY            TREATMENT:   (In addition to Assessment/Re-Assessment sessions the following treatments were rendered)   Pre-treatment Symptoms/Complaints:  \"Good morning\"  Pain: Initial:   Pain Intensity 1: 0  Post Session:  0/10     Therapeutic Activity: (    13 min): Therapeutic activities including Chair transfers, Ambulation on level ground, and weight shifting, sternal precautions, posture, pursed lip breathing to improve mobility, strength, balance, and coordination. Required close supervision   to promote static and dynamic balance in standing and promote coordination of bilateral, lower extremity(s). Therapeutic Exercise: (  10 minutes):  Exercises per grid below to improve mobility, strength, balance and coordination. Required minimum  verbal cues    Progressed repetitions and complexity of movement as indicated. Date:  11/21/20 Date:  11/23/20 Date:  11/24/20   ACTIVITY/EXERCISE AM PM AM PM AM PM   LAQ 15  15  15    Shoulder shrugs 15  15  15    Gluteal sets 15  15  15    marching 15  15  15    Ankle pumps 15  15  15    abduction 15  15  15             B = bilateral; AA = active assistive; A = active; P = passive    Braces/Orthotics/Lines/Etc:   · RA  Treatment/Session Assessment:    · Response to Treatment:  Making good progress  · Interdisciplinary Collaboration:   o Physical Therapy Assistant  o Registered Nurse  · After treatment position/precautions:   o Up in chair  o Bed/Chair-wheels locked  o Call light within reach  o RN notified  o Family at bedside   · Compliance with Program/Exercises: Compliant all of the time  · Recommendations/Intent for next treatment session:   \"Next visit will focus on advancements to more challenging activities\".   Total Treatment Duration:  PT Patient Time In/Time Out  Time In: 0840  Time Out: 5177    Yasmeen Jama, WALTER

## 2020-11-24 NOTE — PROGRESS NOTES
[facility] CARDIOLOGY PROGRESS NOTE           11/24/2020 7:32 AM    Admit Date: 11/18/2020      Subjective:   Patient doing well. No complaints. ROS:  Cardiovascular:  As noted above    Objective:      Vitals:    11/23/20 2011 11/23/20 2246 11/24/20 0237 11/24/20 0701   BP:  (!) 128/57 (!) 146/68 (!) 142/81   Pulse:  82 75 79   Resp:  18 18 18   Temp:  97.5 °F (36.4 °C) 97.9 °F (36.6 °C) 98 °F (36.7 °C)   SpO2: 92% 90% 90% 94%   Weight:       Height:           Physical Exam:  General-No Acute Distress  Neck- supple, no JVD  CV- regular rate and rhythm no MRG  Lung- clear bilaterally  Abd- soft, nontender, nondistended  Ext- no edema bilaterally. Skin- warm and dry    Data Review:   Recent Labs     11/24/20  0340 11/23/20  0334   K 4.0 4.0   MG 1.9 2.1   WBC 13.7* 12.1*   HGB 11.1* 10.5*   HCT 34.1* 31.8*    343       Assessment/Plan:     Principal Problem:    S/P CABG x 4 (11/18/2020)    Recovering. EF normal.  Medical therapy appropriate. Change metoprolol to 50mg BID. Reduce amiodarone to 200mg BID. No additional PAF. No OAC at this time. On ASA and clopidogrel. Active Problems:    Hypertension ()    This is stable       CAD (coronary artery disease) ()    S/P CABG      DM (diabetes mellitus) ([facility] [address] (11/8/2011)    SSI and home medications       JOLEEN (obstructive sleep apnea) ()    CPAP       RLS (restless legs syndrome) (11/23/2016)    Home medications       Atherosclerosis of native coronary artery of native heart with unstable angina pectoris ([facility] [address] (11/18/2020)    S/P CABG    Likely home today. Will sign off and call with questions.           Delta Keita MD  11/24/2020 7:32 AM

## 2020-11-24 NOTE — PROGRESS NOTES
Problem: Falls - Risk of  Goal: *Absence of Falls  Description: Document Ruddy Arred[place]occasin Fall Risk and appropriate interventions in the flowsheet. Outcome: Progressing Towards Goal  Note: Fall Risk Interventions:  Mobility Interventions: Patient to call before getting OOB, Strengthening exercises (ROM-active/passive)         Medication Interventions: Patient to call before getting OOB, Teach patient to arise slowly    Elimination Interventions: Call light in reach, Patient to call for help with toileting needs              Problem: Pressure Injury - Risk of  Goal: *Prevention of pressure injury  Description: Document Jackson Scale and appropriate interventions in the flowsheet.   Outcome: Progressing Towards Goal  Note: Pressure Injury Interventions:  Sensory Interventions: Maintain/enhance activity level, Pressure redistribution bed/mattress (bed type)    Moisture Interventions: Absorbent underpads, Minimize layers    Activity Interventions: Increase time out of bed, Pressure redistribution bed/mattress(bed type), PT/OT evaluation    Mobility Interventions: HOB 30 degrees or less, Pressure redistribution bed/mattress (bed type), PT/OT evaluation    Nutrition Interventions: Document food/fluid/supplement intake, Offer support with meals,snacks and hydration    Friction and Shear Interventions: Minimize layers

## 2020-11-25 NOTE — ADT AUTH CERT NOTES
LOC:Acute Adult-General Surgical (11/23/2020) by Filippo Salazar         Review Status  Review Entered    In Primary  11/24/2020 11:13        Criteria Review    REVIEW SUMMARY     Patient: Marysol Man  Review Number: [id number]  Review Status: In Primary     Condition Specific: Yes     Condition Level Of Care Code: INTERMED  Condition Level Of Care Description: Intermediate        OUTCOMES  Outcome Type: Primary           REVIEW DETAILS     Service Date: 11/23/2020  Admit Date: 11/18/2020  Product: Maye Dobbins Adult  Subset: General Surgical      (Symptom or finding within 24h)         (Excludes PO medications unless noted)          [X] Select Day, One:              [X] Post-op Day 3-21, One:                  [X] ACUTE, One:                      [X] Partial responder, not clinically stable for discharge and requires continued stay, One:                      ~--Admin, IQ Admin Admin on 11- 11:13 AM--~                      Daily Progress Note: 11/23/2020                                            She is sitting up in bed and did used CPAP last night. On NC 1 LPM. Using IS. Still with no BM                                            EXAM:  Lungs: clear but overall decreased. Wearing 4 liter high flow cannula                      Cardiovascular:  RRR without M,G,R. Sinus per telemetry                      Abd/GI: soft and non-tender; with positive bowel sounds. Ext: warm without cyanosis. There is a trace amount of lower leg edema. Musculoskeletal: can ambulate. No deformity                                            T 98, /67, P 83, R 16                                            WBC 12.1, RBC 3.60, HG 10.5, HCT 31.8,                       POC GLUC                                                                   Plan:  (Medical Decision Making)                                             Control pain and continue to work with IS. laxative                      Daily lasix. Mobilize                      Wean O2                                            CARDIOLOGY NOTE:                      Assessment/Plan:                                             Principal Problem:                        S/P CABG x 4 (11/18/2020)Stable. Continue current medical therapy.                                                                    Active Problems:                        Hypertension ()Stable.  Continue current medical therapy.                                                 Overview: controlled w meds                                               CAD (coronary artery disease) ()                          Overview: cath 2009                                               DM (diabetes mellitus) ([facility] (11/8/2011)                                               JOLEEN (obstructive sleep apnea) ()                                               RLS (restless legs syndrome) (11/23/2016)                                               Atherosclerosis of native coronary artery of native heart with unstable [facility] pector[facility] (11/18/2020)                                               Encounter for weaning from ventilator ([facility] (11/18/2020)                                               Bipolar disorder ([facility] (11/18/2020)                                                                    PAf- short duration -in nsr- increase amio to 400mg bid and dc on 200mg bid                                                                  LACTULOS E30ML PO X2,  AMIODARONE 400 MG PO Q 12 HRS,  KCL 20MEQ PO BID PRN X2,  OTHER SCHED MEDS ARE SAME                                                                                            [X] Routine post-op stay, Both:                              [X] Surgical stay, One:                                  [X] Long stay within the last 6d                              [X] Expected post-op course, One: [X] General surgery, All:                                      [X] Deep vein thrombosis (DVT) prophylaxis or patient ambulatory                                      ~--Admin, IQ Admin Admin on 11- 11:06 AM--~                                      PLAVIX 75MG PO QD                                                                                                                                                        [X] Hydration or nutrition, One:                                          [X] Advancing diet as tolerated or nutritional route established                                          ~--Admin, IQ Admin Admin on 11- 11:07 AM--~                                          DIABETIC CONSIST CARB  DIET                                                                                                                                                                    [X] Mobility advancing as tolerated                                      ~--Admin, IQ Admin Admin on 11- 11:08 AM--~                                      PHYSICAL THERAPY: Daily Note and PM 11/23/2020                                                                             Sit to stand with supervision. Gait training without an assistive device x 200 feet with good but slow amanda. Patient is returned to the recliner and after a short rest break the patient performs therapeutic exercises. Tolerated well. Good session and the patient is making good progress toward goals. Pt overall doing well with activity prior to this episode. PT to cont to follow for acute care needs to address deficits noted above. Home with HHPT. Will continue PT efforts. PM note:  Patient is sitting in the recliner and agreeable to therapy. Patient able to maintain her am gait distance and continue with his exercises.   Patient is left sitting in the recliner with needs within reach.  at bedside. Making progress. Will continue Pt efforts. [X] Pain assessment, One:                                          [X] Pain controlled                                          ~--Admin, IQ Admin Admin on 11- 11:07 AM--~                                          PERCOCET 1 TAB PO Q 4 HRS PRN X 6,                                                                                                                                   Version: InterQual® 2019  InterQual® and InterQual®  © 2019 [employer] and/or one of its subsidiaries. All Rights Reserved. CPT only © 2018 American [employer]. All Rights Reserved.       LOC:Acute Adult-General Surgical (11/22/2020) by Natividad Sotelo         Review Status  Review Entered    In Primary  11/23/2020 11:36        Criteria Review    REVIEW SUMMARY     Patient: Darius Melendez  Review Number: [id number]  Review Status: In Primary     Condition Specific: Yes     Condition Level Of Care Code: INTERMED  Condition Level Of Care Description: Intermediate        OUTCOMES  Outcome Type: Primary           REVIEW DETAILS     Service Date: 11/22/2020  Admit Date: 11/18/2020  Product: Augustina Eva Adult  Subset: General Surgical      (Symptom or finding within 24h)         (Excludes PO medications unless noted)          [X] Select Day, One:              [X] Post-op Day 3-21, One:                  [X] ACUTE, One:                      [X] Partial responder, not clinically stable for discharge and requires continued stay, One:                      ~--Admin, IQ Admin Admin on 11- 11:36 AM--~                      Daily Progress Note: 11/22/2020                      She is sitting up in bed and did used CPAP last night.  On NC 2 LPM. Using IS EXAM:  Lungs: clear but overall decreased. Wearing 4 liter high flow cannula                      Cardiovascular:  RRR without M,G,R. Sinus per telemetry                                            T 97.9, /64, P , R 18, 02 SAT 92% 1L NC                      WBC 12.4, RBC 3.48, HGB 10.0,  HCT 31.1, MAG 2.5                      POC GLUC 190-272                                            CXR: 1. Shallow inspiration with continued mild pulmonary vascular congestion and small basilar effusions. Plan:  (Medical Decision Making)                                             Control pain and continue to work with IS. Using home CPAP with sleep                       Daily lasix. Check CXR                      Mobilize                      Wean O2                                            HOSPITALIST CONSULT:                                            We are asked to see this patient regarding Diabetes/hyperglycemia.                        Assessment and Plan:                      S/P CABG x 4 without MI                                   Per primary                                              Morbid obesity:  Dietary restrictions                                              Hypertension:  Per primary                                             CAD:  As above                                              IDDM: last A1C: 7.1                                  Reinstate home routine: Humulin R U5 100: 170 units          ac breakfast and lunch, 85 units ac supper. Cover with humalog 4 units prn glucose >200.                                     Reinstate trulicity 2.7/7.0: 0.5 q 7 days, missed 11/20,                                  Give today:  Patient has home meds with her, not                                   On formulary                                  Has appt with Dr Rubens Liu 12/14. Ino Barton                                    Allow patient to control glucose checks and insulin   administration                                   If patient staying longer than tomorrow will order                                   DM Management consult for assist.                                              JOLEEN:  CPAP                                             RLS:  Home meds                                              Atherosclerosis of native coronary artery of native heart with unstable angina pectoris:  Per primary team                                              Encounter for weaning from ventilator:  Per primary team                                             Bipolar disorder:  Home meds                                                                                                                DULCOLAX 5MG PO  QD PRN X1,  LOPRESSOR 5MG IV X1 &  25MG PO TID,  SSI SC AC & HS (4U X1), OTHER SCHED MEDS ARE SAME                                                                                            [X] Routine post-op stay, Both:                              [X] Surgical stay, One:                                  [X] Moderate stay within the last 5d                              [X] Expected post-op course, One:                                  [X] General surgery, All:                                      [X] Deep vein thrombosis (DVT) prophylaxis or patient ambulatory                                      ~--Admin, IQ Admin Admin on 11- 11:28 AM--~                                      PLAVIX 75MG PO QD                                                                                                                                                        [X] Hydration or nutrition, One:                                          [X] Advancing diet as tolerated or nutritional route established                                          ~--Admin, IQ Admin Admin on 11- 11:28 AM--~ DIABETIC CONSIST CARB  DIET                                                                                                                                                                    [X] Mobility advancing as tolerated                                      ~--Admin, IQ Admin Admin on 11- 11:30 AM--~                                      PHYSICAL THERAPY: Daily Note and AM 11/22/2020                                                                            Patient bed  mobility is with stand by assist.   Gait training without an assistive device x 200 feet with good but slow amanda. As the patient is returning to the room she stops and states that she feel her heart racing, once in the room it is noted that the patients heart rate is 223, patient seated and RN arrives quickly. Treatment terminated. Pt overall doing well with activity prior to this episode. PT to cont to follow for acute care needs to address deficits noted above. Home with HHPT. Will continue PT efforts. [X] Pain assessment, One:                                          [X] Pain controlled                                          ~--Admin, IQ Admin Admin on 11- 11:30 AM--~                                          PERCOCET 1 TAB PO Q 4 HRS PRN X 3                                                                                                                                   Version: InterQual® 2019  InterQual® and InterQual®  © 2019 [employer] and/or one of its subsidiaries. All Rights Reserved. CPT only © 2018 American Medical Association.   All Rights Reserved.

## 2020-12-07 LAB
CA-I BLD-MCNC: 1.13 MMOL/L (ref 1.12–1.32)
CA-I BLD-MCNC: 1.14 MMOL/L (ref 1.12–1.32)
CA-I BLD-MCNC: 1.18 MMOL/L (ref 1.12–1.32)
CA-I BLD-MCNC: 1.22 MMOL/L (ref 1.12–1.32)
CA-I BLD-MCNC: 1.24 MMOL/L (ref 1.12–1.32)
CA-I BLD-MCNC: 1.28 MMOL/L (ref 1.12–1.32)
CA-I BLD-MCNC: 1.32 MMOL/L (ref 1.12–1.32)
GLUCOSE BLD STRIP.AUTO-MCNC: 102 MG/DL (ref 65–100)
GLUCOSE BLD STRIP.AUTO-MCNC: 119 MG/DL (ref 65–100)
GLUCOSE BLD STRIP.AUTO-MCNC: 136 MG/DL (ref 65–100)
GLUCOSE BLD STRIP.AUTO-MCNC: 148 MG/DL (ref 65–100)
GLUCOSE BLD STRIP.AUTO-MCNC: 150 MG/DL (ref 65–100)
GLUCOSE BLD STRIP.AUTO-MCNC: 80 MG/DL (ref 65–100)
GLUCOSE BLD STRIP.AUTO-MCNC: 83 MG/DL (ref 65–100)
HCO3 BLD-SCNC: 20.7 MMOL/L (ref 22–26)
HCO3 BLD-SCNC: 22.1 MMOL/L (ref 22–26)
HCO3 BLD-SCNC: 22.3 MMOL/L (ref 22–26)
HCO3 BLD-SCNC: 22.9 MMOL/L (ref 22–26)
HCO3 BLD-SCNC: 23.5 MMOL/L (ref 22–26)
HCO3 BLD-SCNC: 23.7 MMOL/L (ref 22–26)
HCO3 BLD-SCNC: 24.1 MMOL/L (ref 22–26)
PCO2 BLD: 39.4 MMHG (ref 35–45)
PCO2 BLD: 41.7 MMHG (ref 35–45)
PCO2 BLD: 43.3 MMHG (ref 35–45)
PCO2 BLD: 43.8 MMHG (ref 35–45)
PCO2 BLD: 46.2 MMHG (ref 35–45)
PCO2 BLD: 48.8 MMHG (ref 35–45)
PCO2 BLD: 48.9 MMHG (ref 35–45)
PH BLD: 7.29 [PH] (ref 7.35–7.45)
PH BLD: 7.29 [PH] (ref 7.35–7.45)
PH BLD: 7.3 [PH] (ref 7.35–7.45)
PH BLD: 7.3 [PH] (ref 7.35–7.45)
PH BLD: 7.33 [PH] (ref 7.35–7.45)
PH BLD: 7.34 [PH] (ref 7.35–7.45)
PH BLD: 7.36 [PH] (ref 7.35–7.45)
PO2 BLD: 115 MMHG (ref 75–100)
PO2 BLD: 126 MMHG (ref 75–100)
PO2 BLD: 156 MMHG (ref 75–100)
PO2 BLD: 204 MMHG (ref 75–100)
PO2 BLD: 246 MMHG (ref 75–100)
PO2 BLD: 269 MMHG (ref 75–100)
PO2 BLD: 49 MMHG (ref 75–100)
POTASSIUM BLD-SCNC: 3.6 MMOL/L (ref 3.5–5.1)
POTASSIUM BLD-SCNC: 3.6 MMOL/L (ref 3.5–5.1)
POTASSIUM BLD-SCNC: 3.8 MMOL/L (ref 3.5–5.1)
POTASSIUM BLD-SCNC: 3.8 MMOL/L (ref 3.5–5.1)
POTASSIUM BLD-SCNC: 3.9 MMOL/L (ref 3.5–5.1)
POTASSIUM BLD-SCNC: 4.3 MMOL/L (ref 3.5–5.1)
POTASSIUM BLD-SCNC: 4.3 MMOL/L (ref 3.5–5.1)
SAO2 % BLD: 100 % (ref 95–98)
SAO2 % BLD: 80 % (ref 95–98)
SAO2 % BLD: 98 % (ref 95–98)
SAO2 % BLD: 98 % (ref 95–98)
SAO2 % BLD: 99 % (ref 95–98)
SODIUM BLD-SCNC: 141 MMOL/L (ref 136–145)
SODIUM BLD-SCNC: 141 MMOL/L (ref 136–145)
SODIUM BLD-SCNC: 142 MMOL/L (ref 136–145)
SODIUM BLD-SCNC: 142 MMOL/L (ref 136–145)
SODIUM BLD-SCNC: 143 MMOL/L (ref 136–145)

## 2020-12-09 ENCOUNTER — HOSPITAL ENCOUNTER (OUTPATIENT)
Dept: CARDIAC REHAB | Age: 50
Discharge: HOME | End: 2020-12-09

## 2020-12-09 NOTE — CARDIO/PULMONARY
Dear Dr. Helen Dugan: Thank you for referring your patient, Yancy Mendez (: 1970), to the Cardiopulmonary Rehabilitation Program at [facility].  Mrs. Lisandro Mendez is a good candidate for the Cardiac Rehab Program and should see improvements with regular participation. We will be addressing appropriate interventions for modifiable risk factors with your patient during the next 12 weeks. We will contact you with any issues or concerns that may arise, or you can follow your patients progress through [address] [place] [address] at any time. A final summary will be available on [facility] when the program is completed. Again, thank you for your referral. If we can be of further assistance, please feel free to contact the Cardiopulmonary Rehab staff at [phone].     Sincerely,    ANTONI BairesN, RN  Cardiopulmonary Rehabilitation Nurse  [facility]

## 2020-12-09 NOTE — CARDIO/PULMONARY
Dear Dr. Kosta Dias: Your patient, Lee Chowdhury. Suze Peter (: 1970), has taken the PHQ-9 as part of her admission to the Cardiac Rehab program. Her score on this test were not indicative of her experiencing stress and/or depression; however, she indicated on her questionnaire that she would like to \"talk to my doctor about depression or anxiety. \" She answered that she does not have thoughts of hurting herself. Our medication list shows that the patient is taking diazepam.    It is our program protocol to contact the patient's PCP if the PHQ-9 score is greater than 9; her score was 7, but we are contacting you because she indicated she wanted to talk with you. Thank you.     Cassy Zee, ANTONIN, RN  Cardiopulmonary Rehabilitation

## 2020-12-15 ENCOUNTER — HOSPITAL ENCOUNTER (OUTPATIENT)
Dept: CARDIAC REHAB | Age: 50
Discharge: HOME | End: 2020-12-15
Payer: COMMERCIAL

## 2020-12-15 VITALS — BODY MASS INDEX: 39.85 KG/M2 | HEIGHT: 64 IN | WEIGHT: 233.4 LBS

## 2020-12-15 PROCEDURE — 93798 PHYS/QHP OP CAR RHAB W/ECG: CPT

## 2020-12-16 ENCOUNTER — HOSPITAL ENCOUNTER (OUTPATIENT)
Dept: CARDIAC REHAB | Age: 50
Discharge: HOME | End: 2020-12-16
Payer: COMMERCIAL

## 2020-12-16 PROCEDURE — 93798 PHYS/QHP OP CAR RHAB W/ECG: CPT

## 2020-12-21 ENCOUNTER — HOSPITAL ENCOUNTER (OUTPATIENT)
Dept: CARDIAC REHAB | Age: 50
Discharge: HOME | End: 2020-12-21
Payer: COMMERCIAL

## 2020-12-21 PROCEDURE — 93798 PHYS/QHP OP CAR RHAB W/ECG: CPT

## 2020-12-23 ENCOUNTER — HOSPITAL ENCOUNTER (OUTPATIENT)
Dept: CARDIAC REHAB | Age: 50
Discharge: HOME | End: 2020-12-23
Payer: COMMERCIAL

## 2020-12-23 VITALS — WEIGHT: 229.2 LBS | BODY MASS INDEX: 39.34 KG/M2

## 2020-12-23 PROCEDURE — 93798 PHYS/QHP OP CAR RHAB W/ECG: CPT

## 2020-12-28 ENCOUNTER — HOSPITAL ENCOUNTER (OUTPATIENT)
Dept: CARDIAC REHAB | Age: 50
Discharge: HOME | End: 2020-12-28
Payer: COMMERCIAL

## 2020-12-28 PROCEDURE — 93798 PHYS/QHP OP CAR RHAB W/ECG: CPT

## 2020-12-30 ENCOUNTER — HOSPITAL ENCOUNTER (OUTPATIENT)
Dept: CARDIAC REHAB | Age: 50
Discharge: HOME | End: 2020-12-30
Payer: COMMERCIAL

## 2020-12-30 VITALS — WEIGHT: 231.8 LBS | BODY MASS INDEX: 39.79 KG/M2

## 2020-12-30 PROCEDURE — 93798 PHYS/QHP OP CAR RHAB W/ECG: CPT

## 2021-01-06 ENCOUNTER — HOSPITAL ENCOUNTER (OUTPATIENT)
Dept: CARDIAC REHAB | Age: 51
Discharge: HOME | End: 2021-01-06
Payer: MEDICARE

## 2021-01-06 VITALS — WEIGHT: 228.2 LBS | BODY MASS INDEX: 39.17 KG/M2

## 2021-01-06 PROCEDURE — 93798 PHYS/QHP OP CAR RHAB W/ECG: CPT

## 2021-01-08 ENCOUNTER — HOSPITAL ENCOUNTER (OUTPATIENT)
Dept: CARDIAC REHAB | Age: 51
Discharge: HOME | End: 2021-01-08
Payer: MEDICARE

## 2021-01-08 PROCEDURE — 93798 PHYS/QHP OP CAR RHAB W/ECG: CPT

## 2021-01-11 ENCOUNTER — APPOINTMENT (OUTPATIENT)
Dept: CARDIAC REHAB | Age: 51
End: 2021-01-11
Payer: MEDICARE

## 2021-01-13 ENCOUNTER — HOSPITAL ENCOUNTER (OUTPATIENT)
Dept: CARDIAC REHAB | Age: 51
Discharge: HOME | End: 2021-01-13
Payer: MEDICARE

## 2021-01-13 VITALS — WEIGHT: 229.2 LBS | BODY MASS INDEX: 39.34 KG/M2

## 2021-01-13 PROCEDURE — 93798 PHYS/QHP OP CAR RHAB W/ECG: CPT

## 2021-01-15 ENCOUNTER — HOSPITAL ENCOUNTER (OUTPATIENT)
Dept: CARDIAC REHAB | Age: 51
Discharge: HOME | End: 2021-01-15
Payer: MEDICARE

## 2021-01-15 PROCEDURE — 93798 PHYS/QHP OP CAR RHAB W/ECG: CPT

## 2021-01-19 ENCOUNTER — HOSPITAL ENCOUNTER (OUTPATIENT)
Dept: CARDIAC REHAB | Age: 51
Discharge: HOME | End: 2021-01-19
Payer: MEDICARE

## 2021-01-19 PROCEDURE — 93798 PHYS/QHP OP CAR RHAB W/ECG: CPT

## 2021-01-20 ENCOUNTER — HOSPITAL ENCOUNTER (OUTPATIENT)
Dept: CARDIAC REHAB | Age: 51
Discharge: HOME | End: 2021-01-20
Payer: MEDICARE

## 2021-01-20 VITALS — BODY MASS INDEX: 40.51 KG/M2 | WEIGHT: 236 LBS

## 2021-01-20 PROCEDURE — 93798 PHYS/QHP OP CAR RHAB W/ECG: CPT

## 2021-01-22 ENCOUNTER — HOSPITAL ENCOUNTER (OUTPATIENT)
Dept: CARDIAC REHAB | Age: 51
Discharge: HOME | End: 2021-01-22
Payer: MEDICARE

## 2021-01-22 PROCEDURE — 93798 PHYS/QHP OP CAR RHAB W/ECG: CPT

## 2021-01-27 ENCOUNTER — APPOINTMENT (OUTPATIENT)
Dept: CARDIAC REHAB | Age: 51
End: 2021-01-27
Payer: MEDICARE

## 2021-01-29 ENCOUNTER — HOSPITAL ENCOUNTER (OUTPATIENT)
Dept: CARDIAC REHAB | Age: 51
Discharge: HOME | End: 2021-01-29
Payer: MEDICARE

## 2021-01-29 VITALS — BODY MASS INDEX: 40.85 KG/M2 | WEIGHT: 238 LBS

## 2021-01-29 PROCEDURE — 93798 PHYS/QHP OP CAR RHAB W/ECG: CPT

## 2021-02-09 ENCOUNTER — TELEPHONE (OUTPATIENT)
Dept: CARDIAC REHAB | Age: 51
End: 2021-02-09

## 2021-02-09 NOTE — TELEPHONE ENCOUNTER
Called patient to follow up due to recent absence from  cardiac rehab. She states she feels she will be able to return tomorrow.

## 2021-02-10 ENCOUNTER — HOSPITAL ENCOUNTER (OUTPATIENT)
Dept: CARDIAC REHAB | Age: 51
Discharge: HOME | End: 2021-02-10
Payer: MEDICARE

## 2021-02-10 VITALS — BODY MASS INDEX: 39.17 KG/M2 | WEIGHT: 230 LBS

## 2021-02-10 PROCEDURE — 93798 PHYS/QHP OP CAR RHAB W/ECG: CPT

## 2021-02-17 ENCOUNTER — APPOINTMENT (OUTPATIENT)
Dept: CARDIAC REHAB | Age: 51
End: 2021-02-17
Payer: MEDICARE

## 2021-03-01 ENCOUNTER — APPOINTMENT (OUTPATIENT)
Dept: CARDIAC REHAB | Age: 51
End: 2021-03-01

## 2021-03-03 ENCOUNTER — APPOINTMENT (OUTPATIENT)
Dept: CARDIAC REHAB | Age: 51
End: 2021-03-03

## 2021-03-10 ENCOUNTER — APPOINTMENT (OUTPATIENT)
Dept: CARDIAC REHAB | Age: 51
End: 2021-03-10

## 2021-03-10 ENCOUNTER — TELEPHONE (OUTPATIENT)
Dept: CARDIAC REHAB | Age: 51
End: 2021-03-10

## 2021-03-10 NOTE — TELEPHONE ENCOUNTER
Called patient to follow up regarding attendance to cardiac rehab. No answer and voice mail box is full. Unable to leave message.

## 2021-03-24 ENCOUNTER — HOSPITAL ENCOUNTER (OUTPATIENT)
Dept: ULTRASOUND IMAGING | Age: 51
Discharge: HOME | End: 2021-03-24
Attending: PHYSICIAN ASSISTANT
Payer: MEDICARE

## 2021-03-24 DIAGNOSIS — M79.605 PAIN IN LEFT LEG: ICD-10-CM

## 2021-03-24 PROCEDURE — 93971 EXTREMITY STUDY: CPT

## 2021-03-31 ENCOUNTER — TELEPHONE (OUTPATIENT)
Dept: CARDIAC REHAB | Age: 51
End: 2021-03-31

## 2021-03-31 NOTE — TELEPHONE ENCOUNTER
Called patient regarding attendance to cardiac rehab. She states she is planning to return. She has had foot pain for which she is seeing her PCP tomorrow. She is to let us know if she will be able to attend.
[id number]

## 2021-05-14 ENCOUNTER — HOSPITAL ENCOUNTER (OUTPATIENT)
Dept: GENERAL RADIOLOGY | Age: 51
Discharge: HOME | End: 2021-05-14
Attending: FAMILY MEDICINE
Payer: MEDICARE

## 2021-05-14 ENCOUNTER — HOSPITAL ENCOUNTER (OUTPATIENT)
Dept: ULTRASOUND IMAGING | Age: 51
Discharge: HOME | End: 2021-05-14
Attending: FAMILY MEDICINE
Payer: MEDICARE

## 2021-05-14 DIAGNOSIS — Z95.1 HX OF CABG: ICD-10-CM

## 2021-05-14 DIAGNOSIS — M79.605 PAIN OF LEFT LOWER EXTREMITY: ICD-10-CM

## 2021-05-14 DIAGNOSIS — R10.32 SEVERE LEFT GROIN PAIN: ICD-10-CM

## 2021-05-14 DIAGNOSIS — M79.89 LEFT LEG SWELLING: ICD-10-CM

## 2021-05-14 PROCEDURE — 73502 X-RAY EXAM HIP UNI 2-3 VIEWS: CPT

## 2021-05-14 PROCEDURE — 73552 X-RAY EXAM OF FEMUR 2/>: CPT

## 2021-05-14 PROCEDURE — 93971 EXTREMITY STUDY: CPT

## 2021-05-17 NOTE — PROGRESS NOTES
SHE DOES HAVE OSTEOARTHRITIS IN THE LEFT HIP------if she continues to have pain ----CAN REFER TO ORTHOPEDICS-----PLEASE LET ME KNOW IF SHE WANTS ME TO DO THIS.

## 2021-10-20 PROBLEM — R13.12 OROPHARYNGEAL DYSPHAGIA: Status: ACTIVE | Noted: 2017-06-29

## 2021-10-20 PROBLEM — R11.0 NAUSEA: Status: ACTIVE | Noted: 2018-07-19

## 2021-10-20 PROBLEM — G56.02 CARPAL TUNNEL SYNDROME OF LEFT WRIST: Status: ACTIVE | Noted: 2018-09-24

## 2021-10-20 PROBLEM — S66.812A RUPTURE OF EXTENSOR TENDON OF LEFT HAND: Status: ACTIVE | Noted: 2018-07-09

## 2021-10-20 PROBLEM — M67.80 TENDON WEAKNESS: Status: ACTIVE | Noted: 2018-05-29

## 2021-10-20 PROBLEM — H52.10 MYOPIA: Status: ACTIVE | Noted: 2020-06-02

## 2021-10-20 PROBLEM — M79.642 PAIN IN LEFT HAND: Status: ACTIVE | Noted: 2018-05-29

## 2021-10-20 PROBLEM — R19.8 IRREGULAR BOWEL HABITS: Status: ACTIVE | Noted: 2019-04-25

## 2021-10-20 PROBLEM — M25.642 STIFFNESS OF LEFT HAND JOINT: Status: ACTIVE | Noted: 2018-05-29

## 2021-10-20 PROBLEM — M79.644 PAIN OF RIGHT THUMB: Status: ACTIVE | Noted: 2018-04-30

## 2021-10-20 PROBLEM — Z96.9 RETAINED ORTHOPEDIC HARDWARE: Status: ACTIVE | Noted: 2018-05-02

## 2022-01-05 PROBLEM — K59.09 CHRONIC CONSTIPATION: Status: ACTIVE | Noted: 2021-10-29

## 2022-03-18 PROBLEM — H52.10 MYOPIA: Status: ACTIVE | Noted: 2020-06-02

## 2022-03-18 PROBLEM — G45.9 TIA (TRANSIENT ISCHEMIC ATTACK): Status: ACTIVE | Noted: 2020-04-20

## 2022-03-18 PROBLEM — M79.644 PAIN OF RIGHT THUMB: Status: ACTIVE | Noted: 2018-04-30

## 2022-03-18 PROBLEM — G43.809 VESTIBULAR MIGRAINE: Status: ACTIVE | Noted: 2019-06-10

## 2022-03-18 PROBLEM — E55.9 VITAMIN D DEFICIENCY: Status: ACTIVE | Noted: 2018-07-06

## 2022-03-19 PROBLEM — M79.642 PAIN IN LEFT HAND: Status: ACTIVE | Noted: 2018-05-29

## 2022-03-19 PROBLEM — M67.80 TENDON WEAKNESS: Status: ACTIVE | Noted: 2018-05-29

## 2022-03-19 PROBLEM — R29.3 POSTURAL IMBALANCE: Status: ACTIVE | Noted: 2020-04-20

## 2022-03-19 PROBLEM — S66.812A RUPTURE OF EXTENSOR TENDON OF LEFT HAND: Status: ACTIVE | Noted: 2018-07-09

## 2022-03-19 PROBLEM — R11.0 NAUSEA: Status: ACTIVE | Noted: 2018-07-19

## 2022-03-19 PROBLEM — I25.110 ATHEROSCLEROSIS OF NATIVE CORONARY ARTERY OF NATIVE HEART WITH UNSTABLE ANGINA PECTORIS (HCC): Status: ACTIVE | Noted: 2020-11-18

## 2022-03-19 PROBLEM — Z95.1 S/P CABG X 4: Status: ACTIVE | Noted: 2020-11-18

## 2022-03-19 PROBLEM — G43.109 CHRONIC MIGRAINE WITH AURA: Status: ACTIVE | Noted: 2020-07-30

## 2022-03-19 PROBLEM — I50.32 DIASTOLIC CHF, CHRONIC (HCC): Status: ACTIVE | Noted: 2019-02-27

## 2022-03-19 PROBLEM — Z99.11 ENCOUNTER FOR WEANING FROM VENTILATOR (HCC): Status: ACTIVE | Noted: 2020-11-18

## 2022-03-19 PROBLEM — M79.605 PAIN IN LEFT LEG: Status: ACTIVE | Noted: 2021-03-24

## 2022-03-19 PROBLEM — R42 DIZZINESS: Status: ACTIVE | Noted: 2019-06-10

## 2022-03-19 PROBLEM — Z79.899 ENCOUNTER FOR MEDICATION MANAGEMENT: Status: ACTIVE | Noted: 2020-04-20

## 2022-03-19 PROBLEM — R13.12 OROPHARYNGEAL DYSPHAGIA: Status: ACTIVE | Noted: 2017-06-29

## 2022-03-19 PROBLEM — R53.83 OTHER FATIGUE: Status: ACTIVE | Noted: 2019-02-27

## 2022-03-19 PROBLEM — G56.02 CARPAL TUNNEL SYNDROME OF LEFT WRIST: Status: ACTIVE | Noted: 2018-09-24

## 2022-03-19 PROBLEM — G43.E09 CHRONIC MIGRAINE WITH AURA: Status: ACTIVE | Noted: 2020-07-30

## 2022-03-20 PROBLEM — Z96.9 RETAINED ORTHOPEDIC HARDWARE: Status: ACTIVE | Noted: 2018-05-02

## 2022-03-20 PROBLEM — F31.9 BIPOLAR DISORDER (HCC): Status: ACTIVE | Noted: 2020-11-18

## 2022-03-20 PROBLEM — R19.8 IRREGULAR BOWEL HABITS: Status: ACTIVE | Noted: 2019-04-25

## 2022-03-20 PROBLEM — K59.09 CHRONIC CONSTIPATION: Status: ACTIVE | Noted: 2021-10-29

## 2022-03-20 PROBLEM — M25.642 STIFFNESS OF LEFT HAND JOINT: Status: ACTIVE | Noted: 2018-05-29

## 2022-03-29 ENCOUNTER — TELEPHONE (OUTPATIENT)
Dept: DIABETES SERVICES | Age: 52
End: 2022-03-29

## 2022-03-29 NOTE — TELEPHONE ENCOUNTER
Call to patient about Diabetes Education. She has had education about 2 years. Asked about follow up and instructed she can have follow up of 2 hours every year. She thinks she is \" doing pretty good\" and declines education at this time. Instructed to let us know when need education and we are here.

## 2022-04-05 PROBLEM — E11.649 UNCONTROLLED TYPE 2 DIABETES MELLITUS WITH HYPOGLYCEMIA WITHOUT COMA (HCC): Status: ACTIVE | Noted: 2017-01-06

## 2022-05-03 ENCOUNTER — HOSPITAL ENCOUNTER (OUTPATIENT)
Dept: GENERAL RADIOLOGY | Age: 52
Discharge: HOME | End: 2022-05-03
Payer: MEDICARE

## 2022-05-03 ENCOUNTER — HOSPITAL ENCOUNTER (OUTPATIENT)
Dept: LAB | Age: 52
Discharge: HOME | End: 2022-05-03
Payer: MEDICARE

## 2022-05-03 DIAGNOSIS — I50.32 DIASTOLIC CHF, CHRONIC (HCC): ICD-10-CM

## 2022-05-03 DIAGNOSIS — R06.02 SOB (SHORTNESS OF BREATH): ICD-10-CM

## 2022-05-03 PROBLEM — D75.1 POLYCYTHEMIA: Status: ACTIVE | Noted: 2022-05-03

## 2022-05-03 PROBLEM — J45.30 MILD PERSISTENT ASTHMA: Status: ACTIVE | Noted: 2022-05-03

## 2022-05-03 PROBLEM — R06.09 DOE (DYSPNEA ON EXERTION): Status: ACTIVE | Noted: 2022-05-03

## 2022-05-03 PROCEDURE — 71046 X-RAY EXAM CHEST 2 VIEWS: CPT

## 2022-05-03 PROCEDURE — 36415 COLL VENOUS BLD VENIPUNCTURE: CPT

## 2022-05-03 PROCEDURE — 82668 ASSAY OF ERYTHROPOIETIN: CPT

## 2022-05-05 LAB — EPO SERPL-ACNC: 14.5 MIU/ML (ref 2.6–18.5)

## 2022-05-18 ENCOUNTER — HOSPITAL ENCOUNTER (OUTPATIENT)
Dept: LAB | Age: 52
Discharge: HOME | End: 2022-05-18
Payer: MEDICARE

## 2022-05-18 DIAGNOSIS — D75.1 POLYCYTHEMIA: ICD-10-CM

## 2022-05-18 LAB
BASOPHILS # BLD: 0.1 K/UL (ref 0–0.2)
BASOPHILS NFR BLD: 1 % (ref 0–2)
DIFFERENTIAL METHOD BLD: ABNORMAL
EOSINOPHIL # BLD: 0.2 K/UL (ref 0–0.8)
EOSINOPHIL NFR BLD: 3 % (ref 0.5–7.8)
ERYTHROCYTE [DISTWIDTH] IN BLOOD BY AUTOMATED COUNT: 12.7 % (ref 11.9–14.6)
HCT VFR BLD AUTO: 45.3 % (ref 35.8–46.3)
HGB BLD-MCNC: 14.8 G/DL (ref 11.7–15.4)
IMM GRANULOCYTES # BLD AUTO: 0 K/UL (ref 0–0.5)
IMM GRANULOCYTES NFR BLD AUTO: 1 % (ref 0–5)
LYMPHOCYTES # BLD: 2.6 K/UL (ref 0.5–4.6)
LYMPHOCYTES NFR BLD: 33 % (ref 13–44)
MCH RBC QN AUTO: 27.1 PG (ref 26.1–32.9)
MCHC RBC AUTO-ENTMCNC: 32.7 G/DL (ref 31.4–35)
MCV RBC AUTO: 82.8 FL (ref 79.6–97.8)
MONOCYTES # BLD: 0.5 K/UL (ref 0.1–1.3)
MONOCYTES NFR BLD: 6 % (ref 4–12)
NEUTS SEG # BLD: 4.5 K/UL (ref 1.7–8.2)
NEUTS SEG NFR BLD: 56 % (ref 43–78)
NRBC # BLD: 0 K/UL (ref 0–0.2)
PLATELET # BLD AUTO: 333 K/UL (ref 150–450)
PMV BLD AUTO: 9.2 FL (ref 9.4–12.3)
RBC # BLD AUTO: 5.47 M/UL (ref 4.05–5.2)
WBC # BLD AUTO: 7.9 K/UL (ref 4.3–11.1)

## 2022-05-18 PROCEDURE — 88185 FLOWCYTOMETRY/TC ADD-ON: CPT

## 2022-05-18 PROCEDURE — 36415 COLL VENOUS BLD VENIPUNCTURE: CPT

## 2022-05-18 PROCEDURE — 85025 COMPLETE CBC W/AUTO DIFF WBC: CPT

## 2022-05-18 PROCEDURE — 88184 FLOWCYTOMETRY/ TC 1 MARKER: CPT

## 2022-05-18 PROCEDURE — 82375 ASSAY CARBOXYHB QUANT: CPT

## 2022-05-19 LAB — COHGB MFR BLD: 2.5 % (ref 0–3.6)

## 2022-05-20 PROBLEM — B37.2 YEAST DERMATITIS: Status: ACTIVE | Noted: 2022-05-20

## 2022-05-21 LAB — PATH REV BLD -IMP: NORMAL

## 2022-05-25 LAB
FLOW CYTOMETRY, FBTC1: NORMAL
SPECIMEN SOURCE: NORMAL
TEST ORDERED:: NORMAL

## 2022-06-01 ENCOUNTER — OFFICE VISIT (OUTPATIENT)
Dept: NEUROLOGY | Age: 52
End: 2022-06-01
Payer: MEDICARE

## 2022-06-01 VITALS
HEIGHT: 64 IN | BODY MASS INDEX: 38.07 KG/M2 | DIASTOLIC BLOOD PRESSURE: 88 MMHG | WEIGHT: 223 LBS | SYSTOLIC BLOOD PRESSURE: 139 MMHG | HEART RATE: 78 BPM

## 2022-06-01 DIAGNOSIS — R29.3 POSTURAL IMBALANCE: ICD-10-CM

## 2022-06-01 DIAGNOSIS — G47.00 PERSISTENT DISORDER OF INITIATING OR MAINTAINING SLEEP: ICD-10-CM

## 2022-06-01 DIAGNOSIS — Z79.899 ENCOUNTER FOR MEDICATION MANAGEMENT: ICD-10-CM

## 2022-06-01 DIAGNOSIS — G43.109 CHRONIC MIGRAINE WITH AURA: Primary | ICD-10-CM

## 2022-06-01 DIAGNOSIS — R42 VERTIGO: ICD-10-CM

## 2022-06-01 DIAGNOSIS — G25.81 RLS (RESTLESS LEGS SYNDROME): ICD-10-CM

## 2022-06-01 PROCEDURE — 99214 OFFICE O/P EST MOD 30 MIN: CPT | Performed by: PSYCHIATRY & NEUROLOGY

## 2022-06-01 RX ORDER — GALCANEZUMAB 120 MG/ML
INJECTION, SOLUTION SUBCUTANEOUS
Qty: 1 ML | Refills: 11 | Status: ON HOLD | OUTPATIENT
Start: 2022-06-01

## 2022-06-01 ASSESSMENT — ENCOUNTER SYMPTOMS
PHOTOPHOBIA: 1
BACK PAIN: 1
GASTROINTESTINAL NEGATIVE: 1
RESPIRATORY NEGATIVE: 1
ALLERGIC/IMMUNOLOGIC NEGATIVE: 1

## 2022-06-01 ASSESSMENT — VISUAL ACUITY: OU: 1

## 2022-06-01 NOTE — PROGRESS NOTES
NEUROLOGY  RETURN  OFFICE VISIT [de-identified]                     2022  Eldon Ramirez is a 49 y.o. female here for [de-identified]    Vertigo and migraine stable. Referred by     Jennifer Blue MD     Chief Complaint:   Vertigo   Migraine  meniere's      28-year-old woman with migraine. She also has vestibular migraine and vertigo that appears to be compatible with Ménière's. She is seen at [facility] by Dr. Petar Renteria. Otherwise doing well on her medication to prevent and to abort migraine. Stable to improved.     Last visit -- --2020    Past Medical History:   Diagnosis Date    Arrhythmia     palpatations    Asthma     Bipolar disorder ([address]     Coronary artery disease     cardiac cath 2020    Diabetic neuropathy ([address]     Fatty liver     GERD (gastroesophageal reflux disease)     Heart failure ([address]     Hypercholesterolemia     Hypertension     Hypertriglyceridemia     Meniere's disease     Migraine     Morbid obesity ([address]     Obstructive sleep apnea     CPAP    Palpitations     Peptic ulcer disease 2009    Psychiatric disorder     bipolar    Syncope and collapse     TIA (transient ischemic attack)     pt denies- states she had a migraine causing face tingling     Past Surgical History:   Procedure Laterality Date    BREAST LUMPECTOMY      CARDIAC CATHETERIZATION  2020    CARDIAC CATHETERIZATION  2009    x 4     SECTION  1986    CHOLECYSTECTOMY  2004    CORONARY ARTERY BYPASS GRAFT  11/18/2020    X 4    GYN  2010    ovaries rem    NEUROLOGICAL SURGERY      x3, lumbar region, cervical    ORTHOPEDIC SURGERY      left wrist surgery,back surgery(discectomy-removed)    OTHER SURGICAL HISTORY  2020    OPEN HEART SX     LISSA AND BSO  2000    TONSILLECTOMY  1979    as a child    UROLOGICAL SURGERY      bladder sling      Family History   Problem Relation Age of Onset    Diabetes Mother     Coronary Art Dis Mother     Coronary Art Dis Father     Diabetes Brother     Coronary Art Dis Paternal Grandfather     Breast Cancer Sister     Diabetes Sister      Social History     Socioeconomic History    Marital status:      Spouse name: None    Number of children: None    Years of education: None    Highest education level: None   Occupational History    None   Tobacco Use    Smoking status: Never Smoker    Smokeless tobacco: Never Used   Substance and Sexual Activity    Alcohol use: Not Currently    Drug use: Not Currently     Types: Marijuana Gaytomasa Anaya)    Sexual activity: None   Other Topics Concern    None   Social History Narrative    None     Social Determinants of Health     Financial Resource Strain:     Difficulty of Paying Living Expenses: Not on file   Food Insecurity:     Worried About Running Out of Food in the Last Year: Not on file    Diomedes of Food in the Last Year: Not on file   Transportation Needs:     Lack of Transportation (Medical): Not on file    Lack of Transportation (Non-Medical):  Not on file   Physical Activity:     Days of Exercise per Week: Not on file    Minutes of Exercise per Session: Not on file   Stress:     Feeling of Stress : Not on file   Social Connections:     Frequency of Communication with Friends and Family: Not on file    Frequency of Social Gatherings with Friends and Family: Not on file    Attends [employer] Services: Not on file    Active Member of [address] [place] [address] or Organizations: Not on file    Attends Club or Organization Meetings: Not on file    Marital Status: Not on file   Intimate Partner Violence:     Fear of Current or Ex-Partner: Not on file    Emotionally Abused: Not on file    Physically Abused: Not on file    Sexually Abused: Not on file   Housing Stability:     Unable to Pay for Housing in the Last Year: Not on file    Number of [place] in the Last Year: Not on file    Unstable Housing in the Last Year: Not on file        Current Outpatient Medications   Medication Sig Dispense Refill    Galcanezumab-gnlm (EMGALITY) 120 MG/ML SOSY INJECT CONTENTS OF 1 SYRINGE SUBCUTANEOUSLY EVERY 30 DAYS 1 mL 11    Ubrogepant 50 MG TABS Take 1 tablet by mouth at onset of migraine, repeat in two hours if needed. Maximum 2/day up to 4/week. 16 tablet 11    albuterol sulfate  (90 Base) MCG/ACT inhaler TAKE 1 PUFF BY INHALATION EVERY FOUR (4) HOURS AS NEEDED FOR WHEEZING OR SHORTNESS OF BREATH.  alirocumab (PRALUENT) 75 MG/ML SOAJ injection pen Inject 75 mg into the skin      amLODIPine (NORVASC) 5 MG tablet Take 5 mg by mouth daily      aspirin 81 MG EC tablet Take 81 mg by mouth every evening      atorvastatin (LIPITOR) 80 MG tablet Take 80 mg by mouth daily      clopidogrel (PLAVIX) 75 MG tablet Take 75 mg by mouth daily      clotrimazole-betamethasone (LOTRISONE) 1-0.05 % cream Apply topically 2 times daily      dexlansoprazole (DEXILANT) 60 MG CPDR delayed release capsule Take 60 mg by mouth      diazePAM (VALIUM) 2 MG tablet Take 2 mg by mouth 2 times daily as needed.  Dulaglutide (TRULICITY) 4.5 PJ/4.8GZ SOPN Inject 4.5 mg into the skin every 7 days      DULoxetine (CYMBALTA) 60 MG extended release capsule Take 120 mg by mouth daily      empagliflozin (JARDIANCE) 25 MG tablet Take 25 mg by mouth daily      ergocalciferol (ERGOCALCIFEROL) 1.25 MG (15686 UT) capsule Take 50,000 Units by mouth      furosemide (LASIX) 40 MG tablet Take 40 mg by mouth daily      gabapentin (NEURONTIN) 600 MG tablet Take 600 mg by mouth 2 times daily.  Glucagon (GVOKE HYPOPEN 2-PACK) 1 MG/0.2ML SOAJ Inject 1 mg into the skin as needed      glucagon 1 MG injection Inject 1 mg into the muscle as needed      [START ON 6/5/2022] HYDROcodone-acetaminophen (NORCO)  MG per tablet Take 1 tablet by mouth every 8 hours as needed.       hydrOXYzine (ATARAX) 25 MG tablet Take 25 mg by mouth 3 times daily as needed      insulin aspart (NOVOLOG FLEXPEN) 100 UNIT/ML injection pen Correction 4 units for every 50 over 150, bedtime 4/50>200 max daily dose 30 units      insulin regular human (HUMULIN R U-500 KWIKPEN) 500 UNIT/ML SOPN concentrated injection pen 200 units before breakfast, 200 units before lunch, 95 units before supper      ipratropium-albuterol (DUONEB) 0.5-2.5 (3) MG/3ML SOLN nebulizer solution INHALE 3ML VIA NEBULIZER EVERY 6 HOURS      lamoTRIgine (LAMICTAL) 100 MG tablet Take 100 mg by mouth 2 times daily      linaclotide (LINZESS) 145 MCG capsule Take 145 mcg by mouth daily      lisinopril (PRINIVIL;ZESTRIL) 40 MG tablet Take 40 mg by mouth every evening      Loteprednol Etabonate (LOTEMAX SM) 0.38 % GEL 1 drop 3x a day x 1 wk, 2x a day x 1 wk, 1x a day x 1 wk, then d/c in both eyes      meloxicam (MOBIC) 15 MG tablet One daily      metoprolol succinate (TOPROL XL) 50 MG extended release tablet Take 50 mg by mouth daily      naloxone 4 MG/0.1ML LIQD nasal spray Use 1 spray intranasally, then discard. Repeat with new spray every 2 min as needed for opioid overdose symptoms, alternating nostrils.  nitroGLYCERIN (NITROSTAT) 0.4 MG SL tablet PLACE ONE TABLET UNDER TONGUE AS NEEDED FOR CHEST PAIN. MAY REPEAT EVERY 5 MINUTES FOR 2 MORE DOSES. CALL 911 ON SECOND DOSE.  ondansetron (ZOFRAN-ODT) 8 MG TBDP disintegrating tablet Take 8 mg by mouth every 8 hours as needed      potassium chloride (KLOR-CON M) 20 MEQ extended release tablet Take 20 mEq by mouth daily      Probiotic Product (ACIDOPHILUS PROBIOTIC) CAPS capsule Take 4 mg by mouth daily      promethazine (PHENERGAN) 25 MG tablet Take 25 mg by mouth every 6 hours as needed      tiZANidine (ZANAFLEX) 2 MG tablet Take 2 mg by mouth      traZODone (DESYREL) 100 MG tablet Take 100 mg by mouth daily       No current facility-administered medications for this visit.          Allergies   Allergen Reactions    Penicillins Itching, Nausea And Vomiting and Rash       REVIEW OTHER RECORDS:    Review of Systems Constitutional: Negative. HENT: Negative. Eyes: Positive for photophobia. Respiratory: Negative. Cardiovascular: Negative. Gastrointestinal: Negative. Endocrine: Negative. Genitourinary: Negative. Musculoskeletal: Positive for back pain and neck pain. Skin: Negative. Allergic/Immunologic: Negative. Neurological: Positive for dizziness, light-headedness and headaches. Negative for tremors, seizures, syncope, facial asymmetry, speech difficulty, weakness and numbness. Hematological: Negative. Psychiatric/Behavioral: Positive for sleep disturbance. All other systems reviewed and are negative. REVIEW IMAGING:     Objective:     Vitals:    06/01/22 1438   BP: 139/88   Site: Left Upper Arm   Position: Sitting   Pulse: 78   Weight: 223 lb (101.2 kg)   Height: 5' 4\" (1.626 m)        Physical Exam  Vitals reviewed. Constitutional:       General: She is awake. She is not in acute distress. Appearance: She is well-developed and well-groomed. She is not ill-appearing, toxic-appearing or diaphoretic. HENT:      Head: Normocephalic and atraumatic. No raccoon eyes, abrasion, contusion, right periorbital erythema or left periorbital erythema. Right Ear: Hearing normal.      Left Ear: Hearing normal.   Eyes:      General: Lids are normal. Vision grossly intact. No visual field deficit or scleral icterus. Right eye: No discharge. Left eye: No discharge. Extraocular Movements: Extraocular movements intact. Right eye: Normal extraocular motion and no nystagmus. Left eye: Normal extraocular motion and no nystagmus. Conjunctiva/sclera: Conjunctivae normal.      Right eye: Right conjunctiva is not injected. Left eye: Left conjunctiva is not injected. Pupils: Pupils are equal, round, and reactive to light. Neck:      Trachea: Phonation normal.   Pulmonary:      Effort: Pulmonary effort is normal. No respiratory distress.       Breath sounds: No wheezing. Musculoskeletal:         General: No swelling, tenderness, deformity or signs of injury. Cervical back: No rigidity or torticollis. No pain with movement. Normal range of motion. Right lower leg: No edema. Left lower leg: No edema. Skin:     Coloration: Skin is not cyanotic, jaundiced or pale. Nails: There is no clubbing. Neurological:      Mental Status: She is alert and easily aroused. Mental status is at baseline. Cranial Nerves: No cranial nerve deficit, dysarthria or facial asymmetry. Motor: No weakness, tremor or seizure activity. Coordination: Coordination is intact. Coordination normal.      Gait: Gait is intact. Gait normal.   Psychiatric:         Attention and Perception: Attention normal.         Mood and Affect: Mood normal.         Speech: Speech normal.         Behavior: Behavior normal. Behavior is cooperative. Neurologic Exam     Mental Status   Speech: speech is normal   Level of consciousness: alert  Normal comprehension. Cranial Nerves     CN III, IV, VI   Pupils are equal, round, and reactive to light. Gait, Coordination, and Reflexes     Gait  Gait: normal    Tremor   Resting tremor: absent  Intention tremor: absent  Action tremor: absent  There is no tic, twitch, tonic or clonic activity noted. Assessment & Plan     Enrike Sparrow was seen today for follow-up and headache. Diagnoses and all orders for this visit:    Chronic migraine with aura    Persistent disorder of initiating or maintaining sleep    RLS (restless legs syndrome)    Vertigo    Postural imbalance    Encounter for medication management    Other orders  -     Galcanezumab-gnlm (EMGALITY) 120 MG/ML SOSY; INJECT CONTENTS OF 1 SYRINGE SUBCUTANEOUSLY EVERY 30 DAYS  -     Ubrogepant 50 MG TABS; Take 1 tablet by mouth at onset of migraine, repeat in two hours if needed. Maximum 2/day up to 4/week. 1.   Doing well on medication regimen.     2.  Continue meds for prophylaxis and abort suzi. 3.  Diary. RTO one yr. All drugs and rx reviewed fully with patient and acknowledged. Differential diagnostic decisions and thoughts reviewed and discussed. Extensive time[de-identified]  Total time  30 minutes -     More than 50% of this visit was spent in counseling and care coordination. Treatment options fully reviewed with patient. Time includes pre-  and post- face-face time in records review, and preparation including available pertinent images and reports. Acknowledgements obtained where needed.    [ *NOTE: parts of this note were produced using artificial speech recognition software, which may have inadvertent errors in the produced wordings. ]           Anastacia Ruiz MD  Consultative Neurology, [address] [facility] [place]   [address], [address] [place] [address]  Phone:  [phone]  Fax:   [phone]        Signed By: Anastacia Ruiz MD     June 1, 2022

## 2022-06-01 NOTE — PATIENT INSTRUCTIONS
Patient Education        Vertigo: Care Instructions  Your Care Instructions     Vertigo is the feeling that you or your surroundings are moving when there is no actual movement. It is often described as a feeling of spinning, whirling, falling, or tilting. Vertigo may make you vomit or feel nauseated. You may havetrouble standing or walking and may lose your balance. Vertigo is often related to an inner ear problem, but it can have other moreserious causes. If vertigo continues, you may need more tests to find its cause. Follow-up care is a key part of your treatment and safety. Be sure to make and go to all appointments, and call your doctor if you are having problems. It's also a good idea to know your test results and keep alist of the medicines you take. How can you care for yourself at home?  Do not lie flat on your back. Prop yourself up slightly. This may reduce the spinning feeling. Keep your eyes open.  Move slowly so that you do not fall.  If your doctor recommends medicine, take it exactly as directed.  Do not drive while you are having vertigo. Certain exercises, called Fernandez-Daroff exercises, can help decrease vertigo. To do Fernandez-Daroff exercises:   Sit on the edge of a bed or sofa and quickly lie down on the side that causes the worst vertigo. Lie on your side with your ear down.  Stay in this position for at least 30 seconds or until the vertigo goes away.  Sit up. If this causes vertigo, wait for it to stop.  Repeat the procedure on the other side.  Repeat this 10 times. Do these exercises 2 times a day until the vertigo is gone. When should you call for help? Call 911 anytime you think you may need emergency care. For example, call if:     You passed out (lost consciousness).      You have symptoms of a stroke. These may include:  ? Sudden numbness, tingling, weakness, or loss of movement in your face, arm, or leg, especially on only one side of your body.   ? Sudden vision changes. ? Sudden trouble speaking. ? Sudden confusion or trouble understanding simple statements. ? Sudden problems with walking or balance. ? A sudden, severe headache that is different from past headaches. Call your doctor now or seek immediate medical care if:     Vertigo occurs with a fever, a headache, or ringing in your ears.      You have new or increased nausea and vomiting. Watch closely for changes in your health, and be sure to contact your doctor if:     Vertigo gets worse or happens more often.      Vertigo has not gotten better after 2 weeks. Where can you learn more? Go to [email or web address] org and sign in to your [employer] account. Enter I463 in the iCarsClub box to learn more about \"Vertigo: Care Instructions. \"     If you do not have an account, please click on the \"Sign Up Now\" link. Current as of: September 8, 2021               Content Version: 13.2  © 1086-8971 Healthwise, Incorporated. Care instructions adapted under license by [facility] ([facility]). If you have questions about a medical condition or this instruction, always ask your healthcare professional. [place] [address] any warranty or liability for your use of this information.

## 2022-06-14 ENCOUNTER — OFFICE VISIT (OUTPATIENT)
Dept: CARDIOLOGY CLINIC | Age: 52
End: 2022-06-14
Payer: MEDICARE

## 2022-06-14 VITALS
WEIGHT: 218 LBS | HEIGHT: 64 IN | DIASTOLIC BLOOD PRESSURE: 66 MMHG | HEART RATE: 62 BPM | BODY MASS INDEX: 37.22 KG/M2 | SYSTOLIC BLOOD PRESSURE: 118 MMHG

## 2022-06-14 DIAGNOSIS — I47.1 SUPRAVENTRICULAR TACHYCARDIA (HCC): Primary | ICD-10-CM

## 2022-06-14 DIAGNOSIS — I10 PRIMARY HYPERTENSION: ICD-10-CM

## 2022-06-14 DIAGNOSIS — I25.119 ATHEROSCLEROSIS OF NATIVE CORONARY ARTERY OF NATIVE HEART WITH ANGINA PECTORIS (HCC): ICD-10-CM

## 2022-06-14 DIAGNOSIS — E78.00 HYPERCHOLESTEROLEMIA: ICD-10-CM

## 2022-06-14 PROBLEM — I47.10 SUPRAVENTRICULAR TACHYCARDIA: Status: ACTIVE | Noted: 2022-06-14

## 2022-06-14 PROCEDURE — 99214 OFFICE O/P EST MOD 30 MIN: CPT | Performed by: INTERNAL MEDICINE

## 2022-06-14 NOTE — PROGRESS NOTES
[address], [address]  PHONE: [phone]      22    NAME:  Marina Castro  : 1970  MRN: [id number]       SUBJECTIVE:   Marina Castro is a 49 y.o. female seen for a follow up visit regarding the following:     Chief Complaint   Patient presents with    Hypertension    Coronary Artery Disease         HPI:    No cp or chicas. No orthopnea or pnd. No palpitations or syncope. Past Medical History, Past Surgical History, Family history, Social History, and Medications were all reviewed with the patient today and updated as necessary. Current Outpatient Medications   Medication Sig Dispense Refill    Galcanezumab-gnlm (EMGALITY) 120 MG/ML SOSY INJECT CONTENTS OF 1 SYRINGE SUBCUTANEOUSLY EVERY 30 DAYS 1 mL 11    Ubrogepant 50 MG TABS Take 1 tablet by mouth at onset of migraine, repeat in two hours if needed. Maximum 2/day up to 4/week. 16 tablet 11    albuterol sulfate  (90 Base) MCG/ACT inhaler TAKE 1 PUFF BY INHALATION EVERY FOUR (4) HOURS AS NEEDED FOR WHEEZING OR SHORTNESS OF BREATH.  alirocumab (PRALUENT) 75 MG/ML SOAJ injection pen Inject 75 mg into the skin      amLODIPine (NORVASC) 5 MG tablet Take 5 mg by mouth daily      aspirin 81 MG EC tablet Take 81 mg by mouth every evening      atorvastatin (LIPITOR) 80 MG tablet Take 80 mg by mouth daily      clopidogrel (PLAVIX) 75 MG tablet Take 75 mg by mouth daily      dexlansoprazole (DEXILANT) 60 MG CPDR delayed release capsule Take 60 mg by mouth      diazePAM (VALIUM) 2 MG tablet Take 2 mg by mouth 2 times daily as needed.       Dulaglutide (TRULICITY) 4.5 YM/1.2RU SOPN Inject 4.5 mg into the skin every 7 days      DULoxetine (CYMBALTA) 60 MG extended release capsule Take 120 mg by mouth daily      empagliflozin (JARDIANCE) 25 MG tablet Take 25 mg by mouth daily      furosemide (LASIX) 40 MG tablet Take 40 mg by mouth daily      gabapentin (NEURONTIN) 600 MG tablet Take 600 mg by mouth 2 times daily.  Glucagon (GVOKE HYPOPEN 2-PACK) 1 MG/0.2ML SOAJ Inject 1 mg into the skin as needed      glucagon 1 MG injection Inject 1 mg into the muscle as needed      HYDROcodone-acetaminophen (NORCO)  MG per tablet Take 1 tablet by mouth every 8 hours as needed.  hydrOXYzine (ATARAX) 25 MG tablet Take 25 mg by mouth 3 times daily as needed      insulin aspart (NOVOLOG FLEXPEN) 100 UNIT/ML injection pen Correction 4 units for every 50 over 150, bedtime 4/50>200 max daily dose 30 units      insulin regular human (HUMULIN R U-500 KWIKPEN) 500 UNIT/ML SOPN concentrated injection pen 200 units before breakfast, 200 units before lunch, 95 units before supper      ipratropium-albuterol (DUONEB) 0.5-2.5 (3) MG/3ML SOLN nebulizer solution INHALE 3ML VIA NEBULIZER EVERY 6 HOURS      lamoTRIgine (LAMICTAL) 100 MG tablet Take 100 mg by mouth 2 times daily      linaclotide (LINZESS) 145 MCG capsule Take 145 mcg by mouth daily      lisinopril (PRINIVIL;ZESTRIL) 40 MG tablet Take 40 mg by mouth every evening      Loteprednol Etabonate (LOTEMAX SM) 0.38 % GEL 1 drop 3x a day x 1 wk, 2x a day x 1 wk, 1x a day x 1 wk, then d/c in both eyes      meloxicam (MOBIC) 15 MG tablet One daily      metoprolol succinate (TOPROL XL) 50 MG extended release tablet Take 50 mg by mouth daily      nitroGLYCERIN (NITROSTAT) 0.4 MG SL tablet PLACE ONE TABLET UNDER TONGUE AS NEEDED FOR CHEST PAIN. MAY REPEAT EVERY 5 MINUTES FOR 2 MORE DOSES. CALL 911 ON SECOND DOSE.       ondansetron (ZOFRAN-ODT) 8 MG TBDP disintegrating tablet Take 8 mg by mouth every 8 hours as needed      potassium chloride (KLOR-CON M) 20 MEQ extended release tablet Take 20 mEq by mouth daily      Probiotic Product (ACIDOPHILUS PROBIOTIC) CAPS capsule Take 4 mg by mouth daily      promethazine (PHENERGAN) 25 MG tablet Take 25 mg by mouth every 6 hours as needed      tiZANidine (ZANAFLEX) 2 MG tablet Take 2 mg by mouth      traZODone (DESYREL) 100 MG tablet Take 100 mg by mouth daily      clotrimazole-betamethasone (LOTRISONE) 1-0.05 % cream Apply topically 2 times daily      naloxone 4 MG/0.1ML LIQD nasal spray Use 1 spray intranasally, then discard. Repeat with new spray every 2 min as needed for opioid overdose symptoms, alternating nostrils. No current facility-administered medications for this visit. Social History     Tobacco Use    Smoking status: Never Smoker    Smokeless tobacco: Never Used   Substance Use Topics    Alcohol use: Not Currently              PHYSICAL EXAM:   /66   Pulse 62   Ht 5' 4\" (1.626 m)   Wt 218 lb (98.9 kg)   BMI 37.42 kg/m²    Constitutional: Oriented to person, place, and time. Appears well-developed and well-nourished. Head: Normocephalic and atraumatic. Neck: Neck supple. Cardiovascular: Normal rate and regular rhythm with no murmur -No JVP  Pulmonary/Chest: Breath sounds normal.   Abdominal: Soft. Musculoskeletal: No edema. Neurological: Alert and oriented to person, place, and time. Skin: Skin is warm and dry. Psychiatric: Normal mood and affect. Vitals reviewed. Wt Readings from Last 3 Encounters:   06/14/22 218 lb (98.9 kg)   06/01/22 223 lb (101.2 kg)   05/18/22 223 lb 3.2 oz (101.2 kg)       Medical problems and test results were reviewed with the patient today. ASSESSMENT and PLAN    1. Supraventricular tachycardia (HCC)  Stable. Continue toprol      2. Atherosclerosis of native coronary artery of native heart with angina pectoris (HCC)  Stable. Continue asa      3. Primary hypertension  Stable. Continue lisinopril and toprol      4. Hypercholesterolemia  Stable. Continue lipitor         Return in about 6 months (around 12/14/2022).          Kodak Flores MD  6/14/2022  9:18 AM

## 2022-06-20 ENCOUNTER — TELEMEDICINE (OUTPATIENT)
Dept: ONCOLOGY | Age: 52
End: 2022-06-20
Payer: MEDICARE

## 2022-06-20 DIAGNOSIS — D75.1 POLYCYTHEMIA: Primary | ICD-10-CM

## 2022-06-20 PROCEDURE — 99212 OFFICE O/P EST SF 10 MIN: CPT | Performed by: INTERNAL MEDICINE

## 2022-06-20 ASSESSMENT — ENCOUNTER SYMPTOMS
COUGH: 0
ABDOMINAL PAIN: 0
CHEST TIGHTNESS: 0
SCLERAL ICTERUS: 0

## 2022-06-20 ASSESSMENT — PATIENT HEALTH QUESTIONNAIRE - PHQ9
2. FEELING DOWN, DEPRESSED OR HOPELESS: 0
SUM OF ALL RESPONSES TO PHQ QUESTIONS 1-9: 0
SUM OF ALL RESPONSES TO PHQ9 QUESTIONS 1 & 2: 0
1. LITTLE INTEREST OR PLEASURE IN DOING THINGS: 0

## 2022-06-20 NOTE — PATIENT INSTRUCTIONS
Patient Instructions from Today's Visit    Reason for Visit:  Follow up. Plan:      Follow Up:  As needed. Recent Lab Results:  n/a      Treatment Summary has been discussed and given to patient: n/a        -------------------------------------------------------------------------------------------------------------------  Please call our office at [phone] if you have any  of the following symptoms:   · Fever of 100.5 or greater  · Chills  · Shortness of breath  · Swelling or pain in one leg    After office hours an answering service is available and will contact a provider for emergencies or if you are experiencing any of the above symptoms.  Patient did express an interest in My Chart. My Chart log in information explained on the after visit summary printout at the [facility] [address] desk.     Ada Wakefield RN

## 2022-06-20 NOTE — PROGRESS NOTES
Consent: Patient was seen by synchronous (real-time) audio-video technology, and/or his/her healthcare decision maker, is aware that this patient-initiated, Telehealth encounter from today is a billable service, with coverage as determined by his/her insurance carrier. Patient  is aware that he/she may receive a bill and has provided verbal consent to proceed: Yes. Services were provided through video using doxy. me discussing virtually to substitute for in-person clinic visit. Total Time: 10 minutes. Pursuant to the emergency declaration under the [facility], [facility] waiver authority and the [employer] Act, this Virtual  Visit was conducted, with patient's (and/or legal guardian's) consent, to reduce the patient's risk of exposure to COVID-19 and provide necessary medical care. o synchronous discussion virtually to substitute for in-person clinic visit. Patient was at his/her home during this encounter. [facility]: Established patient - follow up  - VV     Chief Complaint   Patient presents with    Follow-up     Reason for Referral: Polycythemia   Referring Alisha Ulloa MD   Family History of Cancer/Hematologic Disorders: Family history is significant for sister with breast cancer. Presenting Symptoms: RBCs and HGB persistently elevated on routine labs since 2022    History of Present Illness:  Ms. Rani Bah is a 49 y.o. female who presents today for FU of polycythemia.   The past medical history is significant for marijuana use (used for 7 years; not currently using),  TIA, bipolar disease, PUD, SHELBY, morbid obesity, migraines, Meniere's disease, hypertriglyceridemia, HTN, heart failure, GERD, fatty liver, diabetic neuropathy, DM2, CAD, asthma, palpitations, tonsillectomy, LISSA/BSO, heart catheterization x 2, CABG  2020, cholecystectomy,  section, and breast lumpectomy. She was seen by her PCP on 4/5/22 for a chronic care visit. Review of systems and physical exam were unremarkable. Routine labs drawn the same day were significant for HCT 46.8, CO2 19, glucose 235, and ALT 38. Referral was placed to [facility] for hematology evaluation and treatment of polycythemia.       Patient was also urgently referred to [facility] and evaluated in consultation by Dr. Shireen Solares on 5/3/22. She reported experiencing increased shortness of breath for quite some time now, but ultimately was referred to pulmonology due to  the concern for high hemoglobin and concern for hypoxemia.  She notes that her oxygen is always around 96% or better and has not noticed any clear hypoxemia.  She was hypoxic on her sleep study and has now been on CPAP. She was having difficulty  using CPAP for a couple months in February and March when her allergies and nasal congestion were severe. Allergy symptoms have now improved. She is back to using CPAP and doing well. Dr. Raul Arambula noted that patient's polycythemia was mild and borderline with no identified hypoxemia. Patient had no rest or exertional hypoxemia with ambulation in the office. Erythropoietin level drawn on 5/3/22 was at 14.5. AT consultation, we discussed the pathophysiology of hematopoiesis in general and etiologies of increased hemoglobin counts. Ranges of normal also reviewed with patient. She wishesdto p/w further workup. Today, she is here for follow-up via zhiwo for a virtual visit. Labs reviewed and normal results discussed. Flow cytometry also negative. She can proceed with follow-up with her PCP. Currently no abnormalities noted in her blood. She is welcome to return for follow-up if new abnormalities are found as needed.         Chronological Events:                5/13/2021 11:21  10/7/2021 08:19  1/5/2022 10:08  3/31/2022 08:57  4/5/2022 08:52     WBC  8.6  9.2  9.5  8.7  8.0     RBC  5.13  5.21 5.54 (H)  5.82 (H)  5.34 (H)     HGB  14.2  14.8  15.6  16.2 (H)  15.2     HCT  43.2  45.2  47.9 (H)  50.4 (H)  46.8 (H)     MCV  84  87  87  87  88     MCH  27.7  28.4  28.2  27.8  28.5     MCHC  32.9  32.7  32.6  32.1  32.5     RDW  14.4  13.3  12.9  13.4  12.6     PLATELET  487  604  491  384  335     NEUTROPHILS  60  53  55  64  54     Lymphocytes  30  35  33  27  34     MONOCYTES  6  7  7  6  8     EOSINOPHILS  2  3  3  1  3     BASOPHILS  1  1  1  1  1     IMMATURE GRANULOCYTES  1  1  1  1  0     ABS. NEUTROPHILS  5.2  4.9  5.3  5.6  4.3     ABS. IMM. GRANS.  0.1  0.1  0.1  0.0  0.0     Abs Lymphocytes  2.6  3.3 (H)  3.1  2.4  2.7     ABS. MONOCYTES  0.5  0.7  0.7  0.5  0.6     ABS. EOSINOPHILS  0.2  0.3  0.3  0.1  0.3     ABS.  BASOPHILS  0.1  0.1  0.1  0.1  0.1               5/3/2022 15:35     ERYTHROPOIETIN  14.5         5/18/22 heme/onc consultation   6/20/22 FU - VV labs reviewed, FU PRN     Family History   Problem Relation Age of Onset    Diabetes Mother     Coronary Art Dis Mother     Coronary Art Dis Father     Diabetes Brother     Coronary Art Dis Paternal Grandfather     Breast Cancer Sister     Diabetes Sister       Social History     Socioeconomic History    Marital status:      Spouse name: None    Number of children: None    Years of education: None    Highest education level: None   Occupational History    None   Tobacco Use    Smoking status: Never Smoker    Smokeless tobacco: Never Used   Substance and Sexual Activity    Alcohol use: Not Currently    Drug use: Not Currently     Types: Marijuana Napoleon Bhatt)    Sexual activity: None   Other Topics Concern    None   Social History Narrative    None     Social Determinants of Health     Financial Resource Strain:     Difficulty of Paying Living Expenses: Not on file   Food Insecurity:     Worried About Running Out of Food in the Last Year: Not on file    Diomedes of Food in the Last Year: Not on file   Transportation Needs:  Lack of Transportation (Medical): Not on file    Lack of Transportation (Non-Medical): Not on file   Physical Activity:     Days of Exercise per Week: Not on file    Minutes of Exercise per Session: Not on file   Stress:     Feeling of Stress : Not on file   Social Connections:     Frequency of Communication with Friends and Family: Not on file    Frequency of Social Gatherings with Friends and Family: Not on file    Attends Taoist Services: Not on file    Active Member of [address] [place] [address] or Organizations: Not on file    Attends Club or Organization Meetings: Not on file    Marital Status: Not on file   Intimate Partner Violence:     Fear of Current or Ex-Partner: Not on file    Emotionally Abused: Not on file    Physically Abused: Not on file    Sexually Abused: Not on file   Housing Stability:     Unable to Pay for Housing in the Last Year: Not on file    Number of [place] in the Last Year: Not on file    Unstable Housing in the Last Year: Not on file        Review of Systems   Eyes: Negative for icterus. Respiratory: Negative for chest tightness and cough. Cardiovascular: Positive for palpitations. Gastrointestinal: Negative for abdominal pain. Psychiatric/Behavioral: Positive for depression. All other systems reviewed and are negative.        Allergies   Allergen Reactions    Penicillins Itching, Nausea And Vomiting and Rash     Past Medical History:   Diagnosis Date    Arrhythmia     palpatations    Asthma     Bipolar disorder ([address]     Coronary artery disease     cardiac cath 11/5/2020    Diabetic neuropathy ([address]     Fatty liver     GERD (gastroesophageal reflux disease)     Heart failure ([address]     Hypercholesterolemia     Hypertension     Hypertriglyceridemia     Meniere's disease     Migraine     Morbid obesity ([address]     Obstructive sleep apnea     CPAP    Palpitations     Peptic ulcer disease 2009    Psychiatric disorder     bipolar    Syncope and collapse  TIA (transient ischemic attack)     pt denies- states she had a migraine causing face tingling     Past Surgical History:   Procedure Laterality Date    BREAST LUMPECTOMY      CARDIAC CATHETERIZATION  11/2020    CARDIAC CATHETERIZATION  2009    x 4   [address]    CHOLECYSTECTOMY  2004    CORONARY ARTERY BYPASS GRAFT  11/18/2020    X 4    GYN  2010    ovaries rem    NEUROLOGICAL SURGERY      x3, lumbar region, cervical    ORTHOPEDIC SURGERY      left wrist surgery,back surgery(discectomy-removed)    OTHER SURGICAL HISTORY  11/2020    OPEN HEART SX     LISSA AND BSO (CERVIX REMOVED)  2000    TONSILLECTOMY  1979    as a child    UROLOGICAL SURGERY      bladder sling     Current Outpatient Medications   Medication Sig Dispense Refill    Galcanezumab-gnlm (EMGALITY) 120 MG/ML SOSY INJECT CONTENTS OF 1 SYRINGE SUBCUTANEOUSLY EVERY 30 DAYS 1 mL 11    Ubrogepant 50 MG TABS Take 1 tablet by mouth at onset of migraine, repeat in two hours if needed. Maximum 2/day up to 4/week. 16 tablet 11    albuterol sulfate  (90 Base) MCG/ACT inhaler TAKE 1 PUFF BY INHALATION EVERY FOUR (4) HOURS AS NEEDED FOR WHEEZING OR SHORTNESS OF BREATH.  alirocumab (PRALUENT) 75 MG/ML SOAJ injection pen Inject 75 mg into the skin      amLODIPine (NORVASC) 5 MG tablet Take 5 mg by mouth daily      aspirin 81 MG EC tablet Take 81 mg by mouth every evening      atorvastatin (LIPITOR) 80 MG tablet Take 80 mg by mouth daily      clopidogrel (PLAVIX) 75 MG tablet Take 75 mg by mouth daily      clotrimazole-betamethasone (LOTRISONE) 1-0.05 % cream Apply topically 2 times daily      dexlansoprazole (DEXILANT) 60 MG CPDR delayed release capsule Take 60 mg by mouth      diazePAM (VALIUM) 2 MG tablet Take 2 mg by mouth 2 times daily as needed.       Dulaglutide (TRULICITY) 4.5 GI/2.8SA SOPN Inject 4.5 mg into the skin every 7 days      DULoxetine (CYMBALTA) 60 MG extended release capsule Take 120 mg by mouth daily      empagliflozin (JARDIANCE) 25 MG tablet Take 25 mg by mouth daily      furosemide (LASIX) 40 MG tablet Take 40 mg by mouth daily      gabapentin (NEURONTIN) 600 MG tablet Take 600 mg by mouth 2 times daily.  Glucagon (GVOKE HYPOPEN 2-PACK) 1 MG/0.2ML SOAJ Inject 1 mg into the skin as needed      glucagon 1 MG injection Inject 1 mg into the muscle as needed      HYDROcodone-acetaminophen (NORCO)  MG per tablet Take 1 tablet by mouth every 8 hours as needed.  hydrOXYzine (ATARAX) 25 MG tablet Take 25 mg by mouth 3 times daily as needed      insulin aspart (NOVOLOG FLEXPEN) 100 UNIT/ML injection pen Correction 4 units for every 50 over 150, bedtime 4/50>200 max daily dose 30 units      insulin regular human (HUMULIN R U-500 KWIKPEN) 500 UNIT/ML SOPN concentrated injection pen 200 units before breakfast, 200 units before lunch, 95 units before supper      ipratropium-albuterol (DUONEB) 0.5-2.5 (3) MG/3ML SOLN nebulizer solution INHALE 3ML VIA NEBULIZER EVERY 6 HOURS      lamoTRIgine (LAMICTAL) 100 MG tablet Take 100 mg by mouth 2 times daily      linaclotide (LINZESS) 145 MCG capsule Take 145 mcg by mouth daily      lisinopril (PRINIVIL;ZESTRIL) 40 MG tablet Take 40 mg by mouth every evening      Loteprednol Etabonate (LOTEMAX SM) 0.38 % GEL 1 drop 3x a day x 1 wk, 2x a day x 1 wk, 1x a day x 1 wk, then d/c in both eyes      meloxicam (MOBIC) 15 MG tablet One daily      metoprolol succinate (TOPROL XL) 50 MG extended release tablet Take 50 mg by mouth daily      naloxone 4 MG/0.1ML LIQD nasal spray Use 1 spray intranasally, then discard. Repeat with new spray every 2 min as needed for opioid overdose symptoms, alternating nostrils.  nitroGLYCERIN (NITROSTAT) 0.4 MG SL tablet PLACE ONE TABLET UNDER TONGUE AS NEEDED FOR CHEST PAIN. MAY REPEAT EVERY 5 MINUTES FOR 2 MORE DOSES. CALL 911 ON SECOND DOSE.       ondansetron (ZOFRAN-ODT) 8 MG TBDP disintegrating tablet Take 8 mg by mouth every 8 hours as needed      potassium chloride (KLOR-CON M) 20 MEQ extended release tablet Take 20 mEq by mouth daily      Probiotic Product (ACIDOPHILUS PROBIOTIC) CAPS capsule Take 4 mg by mouth daily      promethazine (PHENERGAN) 25 MG tablet Take 25 mg by mouth every 6 hours as needed      tiZANidine (ZANAFLEX) 2 MG tablet Take 2 mg by mouth      traZODone (DESYREL) 100 MG tablet Take 100 mg by mouth daily       No current facility-administered medications for this visit. No flowsheet data found. OBJECTIVE:  There were no vitals taken for this visit. ECOG PERFORMANCE STATUS - 0-Fully active, able to carry on all pre-disease performance without restriction. Pain - /10. None/Minimal pain - not affecting QOL     Fatigue - No flowsheet data found. Distress - No flowsheet data found. Physical Exam  Constitutional:       General: She is not in acute distress. Appearance: Normal appearance. She is not toxic-appearing. Eyes:      General: No scleral icterus. Pulmonary:      Effort: Pulmonary effort is normal. No respiratory distress. Musculoskeletal:      Cervical back: Normal range of motion. Neurological:      General: No focal deficit present. Mental Status: She is alert and oriented to person, place, and time. Psychiatric:         Behavior: Behavior normal.         Thought Content: Thought content normal.          Labs:  No results found for this or any previous visit (from the past 168 hour(s)). Imaging: reviewed     PATHOLOGY:           ASSESSMENT:     Diagnosis Orders   1. Polycythemia          Ms. Finley Lesches is here for FU of polycythemia.         1. Polycythemia   - current blood work does not show evidence of polycythemia  - flow cytometry normal   - smear without morphological abnormalities        RTC as needed     MDM  Number of Diagnoses or Management Options  Polycythemia: established, improving     Amount and/or Complexity of Data Reviewed  Clinical lab tests: ordered and reviewed  Review and summarize past medical records: yes  Independent visualization of images, tracings, or specimens: yes    Risk of Complications, Morbidity, and/or Mortality  Presenting problems: low  Diagnostic procedures: low  Management options: low        Lab studies and imaging studies were personally reviewed. Pertinent old records were reviewed. Historical:    -  we reviewed the pathophysiology of hematopoiesis in general and etiologies of increased hemoglobin counts. Ranges of normal also reviewed with patient. She wished to p/w further workup.     - rash - Nystatin to apply to affected areas     All questions were asked and answered to the best of my ability. The patient verbalized understanding and agrees with the plan above.               [place] [facility] Deb Cecy Number, [phone] [facility]  [address] [place] [address]  Office : [phone]  Fax : [phone]

## 2022-06-21 ENCOUNTER — PATIENT MESSAGE (OUTPATIENT)
Dept: ENDOCRINOLOGY | Age: 52
End: 2022-06-21

## 2022-06-21 NOTE — TELEPHONE ENCOUNTER
myChart response:          I cannot send in lyrica as it is a controlled substance.  Please reach out to her PCP for fills  ~Arleen

## 2022-06-21 NOTE — TELEPHONE ENCOUNTER
From: Ness Larry  To: Lowe Leeleegreg  Sent: 6/21/2022 8:56 AM EDT  Subject: Flash Ledbetter asked me to ask you if you could send her a script for her lyrica 50 mg 2xa re she sees family dr next month but she is in a lot of pain she seeing John Wilson!  Thank you

## 2022-06-22 ENCOUNTER — TELEPHONE (OUTPATIENT)
Dept: ONCOLOGY | Age: 52
End: 2022-06-22

## 2022-06-27 ENCOUNTER — OFFICE VISIT (OUTPATIENT)
Dept: FAMILY MEDICINE CLINIC | Facility: CLINIC | Age: 52
End: 2022-06-27
Payer: MEDICARE

## 2022-06-27 VITALS
HEIGHT: 64 IN | SYSTOLIC BLOOD PRESSURE: 122 MMHG | DIASTOLIC BLOOD PRESSURE: 62 MMHG | TEMPERATURE: 98 F | BODY MASS INDEX: 37.22 KG/M2 | WEIGHT: 218 LBS

## 2022-06-27 DIAGNOSIS — G89.29 CHRONIC BILATERAL LOW BACK PAIN WITHOUT SCIATICA: ICD-10-CM

## 2022-06-27 DIAGNOSIS — E55.9 VITAMIN D DEFICIENCY: ICD-10-CM

## 2022-06-27 DIAGNOSIS — E78.49 OTHER HYPERLIPIDEMIA: ICD-10-CM

## 2022-06-27 DIAGNOSIS — F33.0 MILD EPISODE OF RECURRENT MAJOR DEPRESSIVE DISORDER (HCC): ICD-10-CM

## 2022-06-27 DIAGNOSIS — I25.84 CORONARY ARTERY DISEASE DUE TO CALCIFIED CORONARY LESION: ICD-10-CM

## 2022-06-27 DIAGNOSIS — K21.9 GASTROESOPHAGEAL REFLUX DISEASE, UNSPECIFIED WHETHER ESOPHAGITIS PRESENT: ICD-10-CM

## 2022-06-27 DIAGNOSIS — G62.9 PERIPHERAL POLYNEUROPATHY: ICD-10-CM

## 2022-06-27 DIAGNOSIS — E11.65 UNCONTROLLED TYPE 2 DIABETES MELLITUS WITH HYPERGLYCEMIA (HCC): ICD-10-CM

## 2022-06-27 DIAGNOSIS — J45.20 MILD INTERMITTENT REACTIVE AIRWAY DISEASE WITHOUT COMPLICATION: ICD-10-CM

## 2022-06-27 DIAGNOSIS — I25.10 CORONARY ARTERY DISEASE DUE TO CALCIFIED CORONARY LESION: ICD-10-CM

## 2022-06-27 DIAGNOSIS — F41.9 ANXIETY: ICD-10-CM

## 2022-06-27 DIAGNOSIS — I10 PRIMARY HYPERTENSION: Primary | ICD-10-CM

## 2022-06-27 DIAGNOSIS — M54.50 CHRONIC BILATERAL LOW BACK PAIN WITHOUT SCIATICA: ICD-10-CM

## 2022-06-27 DIAGNOSIS — R60.0 LOCALIZED EDEMA: ICD-10-CM

## 2022-06-27 PROBLEM — Z99.11 ENCOUNTER FOR WEANING FROM VENTILATOR (HCC): Status: RESOLVED | Noted: 2020-11-18 | Resolved: 2022-06-27

## 2022-06-27 LAB
ALBUMIN SERPL-MCNC: 4.1 G/DL (ref 3.5–5)
ALBUMIN/GLOB SERPL: 1.1 {RATIO} (ref 1.2–3.5)
ALP SERPL-CCNC: 81 U/L (ref 50–136)
ALT SERPL-CCNC: 37 U/L (ref 12–65)
ANION GAP SERPL CALC-SCNC: 10 MMOL/L (ref 7–16)
AST SERPL-CCNC: 14 U/L (ref 15–37)
BASOPHILS # BLD: 0.1 K/UL (ref 0–0.2)
BASOPHILS NFR BLD: 1 % (ref 0–2)
BILIRUB SERPL-MCNC: 0.6 MG/DL (ref 0.2–1.1)
BUN SERPL-MCNC: 22 MG/DL (ref 6–23)
CALCIUM SERPL-MCNC: 10 MG/DL (ref 8.3–10.4)
CHLORIDE SERPL-SCNC: 107 MMOL/L (ref 98–107)
CHOLEST SERPL-MCNC: 180 MG/DL
CO2 SERPL-SCNC: 24 MMOL/L (ref 21–32)
CREAT SERPL-MCNC: 0.7 MG/DL (ref 0.6–1)
DIFFERENTIAL METHOD BLD: ABNORMAL
EOSINOPHIL # BLD: 0.2 K/UL (ref 0–0.8)
EOSINOPHIL NFR BLD: 2 % (ref 0.5–7.8)
ERYTHROCYTE [DISTWIDTH] IN BLOOD BY AUTOMATED COUNT: 13.2 % (ref 11.9–14.6)
GLOBULIN SER CALC-MCNC: 3.6 G/DL (ref 2.3–3.5)
GLUCOSE SERPL-MCNC: 170 MG/DL (ref 65–100)
HCT VFR BLD AUTO: 48.9 % (ref 35.8–46.3)
HDLC SERPL-MCNC: 52 MG/DL (ref 40–60)
HDLC SERPL: 3.5 {RATIO}
HGB BLD-MCNC: 15.1 G/DL (ref 11.7–15.4)
IMM GRANULOCYTES # BLD AUTO: 0.1 K/UL (ref 0–0.5)
IMM GRANULOCYTES NFR BLD AUTO: 1 % (ref 0–5)
LDLC SERPL CALC-MCNC: 78.2 MG/DL
LYMPHOCYTES # BLD: 2.5 K/UL (ref 0.5–4.6)
LYMPHOCYTES NFR BLD: 24 % (ref 13–44)
MCH RBC QN AUTO: 27.4 PG (ref 26.1–32.9)
MCHC RBC AUTO-ENTMCNC: 30.9 G/DL (ref 31.4–35)
MCV RBC AUTO: 88.6 FL (ref 79.6–97.8)
MONOCYTES # BLD: 0.8 K/UL (ref 0.1–1.3)
MONOCYTES NFR BLD: 7 % (ref 4–12)
NEUTS SEG # BLD: 6.7 K/UL (ref 1.7–8.2)
NEUTS SEG NFR BLD: 65 % (ref 43–78)
NRBC # BLD: 0 K/UL (ref 0–0.2)
PLATELET # BLD AUTO: 362 K/UL (ref 150–450)
PMV BLD AUTO: 9.6 FL (ref 9.4–12.3)
POTASSIUM SERPL-SCNC: 4.8 MMOL/L (ref 3.5–5.1)
PROT SERPL-MCNC: 7.7 G/DL (ref 6.3–8.2)
RBC # BLD AUTO: 5.52 M/UL (ref 4.05–5.2)
SODIUM SERPL-SCNC: 141 MMOL/L (ref 136–145)
TRIGL SERPL-MCNC: 249 MG/DL (ref 35–150)
VLDLC SERPL CALC-MCNC: 49.8 MG/DL (ref 6–23)
WBC # BLD AUTO: 10.4 K/UL (ref 4.3–11.1)

## 2022-06-27 PROCEDURE — 99214 OFFICE O/P EST MOD 30 MIN: CPT | Performed by: FAMILY MEDICINE

## 2022-06-27 PROCEDURE — 3046F HEMOGLOBIN A1C LEVEL >9.0%: CPT | Performed by: FAMILY MEDICINE

## 2022-06-27 RX ORDER — PEN NEEDLE, DIABETIC 31 G X1/4"
NEEDLE, DISPOSABLE MISCELLANEOUS
Status: ON HOLD | COMMUNITY
Start: 2022-04-19

## 2022-06-27 RX ORDER — ATORVASTATIN CALCIUM 80 MG/1
80 TABLET, FILM COATED ORAL DAILY
Qty: 30 TABLET | Refills: 3 | Status: SHIPPED | OUTPATIENT
Start: 2022-06-27 | End: 2022-09-27 | Stop reason: SDUPTHER

## 2022-06-27 RX ORDER — DULOXETIN HYDROCHLORIDE 60 MG/1
120 CAPSULE, DELAYED RELEASE ORAL DAILY
Qty: 30 CAPSULE | Refills: 3 | Status: SHIPPED | OUTPATIENT
Start: 2022-06-27 | End: 2022-09-27 | Stop reason: SDUPTHER

## 2022-06-27 RX ORDER — DULAGLUTIDE 4.5 MG/.5ML
4.5 INJECTION, SOLUTION SUBCUTANEOUS
Qty: 3 PEN | Refills: 3 | Status: SHIPPED | OUTPATIENT
Start: 2022-06-27 | End: 2022-07-12 | Stop reason: SDUPTHER

## 2022-06-27 RX ORDER — ALBUTEROL SULFATE 90 UG/1
1 AEROSOL, METERED RESPIRATORY (INHALATION) EVERY 6 HOURS PRN
Qty: 18 G | Refills: 2 | Status: SHIPPED | OUTPATIENT
Start: 2022-06-27 | End: 2022-09-27 | Stop reason: SDUPTHER

## 2022-06-27 RX ORDER — DEXLANSOPRAZOLE 60 MG/1
60 CAPSULE, DELAYED RELEASE ORAL DAILY
Qty: 30 CAPSULE | Refills: 3 | Status: SHIPPED | OUTPATIENT
Start: 2022-06-27 | End: 2022-09-27 | Stop reason: SDUPTHER

## 2022-06-27 RX ORDER — AMLODIPINE BESYLATE 5 MG/1
5 TABLET ORAL DAILY
Qty: 30 TABLET | Refills: 3 | Status: SHIPPED | OUTPATIENT
Start: 2022-06-27 | End: 2022-09-27 | Stop reason: SDUPTHER

## 2022-06-27 RX ORDER — GABAPENTIN 600 MG/1
600 TABLET ORAL 2 TIMES DAILY
Qty: 90 TABLET | Refills: 3 | Status: SHIPPED | OUTPATIENT
Start: 2022-06-27 | End: 2022-09-27 | Stop reason: SDUPTHER

## 2022-06-27 RX ORDER — CLOPIDOGREL BISULFATE 75 MG/1
75 TABLET ORAL DAILY
Qty: 30 TABLET | Refills: 3 | Status: SHIPPED | OUTPATIENT
Start: 2022-06-27 | End: 2022-09-27 | Stop reason: SDUPTHER

## 2022-06-27 RX ORDER — FUROSEMIDE 40 MG/1
40 TABLET ORAL DAILY
Qty: 60 TABLET | Refills: 3 | Status: SHIPPED | OUTPATIENT
Start: 2022-06-27 | End: 2022-09-27 | Stop reason: SDUPTHER

## 2022-06-27 RX ORDER — HYDROCODONE BITARTRATE AND ACETAMINOPHEN 10; 325 MG/1; MG/1
1 TABLET ORAL EVERY 8 HOURS PRN
Qty: 90 TABLET | Refills: 0 | Status: SHIPPED | OUTPATIENT
Start: 2022-06-27 | End: 2022-07-27

## 2022-06-27 RX ORDER — BLOOD SUGAR DIAGNOSTIC
STRIP MISCELLANEOUS
Status: ON HOLD | COMMUNITY
Start: 2022-04-25 | End: 2022-08-22 | Stop reason: CLARIF

## 2022-06-27 RX ORDER — HYDROCODONE BITARTRATE AND ACETAMINOPHEN 10; 325 MG/1; MG/1
1 TABLET ORAL EVERY 8 HOURS PRN
Qty: 90 TABLET | Refills: 0 | Status: SHIPPED | OUTPATIENT
Start: 2022-08-27 | End: 2022-09-26

## 2022-06-27 RX ORDER — HYDROCODONE BITARTRATE AND ACETAMINOPHEN 10; 325 MG/1; MG/1
1 TABLET ORAL EVERY 8 HOURS PRN
Qty: 90 TABLET | Refills: 0 | Status: SHIPPED | OUTPATIENT
Start: 2022-07-27 | End: 2022-08-21 | Stop reason: CLARIF

## 2022-06-27 RX ORDER — DIAZEPAM 2 MG/1
2 TABLET ORAL 2 TIMES DAILY PRN
Qty: 60 TABLET | Refills: 3 | Status: SHIPPED | OUTPATIENT
Start: 2022-06-27 | End: 2022-09-27 | Stop reason: SDUPTHER

## 2022-06-27 ASSESSMENT — PATIENT HEALTH QUESTIONNAIRE - PHQ9
6. FEELING BAD ABOUT YOURSELF - OR THAT YOU ARE A FAILURE OR HAVE LET YOURSELF OR YOUR FAMILY DOWN: 0
9. THOUGHTS THAT YOU WOULD BE BETTER OFF DEAD, OR OF HURTING YOURSELF: 0
10. IF YOU CHECKED OFF ANY PROBLEMS, HOW DIFFICULT HAVE THESE PROBLEMS MADE IT FOR YOU TO DO YOUR WORK, TAKE CARE OF THINGS AT HOME, OR GET ALONG WITH OTHER PEOPLE: 0
SUM OF ALL RESPONSES TO PHQ QUESTIONS 1-9: 0
8. MOVING OR SPEAKING SO SLOWLY THAT OTHER PEOPLE COULD HAVE NOTICED. OR THE OPPOSITE, BEING SO FIGETY OR RESTLESS THAT YOU HAVE BEEN MOVING AROUND A LOT MORE THAN USUAL: 0
SUM OF ALL RESPONSES TO PHQ9 QUESTIONS 1 & 2: 0
2. FEELING DOWN, DEPRESSED OR HOPELESS: 0
4. FEELING TIRED OR HAVING LITTLE ENERGY: 0
1. LITTLE INTEREST OR PLEASURE IN DOING THINGS: 0
7. TROUBLE CONCENTRATING ON THINGS, SUCH AS READING THE NEWSPAPER OR WATCHING TELEVISION: 0
SUM OF ALL RESPONSES TO PHQ QUESTIONS 1-9: 0
5. POOR APPETITE OR OVEREATING: 0
3. TROUBLE FALLING OR STAYING ASLEEP: 0
SUM OF ALL RESPONSES TO PHQ QUESTIONS 1-9: 0
SUM OF ALL RESPONSES TO PHQ QUESTIONS 1-9: 0

## 2022-06-28 LAB — 25(OH)D3 SERPL-MCNC: 42.4 NG/ML (ref 30–100)

## 2022-06-28 ASSESSMENT — ENCOUNTER SYMPTOMS
EYE PAIN: 0
BLOOD IN STOOL: 0
SORE THROAT: 0
COUGH: 0
ABDOMINAL PAIN: 0
SHORTNESS OF BREATH: 0
BACK PAIN: 1
COLOR CHANGE: 0
PHOTOPHOBIA: 0
ABDOMINAL DISTENTION: 0
NAUSEA: 0
BLURRED VISION: 0

## 2022-06-28 NOTE — PROGRESS NOTES
Job Guy  1970 is a 49 y.o. female ,Established patient, here for evaluation of the following chief complaint(s):   Chief Complaint   Patient presents with    Cholesterol Problem     W/ FASTING     Hypertension    Gastroesophageal Reflux    Anxiety           Rodolfo Rcihter was seen today for cholesterol problem, hypertension, gastroesophageal reflux and anxiety. Diagnoses and all orders for this visit:    Primary hypertension  -     Comprehensive Metabolic Panel; Future  -     CBC with Auto Differential; Future  -     amLODIPine (NORVASC) 5 MG tablet; Take 1 tablet by mouth daily  -     CBC with Auto Differential  -     Comprehensive Metabolic Panel    Other hyperlipidemia  -     Lipid Panel; Future  -     atorvastatin (LIPITOR) 80 MG tablet; Take 1 tablet by mouth daily  -     Lipid Panel    Uncontrolled type 2 diabetes mellitus with hyperglycemia (HCC)  -     Comprehensive Metabolic Panel; Future  -     CBC with Auto Differential; Future  -     Hemoglobin A1C; Future  -     Dulaglutide (TRULICITY) 4.5 HI/1.3AB SOPN; Inject 4.5 mg into the skin every 7 days  -     empagliflozin (JARDIANCE) 25 MG tablet; Take 1 tablet by mouth daily  -     Hemoglobin A1C  -     CBC with Auto Differential  -     Comprehensive Metabolic Panel    Vitamin D deficiency  -     Vitamin D 25 Hydroxy; Future  -     Vitamin D 25 Hydroxy    Mild intermittent reactive airway disease without complication  -     albuterol sulfate HFA (PROVENTIL;VENTOLIN;PROAIR) 108 (90 Base) MCG/ACT inhaler; Inhale 1 puff into the lungs every 6 hours as needed for Wheezing TAKE 1 PUFF BY INHALATION EVERY FOUR (4) HOURS AS NEEDED FOR WHEEZING OR SHORTNESS OF BREATH. Coronary artery disease due to calcified coronary lesion  -     atorvastatin (LIPITOR) 80 MG tablet; Take 1 tablet by mouth daily  -     clopidogrel (PLAVIX) 75 MG tablet;  Take 1 tablet by mouth daily    Gastroesophageal reflux disease, unspecified whether esophagitis present  -     dexlansoprazole (DEXILANT) 60 MG CPDR delayed release capsule; Take 1 capsule by mouth daily    Anxiety  -     diazePAM (VALIUM) 2 MG tablet; Take 1 tablet by mouth 2 times daily as needed for Anxiety for up to 120 days. Mild episode of recurrent major depressive disorder (HCC)  -     DULoxetine (CYMBALTA) 60 MG extended release capsule; Take 2 capsules by mouth daily    Peripheral polyneuropathy  -     gabapentin (NEURONTIN) 600 MG tablet; Take 1 tablet by mouth 2 times daily for 180 days. Localized edema  -     furosemide (LASIX) 40 MG tablet; Take 1 tablet by mouth daily    Chronic bilateral low back pain without sciatica  -     HYDROcodone-acetaminophen (NORCO)  MG per tablet; Take 1 tablet by mouth every 8 hours as needed for Pain for up to 30 days.  -     HYDROcodone-acetaminophen (NORCO)  MG per tablet; Take 1 tablet by mouth every 8 hours as needed for Pain for up to 30 days. Intended supply: 30 days  -     HYDROcodone-acetaminophen (NORCO)  MG per tablet; Take 1 tablet by mouth every 8 hours as needed for Pain for up to 30 days. Intended supply: 30 days    - I have reviewed the patients controlled substance prescription history, as maintained in the [place] prescription monitoring program, so that the prescription(s) for a  controlled substance can be given. - Patient is aware of addictive potential of medication.   - Patient is aware of respiratory suppression and is not experiencing any sedation with medication.   - Patient denies sedation or other side effects from medication  - Patient is instructed on compliance with dosing schedule and acknowledges that no early refill will be provided in the event of non-compliance, theft or loss of medication.   - Patient is aware that they may be subject to random drug testing and pill counts. Return in about 3 months (around 9/27/2022).         Subjective   SUBJECTIVE/OBJECTIVE:  Hypertension  This is a chronic problem. The current episode started more than 1 year ago. The problem has been rapidly improving since onset. The problem is uncontrolled. Pertinent negatives include no anxiety, blurred vision, chest pain, headaches, palpitations, peripheral edema, PND, shortness of breath or sweats. The current treatment provides moderate improvement. Hyperlipidemia  This is a chronic problem. The current episode started more than 1 year ago. The problem is controlled. Recent lipid tests were reviewed and are variable. Exacerbating diseases include diabetes. Pertinent negatives include no chest pain, myalgias or shortness of breath. The current treatment provides mild improvement of lipids. Diabetes  She presents for her follow-up diabetic visit. She has type 2 diabetes mellitus. Her disease course has been worsening. Pertinent negatives for hypoglycemia include no confusion, headaches, pallor, speech difficulty or sweats. Associated symptoms include fatigue. Pertinent negatives for diabetes include no blurred vision, no chest pain and no weakness. Pertinent negatives for hypoglycemia complications include no blackouts. Symptoms are worsening. Diabetic complications include heart disease. She is compliant with treatment none of the time. Back Pain  This is a chronic problem. The current episode started more than 1 year ago. The problem occurs constantly. The problem is unchanged. The pain is at a severity of 8/10. The pain is moderate. The pain is the same all the time. Pertinent negatives include no abdominal pain, chest pain, dysuria, fever, headaches or weakness. Review of Systems   Constitutional: Positive for fatigue. Negative for chills and fever. HENT: Negative for ear pain, hearing loss and sore throat. Eyes: Negative for blurred vision, photophobia and pain. Respiratory: Negative for cough and shortness of breath. Cardiovascular: Negative for chest pain, palpitations, leg swelling and PND. Gastrointestinal: Negative for abdominal distention, abdominal pain, blood in stool and nausea. Genitourinary: Negative for dysuria and urgency. Musculoskeletal: Positive for back pain. Negative for joint swelling and myalgias. Skin: Negative for color change, pallor and rash. Neurological: Negative for speech difficulty, weakness, light-headedness and headaches. Hematological: Negative for adenopathy. Psychiatric/Behavioral: Negative for agitation, confusion, hallucinations, self-injury and suicidal ideas. Objective   Physical Exam  Constitutional:       Appearance: Normal appearance. HENT:      Head: Normocephalic and atraumatic. Nose: Nose normal.   Eyes:      Extraocular Movements: Extraocular movements intact. Conjunctiva/sclera: Conjunctivae normal.      Pupils: Pupils are equal, round, and reactive to light. Cardiovascular:      Rate and Rhythm: Normal rate and regular rhythm. Pulses: Normal pulses. Heart sounds: Normal heart sounds. Pulmonary:      Effort: Pulmonary effort is normal.      Breath sounds: Normal breath sounds. Abdominal:      General: Abdomen is flat. Bowel sounds are normal.      Palpations: Abdomen is soft. Skin:     General: Skin is warm and dry. Neurological:      General: No focal deficit present. Mental Status: She is alert and oriented to person, place, and time. Psychiatric:         Mood and Affect: Mood normal.                   An electronic signature was used to authenticate this note.     --Chavo Villavicencio MD

## 2022-06-29 LAB
EST. AVERAGE GLUCOSE BLD GHB EST-MCNC: 229 MG/DL
HBA1C MFR BLD: 9.6 % (ref 4.2–6.3)

## 2022-07-12 ENCOUNTER — OFFICE VISIT (OUTPATIENT)
Dept: ENDOCRINOLOGY | Age: 52
End: 2022-07-12
Payer: MEDICARE

## 2022-07-12 VITALS
BODY MASS INDEX: 37.25 KG/M2 | DIASTOLIC BLOOD PRESSURE: 70 MMHG | WEIGHT: 217 LBS | SYSTOLIC BLOOD PRESSURE: 110 MMHG | OXYGEN SATURATION: 95 % | HEART RATE: 80 BPM

## 2022-07-12 DIAGNOSIS — E55.9 VITAMIN D DEFICIENCY: ICD-10-CM

## 2022-07-12 DIAGNOSIS — E11.65 UNCONTROLLED TYPE 2 DIABETES MELLITUS WITH HYPERGLYCEMIA (HCC): Primary | ICD-10-CM

## 2022-07-12 DIAGNOSIS — I10 PRIMARY HYPERTENSION: ICD-10-CM

## 2022-07-12 PROCEDURE — 99214 OFFICE O/P EST MOD 30 MIN: CPT | Performed by: PHYSICIAN ASSISTANT

## 2022-07-12 PROCEDURE — 3046F HEMOGLOBIN A1C LEVEL >9.0%: CPT | Performed by: PHYSICIAN ASSISTANT

## 2022-07-12 PROCEDURE — 95251 CONT GLUC MNTR ANALYSIS I&R: CPT | Performed by: PHYSICIAN ASSISTANT

## 2022-07-12 RX ORDER — LAMOTRIGINE 25 MG/1
TABLET ORAL
Status: ON HOLD | COMMUNITY
Start: 2022-06-30 | End: 2022-08-22 | Stop reason: CLARIF

## 2022-07-12 RX ORDER — DULAGLUTIDE 4.5 MG/.5ML
4.5 INJECTION, SOLUTION SUBCUTANEOUS WEEKLY
COMMUNITY
End: 2022-08-03 | Stop reason: ALTCHOICE

## 2022-07-12 RX ORDER — INSULIN HUMAN 500 [IU]/ML
INJECTION, SOLUTION SUBCUTANEOUS
Qty: 93 ML | Refills: 3 | Status: ON HOLD | OUTPATIENT
Start: 2022-07-12

## 2022-07-12 RX ORDER — INSULIN HUMAN 500 [IU]/ML
INJECTION, SOLUTION SUBCUTANEOUS
COMMUNITY
End: 2022-07-12 | Stop reason: SDUPTHER

## 2022-07-12 RX ORDER — INSULIN ASPART 100 [IU]/ML
INJECTION, SOLUTION INTRAVENOUS; SUBCUTANEOUS
Status: ON HOLD | COMMUNITY
End: 2022-08-22

## 2022-07-12 RX ORDER — TIRZEPATIDE 2.5 MG/.5ML
2.5 INJECTION, SOLUTION SUBCUTANEOUS
Qty: 4 ML | Refills: 11 | Status: SHIPPED | OUTPATIENT
Start: 2022-07-12 | End: 2022-08-03 | Stop reason: ALTCHOICE

## 2022-07-12 NOTE — PROGRESS NOTES
supper  Dispense: 93 mL; Refill: 3    2. Primary hypertension  BP Readings from Last 3 Encounters:   07/12/22 110/70   06/27/22 122/62   06/14/22 118/66         3. Vitamin D deficiency  Taking daily D3            Gage Mcnally was seen today for diabetes. Diagnoses and all orders for this visit:    Uncontrolled type 2 diabetes mellitus with hyperglycemia (HCC)  -     MOUNJARO 2.5 MG/0.5ML SOPN; Inject 2.5 mg into the skin every 7 days  -     WI CONTINUOUS GLUCOSE MONITORING ANALYSIS I&R  -     HUMULIN R U-500 KWIKPEN 500 UNIT/ML SOPN concentrated injection pen; 200 units before breakfast, 210 units before lunch, 105 units before supper    Primary hypertension    Vitamin D deficiency            History of Present Illness:    7/12/2022  Patient with type 2 diabetes with suboptimal glycemic control. 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 This has drastically increased patient's stress and obligation. She appears to have less time for self-care. She is exercising more regularly and feels that her diet has improved           Current Regimen:  Trulicity 4.5mg on ARGPEQ, RLECZZCCA 26WU,  Humulin R U-500 200 units  before breakfast, 200 before lunch and 95 units before supper, novolog for correction 5/50>150  using correction daily      3/25/2022   Patient returns clinic for follow-up on U-500, max dose weekly GLP-1, max dose SGLT2 I, and U-100 by correction. 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 Moreover patient has a sedentary lifestyle with no exercise routine and does admit to dietary indiscretions.   Patient previously had overnight hypoglycemia on higher dose of evening U5 100           11/18/2021   Patient with uncontrolled and worsening type 2 diabetes on weekly GLP-1, low-dose SGLT2 I, and Humulin R U-500.  Patient reports significant life stress due to psychosocial factors that were described at length and confidential                7/15/2021   Pt with mild consistent hypergycemia; often skipping pre-supper U-500 dose, explaining that if she take 85 units she has overnight hypoglycemia. Working to improve lifestyle               4/5/2021   VIRTUAL VISIT   Jose Juan Sunshine is a 53 y.o. female who was seen by synchronous (real-time) audio-video technology on 4/5/2021 .  The patient provided consent for this audio-video interaction.  The [employer] platform was used. Patient and provider  were located at their individual homes.        Patient with uncontrolled type 2 diabetes with postprandial hyperglycemia, average blood sugar 40 points greater than previous average.  Notes decreased energy               DIABETES MELLITUS   Tammie Sunshine is here for follow-up of type 2 diabetes mellitus.   She was previously under the care of Dr. Charline Jewell. She was previously treated with U-500 insulin,  Farxiga, and correction NovoLog. At last visit with Dr. Jeana Wooten, she had not checked blood glucose or taken U-500 insulin in many months. She was restarted on her previous treatment regimen.  Reportedly had difficulty obtaining medication from pharmacy           10/22/2018   Was intolerant to metformin challenge with GI upset. Is skipping her Humalin U-500 any time her glucose is less than 150.  Having frequent morning hypoglycemia, is experiencing  hyperglycemia       01/02/2018   Pt RTC for f/u of type 2 DM currently tolerating high-dose SGL T-2 and Humulin R U-500.  Patient has limited data for review and was unable to obtain CGM therapy due to  insurance coverage       03/01/2019   Patient returns to clinic for follow-up of uncontrolled type 2 diabetes on GLP-1, SGL T-2, and Humulin R U-500.  In the interim since patient's last office visit, patient  called complaining of hypoglycemia and insulin adjustments were made to her suppertime regimen to combat hypoglycemia.  Has some improvement of her hypoglycemia but hypoglycemia persists.  This is interspersed with hyperglycemia.  This is concerning  and dangerous for patient with recent diagnosis of diastolic congestive heart failure       04/30/2019   Pt RTC for f/u, doing well on current regimen. Now with HgbA1c at goal!!! Pt tolerating once daily SGLT-2, once weekly GLP-1, TID Humulin R U-500 and correction scale novolog. Pt  has been working to improve diet       10/22/2019   Pt RTC explaining that she has been ill with new diagnosis of vestibular migraines. Had severe nausea and vomiting leading to lack of compliance to her medication. Now on amovig with  relief. Stopped farixga due to inability to stay hydrated which was appropriate, recently restarted U-500 insulin.  Reports has continue trulicity since last visit       02/04/2020   Under care of Neurology with concerns for recurrent headaches and dizziness.  This is leading to decreased appetite and poor intake.  Patient reports that she is 14 days headache  free which is helped to improve her appetite.  When she is not eating she is not taking any U-500.  There is minimal blood glucose data available for review but she reports improvemente- of blood glucose when taking her prescribed dose  without hypoglycemia.  She is tolerating once weekly and NovoLog by correction       05/15/2020   VIRTUAL VISIT    Pt meets for virtual follow up, continues to have dizziness and is under care of neuro for vestibular migraines and possible stroke               10/14/2020   VIRTUAL VISIT       Doing well with some hyperglycemia, on weekly GLP-1, new SGLT2i and Humulin R U-500, recently with fasting hypoglycemia, improved with dose reduction.           12/14/2020   VIRTUAL VISIT   Matty Sunshine is a 53 y.o. female who was seen by synchronous (real-time) audio-video technology on 12/14/2020 .  The patient provided consent for this audio-video  interaction.  The [employer] platform was used. Patient and provider were located at their individual homes.         Took break from invokana for CABGx4, tolerating other meds, some overnight hypoglycemia persists, did not down titrate lunch dose of U-500 as discussed  On phone visit           Previously treatment: tried Verdie How coverage issues, bydureon with no improvement, victoza with no benefit, metformin with no control       Date of diagnosis: ~2008       Diet:  \"I eat what I want to eat. \"       Exercise:  Walking 30-45 minutes at times       Body weight trend: decreasing steadily      Wt Readings from Last 3 Encounters:   07/12/22 217 lb (98.4 kg)   06/27/22 218 lb (98.9 kg)   06/14/22 218 lb (98.9 kg)                      Wt Readings from Last 3 Encounters:        12/02/20  234 lb (106.1 kg)     11/24/20  232 lb (105.2 kg)     11/17/20  233 lb 14.4 oz (106.1 kg)                 Diabetes education: The patient has received formal diabetes education (at Dr. Emma Jaffe office,  most recently ~2015).        Home blood glucose monitoring frequency:   By review of CGM download over past 30 days  Average blood glucose 193 ± 56  Time in range 44.4%  High 55.6%, Very High 15.1%  Low 0.1%, Very Low 0.%     Typical Standard Deviation   Fasting 175 45   AC lunch 183 46   AC supper 232 66   Bedtime 211 54     Blood glucose levels are uncontrolled, most significant elevations are post prandial          Hypoglycemia: recurrent, fasting, with nausea and lightheadedness        Hemoglobin A1c:   11/8/2011: 8.3%. 10/20/2015: 12.2%. 10/11/2016: 10.0%. 1/6/2016:  7.6%. 5/15/2017: 8.8%. 2/5/2018: 11.3%. 5/7/2018: 11.4%.    09/06/2018: 10.0%   01/02/2019: 8.0%   04/30/2019: 6.9%!!!   10/03/2019: 11.1%   02/04/2020: 9.8%   08/04/2020: 7.8%   11/04/2020: 7.1%   04/05/2021: 5.0% (inconsistent with BGL readings)   05/13/2021: 7.7%   10/07/2021: 8.8%   01/05/2022: 8.9%  06/27/2022: 9.6%           Diabetic complications:                   Retinopathy : Last eye exam was 6/2022 and demonstrated no diabetic retinopathy.  Eye care specialist is [facility]                   Albuminuria/nephropathy :                           8/24/2016: Urine microalbumin to creatinine ratio 14 (negative).                         7/6/1368: Serum creatinine 0.56.                           09/05/2018      Cr 0.49,                            01/30/2019      Cr 0.65, GFR >60                           03/25/2019      Cr 0.67,                            04/30/2019      Cr 0.62, , microalbumin/Cr ratio 5.0                           10/03/2019      Cr 0.85, GFR 82                           11/05/2020      Cr 0.53, GWP 948                           04/01/2021      Cr  0.83, GFR 83                               10/07/2021      Cr 0.61, ODS 384                                  01/05/2022      Cr 0.67,      06/27/2022 Cr 0.70, GFR >60                                              Neuropathy :  burning, tingling, cramping and hypersensitivity of feet       Other pertinent labs:                   EJBSOXDX labs                           10/22/2019 C-peptide 2.6 (fasting and concomitant BGL  131)                   Lipids :                                           9/16/2016: Total cholesterol 197, triglycerides 449, HDL 31, LDL cannot be calculated  secondary to high triglycerides.                           1/12/2017: Total cholesterol 237, triglycerides 509, HDL 38, LDL cannot be calculated  secondary to high triglycerides.                           2/5/2018: Total cholesterol 288, triglycerides 1321, HDL 32, LDL cannot be calculated  secondary to high triglycerides.                           5/7/2018:  Total cholesterol 242, triglycerides 644, HDL 39, LDL cannot be calculated  secondary to high triglycerides.                               09/05/2018  TC- 245, LDL- 129, VLDL- 69,  HDL- 47, TG- 346                                  Was given RX for atorvastatin 40mg but never received RX                               80/01/6767  TC- 245, LDL- 129, VLDL- 69,  HDL- 47, TG- 346               Now compliant with atorvastatin 40mg                           03/05/2019  TC- 127, LDL- 45, VLDL- 46,  HDL- 36, TG- 231                           10/03/2019  TC- 219, LDL- 101, VLDL- 79,  HDL- 39, TG- 393                           11/04/2020  TC- 208, LDL- 129, VLDL- 32,  HDL- 47, TG- 179               On atorvastatin 80mg                           S/p CABGx4 Nov 20202020 04/01/2021  TC- 192, LDL- 123,  VLDL- 36,  HDL- 33, TG- 202    06/17/2022  TC- 180, LDL- 78.2, VLDL- 49.8,  HDL- 52, TG- 249                             Thyroid :                            8/15/2016: TSH 3.270.                           7/05/0664: TSH 1.950.                           45/08/2625: TSH 1.790                           85/30/3914: TSH 1.810                           38/63/1293: TSH 1.150                               53/47/0509: TSH 2.200                                      Vitamin D:                              88/90/1082      27. 2                           09/06/2018      44. 5                           52/03/8850      61. 8                           56/66/7817      82. 8                           04/01/2021      29.9 (daily D3 5k, and week 50k ergocalciferol)                           10/07/2021          37.7                                    01/05/2022      22.3     06/27/2022 42.4                                                   Allergies & Medications:  Reviewed in chart. Review of Systems    Vital Signs:  /70 (Site: Left Upper Arm, Position: Sitting)   Pulse 80   Wt 217 lb (98.4 kg)   SpO2 95%   BMI 37.25 kg/m²       Physical Exam  Constitutional:       Appearance: Normal appearance. Comments: Central obesity   HENT:      Head: Normocephalic. Neck:      Thyroid: No thyroid mass or thyromegaly. Vascular: No carotid bruit. Cardiovascular:      Rate and Rhythm: Normal rate and regular rhythm. Pulmonary:      Effort: Pulmonary effort is normal.      Breath sounds: Normal breath sounds. Abdominal:      Palpations: Abdomen is soft. Musculoskeletal:      Cervical back: Neck supple. Right lower leg: No edema. Left lower leg: No edema. Feet:      Right foot:      Protective Sensation: 3 sites tested. 3 sites sensed. Skin integrity: Skin integrity normal.      Left foot:      Protective Sensation: 3 sites tested. 3 sites sensed. Skin integrity: Skin integrity normal.   Lymphadenopathy:      Cervical: No cervical adenopathy. Skin:     General: Skin is warm and dry. Neurological:      General: No focal deficit present. Mental Status: She is alert. Sensory: Sensation is intact. Psychiatric:         Mood and Affect: Mood normal.         Behavior: Behavior normal.         Thought Content: Thought content normal.         Judgment: Judgment normal.             Return in about 3 months (around 10/12/2022) for Diabetes DM2 Follow-Up. Portions of this note were generated with the assistance of voice recogniton software. As such, some errors in transcription may be present.

## 2022-07-12 NOTE — PATIENT INSTRUCTIONS
INSULIN ADMINISTRATION INSTRUCTIONS:    Patient Name:  Tamika Cotton   YOB: 1970    Provider Name:  Yenny Mauricio PA-C  Today's Date:  07/12/22      LONG-ACTING INSULIN     U-500    200 units before breakfast, 210 units before lunch, 105 units before supper         SHORT-ACTING INSULIN     Novolog      Be sure to check blood glucose before meals and at bedtime. Based on the blood glucose reading, take the following amount of insulin:    Blood glucose 150 or less  No insulin  Blood glucose 151-200  5 units  Blood glucose 201-250  10 units  Blood glucose 251-300  15 units  Blood glucose 301-350  20 units  Blood glucose greater than 350 25 units      If there are questions, send an electronic [email or web address] message to for nonurgent questions or call the office at [phone].     [facility] ENDOCRINOLOGY  [address] [address]

## 2022-07-21 ENCOUNTER — PATIENT MESSAGE (OUTPATIENT)
Dept: ENDOCRINOLOGY | Age: 52
End: 2022-07-21

## 2022-07-21 DIAGNOSIS — E11.65 UNCONTROLLED TYPE 2 DIABETES MELLITUS WITH HYPERGLYCEMIA (HCC): Primary | ICD-10-CM

## 2022-07-22 NOTE — TELEPHONE ENCOUNTER
Evelio Dash!!!!!!!!! The 2.5mg dose replaces trulicity. Send me a message next week and i'll send in the next higher dose so you can move up to the 5mg dose after a very short time on the 2.5mg dose as long as we know you tolerate it. You may require more correction during this change. Good to hear about Neida. Send hugs.

## 2022-07-22 NOTE — TELEPHONE ENCOUNTER
From: Saida Rutledge  To: Walker Sandoval  Sent: 7/21/2022 10:54 PM EDT  Subject: Olga Schuler News !!! It was covered and continued to be covered I asked insurance I am stoked I am also glad we didnt have to wait this is awesome maybe it will help me to lose weight and get sugars under control     Zander Lovett is doing good ty for your help!

## 2022-08-01 RX ORDER — ALIROCUMAB 75 MG/ML
INJECTION, SOLUTION SUBCUTANEOUS
Qty: 2 ML | Refills: 11 | OUTPATIENT
Start: 2022-08-01

## 2022-08-03 RX ORDER — FLUCONAZOLE 100 MG/1
200 TABLET ORAL
Qty: 3 TABLET | Refills: 1 | Status: SHIPPED | OUTPATIENT
Start: 2022-08-03 | End: 2022-08-13

## 2022-08-03 RX ORDER — TIRZEPATIDE 5 MG/.5ML
5 INJECTION, SOLUTION SUBCUTANEOUS WEEKLY
Qty: 4 PEN | Refills: 1 | Status: SHIPPED | OUTPATIENT
Start: 2022-08-03 | End: 2022-10-17 | Stop reason: ALTCHOICE

## 2022-08-03 NOTE — TELEPHONE ENCOUNTER
Cherelle response:    I am thrilled to hear you are tolerating the new medication. I sent in the 5 mg dose. I also sent in Diflucan 200 mg. With the instructions to take 1 tablet every 3 days for 3 doses. This should give you 10 days of coverage. I also sent in a refill. The medication last about 72 hours in your system so spreading out the dosage can be really helpful. Sure don't want Neida to have lows so I am happy to look at her stuff.  Can you arrange it?    ~Arleen

## 2022-08-04 ENCOUNTER — TELEPHONE (OUTPATIENT)
Dept: FAMILY MEDICINE CLINIC | Facility: CLINIC | Age: 52
End: 2022-08-04

## 2022-08-11 ENCOUNTER — NURSE ONLY (OUTPATIENT)
Dept: PULMONOLOGY | Age: 52
End: 2022-08-11
Payer: MEDICARE

## 2022-08-11 DIAGNOSIS — R06.09 DOE (DYSPNEA ON EXERTION): Primary | ICD-10-CM

## 2022-08-11 LAB
FEV 1 , POC: 2.35 L
FEV1 % PRED, POC: 86 %
FEV1/FVC, POC: NORMAL
FVC % PRED, POC: 85 %
FVC, POC: NORMAL

## 2022-08-11 PROCEDURE — 94060 EVALUATION OF WHEEZING: CPT | Performed by: INTERNAL MEDICINE

## 2022-08-11 PROCEDURE — 94726 PLETHYSMOGRAPHY LUNG VOLUMES: CPT | Performed by: INTERNAL MEDICINE

## 2022-08-11 PROCEDURE — 94729 DIFFUSING CAPACITY: CPT | Performed by: INTERNAL MEDICINE

## 2022-08-11 RX ORDER — TIZANIDINE 2 MG/1
TABLET ORAL
Qty: 15 TABLET | Refills: 0 | Status: SHIPPED | OUTPATIENT
Start: 2022-08-11 | End: 2022-09-27 | Stop reason: SDUPTHER

## 2022-08-11 ASSESSMENT — PULMONARY FUNCTION TESTS
FVC_PERCENT_PREDICTED_POC: 85
FEV1_PERCENT_PREDICTED_POC: 86

## 2022-08-12 ENCOUNTER — OFFICE VISIT (OUTPATIENT)
Dept: PULMONOLOGY | Age: 52
End: 2022-08-12
Payer: MEDICARE

## 2022-08-12 VITALS
SYSTOLIC BLOOD PRESSURE: 120 MMHG | HEIGHT: 64 IN | OXYGEN SATURATION: 97 % | WEIGHT: 212 LBS | DIASTOLIC BLOOD PRESSURE: 80 MMHG | RESPIRATION RATE: 20 BRPM | BODY MASS INDEX: 36.19 KG/M2 | TEMPERATURE: 98 F | HEART RATE: 78 BPM

## 2022-08-12 DIAGNOSIS — J45.40 MODERATE PERSISTENT ASTHMA WITHOUT COMPLICATION: Primary | ICD-10-CM

## 2022-08-12 DIAGNOSIS — D75.1 POLYCYTHEMIA: ICD-10-CM

## 2022-08-12 PROCEDURE — 99214 OFFICE O/P EST MOD 30 MIN: CPT | Performed by: INTERNAL MEDICINE

## 2022-08-12 RX ORDER — FLUTICASONE FUROATE AND VILANTEROL 200; 25 UG/1; UG/1
1 POWDER RESPIRATORY (INHALATION) DAILY
Qty: 1 EACH | Refills: 11 | Status: SHIPPED | OUTPATIENT
Start: 2022-08-12 | End: 2022-09-27 | Stop reason: SDUPTHER

## 2022-08-12 RX ORDER — ALBUTEROL SULFATE 90 UG/1
1 AEROSOL, METERED RESPIRATORY (INHALATION) EVERY 6 HOURS PRN
Qty: 1 EACH | Refills: 11 | Status: CANCELLED | OUTPATIENT
Start: 2022-08-12

## 2022-08-12 RX ORDER — ALBUTEROL SULFATE 90 UG/1
1 POWDER, METERED RESPIRATORY (INHALATION) EVERY 6 HOURS PRN
Qty: 1 EACH | Refills: 11 | Status: SHIPPED | OUTPATIENT
Start: 2022-08-12 | End: 2022-09-27 | Stop reason: SDUPTHER

## 2022-08-12 RX ORDER — IPRATROPIUM BROMIDE AND ALBUTEROL SULFATE 2.5; .5 MG/3ML; MG/3ML
1 SOLUTION RESPIRATORY (INHALATION) EVERY 6 HOURS PRN
Qty: 360 ML | Refills: 11 | Status: ON HOLD | OUTPATIENT
Start: 2022-08-12

## 2022-08-12 NOTE — PROGRESS NOTES
[address] Yomaira Davis. [address], [address] [facility]  [phone]    Patient Name:  Srinivasa Borges      YOB: 1970  Office Visit 8/12/2022    ASSESSMENT AND PLAN:  (Medical Decision Making)    Diagnoses and all orders for this visit:  Moderate persistent asthma without complication: Improved and well controlled by PFTs. She still using her albuterol more than I would consider her control, but she is dramatically better. I we will give her another 6 months on therapy and reevaluate her. We can consider adding Incruse or changing to Trelegy in the future if persistent symptoms. She only had 200 eosinophil cells so not clearly overtly eosinophilic, we can consider a Jey in the clinic next time if she remains using her albuterol daily.  -     BREO ELLIPTA 200-25 MCG/INH AEPB inhaler; TAKE 1 PUFF BY INHALATION DAILY. -     ipratropium-albuterol (DUONEB) 0.5-2.5 (3) MG/3ML SOLN nebulizer solution; Inhale 3 mLs into the lungs every 6 hours as needed for Shortness of Breath INHALE 3ML VIA NEBULIZER EVERY 6 HOURS  -     albuterol sulfate (PROAIR RESPICLICK) 513 (90 Base) MCG/ACT aerosol powder inhalation; Inhale 1 puff into the lungs every 6 hours as needed for Wheezing or Shortness of Breath    Polycythemia: Hemoglobin levels are improved and are within high levels of normal.  She did not have low oxygen with rest, exertion or overnight on her CPAP. It is possible that she transiently had some low oxygen when she was not using her CPAP due to sinus disease and poorly controlled asthma, but currently I see no evidence for a pulmonary disorder contributing to polycythemia. Brooks Delgado MD  _________________________________________________________________________    HISTORY OF PRESENT ILLNESS:    Ms. Ivana Avila in our clinic today who presents with a Asthma and Shortness of Breath  . She presents today for follow-up.   She has been on Breo for the last few months since her last visit and reports a significant improvement in her overall symptoms. She reports that she is still using her albuterol 1-3 times per day for shortness of breath, but she is dramatically improved and she had complete PFTs yesterday that were completely normal.  She denies any other symptoms and reports that her sinus symptoms are well controlled now and she is tolerating her CPAP well nightly. REVIEW OF SYSTEMS:  10 point review of systems is negative except as reported in HPI. PHYSICAL EXAM: Body mass index is 36.39 kg/m². Vitals:    08/12/22 1008   BP: 120/80   Pulse: 78   Resp: 20   Temp: 98 °F (36.7 °C)   TempSrc: Temporal   SpO2: 97%   Weight: 212 lb (96.2 kg)   Height: 5' 4\" (1.626 m)     PERTINENT FINDINGS: Alert, calm, clear breath sounds, regular rate and rhythm, no murmurs, no edema. Fluent speech normal gait. DIAGNOSTIC TESTS:                                                                                    LABS:   Lab Results   Component Value Date/Time    WBC 10.4 06/27/2022 10:14 AM    HGB 15.1 06/27/2022 10:14 AM    HCT 48.9 06/27/2022 10:14 AM     06/27/2022 10:14 AM    TSH 0.682 03/31/2022 08:57 AM     No results found for: DEEPALI, ANCA, RF, ESR, CRP  Imaging:  CXR:   XR CHEST STANDARD TWO VW 05/03/2022    Narrative  Chest X-ray    INDICATION: Shortness of breath    PA and lateral views of the chest were obtained. FINDINGS: The lungs are clear. There are no infiltrates or effusions. The heart  size is normal.  There are sternotomy changes.     Impression  No acute findings in the chest    PFTs:   Results for orders placed or performed in visit on 08/11/22   AMB POC SPIROMETRY W/BRONCHODILATOR   Result Value Ref Range    FEV 1 , POC 2.35 L    FEV1 % Pred POC 86 %    FVC, POC 2.83 L     FVC % Pred POC 85 %    FEV1/FVC, POC 83%         PMH Reference Info: SOSY INJECT CONTENTS OF 1 SYRINGE SUBCUTANEOUSLY EVERY 30 DAYS    glucagon 1 mg, IntraMUSCular, PRN    Gvoke HypoPen 2-Pack 1 mg, SubCUTAneous, PRN    HUMULIN R U-500 KWIKPEN 500 UNIT/ML SOPN concentrated injection pen 200 units before breakfast, 210 units before lunch, 105 units before supper    HYDROcodone-acetaminophen (NORCO)  MG per tablet 1 tablet, Oral, EVERY 8 HOURS PRN, Intended supply: 30 days    [START ON 8/27/2022] HYDROcodone-acetaminophen (NORCO)  MG per tablet 1 tablet, Oral, EVERY 8 HOURS PRN, Intended supply: 30 days    hydrOXYzine HCl (ATARAX) 25 mg, Oral, 3 TIMES DAILY PRN    ipratropium-albuterol (DUONEB) 0.5-2.5 (3) MG/3ML SOLN nebulizer solution 3 mLs, Inhalation, EVERY 6 HOURS PRN, INHALE 3ML VIA NEBULIZER EVERY 6 HOURS    lamoTRIgine (LAMICTAL) 25 MG tablet TAKE 2 TABLETS (50 MG) DAILY. lamoTRIgine (LAMICTAL) 100 mg, Oral, 2 TIMES DAILY    linaclotide (LINZESS) 145 mcg, Oral, DAILY    lisinopril (PRINIVIL;ZESTRIL) 40 mg, Oral, EVERY EVENING    Loteprednol Etabonate (LOTEMAX SM) 0.38 % GEL 1 drop 3x a day x 1 wk, 2x a day x 1 wk, 1x a day x 1 wk, then d/c in both eyes    meloxicam (MOBIC) 15 MG tablet One daily    metoprolol succinate (TOPROL XL) 50 mg, Oral, DAILY    Mounjaro 5 mg, SubCUTAneous, WEEKLY    naloxone 4 MG/0.1ML LIQD nasal spray Use 1 spray intranasally, then discard. Repeat with new spray every 2 min as needed for opioid overdose symptoms, alternating nostrils. nitroGLYCERIN (NITROSTAT) 0.4 MG SL tablet PLACE ONE TABLET UNDER TONGUE AS NEEDED FOR CHEST PAIN. MAY REPEAT EVERY 5 MINUTES FOR 2 MORE DOSES. CALL 911 ON SECOND DOSE.     NOVOLOG FLEXPEN 100 UNIT/ML injection pen SubCUTAneous, 3 TIMES DAILY BEFORE MEALS, Correction 4 units for every 50 over 150, bedtime 4/50>200 max daily dose 30 units     ondansetron (ZOFRAN-ODT) 8 mg, Oral, EVERY 8 HOURS PRN    ONETOUCH VERIO strip UTD TWO TIMES A DAY TO THREE TIMES A DAY    potassium chloride (KLOR-CON M) 20 MEQ

## 2022-08-21 ENCOUNTER — HOSPITAL ENCOUNTER (OUTPATIENT)
Age: 52
Setting detail: OBSERVATION
Discharge: HOME | End: 2022-08-22
Attending: EMERGENCY MEDICINE | Admitting: INTERNAL MEDICINE
Payer: MEDICARE

## 2022-08-21 DIAGNOSIS — I10 HYPERTENSION, UNSPECIFIED TYPE: ICD-10-CM

## 2022-08-21 DIAGNOSIS — R07.9 CHEST PAIN, UNSPECIFIED TYPE: Primary | ICD-10-CM

## 2022-08-21 DIAGNOSIS — I20.0 UNSTABLE ANGINA (HCC): ICD-10-CM

## 2022-08-21 PROBLEM — Z95.1 S/P CABG X 4: Status: ACTIVE | Noted: 2020-11-18

## 2022-08-21 PROBLEM — I50.32 DIASTOLIC CHF, CHRONIC (HCC): Status: ACTIVE | Noted: 2019-02-27

## 2022-08-21 LAB
ALBUMIN SERPL-MCNC: 3.6 G/DL (ref 3.5–5)
ALBUMIN/GLOB SERPL: 1 {RATIO} (ref 1.2–3.5)
ALP SERPL-CCNC: 67 U/L (ref 50–136)
ALT SERPL-CCNC: 32 U/L (ref 12–65)
ANION GAP SERPL CALC-SCNC: 5 MMOL/L (ref 7–16)
AST SERPL-CCNC: 27 U/L (ref 15–37)
BILIRUB SERPL-MCNC: 0.7 MG/DL (ref 0.2–1.1)
BUN SERPL-MCNC: 16 MG/DL (ref 6–23)
CALCIUM SERPL-MCNC: 9.1 MG/DL (ref 8.3–10.4)
CHLORIDE SERPL-SCNC: 112 MMOL/L (ref 98–107)
CO2 SERPL-SCNC: 21 MMOL/L (ref 21–32)
CREAT SERPL-MCNC: 0.6 MG/DL (ref 0.6–1)
ERYTHROCYTE [DISTWIDTH] IN BLOOD BY AUTOMATED COUNT: 13.4 % (ref 11.9–14.6)
GLOBULIN SER CALC-MCNC: 3.6 G/DL (ref 2.3–3.5)
GLUCOSE SERPL-MCNC: 135 MG/DL (ref 65–100)
HCT VFR BLD AUTO: 46.2 % (ref 35.8–46.3)
HGB BLD-MCNC: 14.9 G/DL (ref 11.7–15.4)
MCH RBC QN AUTO: 27.7 PG (ref 26.1–32.9)
MCHC RBC AUTO-ENTMCNC: 32.3 G/DL (ref 31.4–35)
MCV RBC AUTO: 86 FL (ref 79.6–97.8)
NRBC # BLD: 0 K/UL (ref 0–0.2)
PLATELET # BLD AUTO: 376 K/UL (ref 150–450)
PMV BLD AUTO: 9 FL (ref 9.4–12.3)
POTASSIUM SERPL-SCNC: 4.7 MMOL/L (ref 3.5–5.1)
PROT SERPL-MCNC: 7.2 G/DL (ref 6.3–8.2)
RBC # BLD AUTO: 5.37 M/UL (ref 4.05–5.2)
SODIUM SERPL-SCNC: 138 MMOL/L (ref 136–145)
TROPONIN I SERPL HS-MCNC: 4.1 PG/ML (ref 0–14)
TROPONIN I SERPL HS-MCNC: 4.9 PG/ML (ref 0–14)
TROPONIN I SERPL HS-MCNC: 7.8 PG/ML (ref 0–14)
WBC # BLD AUTO: 8.7 K/UL (ref 4.3–11.1)

## 2022-08-21 PROCEDURE — 6370000000 HC RX 637 (ALT 250 FOR IP): Performed by: INTERNAL MEDICINE

## 2022-08-21 PROCEDURE — 99285 EMERGENCY DEPT VISIT HI MDM: CPT

## 2022-08-21 PROCEDURE — G0378 HOSPITAL OBSERVATION PER HR: HCPCS

## 2022-08-21 PROCEDURE — 99220 PR INITIAL OBSERVATION CARE/DAY 70 MINUTES: CPT | Performed by: INTERNAL MEDICINE

## 2022-08-21 PROCEDURE — 6370000000 HC RX 637 (ALT 250 FOR IP): Performed by: PHYSICIAN ASSISTANT

## 2022-08-21 PROCEDURE — 2580000003 HC RX 258: Performed by: PHYSICIAN ASSISTANT

## 2022-08-21 PROCEDURE — 94761 N-INVAS EAR/PLS OXIMETRY MLT: CPT

## 2022-08-21 PROCEDURE — 85027 COMPLETE CBC AUTOMATED: CPT

## 2022-08-21 PROCEDURE — 84484 ASSAY OF TROPONIN QUANT: CPT

## 2022-08-21 PROCEDURE — 80053 COMPREHEN METABOLIC PANEL: CPT

## 2022-08-21 PROCEDURE — 36415 COLL VENOUS BLD VENIPUNCTURE: CPT

## 2022-08-21 RX ORDER — ASPIRIN 81 MG/1
81 TABLET ORAL EVERY EVENING
Status: DISCONTINUED | OUTPATIENT
Start: 2022-08-21 | End: 2022-08-22 | Stop reason: HOSPADM

## 2022-08-21 RX ORDER — PANTOPRAZOLE SODIUM 40 MG/1
40 TABLET, DELAYED RELEASE ORAL
Status: DISCONTINUED | OUTPATIENT
Start: 2022-08-22 | End: 2022-08-22 | Stop reason: HOSPADM

## 2022-08-21 RX ORDER — POLYETHYLENE GLYCOL 3350 17 G/17G
17 POWDER, FOR SOLUTION ORAL DAILY PRN
Status: DISCONTINUED | OUTPATIENT
Start: 2022-08-21 | End: 2022-08-22 | Stop reason: HOSPADM

## 2022-08-21 RX ORDER — ONDANSETRON 8 MG/1
8 TABLET, ORALLY DISINTEGRATING ORAL EVERY 8 HOURS PRN
Status: DISCONTINUED | OUTPATIENT
Start: 2022-08-21 | End: 2022-08-22

## 2022-08-21 RX ORDER — GABAPENTIN 300 MG/1
600 CAPSULE ORAL 2 TIMES DAILY
Status: DISCONTINUED | OUTPATIENT
Start: 2022-08-21 | End: 2022-08-22 | Stop reason: HOSPADM

## 2022-08-21 RX ORDER — HYDROCODONE BITARTRATE AND ACETAMINOPHEN 10; 325 MG/1; MG/1
1 TABLET ORAL EVERY 8 HOURS PRN
Status: DISCONTINUED | OUTPATIENT
Start: 2022-08-21 | End: 2022-08-22 | Stop reason: HOSPADM

## 2022-08-21 RX ORDER — AMLODIPINE BESYLATE 10 MG/1
5 TABLET ORAL DAILY
Status: DISCONTINUED | OUTPATIENT
Start: 2022-08-21 | End: 2022-08-22 | Stop reason: HOSPADM

## 2022-08-21 RX ORDER — ATORVASTATIN CALCIUM 40 MG/1
80 TABLET, FILM COATED ORAL DAILY
Status: DISCONTINUED | OUTPATIENT
Start: 2022-08-21 | End: 2022-08-22 | Stop reason: HOSPADM

## 2022-08-21 RX ORDER — ACETAMINOPHEN 325 MG/1
650 TABLET ORAL EVERY 6 HOURS PRN
Status: DISCONTINUED | OUTPATIENT
Start: 2022-08-21 | End: 2022-08-22 | Stop reason: HOSPADM

## 2022-08-21 RX ORDER — NITROGLYCERIN 0.4 MG/1
0.4 TABLET SUBLINGUAL EVERY 5 MIN PRN
Status: DISCONTINUED | OUTPATIENT
Start: 2022-08-21 | End: 2022-08-22 | Stop reason: HOSPADM

## 2022-08-21 RX ORDER — TRAZODONE HYDROCHLORIDE 50 MG/1
100 TABLET ORAL DAILY
Status: DISCONTINUED | OUTPATIENT
Start: 2022-08-21 | End: 2022-08-22 | Stop reason: HOSPADM

## 2022-08-21 RX ORDER — ALBUTEROL SULFATE 90 UG/1
1 AEROSOL, METERED RESPIRATORY (INHALATION) EVERY 6 HOURS PRN
Status: DISCONTINUED | OUTPATIENT
Start: 2022-08-21 | End: 2022-08-22 | Stop reason: HOSPADM

## 2022-08-21 RX ORDER — LISINOPRIL 20 MG/1
40 TABLET ORAL EVERY EVENING
Status: DISCONTINUED | OUTPATIENT
Start: 2022-08-21 | End: 2022-08-22 | Stop reason: HOSPADM

## 2022-08-21 RX ORDER — DULOXETIN HYDROCHLORIDE 30 MG/1
120 CAPSULE, DELAYED RELEASE ORAL DAILY
Status: DISCONTINUED | OUTPATIENT
Start: 2022-08-21 | End: 2022-08-22 | Stop reason: HOSPADM

## 2022-08-21 RX ORDER — ACETAMINOPHEN 650 MG/1
650 SUPPOSITORY RECTAL EVERY 6 HOURS PRN
Status: DISCONTINUED | OUTPATIENT
Start: 2022-08-21 | End: 2022-08-22 | Stop reason: HOSPADM

## 2022-08-21 RX ORDER — CLOPIDOGREL BISULFATE 75 MG/1
75 TABLET ORAL DAILY
Status: DISCONTINUED | OUTPATIENT
Start: 2022-08-21 | End: 2022-08-22 | Stop reason: HOSPADM

## 2022-08-21 RX ORDER — DIAZEPAM 2 MG/1
2 TABLET ORAL 2 TIMES DAILY PRN
Status: DISCONTINUED | OUTPATIENT
Start: 2022-08-21 | End: 2022-08-22 | Stop reason: HOSPADM

## 2022-08-21 RX ORDER — NITROGLYCERIN 0.4 MG/1
0.4 TABLET SUBLINGUAL EVERY 5 MIN PRN
Status: DISCONTINUED | OUTPATIENT
Start: 2022-08-21 | End: 2022-08-21 | Stop reason: SDUPTHER

## 2022-08-21 RX ORDER — LAMOTRIGINE 100 MG/1
100 TABLET ORAL 2 TIMES DAILY
Status: DISCONTINUED | OUTPATIENT
Start: 2022-08-21 | End: 2022-08-22 | Stop reason: HOSPADM

## 2022-08-21 RX ORDER — SODIUM CHLORIDE 9 MG/ML
INJECTION, SOLUTION INTRAVENOUS CONTINUOUS
Status: DISCONTINUED | OUTPATIENT
Start: 2022-08-21 | End: 2022-08-22 | Stop reason: HOSPADM

## 2022-08-21 RX ADMIN — TRAZODONE HYDROCHLORIDE 100 MG: 50 TABLET ORAL at 15:48

## 2022-08-21 RX ADMIN — LAMOTRIGINE 100 MG: 100 TABLET ORAL at 15:49

## 2022-08-21 RX ADMIN — LAMOTRIGINE 100 MG: 100 TABLET ORAL at 20:30

## 2022-08-21 RX ADMIN — GABAPENTIN 600 MG: 300 CAPSULE ORAL at 20:30

## 2022-08-21 RX ADMIN — ATORVASTATIN CALCIUM 80 MG: 40 TABLET, FILM COATED ORAL at 15:46

## 2022-08-21 RX ADMIN — GABAPENTIN 600 MG: 300 CAPSULE ORAL at 15:46

## 2022-08-21 RX ADMIN — METOPROLOL TARTRATE 25 MG: 25 TABLET, FILM COATED ORAL at 20:29

## 2022-08-21 RX ADMIN — SODIUM CHLORIDE: 9 INJECTION, SOLUTION INTRAVENOUS at 15:44

## 2022-08-21 RX ADMIN — HYDROCODONE BITARTRATE AND ACETAMINOPHEN 1 TABLET: 10; 325 TABLET ORAL at 15:48

## 2022-08-21 RX ADMIN — NITROGLYCERIN 2 INCH: 20 OINTMENT TOPICAL at 14:17

## 2022-08-21 RX ADMIN — CLOPIDOGREL BISULFATE 75 MG: 75 TABLET ORAL at 15:49

## 2022-08-21 RX ADMIN — DULOXETINE 120 MG: 30 CAPSULE, DELAYED RELEASE ORAL at 15:45

## 2022-08-21 RX ADMIN — METOPROLOL TARTRATE 25 MG: 25 TABLET, FILM COATED ORAL at 15:47

## 2022-08-21 RX ADMIN — LISINOPRIL 40 MG: 20 TABLET ORAL at 18:09

## 2022-08-21 RX ADMIN — AMLODIPINE BESYLATE 5 MG: 10 TABLET ORAL at 15:49

## 2022-08-21 ASSESSMENT — PAIN SCALES - GENERAL
PAINLEVEL_OUTOF10: 5
PAINLEVEL_OUTOF10: 8
PAINLEVEL_OUTOF10: 0

## 2022-08-21 ASSESSMENT — PAIN DESCRIPTION - LOCATION
LOCATION: CHEST
LOCATION: CHEST

## 2022-08-21 ASSESSMENT — PAIN - FUNCTIONAL ASSESSMENT
PAIN_FUNCTIONAL_ASSESSMENT: ACTIVITIES ARE NOT PREVENTED
PAIN_FUNCTIONAL_ASSESSMENT: 0-10
PAIN_FUNCTIONAL_ASSESSMENT: ACTIVITIES ARE NOT PREVENTED

## 2022-08-21 ASSESSMENT — PAIN DESCRIPTION - DESCRIPTORS: DESCRIPTORS: DISCOMFORT

## 2022-08-21 NOTE — Clinical Note
Contrast Dose Calculator:   Patient's age: 49.   Patient's sex: Female. Patient weight (kg) = 98.4. Creatinine level (mg/dL) = 0.5. Creatinine clearance (mL/min): 209. Contrast concentration (mg/mL) = 370. MACD = 300 mL. Max Contrast dose per Creatinine Cl calculator = 470.25 mL.

## 2022-08-21 NOTE — ED TRIAGE NOTES
Patient arrived via [facility] from home. Pt had a quadruple bypass in nov of 2020 and had chest pain. Patient took 324 of nitro and received 1 nitro en route and chest pain was relieved.    Ems vitals  164/92  Hr 84  100 on room   Bgl 113

## 2022-08-21 NOTE — ED PROVIDER NOTES
[employer] Emergency Department Provider Note                   PCP:                Agueda Soto MD               Age: 49 y.o. Sex: female       ICD-10-CM    1. Chest pain, unspecified type  R07.9       2. Hypertension, unspecified type  I10           DISPOSITION Decision To Admit 08/21/2022 02:21:25 PM        MDM  Number of Diagnoses or Management Options  Chest pain, unspecified type  Hypertension, unspecified type  Unstable angina ([facility] [address]  Diagnosis management comments: Initial troponin negative. Chest pain resolved on arrival.  Patient with complex cardiac history, discussed with cardiology was agreed with admission. Low suspicion for PE or dissection currently. Amount and/or Complexity of Data Reviewed  Clinical lab tests: ordered and reviewed  Tests in the radiology section of CPT®: ordered and reviewed  Review and summarize past medical records: yes  Independent visualization of images, tracings, or specimens: yes    Risk of Complications, Morbidity, and/or Mortality  Presenting problems: high  Diagnostic procedures: moderate  Management options: high    Patient Progress  Patient progress: stable       Orders Placed This Encounter   Procedures    Comprehensive Metabolic Panel    Troponin    CBC    Cardiac Monitor    Pulse Oximetry    EKG 12 Lead    Saline lock IV        Ottoniel Tinoco is a 49 y.o. female who presents to the Emergency Department with chief complaint of    Chief Complaint   Patient presents with    Chest Pain      81-year-old female comes in with chest pain. Substernal, radiating to the left jaw and left shoulder. Moderate to severe. Sudden onset 2 hours prior to arrival.  Now resolved. No pain tearing the back. No syncope or near syncope. No exacerbating leaving factors or other modifiers. No recent fever chills cough congestion. No abdominal pain vomiting or diarrhea. No recent unilateral leg swelling, surgery or travel. No history DVT.   Has strong family history of coronary artery disease and MI under the age of 54 and direct relatives. She herself is also had quadruple bypass. Review of Systems   All other systems reviewed and are negative.     Past Medical History:   Diagnosis Date    Arrhythmia     palpatations    Asthma     Bipolar disorder ([address]     Coronary artery disease     cardiac cath 11/5/2020    Diabetic neuropathy ([facility] [address]     Fatty liver     GERD (gastroesophageal reflux disease)     Heart failure ([address]     Hypercholesterolemia     Hypertension     Hypertriglyceridemia     Meniere's disease     Migraine     Morbid obesity ([facility] [address]     Obstructive sleep apnea     CPAP    Palpitations     Peptic ulcer disease 2009    Psychiatric disorder     bipolar    Syncope and collapse     TIA (transient ischemic attack)     pt denies- states she had a migraine causing face tingling        Past Surgical History:   Procedure Laterality Date    BREAST LUMPECTOMY      CARDIAC CATHETERIZATION  11/2020    CARDIAC CATHETERIZATION  2009    [facility]    CHOLECYSTECTOMY  2004    CORONARY ARTERY BYPASS GRAFT  11/18/2020    X 4    GYN  2010    ovaries rem    NEUROLOGICAL SURGERY      x3, lumbar region, cervical    ORTHOPEDIC SURGERY      left wrist surgery,back surgery(discectomy-removed)    OTHER SURGICAL HISTORY  11/2020    OPEN HEART SX     LISSA AND BSO (CERVIX REMOVED)  2000    TONSILLECTOMY  1979    as a child    UROLOGICAL SURGERY      bladder sling        Family History   Problem Relation Age of Onset    Diabetes Mother     Coronary Art Dis Mother     Coronary Art Dis Father     Diabetes Brother     Coronary Art Dis Paternal Grandfather     Breast Cancer Sister     Diabetes Sister         Social History     Socioeconomic History    Marital status:      Spouse name: None    Number of children: None    Years of education: None    Highest education level: None   Tobacco Use    Smoking status: Never    Smokeless tobacco: Never   Vaping Use Vaping Use: Never used   Substance and Sexual Activity    Alcohol use: Not Currently    Drug use: Not Currently     Types: Marijuana (Weed)         Penicillins     Previous Medications    ALBUTEROL SULFATE (PROAIR RESPICLICK) 023 (90 BASE) MCG/ACT AEROSOL POWDER INHALATION    Inhale 1 puff into the lungs every 6 hours as needed for Wheezing or Shortness of Breath    ALBUTEROL SULFATE HFA (PROVENTIL;VENTOLIN;PROAIR) 108 (90 BASE) MCG/ACT INHALER    Inhale 1 puff into the lungs every 6 hours as needed for Wheezing TAKE 1 PUFF BY INHALATION EVERY FOUR (4) HOURS AS NEEDED FOR WHEEZING OR SHORTNESS OF BREATH. ALIROCUMAB (PRALUENT) 75 MG/ML SOAJ INJECTION PEN    Inject 75 mg into the skin    AMLODIPINE (NORVASC) 5 MG TABLET    Take 1 tablet by mouth daily    ASPIRIN 81 MG EC TABLET    Take 81 mg by mouth every evening    ATORVASTATIN (LIPITOR) 80 MG TABLET    Take 1 tablet by mouth daily    CLOPIDOGREL (PLAVIX) 75 MG TABLET    Take 1 tablet by mouth daily    CLOTRIMAZOLE-BETAMETHASONE (LOTRISONE) 1-0.05 % CREAM    Apply topically 2 times daily    DEXLANSOPRAZOLE (DEXILANT) 60 MG CPDR DELAYED RELEASE CAPSULE    Take 1 capsule by mouth daily    DIAZEPAM (VALIUM) 2 MG TABLET    Take 1 tablet by mouth 2 times daily as needed for Anxiety for up to 120 days. DULOXETINE (CYMBALTA) 60 MG EXTENDED RELEASE CAPSULE    Take 2 capsules by mouth daily    EMPAGLIFLOZIN (JARDIANCE) 25 MG TABLET    Take 1 tablet by mouth daily    FLUTICASONE FUROATE-VILANTEROL (BREO ELLIPTA) 200-25 MCG/INH AEPB INHALER    Inhale 1 puff into the lungs in the morning. FUROSEMIDE (LASIX) 40 MG TABLET    Take 1 tablet by mouth daily    GABAPENTIN (NEURONTIN) 600 MG TABLET    Take 1 tablet by mouth 2 times daily for 180 days.     GALCANEZUMAB-GNLM (EMGALITY) 120 MG/ML SOSY    INJECT CONTENTS OF 1 SYRINGE SUBCUTANEOUSLY EVERY 30 DAYS    GLUCAGON (GVOKE HYPOPEN 2-PACK) 1 MG/0.2ML SOAJ    Inject 1 mg into the skin as needed    GLUCAGON 1 MG INJECTION VERIO STRIP    UTD TWO TIMES A DAY TO THREE TIMES A DAY    POTASSIUM CHLORIDE (KLOR-CON M) 20 MEQ EXTENDED RELEASE TABLET    Take 20 mEq by mouth daily    PROBIOTIC PRODUCT (ACIDOPHILUS PROBIOTIC) CAPS CAPSULE    Take 4 mg by mouth daily    PROMETHAZINE (PHENERGAN) 25 MG TABLET    Take 25 mg by mouth every 6 hours as needed    TIZANIDINE (ZANAFLEX) 2 MG TABLET    TAKE 1 TABLET BY MOUTH EVERY TWELVE (12) HOURS AS NEEDED FOR MUSCLE SPASM(S). TRAZODONE (DESYREL) 100 MG TABLET    Take 100 mg by mouth daily    UBROGEPANT 50 MG TABS    Take 1 tablet by mouth at onset of migraine, repeat in two hours if needed. Maximum 2/day up to 4/week. UNABLE TO FIND    KATHY PEN NEEDLE   32 G X 5/32\" NEEDLE    UNIFINE PENTIPS PLUS 31G X 6 MM MISC    USE WITH INSULIN UP TO 4 TIMES DAILY, E11.65        Vitals signs and nursing note reviewed. Patient Vitals for the past 4 hrs:   Temp Pulse Resp BP SpO2   08/21/22 1245 -- 77 17 119/64 96 %   08/21/22 1215 -- 85 14 129/74 96 %   08/21/22 1200 -- 78 17 (!) 164/85 96 %   08/21/22 1145 -- 78 17 (!) 157/84 97 %   08/21/22 1140 -- 81 -- (!) 157/81 96 %   08/21/22 1045 -- 91 12 (!) 183/94 96 %   08/21/22 1040 98 °F (36.7 °C) 82 18 (!) 173/87 95 %          Physical Exam  Vitals and nursing note reviewed. Constitutional:       General: She is not in acute distress. Appearance: Normal appearance. She is not ill-appearing. HENT:      Head: Normocephalic and atraumatic. Nose: Nose normal.      Mouth/Throat:      Mouth: Mucous membranes are moist.      Pharynx: No posterior oropharyngeal erythema. Eyes:      Extraocular Movements: Extraocular movements intact. Conjunctiva/sclera: Conjunctivae normal.      Pupils: Pupils are equal, round, and reactive to light. Cardiovascular:      Rate and Rhythm: Normal rate and regular rhythm. Heart sounds: No murmur heard. Pulmonary:      Effort: Pulmonary effort is normal. No respiratory distress. Breath sounds:  No wheezing, rhonchi or rales. Abdominal:      General: There is no distension. Palpations: Abdomen is soft. Tenderness: There is no abdominal tenderness. There is no guarding. Musculoskeletal:         General: No swelling, tenderness or deformity. Cervical back: Neck supple. No tenderness. Comments: 2+ bilateral radial and DP pulses   Skin:     General: Skin is warm and dry. Capillary Refill: Capillary refill takes less than 2 seconds. Coloration: Skin is not jaundiced. Neurological:      Mental Status: She is alert. Mental status is at baseline. Motor: No weakness. Procedures      Labs Reviewed   COMPREHENSIVE METABOLIC PANEL - Abnormal; Notable for the following components:       Result Value    Chloride 112 (*)     Anion Gap 5 (*)     Glucose 135 (*)     Globulin 3.6 (*)     Albumin/Globulin Ratio 1.0 (*)     All other components within normal limits   CBC - Abnormal; Notable for the following components:    RBC 5.37 (*)     MPV 9.0 (*)     All other components within normal limits   TROPONIN   TROPONIN        No orders to display                          Voice dictation software was used during the making of this note. This software is not perfect and grammatical and other typographical errors may be present. This note has not been completely proofread for errors.      Jannette Menon,   08/21/22 3504

## 2022-08-21 NOTE — PROGRESS NOTES
TRANSFER - IN REPORT:    Verbal report received from [facility] on Bea Brewer  being received from ER for routine progression of patient care      Report consisted of patient's Situation, Background, Assessment and   Recommendations(SBAR). Information from the following report(s) Nurse Handoff Report, Index, Adult Overview, and Intake/Output was reviewed with the receiving nurse. Opportunity for questions and clarification was provided. Assessment completed upon patient's arrival to unit and care assumed. Pt skin is dry and intact with no redness or breakdown. Pt heels and sacrum are intact with no redness or breakdown. Pt has a mid sternal scar from previous CABG. Pt has tattoos on his arms. Skin verified with Tam Rust.

## 2022-08-21 NOTE — Clinical Note
Accessed site: right femoral artery. Femoral access needle used. Using ultrasound guidance. Number of attempts: 1. Accessed successfully.  Pre fluoro of right groin obtained

## 2022-08-21 NOTE — Clinical Note
Single view of the aortic root obtained using power injection.  Attempting to visualize the diag graft

## 2022-08-21 NOTE — ED NOTES
TRANSFER - OUT REPORT:    Verbal report given to RN on Tavon Siegel  being transferred to [facility] for routine progression of patient care       Report consisted of patient's Situation, Background, Assessment and   Recommendations(SBAR). Information from the following report(s) ED SBAR was reviewed with the receiving nurse. Lines:   Peripheral IV 08/21/22 Left Antecubital (Active)        Opportunity for questions and clarification was provided.       Patient transported with:  Registered Nurse      Ashley Cardoso RN  08/21/22 [id number]

## 2022-08-21 NOTE — Clinical Note
Aspirin Registry Question:   Aspirin was given prior to the procedure.    81mg at [facility]

## 2022-08-21 NOTE — H&P
X 4    GYN  2010    ovaries rem    NEUROLOGICAL SURGERY      x3, lumbar region, cervical    ORTHOPEDIC SURGERY      left wrist surgery,back surgery(discectomy-removed)    OTHER SURGICAL HISTORY  11/2020    OPEN HEART SX     LISSA AND BSO (CERVIX REMOVED)  2000    TONSILLECTOMY  1979    as a child    UROLOGICAL SURGERY      bladder sling       Allergies   Allergen Reactions    Penicillins Itching, Nausea And Vomiting and Rash      Social History     Socioeconomic History    Marital status:      Spouse name: Not on file    Number of children: Not on file    Years of education: Not on file    Highest education level: Not on file   Occupational History    Not on file   Tobacco Use    Smoking status: Never    Smokeless tobacco: Never   Vaping Use    Vaping Use: Never used   Substance and Sexual Activity    Alcohol use: Not Currently    Drug use: Not Currently     Types: Marijuana [employer])    Sexual activity: Not on file   Other Topics Concern    Not on file   Social History Narrative    Not on file     Social Determinants of Health     Financial Resource Strain: Not on file   Food Insecurity: Not on file   Transportation Needs: Not on file   Physical Activity: Not on file   Stress: Not on file   Social Connections: Not on file   Intimate Partner Violence: Not on file   Housing Stability: Not on file     Family History   Problem Relation Age of Onset    Diabetes Mother     Coronary Art Dis Mother     Coronary Art Dis Father     Diabetes Brother     Coronary Art Dis Paternal Grandfather     Breast Cancer Sister     Diabetes Sister         No current facility-administered medications for this encounter.      Current Outpatient Medications   Medication Sig    ipratropium-albuterol (DUONEB) 0.5-2.5 (3) MG/3ML SOLN nebulizer solution Inhale 3 mLs into the lungs every 6 hours as needed for Shortness of Breath INHALE 3ML VIA NEBULIZER EVERY 6 HOURS    albuterol sulfate (PROAIR RESPICLICK) 849 (90 Base) MCG/ACT aerosol powder inhalation Inhale 1 puff into the lungs every 6 hours as needed for Wheezing or Shortness of Breath    Fluticasone furoate-vilanterol (BREO ELLIPTA) 200-25 MCG/INH AEPB inhaler Inhale 1 puff into the lungs in the morning. tiZANidine (ZANAFLEX) 2 MG tablet TAKE 1 TABLET BY MOUTH EVERY TWELVE (12) HOURS AS NEEDED FOR MUSCLE SPASM(S). MOUNJARO 5 MG/0.5ML SOPN SC injection Inject 0.5 mLs into the skin once a week    NOVOLOG FLEXPEN 100 UNIT/ML injection pen Inject into the skin 3 times daily (before meals) Correction 4 units for every 50 over 150, bedtime 4/50>200 max daily dose 30 units    lamoTRIgine (LAMICTAL) 25 MG tablet TAKE 2 TABLETS (50 MG) DAILY. HUMULIN R U-500 KWIKPEN 500 UNIT/ML SOPN concentrated injection pen 200 units before breakfast, 210 units before lunch, 105 units before supper    UNIFINE PENTIPS PLUS 31G X 6 MM MISC USE WITH INSULIN UP TO 4 TIMES DAILY, E11.65    ONETOUCH VERIO strip UTD TWO TIMES A DAY TO THREE TIMES A DAY    UNABLE TO FIND KATHY PEN NEEDLE   32 G X 5/32\" NEEDLE    albuterol sulfate HFA (PROVENTIL;VENTOLIN;PROAIR) 108 (90 Base) MCG/ACT inhaler Inhale 1 puff into the lungs every 6 hours as needed for Wheezing TAKE 1 PUFF BY INHALATION EVERY FOUR (4) HOURS AS NEEDED FOR WHEEZING OR SHORTNESS OF BREATH.    amLODIPine (NORVASC) 5 MG tablet Take 1 tablet by mouth daily    atorvastatin (LIPITOR) 80 MG tablet Take 1 tablet by mouth daily    clopidogrel (PLAVIX) 75 MG tablet Take 1 tablet by mouth daily    dexlansoprazole (DEXILANT) 60 MG CPDR delayed release capsule Take 1 capsule by mouth daily    diazePAM (VALIUM) 2 MG tablet Take 1 tablet by mouth 2 times daily as needed for Anxiety for up to 120 days.     DULoxetine (CYMBALTA) 60 MG extended release capsule Take 2 capsules by mouth daily    empagliflozin (JARDIANCE) 25 MG tablet Take 1 tablet by mouth daily    furosemide (LASIX) 40 MG tablet Take 1 tablet by mouth daily    gabapentin (NEURONTIN) 600 MG tablet Take 1 tablet by mouth 2 times daily for 180 days. HYDROcodone-acetaminophen (NORCO)  MG per tablet Take 1 tablet by mouth every 8 hours as needed for Pain for up to 30 days. Intended supply: 30 days    [START ON 8/27/2022] HYDROcodone-acetaminophen (NORCO)  MG per tablet Take 1 tablet by mouth every 8 hours as needed for Pain for up to 30 days. Intended supply: 30 days    Galcanezumab-gnlm (EMGALITY) 120 MG/ML SOSY INJECT CONTENTS OF 1 SYRINGE SUBCUTANEOUSLY EVERY 30 DAYS    Ubrogepant 50 MG TABS Take 1 tablet by mouth at onset of migraine, repeat in two hours if needed. Maximum 2/day up to 4/week. alirocumab (PRALUENT) 75 MG/ML SOAJ injection pen Inject 75 mg into the skin    aspirin 81 MG EC tablet Take 81 mg by mouth every evening    clotrimazole-betamethasone (LOTRISONE) 1-0.05 % cream Apply topically 2 times daily    Glucagon (GVOKE HYPOPEN 2-PACK) 1 MG/0.2ML SOAJ Inject 1 mg into the skin as needed    glucagon 1 MG injection Inject 1 mg into the muscle as needed    hydrOXYzine (ATARAX) 25 MG tablet Take 25 mg by mouth 3 times daily as needed    lamoTRIgine (LAMICTAL) 100 MG tablet Take 100 mg by mouth 2 times daily    linaclotide (LINZESS) 145 MCG capsule Take 145 mcg by mouth daily    lisinopril (PRINIVIL;ZESTRIL) 40 MG tablet Take 40 mg by mouth every evening    Loteprednol Etabonate (LOTEMAX SM) 0.38 % GEL 1 drop 3x a day x 1 wk, 2x a day x 1 wk, 1x a day x 1 wk, then d/c in both eyes    meloxicam (MOBIC) 15 MG tablet One daily    metoprolol succinate (TOPROL XL) 50 MG extended release tablet Take 50 mg by mouth daily    naloxone 4 MG/0.1ML LIQD nasal spray Use 1 spray intranasally, then discard. Repeat with new spray every 2 min as needed for opioid overdose symptoms, alternating nostrils. nitroGLYCERIN (NITROSTAT) 0.4 MG SL tablet PLACE ONE TABLET UNDER TONGUE AS NEEDED FOR CHEST PAIN. MAY REPEAT EVERY 5 MINUTES FOR 2 MORE DOSES.  CALL 911 ON SECOND DOSE.    ondansetron (ZOFRAN-ODT) 8 MG TBDP disintegrating tablet Take 8 mg by mouth every 8 hours as needed    potassium chloride (KLOR-CON M) 20 MEQ extended release tablet Take 20 mEq by mouth daily    Probiotic Product (ACIDOPHILUS PROBIOTIC) CAPS capsule Take 4 mg by mouth daily    promethazine (PHENERGAN) 25 MG tablet Take 25 mg by mouth every 6 hours as needed    traZODone (DESYREL) 100 MG tablet Take 100 mg by mouth daily       Review of symptoms:  General: no recent weight loss/gain, +weakness/fatigue, denies fever or chills   Skin: no rashes, lumps, or other skin changes   HEENT: no headache, dizziness, lightheadedness, vision changes, hearing changes, tinnitus, vertigo, sinus pressure/pain, bleeding gums, sore throat, or hoarseness   Neck: no swollen glands, goiter, pain or stiffness   Respiratory: no cough, sputum, hemoptysis, dyspnea, wheezing   Cardiovascular: + chest pain or discomfort, no palpitations, dyspnea, orthopnea, paroxysmal nocturnal dyspnea, occasional peripheral edema   Gastrointestinal: no trouble swallowing, heartburn, change of appetite, nausea, change in bowel habits, pain with defecation, rectal bleeding or black/tarry stools, hemorrhoids, constipation, diarrhea, abdominal pain, jaundice, liver or gallbladder problems   Urinary: no frequency, urgency , hematuria, burning/pain with urination, recent flank pain, polyuria, nocturia, or difficulty urinating   Peripheral Vascular: no claudication, leg cramps, prior DVTs, swelling of calves, legs, or feet, color change, or swelling with redness or tenderness   Musculoskeletal: no muscle or joint pain/stiffness, joint swelling, erythema of joints, or back pain   Psychiatric: + depression, mental disorders, or excessive stress   Neurological: no history of CVA, dizziness, no sensory or motor loss, seizures, syncope, tremors, numbness, tingling, no changes in mood, attention, or speech, no changes in orientation, memory, insight, or judgment. no headache, vertigo.    Hematologic: no anemia, easy bruising or bleeding   Endocrine: +diabetes, thyroid problems, heat or cold intolerance, excessive sweating, polyuria, polydipsia      Subjective:   Physical Exam  Vitals:    08/21/22 1145 08/21/22 1200 08/21/22 1215 08/21/22 1245   BP: (!) 157/84 (!) 164/85 129/74 119/64   Pulse: 78 78 85 77   Resp: 17 17 14 17   Temp:       TempSrc:       SpO2: 97% 96% 96% 96%   Weight:       Height:          GEN: A&O x 3, no acute distress  HEENT: NCAT, PERRL, no JVD or carotid bruits  CV: S1S2 RRR,  Chest: CTA B, no wheezing, rales or rhonchi  Abd: soft, +BS, non tender  Ext: no LE edema, cyanosis or clubbing    Labs:   Recent Results (from the past 24 hour(s))   Comprehensive Metabolic Panel    Collection Time: 08/21/22 10:45 AM   Result Value Ref Range    Sodium 138 136 - 145 mmol/L    Potassium 4.7 3.5 - 5.1 mmol/L    Chloride 112 (H) 98 - 107 mmol/L    CO2 21 21 - 32 mmol/L    Anion Gap 5 (L) 7 - 16 mmol/L    Glucose 135 (H) 65 - 100 mg/dL    BUN 16 6 - 23 MG/DL    Creatinine 0.60 0.6 - 1.0 MG/DL    GFR African American >60 >60 ml/min/1.73m2    GFR Non- >60 >60 ml/min/1.73m2    Calcium 9.1 8.3 - 10.4 MG/DL    Total Bilirubin 0.7 0.2 - 1.1 MG/DL    ALT 32 12 - 65 U/L    AST 27 15 - 37 U/L    Alk Phosphatase 67 50 - 136 U/L    Total Protein 7.2 6.3 - 8.2 g/dL    Albumin 3.6 3.5 - 5.0 g/dL    Globulin 3.6 (H) 2.3 - 3.5 g/dL    Albumin/Globulin Ratio 1.0 (L) 1.2 - 3.5     Troponin    Collection Time: 08/21/22 10:45 AM   Result Value Ref Range    Troponin, High Sensitivity 4.1 0 - 14 pg/mL       Pt has been seen and examined by Dr. Manpreet Blanco and he agrees with the following assessment and plan:     Assessment/Plan:         Patient Active Problem List    Diagnosis    Chest pain/pressure- with h/o concerning for possible ACS- admit to telemetry, r/o MI with serial Manuel, ASA, BB, statin, add NTG paste, IVF with plan [facility] in AM    CAD (coronary artery disease) s/p CABG x 4 2020- ASA, BB, statin Hypertension- uncontrolled, continue meds, add NTG paste, monitor    Hypercholesterolemia- statin    Polycythemia    H/o HFpEF- not volume overloaded    DM (diabetes mellitus) ([facility] [address]- home insulin, POC glucose QAC and HS    H/o SVT (Supraventricular tachycardia)- BB    Bipolar disorder (HCC)    SHELBY (obstructive sleep apnea)    H/o TIA (transient ischemic attack)    RLS (restless legs syndrome)    Morbid obesity ([facility] [address]- weight loss encouraged    Moderate persistent asthma without complication- followed by pulmonary    GERD (gastroesophageal reflux disease)- PPI       Alejandra Rondon PA-C     ATTENDING ADDENDUM:    In this split/shared evaluation I performed/reviewed the patients's H&P, available images, labs, non-invasive studies and discussed the case in detail with the ACP;  performed a medically appropriate history and exam, counseled and educated the patient and/or family members, ordered and reviewed medications, tests or procedures, documented information in EMR, and independently interpreted images and coordinated care. Personal Time:    41  minutes, which equates to greater than 50% of time involved in patient's consultation and care management. I agree with above note by physician extender. Key findings are:  No CP or YOON at present but fairly severe substernal chest pain radiating to the throat and left jaw during a stressful family situation this morning. Not very active and not exercising. Admits to worsening dyspnea on exertion recently. No heart failure or arrhythmic symptoms. Compliant with medications. Exam and ECG fairly benign and first troponin negative but her pain is typical of her angina and she needs admission, hydration, left heart catheterization tomorrow.     Current Facility-Administered Medications   Medication Dose Route Frequency Provider Last Rate Last Admin    albuterol sulfate (PROAIR RESPICLICK) 108 (90 Base) MCG/ACT aerosol powder inhalation 1 puff  1 puff Inhalation Q6H PRN Alejandra Rondon PA-C        amLODIPine (NORVASC) tablet 5 mg  5 mg Oral Daily Alejandra Rondon PA-C        aspirin EC tablet 81 mg  81 mg Oral QPM Alejandra Rondon PA-C        atorvastatin (LIPITOR) tablet 80 mg  80 mg Oral Daily Alejandra Rondon PA-C        clopidogrel (PLAVIX) tablet 75 mg  75 mg Oral Daily Alejandra Rondon PA-C        [START ON 8/22/2022] pantoprazole (PROTONIX) tablet 40 mg  40 mg Oral QAM AC Alejandra Rondon PA-C        diazePAM (VALIUM) tablet 2 mg  2 mg Oral BID PRN Alejandra Rondon PA-C        DULoxetine (CYMBALTA) extended release capsule 120 mg  120 mg Oral Daily Alejandra Rondon PA-C        empagliflozin (JARDIANCE) tablet 25 mg  25 mg Oral Daily Alejandra Rondon PA-C        gabapentin (NEURONTIN) tablet 600 mg  600 mg Oral BID Alejandra Rondon PA-C        HYDROcodone-acetaminophen (NORCO)  MG per tablet 1 tablet  1 tablet Oral Q8H PRN Alejandra Rondon PA-C        lamoTRIgine (LAMICTAL) tablet 100 mg  100 mg Oral BID Alejandra Rondon PA-C        linaclotide (LINZESS) capsule 145 mcg  145 mcg Oral Daily Alejandra Rondon PA-C        lisinopril (PRINIVIL;ZESTRIL) tablet 40 mg  40 mg Oral QPM Alejandra Rondon PA-C        nitroGLYCERIN (NITROSTAT) SL tablet 0.4 mg  0.4 mg SubLINGual Q5 Min PRN Alejandra Rondon PA-C        ondansetron (ZOFRAN-ODT) disintegrating tablet 8 mg  8 mg Oral Q8H PRN Alejandra Rondon PA-C        traZODone (DESYREL) tablet 100 mg  100 mg Oral Daily Alejandra Rondon PA-C        acetaminophen (TYLENOL) tablet 650 mg  650 mg Oral Q6H PRN Alejandra Rondon PA-C        Or    acetaminophen (TYLENOL) suppository 650 mg  650 mg Rectal Q6H PRN Alejandra Rondon PA-C        polyethylene glycol (GLYCOLAX) packet 17 g  17 g Oral Daily PRN Alejandra Rondon PA-C        metoprolol tartrate (LOPRESSOR) tablet 25 mg  25 mg Oral Q6H Alejandra Rondon PA-C        nitroGLYCERIN (NITROSTAT) SL tablet 0.4 mg  0.4 mg SubLINGual Q5 Min PRN Alejandra Rondon PA-C         Current Outpatient Medications   Medication Sig Dispense Refill    ipratropium-albuterol (DUONEB) 0.5-2.5 (3) MG/3ML SOLN nebulizer solution Inhale 3 mLs into the lungs every 6 hours as needed for Shortness of Breath INHALE 3ML VIA NEBULIZER EVERY 6 HOURS 360 mL 11    albuterol sulfate (PROAIR RESPICLICK) 395 (90 Base) MCG/ACT aerosol powder inhalation Inhale 1 puff into the lungs every 6 hours as needed for Wheezing or Shortness of Breath 1 each 11    Fluticasone furoate-vilanterol (BREO ELLIPTA) 200-25 MCG/INH AEPB inhaler Inhale 1 puff into the lungs in the morning. 1 each 11    tiZANidine (ZANAFLEX) 2 MG tablet TAKE 1 TABLET BY MOUTH EVERY TWELVE (12) HOURS AS NEEDED FOR MUSCLE SPASM(S). 15 tablet 0    MOUNJARO 5 MG/0.5ML SOPN SC injection Inject 0.5 mLs into the skin once a week 4 pen 1    NOVOLOG FLEXPEN 100 UNIT/ML injection pen Inject into the skin 3 times daily (before meals) Correction 4 units for every 50 over 150, bedtime 4/50>200 max daily dose 30 units      lamoTRIgine (LAMICTAL) 25 MG tablet TAKE 2 TABLETS (50 MG) DAILY.       HUMULIN R U-500 KWIKPEN 500 UNIT/ML SOPN concentrated injection pen 200 units before breakfast, 210 units before lunch, 105 units before supper 93 mL 3    UNIFINE PENTIPS PLUS 31G X 6 MM MISC USE WITH INSULIN UP TO 4 TIMES DAILY, E11.65      ONETOUCH VERIO strip UTD TWO TIMES A DAY TO THREE TIMES A DAY      UNABLE TO FIND KATHY PEN NEEDLE   32 G X 5/32\" NEEDLE      albuterol sulfate HFA (PROVENTIL;VENTOLIN;PROAIR) 108 (90 Base) MCG/ACT inhaler Inhale 1 puff into the lungs every 6 hours as needed for Wheezing TAKE 1 PUFF BY INHALATION EVERY FOUR (4) HOURS AS NEEDED FOR WHEEZING OR SHORTNESS OF BREATH. 18 g 2    amLODIPine (NORVASC) 5 MG tablet Take 1 tablet by mouth daily 30 tablet 3    atorvastatin (LIPITOR) 80 MG tablet Take 1 tablet by mouth daily 30 tablet 3    clopidogrel (PLAVIX) 75 MG tablet Take 1 tablet by mouth daily 30 tablet 3    dexlansoprazole (DEXILANT) 60 MG CPDR delayed release capsule Take 1 capsule by mouth daily 30 capsule 3    diazePAM (VALIUM) 2 MG tablet Take 1 tablet by mouth 2 times daily as needed for Anxiety for up to 120 days. 60 tablet 3    DULoxetine (CYMBALTA) 60 MG extended release capsule Take 2 capsules by mouth daily 30 capsule 3    empagliflozin (JARDIANCE) 25 MG tablet Take 1 tablet by mouth daily 30 tablet 3    furosemide (LASIX) 40 MG tablet Take 1 tablet by mouth daily 60 tablet 3    gabapentin (NEURONTIN) 600 MG tablet Take 1 tablet by mouth 2 times daily for 180 days. 90 tablet 3    [START ON 8/27/2022] HYDROcodone-acetaminophen (NORCO)  MG per tablet Take 1 tablet by mouth every 8 hours as needed for Pain for up to 30 days. Intended supply: 30 days 90 tablet 0    Galcanezumab-gnlm (EMGALITY) 120 MG/ML SOSY INJECT CONTENTS OF 1 SYRINGE SUBCUTANEOUSLY EVERY 30 DAYS 1 mL 11    Ubrogepant 50 MG TABS Take 1 tablet by mouth at onset of migraine, repeat in two hours if needed. Maximum 2/day up to 4/week. 16 tablet 11    alirocumab (PRALUENT) 75 MG/ML SOAJ injection pen Inject 75 mg into the skin      aspirin 81 MG EC tablet Take 81 mg by mouth every evening      clotrimazole-betamethasone (LOTRISONE) 1-0.05 % cream Apply topically 2 times daily      Glucagon (GVOKE HYPOPEN 2-PACK) 1 MG/0.2ML SOAJ Inject 1 mg into the skin as needed      glucagon 1 MG injection Inject 1 mg into the muscle as needed      hydrOXYzine (ATARAX) 25 MG tablet Take 25 mg by mouth 3 times daily as needed      lamoTRIgine (LAMICTAL) 100 MG tablet Take 100 mg by mouth 2 times daily      linaclotide (LINZESS) 145 MCG capsule Take 145 mcg by mouth daily      lisinopril (PRINIVIL;ZESTRIL) 40 MG tablet Take 40 mg by mouth every evening      Loteprednol Etabonate (LOTEMAX SM) 0.38 % GEL 1 drop 3x a day x 1 wk, 2x a day x 1 wk, 1x a day x 1 wk, then d/c in both eyes      meloxicam (MOBIC) 15 MG tablet One daily      metoprolol succinate (TOPROL XL) 50 MG extended release tablet Take 50 mg by mouth daily      naloxone 4 MG/0.1ML LIQD nasal spray Use 1 spray intranasally, then discard.  Repeat with new spray every 2 min as needed for opioid overdose symptoms, alternating nostrils. nitroGLYCERIN (NITROSTAT) 0.4 MG SL tablet PLACE ONE TABLET UNDER TONGUE AS NEEDED FOR CHEST PAIN. MAY REPEAT EVERY 5 MINUTES FOR 2 MORE DOSES. CALL 911 ON SECOND DOSE.      ondansetron (ZOFRAN-ODT) 8 MG TBDP disintegrating tablet Take 8 mg by mouth every 8 hours as needed      potassium chloride (KLOR-CON M) 20 MEQ extended release tablet Take 20 mEq by mouth daily      Probiotic Product (ACIDOPHILUS PROBIOTIC) CAPS capsule Take 4 mg by mouth daily      promethazine (PHENERGAN) 25 MG tablet Take 25 mg by mouth every 6 hours as needed      traZODone (DESYREL) 100 MG tablet Take 100 mg by mouth daily       Allergies   Allergen Reactions    Penicillins Itching, Nausea And Vomiting and Rash     Patient Active Problem List    Diagnosis Date Noted    Moderate persistent asthma without complication 30/50/5533     Priority: Medium    Supraventricular tachycardia ([facility] [address] 06/14/2022     Priority: Medium    Unstable angina ([facility] [address] 08/21/2022     Overview Note:     Added automatically from request for surgery [id number]      Yeast dermatitis 05/20/2022    YOON (dyspnea on exertion) 05/03/2022    Mild persistent asthma 05/03/2022    Polycythemia 05/03/2022    Chronic constipation 10/29/2021    Diabetic peripheral neuropathy associated with type 2 diabetes mellitus ([facility] [address]      Overview Note:     bilat feet        Morbid obesity with BMI of 40.0-44.9, adult ([facility] [address]      Overview Note:     Last Assessment & Plan:   Obesity stable. We discussed dietary modification and exercise.         CAD (coronary artery disease)      Overview Note:     cath 2009        Pain in left leg 03/24/2021    Hypertension      Overview Note:     controlled w meds        S/P CABG x 4 11/18/2020    Atherosclerosis of native coronary artery of native heart with angina pectoris ([facility] [address] 11/18/2020    Bipolar disorder ([facility] [address] 11/18/2020    SHELBY (obstructive sleep apnea)     Chronic migraine with aura 07/30/2020    Myopia 06/02/2020    TIA (transient ischemic attack) 04/20/2020    Encounter for medication management 04/20/2020    Postural imbalance 04/20/2020    Vestibular migraine 06/10/2019    Vertigo 06/10/2019    Irregular bowel habits 04/25/2019     Overview Note:     Last Assessment & Plan:   Formatting of this note might be different from the original.  Pt with irregular bowel habits - more recently with some constipation. We have recommended use of Miralax. Will trial Linzess. Other fatigue 43/88/6472    Diastolic CHF, chronic (HCC) 02/27/2019    Carpal tunnel syndrome of left wrist 09/24/2018    Nausea 07/19/2018     Overview Note:     Last Assessment & Plan:   Formatting of this note might be different from the original.  She has chronic / recurring bouts of nausea. Sometimes related to constipation. She has DM - reports blood sugars as controlled. Rupture of extensor tendon of left hand 07/09/2018    Vitamin D deficiency 07/06/2018    Idiopathic progressive polyneuropathy     Pain in left hand 05/29/2018    Tendon weakness 05/29/2018     Overview Note:     Last Assessment & Plan:   Formatting of this note might be different from the original.  Encouraged better glycemic control to prevent progression. Stiffness of left hand joint 05/29/2018    Retained orthopedic hardware 05/02/2018     Overview Note:     Formatting of this note might be different from the original.  Added automatically from request for surgery [id number]        Pain of right thumb 04/30/2018    Oropharyngeal dysphagia 06/29/2017     Overview Note:     Last Assessment & Plan:   Formatting of this note might be different from the original.  She has issues with OP dysphagia. Has seen speech - never received outpatient therapy. Overall can manage sx with forceful swallows and liquids between bites.         Uncontrolled type 2 diabetes mellitus with hypoglycemia without coma ([facility] [address] 01/06/2017    Hypersomnia 11/23/2016    RLS (restless legs syndrome) 11/23/2016    Bruxism, sleep-related 11/23/2016    Persistent disorder of initiating or maintaining sleep 11/23/2016    Lateral epicondylitis 11/02/2016    Elevated cortisol level 10/11/2016    Hypercholesterolemia     Hypertriglyceridemia     Chest pain     Diabetes type 2, uncontrolled ([facility] [address] 08/24/2016     Overview Note:     Last Assessment & Plan:   Formatting of this note might be different from the original.  Diabetes remains uncontrolled. Noncompliance with medication is a primary   concern. She believes that the insulin pens we'll increase her compliance. 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  Obesity stable. We discussed dietary modification and exercise. Abnormal results of other endocrine function studies 11/15/2012     Overview Note:     Last Assessment & Plan:   Formatting of this note might be different from the original.  Repeat 24 hour urine cortisol. She had to abnormal salivary cortisol's but   24 hour urine cortisol has remained normal. 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 Microalbuminuria 04/05/2012     Overview Note:     Last Assessment & Plan:   Encouraged better glycemic control to prevent progression. Recheck urine   microalbumin today.         DM (diabetes mellitus) ([facility] [address] 11/08/2011    Displacement of cervical intervertebral disc without myelopathy 11/07/2011    Fatty liver     GERD (gastroesophageal reflux disease)      Overview Note:     controlled w meds        Psychiatric disorder      Overview Note:     bipolar disorder,anxiety, depression        PUD (peptic ulcer disease)     Chronic pain Overview Note:     neck, shoulders         Past Surgical History:   Procedure Laterality Date    BREAST LUMPECTOMY      CARDIAC CATHETERIZATION  2020    CARDIAC CATHETERIZATION  2009    x 4     SECTION  1986    CHOLECYSTECTOMY  2004    CORONARY ARTERY BYPASS GRAFT  11/18/2020    X 4    GYN  2010    ovaries rem    NEUROLOGICAL SURGERY      x3, lumbar region, cervical    ORTHOPEDIC SURGERY      left wrist surgery,back surgery(discectomy-removed)    OTHER SURGICAL HISTORY  2020    OPEN HEART SX     LISSA AND BSO (CERVIX REMOVED)  2000    TONSILLECTOMY  1979    as a child    UROLOGICAL SURGERY      bladder sling     Social History     Tobacco Use    Smoking status: Never    Smokeless tobacco: Never   Substance Use Topics    Alcohol use: Not Currently       REVIEW OF SYSTEMS:    General: no recent weight loss/gain,no weakness/fatigue, denies fever or chills   Skin: no rashes, lumps, or other skin changes   HEENT: no headache, dizziness, lightheadedness, vision changes, hearing changes   Neck: no swollen glands, goiter, pain or stiffness   Respiratory: no cough, sputum, hemoptysis, + shortness of breath, no dyspnea on exertion, wheezing   Cardiovascular: + Chest discomfort, no palpitations, no orthopnea, paroxysmal nocturnal dyspnea or peripheral edema   Gastrointestinal:  heartburn, change of appetite, nausea, change in bowel habits  Urinary: no frequency, urgency , hematuria, burning/pain  Peripheral Vascular: no claudication, leg cramps, prior DVTs  Musculoskeletal: no muscle or joint pain/stiffness  Psychiatric: no depression, mental disorders, or excessive stress   Neurological: no history of CVA, vertigo.    Hematologic: no anemia, easy bruising or bleeding   Endocrine: no diabetes, thyroid problems, heat or cold intolerance, excessive sweating, polyuria, polydipsia        PHYSICAL EXAM:    Vitals:    22 1145 22 1200 22 1215 22 1245   BP: (!) 157/84 (!) 164/85 129/74 119/64 Pulse: 78 78 85 77   Resp: 17 17 14 17   Temp:       TempSrc:       SpO2: 97% 96% 96% 96%   Weight:       Height:           General: Well Developed, Well Nourished, No Acute Distress  HEENT: pupils equal and round, no abnormalities noted  Neck: supple, no JVD, no carotid bruits  Heart: S1S2 with RRR without murmurs or gallops  Lungs: Clear throughout auscultation bilaterally  Abd: soft, nontender, nondistended  Ext: No edema distally  Skin: warm and dry  Psychiatric: Normal mood and affect  Neurologic: Alert and oriented X 3, cranial nerves II thru XII grossly intact and symmetric      ASSESSMENT AND PLAN:  Active Hospital Problems    *Unstable angina (HCC)-acute fairly severe substernal chest pain radiating to the left jaw during a situational stressful family issue this morning. ECG and exam benign, first troponin negative. Angina similar to prior to bypass and no invasive evaluation since bypass. Admit, rule out, maximize meds and add nitrates. Add heparin with any recurrent chest pain or positive troponins and plan for hydration with left heart catheterization and possible percutaneous intervention tomorrow. Morbid obesity with BMI of 40.0-44.9, adult (HCC) lifestyle modification with diet, exercise, weight loss, cardiac rehab in the outpatient setting      CAD (coronary artery disease)      S/P CABG x 4-see graft anatomy above. Left heart catheterization tomorrow after prehydration      Diastolic CHF, chronic (HCC)-stable and lying flat comfortably with clear lungs and no JVD today. Reassess tomorrow and titrate meds as needed      DM (diabetes mellitus) (HCC)-slight scale per protocol      GERD (gastroesophageal reflux disease)-continue meds and titrate as needed       TYRESE Gramajo MD  [address] [facility] [address] Cardiology  Pager [phone]

## 2022-08-22 VITALS
SYSTOLIC BLOOD PRESSURE: 123 MMHG | TEMPERATURE: 98.4 F | RESPIRATION RATE: 20 BRPM | DIASTOLIC BLOOD PRESSURE: 85 MMHG | HEIGHT: 64 IN | OXYGEN SATURATION: 94 % | BODY MASS INDEX: 37.07 KG/M2 | HEART RATE: 67 BPM | WEIGHT: 217.15 LBS

## 2022-08-22 LAB
ANION GAP SERPL CALC-SCNC: ABNORMAL MMOL/L (ref 7–16)
BUN SERPL-MCNC: 13 MG/DL (ref 6–23)
CALCIUM SERPL-MCNC: 8.5 MG/DL (ref 8.3–10.4)
CHLORIDE SERPL-SCNC: 116 MMOL/L (ref 98–107)
CHOLEST SERPL-MCNC: 191 MG/DL
CO2 SERPL-SCNC: 23 MMOL/L (ref 21–32)
CREAT SERPL-MCNC: 0.5 MG/DL (ref 0.6–1)
ECHO BSA: 2.05 M2
ERYTHROCYTE [DISTWIDTH] IN BLOOD BY AUTOMATED COUNT: 13.7 % (ref 11.9–14.6)
GLUCOSE SERPL-MCNC: 196 MG/DL (ref 65–100)
HCT VFR BLD AUTO: 44.3 % (ref 35.8–46.3)
HDLC SERPL-MCNC: 38 MG/DL (ref 40–60)
HDLC SERPL: 5 {RATIO}
HGB BLD-MCNC: 13.7 G/DL (ref 11.7–15.4)
LDLC SERPL CALC-MCNC: 115.6 MG/DL
MAGNESIUM SERPL-MCNC: 2.3 MG/DL (ref 1.8–2.4)
MCH RBC QN AUTO: 27.5 PG (ref 26.1–32.9)
MCHC RBC AUTO-ENTMCNC: 30.9 G/DL (ref 31.4–35)
MCV RBC AUTO: 88.8 FL (ref 79.6–97.8)
NRBC # BLD: 0 K/UL (ref 0–0.2)
PLATELET # BLD AUTO: 371 K/UL (ref 150–450)
PMV BLD AUTO: 9.3 FL (ref 9.4–12.3)
POTASSIUM SERPL-SCNC: 4.1 MMOL/L (ref 3.5–5.1)
RBC # BLD AUTO: 4.99 M/UL (ref 4.05–5.2)
SODIUM SERPL-SCNC: 138 MMOL/L (ref 136–145)
TRIGL SERPL-MCNC: 187 MG/DL (ref 35–150)
VLDLC SERPL CALC-MCNC: 37.4 MG/DL (ref 6–23)
WBC # BLD AUTO: 8.9 K/UL (ref 4.3–11.1)

## 2022-08-22 PROCEDURE — C1894 INTRO/SHEATH, NON-LASER: HCPCS | Performed by: INTERNAL MEDICINE

## 2022-08-22 PROCEDURE — 6360000004 HC RX CONTRAST MEDICATION: Performed by: INTERNAL MEDICINE

## 2022-08-22 PROCEDURE — 93459 L HRT ART/GRFT ANGIO: CPT | Performed by: INTERNAL MEDICINE

## 2022-08-22 PROCEDURE — 99153 MOD SED SAME PHYS/QHP EA: CPT | Performed by: INTERNAL MEDICINE

## 2022-08-22 PROCEDURE — 36415 COLL VENOUS BLD VENIPUNCTURE: CPT

## 2022-08-22 PROCEDURE — 99024 POSTOP FOLLOW-UP VISIT: CPT | Performed by: INTERNAL MEDICINE

## 2022-08-22 PROCEDURE — G0378 HOSPITAL OBSERVATION PER HR: HCPCS

## 2022-08-22 PROCEDURE — 2709999900 HC NON-CHARGEABLE SUPPLY: Performed by: INTERNAL MEDICINE

## 2022-08-22 PROCEDURE — 2580000003 HC RX 258: Performed by: PHYSICIAN ASSISTANT

## 2022-08-22 PROCEDURE — 80061 LIPID PANEL: CPT

## 2022-08-22 PROCEDURE — 99152 MOD SED SAME PHYS/QHP 5/>YRS: CPT | Performed by: INTERNAL MEDICINE

## 2022-08-22 PROCEDURE — 6370000000 HC RX 637 (ALT 250 FOR IP): Performed by: PHYSICIAN ASSISTANT

## 2022-08-22 PROCEDURE — C1760 CLOSURE DEV, VASC: HCPCS | Performed by: INTERNAL MEDICINE

## 2022-08-22 PROCEDURE — 80048 BASIC METABOLIC PNL TOTAL CA: CPT

## 2022-08-22 PROCEDURE — 83735 ASSAY OF MAGNESIUM: CPT

## 2022-08-22 PROCEDURE — 85027 COMPLETE CBC AUTOMATED: CPT

## 2022-08-22 PROCEDURE — 6360000002 HC RX W HCPCS: Performed by: INTERNAL MEDICINE

## 2022-08-22 PROCEDURE — 2500000003 HC RX 250 WO HCPCS: Performed by: INTERNAL MEDICINE

## 2022-08-22 PROCEDURE — 6370000000 HC RX 637 (ALT 250 FOR IP): Performed by: INTERNAL MEDICINE

## 2022-08-22 RX ORDER — SODIUM CHLORIDE 9 MG/ML
INJECTION, SOLUTION INTRAVENOUS CONTINUOUS
Status: CANCELLED | OUTPATIENT
Start: 2022-08-22

## 2022-08-22 RX ORDER — HEPARIN SODIUM 200 [USP'U]/100ML
INJECTION, SOLUTION INTRAVENOUS CONTINUOUS PRN
Status: DISCONTINUED | OUTPATIENT
Start: 2022-08-22 | End: 2022-08-22 | Stop reason: HOSPADM

## 2022-08-22 RX ORDER — SODIUM CHLORIDE 0.9 % (FLUSH) 0.9 %
5-40 SYRINGE (ML) INJECTION EVERY 12 HOURS SCHEDULED
Status: CANCELLED | OUTPATIENT
Start: 2022-08-22

## 2022-08-22 RX ORDER — ISOSORBIDE MONONITRATE 30 MG/1
30 TABLET, EXTENDED RELEASE ORAL DAILY
Qty: 90 TABLET | Refills: 3 | Status: SHIPPED | OUTPATIENT
Start: 2022-08-22 | End: 2022-09-01 | Stop reason: SINTOL

## 2022-08-22 RX ORDER — ACETAMINOPHEN 325 MG/1
650 TABLET ORAL EVERY 4 HOURS PRN
Status: CANCELLED | OUTPATIENT
Start: 2022-08-22

## 2022-08-22 RX ORDER — ASPIRIN 81 MG/1
81 TABLET, CHEWABLE ORAL
Status: COMPLETED | OUTPATIENT
Start: 2022-08-22 | End: 2022-08-22

## 2022-08-22 RX ORDER — MIDAZOLAM HYDROCHLORIDE 1 MG/ML
INJECTION INTRAMUSCULAR; INTRAVENOUS PRN
Status: DISCONTINUED | OUTPATIENT
Start: 2022-08-22 | End: 2022-08-22 | Stop reason: HOSPADM

## 2022-08-22 RX ORDER — LIDOCAINE HYDROCHLORIDE 10 MG/ML
INJECTION, SOLUTION INFILTRATION; PERINEURAL PRN
Status: DISCONTINUED | OUTPATIENT
Start: 2022-08-22 | End: 2022-08-22 | Stop reason: HOSPADM

## 2022-08-22 RX ORDER — SODIUM CHLORIDE 0.9 % (FLUSH) 0.9 %
5-40 SYRINGE (ML) INJECTION PRN
Status: CANCELLED | OUTPATIENT
Start: 2022-08-22

## 2022-08-22 RX ADMIN — ACETAMINOPHEN 650 MG: 325 TABLET ORAL at 08:32

## 2022-08-22 RX ADMIN — SODIUM CHLORIDE: 9 INJECTION, SOLUTION INTRAVENOUS at 06:55

## 2022-08-22 RX ADMIN — AMLODIPINE BESYLATE 5 MG: 10 TABLET ORAL at 08:19

## 2022-08-22 RX ADMIN — METOPROLOL TARTRATE 25 MG: 25 TABLET, FILM COATED ORAL at 02:46

## 2022-08-22 RX ADMIN — PANTOPRAZOLE SODIUM 40 MG: 40 TABLET, DELAYED RELEASE ORAL at 05:07

## 2022-08-22 RX ADMIN — CLOPIDOGREL BISULFATE 75 MG: 75 TABLET ORAL at 08:19

## 2022-08-22 RX ADMIN — LAMOTRIGINE 100 MG: 100 TABLET ORAL at 08:19

## 2022-08-22 RX ADMIN — METOPROLOL TARTRATE 25 MG: 25 TABLET, FILM COATED ORAL at 08:18

## 2022-08-22 RX ADMIN — ACETAMINOPHEN 650 MG: 325 TABLET ORAL at 00:20

## 2022-08-22 RX ADMIN — DULOXETINE 120 MG: 30 CAPSULE, DELAYED RELEASE ORAL at 08:18

## 2022-08-22 RX ADMIN — ASPIRIN 81 MG: 81 TABLET, CHEWABLE ORAL at 08:18

## 2022-08-22 ASSESSMENT — PAIN DESCRIPTION - PAIN TYPE: TYPE: ACUTE PAIN

## 2022-08-22 ASSESSMENT — PAIN DESCRIPTION - LOCATION
LOCATION: HEAD

## 2022-08-22 ASSESSMENT — PAIN SCALES - GENERAL
PAINLEVEL_OUTOF10: 0
PAINLEVEL_OUTOF10: 6
PAINLEVEL_OUTOF10: 0
PAINLEVEL_OUTOF10: 3
PAINLEVEL_OUTOF10: 3
PAINLEVEL_OUTOF10: 0
PAINLEVEL_OUTOF10: 0

## 2022-08-22 ASSESSMENT — PAIN DESCRIPTION - FREQUENCY: FREQUENCY: INTERMITTENT

## 2022-08-22 ASSESSMENT — PAIN DESCRIPTION - DESCRIPTORS
DESCRIPTORS: ACHING

## 2022-08-22 ASSESSMENT — PAIN DESCRIPTION - ORIENTATION
ORIENTATION: RIGHT;LEFT
ORIENTATION: MID
ORIENTATION: MID

## 2022-08-22 ASSESSMENT — PAIN - FUNCTIONAL ASSESSMENT: PAIN_FUNCTIONAL_ASSESSMENT: ACTIVITIES ARE NOT PREVENTED

## 2022-08-22 ASSESSMENT — PAIN DESCRIPTION - ONSET: ONSET: PROGRESSIVE

## 2022-08-22 NOTE — PROGRESS NOTES
TRANSFER - IN REPORT:    Verbal report received from Nakia Yee RN on Beverly Modi  being received from [facility] for routine progression of patient care      Report consisted of patient's Situation, Background, Assessment and   Recommendations(SBAR). Information from the following report(s) Nurse Handoff Report was reviewed with the receiving nurse. Opportunity for questions and clarification was provided. Assessment completed upon patient's arrival to unit and care assumed.

## 2022-08-22 NOTE — CARE COORDINATION
Chart reviewed by CM. Patient is a  49year old female, admitted with Unstable Angina. CM met with patient to discuss discharge planning. Patient presented alert and oriented x4. Demographics verified. Patient lives with her spouse Marco A Randolph [phone] in mobile home with steps. At baseline, the patient is independent with completing ADL's and drives. Patient denies any current DME Use. PCP and insurance verified. Patient is able to afford prescription medications. Discharge planning: PT/OT has not been consulted. Patient has no  history of Home Health and REHAB. Patient plans to return home with family. No CM needs identified or expressed at this time. CM remains available if needs arise. 08/22/22 1143   Service Assessment   Patient Orientation Alert and Oriented;Person;Place;Situation   Cognition Alert   History Provided By Patient   Primary [id number] [id number] is:   (Patient reports her spouse/son are HCPOA. Document not on file.)   PCP Verified by CM Yes   Last Visit to PCP Within last 3 months   Prior Functional Level Independent in ADLs/IADLs   Can patient return to prior living arrangement Yes   Ability to make needs known: Good   Social/Functional History   Lives With Spouse   Type of Home Mobile home   Home Layout One level   Home Access Stairs to enter with rails   Entrance Stairs - Number of Steps 7   Home Equipment   (None specified.)   ADL Assistance Independent   Active  Yes   Mode of Transportation Car   Discharge Planning   Type of Residence Trailer/Mobile Home   Living Arrangements Spouse/Significant Other   Current Services Prior To Admission None   DME Ordered?  No   Potential Assistance Purchasing Medications No   Patient expects to be discharged to: [address] Discharge   Transition of Care Consult (CM Consult) Discharge Planning   Services [address] Discharge None   [employer] Information Provided? No   Mode of Transport at Discharge Other (see comment)  (Family.)   Condition of Participation: Discharge Planning   The Plan for Transition of Care is related to the following treatment goals: Return to baseline.

## 2022-08-22 NOTE — PROGRESS NOTES
[facility] CARDIOLOGY PROGRESS NOTE    8/22/2022 7:10 AM    Admit Date: 8/21/2022        Subjective:   Stable overnight without angina, CHF, or palpitations. Vitals stable and controlled. No other complaints overnight. Tolerating meds well. Serial troponins negative. Left heart cath today. Objective:      Vitals:    08/21/22 2035 08/22/22 0009 08/22/22 0246 08/22/22 0438   BP: 128/69 125/65 113/60 137/81   Pulse: 73 66 76 70   Resp: 16 16  17   Temp: 97.3 °F (36.3 °C) 97.2 °F (36.2 °C)  97.2 °F (36.2 °C)   TempSrc: Axillary Axillary  Axillary   SpO2: 92% 92%  93%   Weight:    217 lb 2.5 oz (98.5 kg)   Height:           Physical Exam:  Neck- supple, no JVD  CV- regular rate and rhythm no MRG  Lung- clear bilaterally  Abd- soft, nontender, nondistended  Ext- no edema  Skin- warm and dry    Data Review:   Recent Labs     08/21/22  1045 08/21/22  1339 08/22/22  0332     --  138   K 4.7  --  4.1   MG  --   --  2.3   BUN 16  --  13   WBC  --  8.7 8.9   HGB  --  14.9 13.7   HCT  --  46.2 44.3   PLT  --  376 371   CHOL  --   --  191   HDL  --   --  38*       Assessment and Plan: Active Hospital Problems    *Unstable angina (HCC)-acute fairly severe substernal chest pain radiating to the left jaw during a situational stressful family issue this morning. ECG and exam benign, first troponin negative. Angina similar to prior to bypass and no invasive evaluation since bypass. The benefits and risks of left heart catheterization and possible percutaneous intervention were discussed with the patient. Risks including but not limited to bleeding, infection, contrast allergy reaction, acute kidney injury, MI, stroke, emergent CABG and death were discussed. The patient understands the risks of the procedure and wishes to proceed.         Morbid obesity with BMI of 40.0-44.9, adult (HCC) lifestyle modification with diet, exercise, weight loss, cardiac rehab in the outpatient setting       CAD (coronary artery disease)       S/P CABG x 4-see graft anatomy above. Left heart catheterization tomorrow after prehydration       Diastolic CHF, chronic (HCC)-stable and lying flat comfortably with clear lungs and no JVD today. Reassess tomorrow and titrate meds as needed       DM (diabetes mellitus) (HCC)-slight scale per protocol       GERD (gastroesophageal reflux disease)-continue meds and titrate as needed    Dyslipidemia-LDL greater than 100, augment meds post catheterization as tolerated.      Clementina Ibrahim MD

## 2022-08-22 NOTE — PROGRESS NOTES
Patient received to CPRU room 3. R femoral site assessed with bedside RN. Clean, dry, intact. No bleeding, swelling, hematoma. Patient instructed to limit RLE movement. Patient verbalized udnerstanding. Family at bedside. Call bell within reach.

## 2022-08-22 NOTE — DISCHARGE INSTRUCTIONS
problems. It's also a good idea to know your test results and keep alist of the medicines you take. When should you call for help? Call 911 anytime you think you may need emergency care. For example, call if:    You passed out (lost consciousness). You have severe trouble breathing. You have sudden chest pain and shortness of breath, or you cough up blood. You have symptoms of a heart attack. These may include:  Chest pain or pressure, or a strange feeling in the chest.  Sweating. Shortness of breath. Nausea or vomiting. Pain, pressure, or a strange feeling in the back, neck, jaw, or upper belly, or in one or both shoulders or arms. Lightheadedness or sudden weakness. A fast or irregular heartbeat. After you call 911, the  may tell you to chew 1 adult-strength or 2 to 4 low-dose aspirin. Wait for an ambulance. Do not try to drive yourself. You have been diagnosed with angina, and you have symptoms that do not go away with rest or are not getting better within 5 minutes after you take a dose of nitroglycerin. Call your doctor now or seek immediate medical care if:    You are bleeding from the area where the catheter was put in your artery. You have a fast-growing, painful lump at the catheter site. You have signs of infection, such as: Increased pain, swelling, warmth, or redness. Red streaks leading from the catheter site. Pus draining from the catheter site. A fever. Your leg or hand is painful, looks blue, or feels cold, numb, or tingly. Watch closely for changes in your health, and be sure to contact your doctor ifyou have any problems. Where can you learn more? Go to https://emoquogeoffRealm.[employer]. org and sign in to your [employer] account. Enter K996 in the imbookin ([place]) box to learn more about \"Coronary Angiogram: What to Expect at Home. \"     If you do not have an account, please click on the \"Sign Up Now\" link.   Current as of: January 10, 2022               Content Version: 13.3  © 9019-7030 Healthwise, Incorporated. Care instructions adapted under license by [facility] ([facility]). If you have questions about a medical condition or this instruction, always ask your healthcare professional. [address] any warranty or liability for your use of this information.

## 2022-08-22 NOTE — PLAN OF CARE
Problem: Discharge Planning  Goal: Discharge to home or other facility with appropriate resources  8/22/2022 1424 by William Isaac RN  Outcome: Completed  8/22/2022 0857 by William Isaca RN  Outcome: Progressing  8/22/2022 0410 by Manoj Sen RN  Outcome: Progressing     Problem: Pain  Goal: Verbalizes/displays adequate comfort level or baseline comfort level  8/22/2022 1424 by William Isaac RN  Outcome: Completed  8/22/2022 0857 by William Isaac RN  Outcome: Progressing  8/22/2022 0410 by Manoj Sen RN  Outcome: Progressing

## 2022-08-22 NOTE — DISCHARGE SUMMARY
[facility] 121 Cardiology Discharge Summary     Patient ID:  Sheila Galvez  [id number]  49 y.o.  1970    Admit date: 8/21/2022    Discharge date and time:  8-    Admitting Physician: Rachael Knight MD     Discharge Physician: Gloria Cervantes PA-C/Dr. Randee Hardin    Admission Diagnoses: Unstable angina ([facility] [I20.0]  Chest pain, unspecified type [R07.9]  Hypertension, unspecified type [I10]    Discharge Diagnoses:   Patient Active Problem List    Diagnosis Date Noted    Unstable angina ([facility] 08/21/2022    Moderate persistent asthma without complication 82/50/2804    Supraventricular tachycardia ([facility] 06/14/2022    Yeast dermatitis 05/20/2022    YOON (dyspnea on exertion) 05/03/2022    Mild persistent asthma 05/03/2022    Polycythemia 05/03/2022    Chronic constipation 10/29/2021    Diabetic peripheral neuropathy associated with type 2 diabetes mellitus ([facility]     Morbid obesity with BMI of 40.0-44.9, adult ([facility])     CAD (coronary artery disease)     Pain in left leg 03/24/2021    Hypertension     S/P CABG x 4 11/18/2020    Atherosclerosis of native coronary artery of native heart with angina pectoris ([facility] 11/18/2020    Bipolar disorder ([facility] 11/18/2020    SHELBY (obstructive sleep apnea)     Chronic migraine with aura 07/30/2020    Myopia 06/02/2020    TIA (transient ischemic attack) 04/20/2020    Encounter for medication management 04/20/2020    Postural imbalance 04/20/2020    Vestibular migraine 06/10/2019    Vertigo 06/10/2019    Irregular bowel habits 04/25/2019    Other fatigue 08/73/1676    Diastolic CHF, chronic (HCC) 02/27/2019    Carpal tunnel syndrome of left wrist 09/24/2018    Nausea 07/19/2018    Rupture of extensor tendon of left hand 07/09/2018    Vitamin D deficiency 07/06/2018    Idiopathic progressive polyneuropathy     Pain in left hand 05/29/2018    Tendon weakness 05/29/2018    Stiffness of left hand joint 05/29/2018    Retained orthopedic hardware 05/02/2018    Pain of right thumb 04/30/2018    Oropharyngeal dysphagia 06/29/2017    Uncontrolled type 2 diabetes mellitus with hypoglycemia without coma ([facility] 01/06/2017    Hypersomnia 11/23/2016    RLS (restless legs syndrome) 11/23/2016    Bruxism, sleep-related 11/23/2016    Persistent disorder of initiating or maintaining sleep 11/23/2016    Lateral epicondylitis 11/02/2016    Elevated cortisol level 10/11/2016    Hypercholesterolemia     Hypertriglyceridemia     Chest pain     Diabetes type 2, uncontrolled ([facility] 08/24/2016    Closed fracture of lower end of left radius with routine healing 03/14/2016    Syncope and collapse     Generalized anxiety disorder     Meniere's disease of right ear 10/02/2014    Incontinence 03/10/2014    Morbid obesity ([facility] 11/15/2012    Abnormal results of other endocrine function studies 11/15/2012    Microalbuminuria 04/05/2012    DM (diabetes mellitus) ([facility] 11/08/2011    Displacement of cervical intervertebral disc without myelopathy 11/07/2011    Fatty liver     GERD (gastroesophageal reflux disease)     Psychiatric disorder     PUD (peptic ulcer disease)     Chronic pain        Cardiology Procedures this admission:  Diagnostic left heart catheterization  Consults: none    Hospital Course: Pt is a 49 y.o. obese WF with h/o CAD s/p CABG X 4 with LIMA-LAD, SVG-diagonal, SVG-OM1 and SVG-PDA on 11/18/20, HTN, HLD, DM II, SVT, HFpEF, asthma, polycythemia, Bipolar D/o, SHELBY and GERD who presented to [facility] ED with c/o CP. Cardiology asked to evaluate for CP. In ED, BP elevated 173//94, HS troponin 4.1. She reports feeling tired/fatigued for the last week. She was very upset over some emotional family issues and started having 8/10 chest pressure \"felt like an elephant on my chest.\" She reports radiation to bilateral jaws, L shoulder and arm. She called EMS who instructed her to take ASA. On EMS arrival they gave her 1 SL NTG with relief of CP down to a 4-5/10. She reports continued CP of 4-5/10.  Second and third HS troponin also negative. She was admitted with plan for [facility] on 8/22. She was taken to the cath lab by Dr. Elda Wong. Pt was found to have:  · 3 out of 4 patent bypass grafts  · Normal LV regional wall motion and ejection fraction  · Elevated left ventricular end-diastolic pressure  Left heart cath was performed via right femoral arterial access utilizing 6 Irish catheters and 185 cc of contrast.  The patient was sedated by Cristian Swartz RN with 3 mg Versed and 75 mcg fentanyl and monitored from 10:37 AM to 11:17 AM.  A Mynx  was deployed at the arteriotomy with excellent hemostasis. There were no complications    Pt tolerated the procedure well and was taken to the telemetry floor for recovery. Pt was started on Imdur 30 mg daily. After bedrest, Pt was up feeling well without any complaints of CP, SOB or palpitations. Pt's right groin cath site was clean, dry and intact without hematoma or bruit. Pt's labs were WNL. Pt was seen and examined by Dr. Elda Wong and determined stable and ready for discharge. Pt was instructed on the importance of continuing home meds to include aspirin and plavix everyday. Pt will follow up with Dr. Madilyn Brittle on Friday 9/16 at 1:15 PM [facility]. DISPOSITION: The patient is being discharged home in stable condition on a low saturated fat, low cholesterol and low salt diet. Pt is instructed to advance activities as tolerated limited to fatigue or shortness of breath. Pt is instructed to do no heavy lifting, straining, stooping or squatting for 3 days. Pt is instructed to watch cath site for bleeding/oozing, if seen Pt is instructed to apply firm pressure with clean cloth and call office at [phone]. Pt is instructed to watch for signs of infection which include increasing area of redness around site, fever/hot to touch or purulent drainage. Pt is instructed not to soak in a tub bath for 1 week, but it is okay to shower.  Pt is instructed to call office or return to ER for immediate evaluation of any shortness of breath or chest pain not relieved by NTG. Discharge Exam: BP (!) 142/84   Pulse 74   Temp 97.5 °F (36.4 °C) (Oral)   Resp 20   Ht 5' 4\" (1.626 m)   Wt 217 lb 2.5 oz (98.5 kg)   SpO2 96%   BMI 37.27 kg/m²    Pt has been seen by Dr. Nathalie Rivera: see his progress note for exam details.     Recent Results (from the past 24 hour(s))   Troponin    Collection Time: 08/21/22  1:39 PM   Result Value Ref Range    Troponin, High Sensitivity 7.8 0 - 14 pg/mL   CBC    Collection Time: 08/21/22  1:39 PM   Result Value Ref Range    WBC 8.7 4.3 - 11.1 K/uL    RBC 5.37 (H) 4.05 - 5.2 M/uL    Hemoglobin 14.9 11.7 - 15.4 g/dL    Hematocrit 46.2 35.8 - 46.3 %    MCV 86.0 79.6 - 97.8 FL    MCH 27.7 26.1 - 32.9 PG    MCHC 32.3 31.4 - 35.0 g/dL    RDW 13.4 11.9 - 14.6 %    Platelets 988 536 - 205 K/uL    MPV 9.0 (L) 9.4 - 12.3 FL    nRBC 0.00 0.0 - 0.2 K/uL   Troponin    Collection Time: 08/21/22  7:00 PM   Result Value Ref Range    Troponin, High Sensitivity 4.9 0 - 14 pg/mL   Basic Metabolic Panel w/ Reflex to MG    Collection Time: 08/22/22  3:32 AM   Result Value Ref Range    Sodium 138 136 - 145 mmol/L    Potassium 4.1 3.5 - 5.1 mmol/L    Chloride 116 (H) 98 - 107 mmol/L    CO2 23 21 - 32 mmol/L    Anion Gap Cannot be calculated 7 - 16 mmol/L    Glucose 196 (H) 65 - 100 mg/dL    BUN 13 6 - 23 MG/DL    Creatinine 0.50 (L) 0.6 - 1.0 MG/DL    GFR African American >60 >60 ml/min/1.73m2    GFR Non- >60 >60 ml/min/1.73m2    Calcium 8.5 8.3 - 10.4 MG/DL   Magnesium    Collection Time: 08/22/22  3:32 AM   Result Value Ref Range    Magnesium 2.3 1.8 - 2.4 mg/dL   Lipid panel - fasting    Collection Time: 08/22/22  3:32 AM   Result Value Ref Range    Cholesterol, Total 191 <200 MG/DL    Triglycerides 187 (H) 35 - 150 MG/DL    HDL 38 (L) 40 - 60 MG/DL    LDL Calculated 115.6 (H) <100 MG/DL    VLDL Cholesterol Calculated 37.4 (H) 6.0 - 23.0 MG/DL    Chol/HDL Ratio 5.0     CBC Collection Time: 08/22/22  3:32 AM   Result Value Ref Range    WBC 8.9 4.3 - 11.1 K/uL    RBC 4.99 4.05 - 5.2 M/uL    Hemoglobin 13.7 11.7 - 15.4 g/dL    Hematocrit 44.3 35.8 - 46.3 %    MCV 88.8 79.6 - 97.8 FL    MCH 27.5 26.1 - 32.9 PG    MCHC 30.9 (L) 31.4 - 35.0 g/dL    RDW 13.7 11.9 - 14.6 %    Platelets 877 156 - 540 K/uL    MPV 9.3 (L) 9.4 - 12.3 FL    nRBC 0.00 0.0 - 0.2 K/uL   Cardiac procedure    Collection Time: 08/22/22 11:21 AM   Result Value Ref Range    Body Surface Area 2.05 m2         Patient Instructions:   Current Discharge Medication List        START taking these medications    Details   isosorbide mononitrate (IMDUR) 30 MG extended release tablet Take 1 tablet by mouth daily  Qty: 90 tablet, Refills: 3           CONTINUE these medications which have NOT CHANGED    Details   ipratropium-albuterol (DUONEB) 0.5-2.5 (3) MG/3ML SOLN nebulizer solution Inhale 3 mLs into the lungs every 6 hours as needed for Shortness of Breath INHALE 3ML VIA NEBULIZER EVERY 6 HOURS  Qty: 360 mL, Refills: 11    Associated Diagnoses: Moderate persistent asthma without complication      albuterol sulfate (PROAIR RESPICLICK) 791 (90 Base) MCG/ACT aerosol powder inhalation Inhale 1 puff into the lungs every 6 hours as needed for Wheezing or Shortness of Breath  Qty: 1 each, Refills: 11    Associated Diagnoses: Moderate persistent asthma without complication      Fluticasone furoate-vilanterol (BREO ELLIPTA) 200-25 MCG/INH AEPB inhaler Inhale 1 puff into the lungs in the morning. Qty: 1 each, Refills: 11    Associated Diagnoses: Moderate persistent asthma without complication      tiZANidine (ZANAFLEX) 2 MG tablet TAKE 1 TABLET BY MOUTH EVERY TWELVE (12) HOURS AS NEEDED FOR MUSCLE SPASM(S).   Qty: 15 tablet, Refills: 0      MOUNJARO 5 MG/0.5ML SOPN SC injection Inject 0.5 mLs into the skin once a week  Qty: 4 pen, Refills: 1    Associated Diagnoses: Uncontrolled type 2 diabetes mellitus with hyperglycemia ([facility] HUMULIN R U-500 KWIKPEN 500 UNIT/ML SOPN concentrated injection pen 200 units before breakfast, 210 units before lunch, 105 units before supper  Qty: 93 mL, Refills: 3    Associated Diagnoses: Uncontrolled type 2 diabetes mellitus with hyperglycemia (HCC)      UNIFINE PENTIPS PLUS 31G X 6 MM MISC USE WITH INSULIN UP TO 4 TIMES DAILY, E11.65      UNABLE TO FIND KATHY PEN NEEDLE   32 G X 5/32\" NEEDLE      albuterol sulfate HFA (PROVENTIL;VENTOLIN;PROAIR) 108 (90 Base) MCG/ACT inhaler Inhale 1 puff into the lungs every 6 hours as needed for Wheezing TAKE 1 PUFF BY INHALATION EVERY FOUR (4) HOURS AS NEEDED FOR WHEEZING OR SHORTNESS OF BREATH. Qty: 18 g, Refills: 2    Associated Diagnoses: Mild intermittent reactive airway disease without complication      amLODIPine (NORVASC) 5 MG tablet Take 1 tablet by mouth daily  Qty: 30 tablet, Refills: 3    Associated Diagnoses: Primary hypertension      atorvastatin (LIPITOR) 80 MG tablet Take 1 tablet by mouth daily  Qty: 30 tablet, Refills: 3    Associated Diagnoses: Other hyperlipidemia; Coronary artery disease due to calcified coronary lesion      clopidogrel (PLAVIX) 75 MG tablet Take 1 tablet by mouth daily  Qty: 30 tablet, Refills: 3    Associated Diagnoses: Coronary artery disease due to calcified coronary lesion      dexlansoprazole (DEXILANT) 60 MG CPDR delayed release capsule Take 1 capsule by mouth daily  Qty: 30 capsule, Refills: 3    Associated Diagnoses: Gastroesophageal reflux disease, unspecified whether esophagitis present      diazePAM (VALIUM) 2 MG tablet Take 1 tablet by mouth 2 times daily as needed for Anxiety for up to 120 days.   Qty: 60 tablet, Refills: 3    Associated Diagnoses: Anxiety      DULoxetine (CYMBALTA) 60 MG extended release capsule Take 2 capsules by mouth daily  Qty: 30 capsule, Refills: 3    Associated Diagnoses: Mild episode of recurrent major depressive disorder (HCC)      empagliflozin (JARDIANCE) 25 MG tablet Take 1 tablet by mouth daily  Qty: 30 tablet, Refills: 3    Associated Diagnoses: Uncontrolled type 2 diabetes mellitus with hyperglycemia (HCC)      furosemide (LASIX) 40 MG tablet Take 1 tablet by mouth daily  Qty: 60 tablet, Refills: 3    Associated Diagnoses: Localized edema      gabapentin (NEURONTIN) 600 MG tablet Take 1 tablet by mouth 2 times daily for 180 days. Qty: 90 tablet, Refills: 3    Associated Diagnoses: Peripheral polyneuropathy      HYDROcodone-acetaminophen (NORCO)  MG per tablet Take 1 tablet by mouth every 8 hours as needed for Pain for up to 30 days. Intended supply: 30 days  Qty: 90 tablet, Refills: 0    Comments: Reduce doses taken as pain becomes manageable  Associated Diagnoses: Chronic bilateral low back pain without sciatica      Galcanezumab-gnlm (EMGALITY) 120 MG/ML SOSY INJECT CONTENTS OF 1 SYRINGE SUBCUTANEOUSLY EVERY 30 DAYS  Qty: 1 mL, Refills: 11      Ubrogepant 50 MG TABS Take 1 tablet by mouth at onset of migraine, repeat in two hours if needed. Maximum 2/day up to 4/week. Qty: 16 tablet, Refills: 11      alirocumab (PRALUENT) 75 MG/ML SOAJ injection pen Inject 75 mg into the skin      aspirin 81 MG EC tablet Take 81 mg by mouth every evening      hydrOXYzine (ATARAX) 25 MG tablet Take 25 mg by mouth 3 times daily as needed      lamoTRIgine (LAMICTAL) 100 MG tablet Take 100 mg by mouth 2 times daily      linaclotide (LINZESS) 145 MCG capsule Take 145 mcg by mouth daily      lisinopril (PRINIVIL;ZESTRIL) 40 MG tablet Take 40 mg by mouth every evening      Loteprednol Etabonate (LOTEMAX SM) 0.38 % GEL 1 drop 3x a day x 1 wk, 2x a day x 1 wk, 1x a day x 1 wk, then d/c in both eyes      meloxicam (MOBIC) 15 MG tablet One daily      metoprolol succinate (TOPROL XL) 50 MG extended release tablet Take 50 mg by mouth daily      nitroGLYCERIN (NITROSTAT) 0.4 MG SL tablet PLACE ONE TABLET UNDER TONGUE AS NEEDED FOR CHEST PAIN. MAY REPEAT EVERY 5 MINUTES FOR 2 MORE DOSES. CALL 911 ON SECOND DOSE. potassium chloride (KLOR-CON M) 20 MEQ extended release tablet Take 20 mEq by mouth daily      traZODone (DESYREL) 100 MG tablet Take 100 mg by mouth daily           STOP taking these medications       NOVOLOG FLEXPEN 100 UNIT/ML injection pen Comments:   Reason for Stopping:             Dr. Katlyn Hernández, PA-C  8/22/2022  11:56 AM

## 2022-08-22 NOTE — PROGRESS NOTES
TRANSFER - OUT REPORT:    Verbal report given to Mary Ann So RN on Tavon Siegel  being transferred to cardiac telemetry for routine progression of patient care       Report consisted of patient's Situation, Background, Assessment and   Recommendations(SBAR). Information from the following report(s) Nurse Handoff Report was reviewed with the receiving nurse. Lines:   Peripheral IV 08/21/22 Left Antecubital (Active)   Site Assessment Clean, dry & intact 08/22/22 0832   Line Status Infusing 08/22/22 0832   Line Care Connections checked and tightened 08/22/22 0415   Phlebitis Assessment No symptoms 08/22/22 0832   Infiltration Assessment 0 08/22/22 0832   Alcohol Cap Used Yes 08/22/22 0415   Dressing Status Clean, dry & intact 08/22/22 0832   Dressing Type Transparent 08/22/22 4396        Opportunity for questions and clarification was provided.       Patient transported with:  O2 @ 2lpm

## 2022-08-22 NOTE — PLAN OF CARE
Problem: Discharge Planning  Goal: Discharge to home or other facility with appropriate resources  8/22/2022 0857 by Kera Garcia RN  Outcome: Progressing  8/22/2022 0410 by Brittany Redding RN  Outcome: Progressing     Problem: Pain  Goal: Verbalizes/displays adequate comfort level or baseline comfort level  8/22/2022 0857 by Kera Garcia RN  Outcome: Progressing  8/22/2022 0410 by Brittany Redding RN  Outcome: Progressing

## 2022-08-22 NOTE — PROGRESS NOTES
[facility] by   Right femoral access  No interventions  RFA closed with Mynx; covered with tegaderm  Versed 3mg  Fentanyl 75mcg  Access site soft. Clean, dry, and intact; with no signs of bleeding or hematoma  Pt. Tolerated procedure  TRANSFER - OUT REPORT:    Verbal report given to Elma on Marlou Sale  being transferred to [facility] for routine progression of patient care       Report consisted of patient's Situation, Background, Assessment and   Recommendations(SBAR). Information from the following report(s) Nurse Handoff Report and MAR was reviewed with the receiving nurse. Lines:   Peripheral IV 08/21/22 Left Antecubital (Active)   Site Assessment Clean, dry & intact 08/22/22 0832   Line Status Infusing 08/22/22 0832   Line Care Connections checked and tightened 08/22/22 0415   Phlebitis Assessment No symptoms 08/22/22 0832   Infiltration Assessment 0 08/22/22 0832   Alcohol Cap Used Yes 08/22/22 0415   Dressing Status Clean, dry & intact 08/22/22 0832   Dressing Type Transparent 08/22/22 1758        Opportunity for questions and clarification was provided.       Patient transported with:  Registered Nurse

## 2022-08-23 ENCOUNTER — CARE COORDINATION (OUTPATIENT)
Dept: CARE COORDINATION | Facility: CLINIC | Age: 52
End: 2022-08-23

## 2022-08-23 NOTE — CARE COORDINATION
Care Transitions Outreach Attempt    Call within 2 business days of discharge: Yes   Attempted to reach patient for transitions of care follow up. Unable to reach patient. Patient: Xin Fraire Patient : 1970 MRN: [id number]    Last Discharge [facility]       Date Complaint Diagnosis Description Type Department Provider    22 Chest Pain Chest pain, unspecified type . .. ED to Hosp-Admission (Discharged) (ADMITTED) Marina Decker MD; Margaret Hart . .. Was this an external facility discharge?  No Discharge Facility: [facility]    Noted following upcoming appointments from discharge chart review:   [facility] follow up appointment(s):   Future Appointments   Date Time Provider Cole Leggett   2022 11:15 AM Mo Figueroa MD [facility] AMB   2022  1:15 PM Aurora Sharma MD [facility] AMB   2022  9:15 AM Mo Figueroa MD [facility] AMB   2022 11:30 AM GRADY Paris GVL AMB   2022 10:00 AM Aurora Sharma MD [facility] AMB   2023  8:40 AM Arthur Cortes MD [facility] AMB   2023  1:30 PM Gabriel Sparks [facility]     [facility] follow up appointment(s): na

## 2022-08-23 NOTE — CARE COORDINATION
Care Transitions Outreach Attempt    Call within 2 business days of discharge: Yes   Attempted to reach patient for transitions of care follow up. Unable to reach patient. Patient: Saravanan James Patient : 1970 MRN: [id number]    Last Discharge [facility]       Date Complaint Diagnosis Description Type Department Provider    22 Chest Pain Chest pain, unspecified type . .. ED to Hosp-Admission (Discharged) (ADMITTED) Julio Carty MD; Poorn[facility] Mar . .. Was this an external facility discharge?  No Discharge Facility: [facility]    Noted following upcoming appointments from discharge chart review:   [facility] follow up appointment(s):   Future Appointments   Date Time Provider Cole Leggett   2022 11:15 AM Primo Krishnamurthy MD [facility] AMB   2022  1:15 PM MD KEERTHI Issa G[facility] AMB   2022  9:15 AM MD LAINA Plasencia GVL AMB   2022 11:30 AM GRADY Vizcaino GVL AMB   2022 10:00 AM MD KEERTHI Issa G[facility] AMB   2023  8:40 AM Jeffrey Gallego MD [facility] AMB   2023  1:30 PM Chris Machuca MD [facility] AMB     Non-[facility] follow up appointment(s): na

## 2022-08-30 ENCOUNTER — OFFICE VISIT (OUTPATIENT)
Dept: CARDIOLOGY CLINIC | Age: 52
End: 2022-08-30
Payer: MEDICARE

## 2022-08-30 ENCOUNTER — OFFICE VISIT (OUTPATIENT)
Dept: FAMILY MEDICINE CLINIC | Facility: CLINIC | Age: 52
End: 2022-08-30
Payer: MEDICARE

## 2022-08-30 VITALS
OXYGEN SATURATION: 97 % | WEIGHT: 214.8 LBS | SYSTOLIC BLOOD PRESSURE: 150 MMHG | BODY MASS INDEX: 36.67 KG/M2 | HEART RATE: 74 BPM | TEMPERATURE: 98.3 F | DIASTOLIC BLOOD PRESSURE: 80 MMHG | HEIGHT: 64 IN

## 2022-08-30 VITALS
SYSTOLIC BLOOD PRESSURE: 146 MMHG | HEART RATE: 68 BPM | WEIGHT: 213 LBS | DIASTOLIC BLOOD PRESSURE: 80 MMHG | BODY MASS INDEX: 36.56 KG/M2

## 2022-08-30 DIAGNOSIS — I10 PRIMARY HYPERTENSION: Primary | ICD-10-CM

## 2022-08-30 DIAGNOSIS — I25.810 CORONARY ARTERY DISEASE INVOLVING CORONARY BYPASS GRAFT OF NATIVE HEART WITHOUT ANGINA PECTORIS: ICD-10-CM

## 2022-08-30 DIAGNOSIS — E78.00 HYPERCHOLESTEROLEMIA: ICD-10-CM

## 2022-08-30 DIAGNOSIS — Z79.4 TYPE 2 DIABETES MELLITUS WITH HYPERGLYCEMIA, WITH LONG-TERM CURRENT USE OF INSULIN (HCC): ICD-10-CM

## 2022-08-30 DIAGNOSIS — E11.65 TYPE 2 DIABETES MELLITUS WITH HYPERGLYCEMIA, WITH LONG-TERM CURRENT USE OF INSULIN (HCC): ICD-10-CM

## 2022-08-30 DIAGNOSIS — I25.810 CORONARY ARTERY DISEASE INVOLVING OTHER CORONARY ARTERY BYPASS GRAFT WITHOUT ANGINA PECTORIS: Primary | ICD-10-CM

## 2022-08-30 DIAGNOSIS — I50.32 DIASTOLIC CHF, CHRONIC (HCC): ICD-10-CM

## 2022-08-30 PROCEDURE — 99214 OFFICE O/P EST MOD 30 MIN: CPT | Performed by: INTERNAL MEDICINE

## 2022-08-30 PROCEDURE — 3046F HEMOGLOBIN A1C LEVEL >9.0%: CPT | Performed by: FAMILY MEDICINE

## 2022-08-30 PROCEDURE — 99213 OFFICE O/P EST LOW 20 MIN: CPT | Performed by: FAMILY MEDICINE

## 2022-08-30 ASSESSMENT — PATIENT HEALTH QUESTIONNAIRE - PHQ9
10. IF YOU CHECKED OFF ANY PROBLEMS, HOW DIFFICULT HAVE THESE PROBLEMS MADE IT FOR YOU TO DO YOUR WORK, TAKE CARE OF THINGS AT HOME, OR GET ALONG WITH OTHER PEOPLE: 0
5. POOR APPETITE OR OVEREATING: 0
SUM OF ALL RESPONSES TO PHQ QUESTIONS 1-9: 0
6. FEELING BAD ABOUT YOURSELF - OR THAT YOU ARE A FAILURE OR HAVE LET YOURSELF OR YOUR FAMILY DOWN: 0
4. FEELING TIRED OR HAVING LITTLE ENERGY: 0
3. TROUBLE FALLING OR STAYING ASLEEP: 0
1. LITTLE INTEREST OR PLEASURE IN DOING THINGS: 0
SUM OF ALL RESPONSES TO PHQ QUESTIONS 1-9: 0
2. FEELING DOWN, DEPRESSED OR HOPELESS: 0
9. THOUGHTS THAT YOU WOULD BE BETTER OFF DEAD, OR OF HURTING YOURSELF: 0
8. MOVING OR SPEAKING SO SLOWLY THAT OTHER PEOPLE COULD HAVE NOTICED. OR THE OPPOSITE, BEING SO FIGETY OR RESTLESS THAT YOU HAVE BEEN MOVING AROUND A LOT MORE THAN USUAL: 0
7. TROUBLE CONCENTRATING ON THINGS, SUCH AS READING THE NEWSPAPER OR WATCHING TELEVISION: 0
SUM OF ALL RESPONSES TO PHQ9 QUESTIONS 1 & 2: 0

## 2022-08-30 NOTE — PROGRESS NOTES
[address], [address]  PHONE: [phone]      22    NAME:  Ara Farah  : 1970  MRN: [id number]       SUBJECTIVE:   Ara Farah is a 49 y.o. female seen for a follow up visit regarding the following:     Chief Complaint   Patient presents with    Coronary Artery Disease    Follow-Up from Hospital                HPI:    No cp or chicas. No orthopnea or pnd. No palpitations or syncope. Stable cad with recent cath. Past Medical History, Past Surgical History, Family history, Social History, and Medications were all reviewed with the patient today and updated as necessary. Current Outpatient Medications   Medication Sig Dispense Refill    ipratropium-albuterol (DUONEB) 0.5-2.5 (3) MG/3ML SOLN nebulizer solution Inhale 3 mLs into the lungs every 6 hours as needed for Shortness of Breath INHALE 3ML VIA NEBULIZER EVERY 6 HOURS 360 mL 11    albuterol sulfate (PROAIR RESPICLICK) 299 (90 Base) MCG/ACT aerosol powder inhalation Inhale 1 puff into the lungs every 6 hours as needed for Wheezing or Shortness of Breath 1 each 11    Fluticasone furoate-vilanterol (BREO ELLIPTA) 200-25 MCG/INH AEPB inhaler Inhale 1 puff into the lungs in the morning. 1 each 11    tiZANidine (ZANAFLEX) 2 MG tablet TAKE 1 TABLET BY MOUTH EVERY TWELVE (12) HOURS AS NEEDED FOR MUSCLE SPASM(S).  15 tablet 0    MOUNJARO 5 MG/0.5ML SOPN SC injection Inject 0.5 mLs into the skin once a week 4 pen 1    HUMULIN R U-500 KWIKPEN 500 UNIT/ML SOPN concentrated injection pen 200 units before breakfast, 210 units before lunch, 105 units before supper 93 mL 3    albuterol sulfate HFA (PROVENTIL;VENTOLIN;PROAIR) 108 (90 Base) MCG/ACT inhaler Inhale 1 puff into the lungs every 6 hours as needed for Wheezing TAKE 1 PUFF BY INHALATION EVERY FOUR (4) HOURS AS NEEDED FOR WHEEZING OR SHORTNESS OF BREATH. 18 g 2    amLODIPine (NORVASC) 5 MG tablet Take 1 tablet by mouth daily 30 tablet 3    atorvastatin (LIPITOR) 80 MG tablet Take 1 tablet by mouth daily 30 tablet 3    clopidogrel (PLAVIX) 75 MG tablet Take 1 tablet by mouth daily 30 tablet 3    dexlansoprazole (DEXILANT) 60 MG CPDR delayed release capsule Take 1 capsule by mouth daily 30 capsule 3    diazePAM (VALIUM) 2 MG tablet Take 1 tablet by mouth 2 times daily as needed for Anxiety for up to 120 days. 60 tablet 3    DULoxetine (CYMBALTA) 60 MG extended release capsule Take 2 capsules by mouth daily 30 capsule 3    empagliflozin (JARDIANCE) 25 MG tablet Take 1 tablet by mouth daily 30 tablet 3    furosemide (LASIX) 40 MG tablet Take 1 tablet by mouth daily 60 tablet 3    gabapentin (NEURONTIN) 600 MG tablet Take 1 tablet by mouth 2 times daily for 180 days. 90 tablet 3    HYDROcodone-acetaminophen (NORCO)  MG per tablet Take 1 tablet by mouth every 8 hours as needed for Pain for up to 30 days. Intended supply: 30 days 90 tablet 0    Galcanezumab-gnlm (EMGALITY) 120 MG/ML SOSY INJECT CONTENTS OF 1 SYRINGE SUBCUTANEOUSLY EVERY 30 DAYS 1 mL 11    Ubrogepant 50 MG TABS Take 1 tablet by mouth at onset of migraine, repeat in two hours if needed. Maximum 2/day up to 4/week.  16 tablet 11    alirocumab (PRALUENT) 75 MG/ML SOAJ injection pen Inject 75 mg into the skin      aspirin 81 MG EC tablet Take 81 mg by mouth every evening      hydrOXYzine (ATARAX) 25 MG tablet Take 25 mg by mouth 3 times daily as needed      lamoTRIgine (LAMICTAL) 100 MG tablet Take 100 mg by mouth 2 times daily      linaclotide (LINZESS) 145 MCG capsule Take 145 mcg by mouth daily      lisinopril (PRINIVIL;ZESTRIL) 40 MG tablet Take 40 mg by mouth every evening      Loteprednol Etabonate (LOTEMAX SM) 0.38 % GEL 1 drop 3x a day x 1 wk, 2x a day x 1 wk, 1x a day x 1 wk, then d/c in both eyes      meloxicam (MOBIC) 15 MG tablet One daily      metoprolol succinate (TOPROL XL) 50 MG extended release tablet Take 50 mg by mouth daily      nitroGLYCERIN (NITROSTAT) 0.4 MG SL tablet PLACE ONE TABLET UNDER TONGUE AS NEEDED FOR CHEST PAIN. MAY REPEAT EVERY 5 MINUTES FOR 2 MORE DOSES. CALL 911 ON SECOND DOSE.      potassium chloride (KLOR-CON M) 20 MEQ extended release tablet Take 20 mEq by mouth daily      traZODone (DESYREL) 100 MG tablet Take 100 mg by mouth daily      isosorbide mononitrate (IMDUR) 30 MG extended release tablet Take 1 tablet by mouth daily (Patient not taking: Reported on 8/30/2022) 90 tablet 3    UNIFINE PENTIPS PLUS 31G X 6 MM MISC USE WITH INSULIN UP TO 4 TIMES DAILY, E11.65      UNABLE TO FIND KATHY PEN NEEDLE   32 G X 5/32\" NEEDLE       No current facility-administered medications for this visit. Social History     Tobacco Use    Smoking status: Never    Smokeless tobacco: Never   Substance Use Topics    Alcohol use: Not Currently              PHYSICAL EXAM:   BP (!) 146/80   Pulse 68   Wt 213 lb (96.6 kg)   BMI 36.56 kg/m²    Constitutional: Oriented to person, place, and time. Appears well-developed and well-nourished. Head: Normocephalic and atraumatic. Neck: Neck supple. Cardiovascular: Normal rate and regular rhythm with no murmur -No JVP  Pulmonary/Chest: Breath sounds normal.   Abdominal: Soft. Musculoskeletal: No edema. Neurological: Alert and oriented to person, place, and time. Skin: Skin is warm and dry. Psychiatric: Normal mood and affect. Vitals reviewed. Wt Readings from Last 3 Encounters:   08/30/22 213 lb (96.6 kg)   08/22/22 217 lb 2.5 oz (98.5 kg)   08/12/22 212 lb (96.2 kg)       Medical problems and test results were reviewed with the patient today. ASSESSMENT and PLAN    1. Primary hypertension  Stable. Continue toprol and norvasc and lisinopril      2. Hypercholesterolemia  Stable. Continue praluent      3. Diastolic CHF, chronic (HCC)  Stable. Continue lasix- feels better since restarting lasix- edp was elevated at cath      4.  Coronary artery disease involving coronary bypass graft of native heart without angina pectoris  Stable. Continue asa and plavix       Return in about 3 months (around 11/30/2022).          Randy Robertosn MD  8/30/2022  10:02 AM

## 2022-08-31 ASSESSMENT — ENCOUNTER SYMPTOMS
PHOTOPHOBIA: 0
SORE THROAT: 0
COLOR CHANGE: 0
ABDOMINAL PAIN: 0
NAUSEA: 0
SHORTNESS OF BREATH: 0
EYE PAIN: 0
BLOOD IN STOOL: 0
COUGH: 0
ABDOMINAL DISTENTION: 0

## 2022-08-31 NOTE — PROGRESS NOTES
[facility]  1970 is a 49 y.o. female ,Established patient, here for evaluation of the following chief complaint(s):   Chief Complaint   Patient presents with    Follow-Up from [facility] in for chest pain was there over night did do a heart cath and said she has some build up and one of the bypasses was not working all the way she seen her cardiologist today and he told her that's not what that said and just take her fluid pill for the fluid. 1. Coronary artery disease involving other coronary artery bypass graft without angina pectoris--recent past hospitalization is reviewed. Her original CABG was in 2020. When she presented to the ER her troponin was 4.1 and CATH SHOWED 3 OUT OF 4 VESSELS PATENT. She was advised to [address]-- and to continue toprol, norvasc lisinopril asa and plavix. NOTE HER INTOLERANCE TO IMDUR. 2. Type 2 diabetes mellitus with hyperglycemia, with long-term current use of insulin (HCC)--SHE IS NOW ON NEW GLP ONE MOUNJAO. FOLLOWED PER ENDO. No follow-ups on file. Subjective   SUBJECTIVE/OBJECTIVE:  She presents in today for hospital follow up. She had chest pain earlier this month and had to have a cath which showed that  three out of four of her bypass grafts are patent. She saw cardiology today. She was placed on imdur but is unable to take it. She denies chest pain or dyspnea today . When she had the chest pain there was a lot of emotional stress going on in the family    Diabetes  She presents for her follow-up diabetic visit. She has type 2 diabetes mellitus. Her disease course has been improving (she is being followed closely by endocrinology). Pertinent negatives for hypoglycemia include no confusion, headaches, pallor or speech difficulty. Pertinent negatives for diabetes include no chest pain and no weakness. Review of Systems   Constitutional:  Negative for chills and fever.    HENT:  Negative for ear pain, hearing loss and sore throat. Eyes:  Negative for photophobia and pain. Respiratory:  Negative for cough and shortness of breath. Cardiovascular:  Negative for chest pain, palpitations and leg swelling. Gastrointestinal:  Negative for abdominal distention, abdominal pain, blood in stool and nausea. Genitourinary:  Negative for dysuria and urgency. Musculoskeletal:  Negative for joint swelling and myalgias. Skin:  Negative for color change, pallor and rash. Neurological:  Negative for speech difficulty, weakness, light-headedness and headaches. Hematological:  Negative for adenopathy. Psychiatric/Behavioral:  Negative for agitation, confusion, hallucinations, self-injury and suicidal ideas. Objective   Physical Exam  Constitutional:       Appearance: Normal appearance. HENT:      Head: Normocephalic and atraumatic. Nose: Nose normal.   Eyes:      Extraocular Movements: Extraocular movements intact. Conjunctiva/sclera: Conjunctivae normal.      Pupils: Pupils are equal, round, and reactive to light. Cardiovascular:      Rate and Rhythm: Normal rate and regular rhythm. Pulses: Normal pulses. Heart sounds: Normal heart sounds. Pulmonary:      Effort: Pulmonary effort is normal.      Breath sounds: Normal breath sounds. Abdominal:      General: Abdomen is flat. Bowel sounds are normal.      Palpations: Abdomen is soft. Skin:     General: Skin is warm and dry. Neurological:      General: No focal deficit present. Mental Status: She is alert and oriented to person, place, and time. Psychiatric:         Mood and Affect: Mood normal.                 An electronic signature was used to authenticate this note.     --Dewayne Berkowitz MD

## 2022-09-01 ENCOUNTER — TELEPHONE (OUTPATIENT)
Dept: CARDIOLOGY CLINIC | Age: 52
End: 2022-09-01

## 2022-09-01 NOTE — TELEPHONE ENCOUNTER
Taking isosorbite and it is causing terrible migraine headaches . She is prone to have migraine headaches. What is she to do?

## 2022-09-01 NOTE — TELEPHONE ENCOUNTER
Pt reports intolerant to Imdur. Pt tried 1/2 tab BID, 1/2 tab daily and migraine headaches persisted. Informed pt ok to stop Imdur. Will update Dr. Tess Bailon. Pt voiced understanding and thanks.  cgh

## 2022-09-06 RX ORDER — ALIROCUMAB 75 MG/ML
INJECTION, SOLUTION SUBCUTANEOUS
Qty: 2 ML | Refills: 11 | OUTPATIENT
Start: 2022-09-06

## 2022-09-08 RX ORDER — ALIROCUMAB 75 MG/ML
INJECTION, SOLUTION SUBCUTANEOUS
Qty: 2 ML | Refills: 11 | OUTPATIENT
Start: 2022-09-08

## 2022-09-27 ENCOUNTER — OFFICE VISIT (OUTPATIENT)
Dept: FAMILY MEDICINE CLINIC | Facility: CLINIC | Age: 52
End: 2022-09-27
Payer: MEDICARE

## 2022-09-27 VITALS
WEIGHT: 211.4 LBS | TEMPERATURE: 98.1 F | DIASTOLIC BLOOD PRESSURE: 76 MMHG | HEIGHT: 64 IN | SYSTOLIC BLOOD PRESSURE: 120 MMHG | HEART RATE: 77 BPM | OXYGEN SATURATION: 96 % | BODY MASS INDEX: 36.09 KG/M2

## 2022-09-27 DIAGNOSIS — K59.09 OTHER CONSTIPATION: ICD-10-CM

## 2022-09-27 DIAGNOSIS — I50.32 DIASTOLIC CHF, CHRONIC (HCC): ICD-10-CM

## 2022-09-27 DIAGNOSIS — G62.9 PERIPHERAL POLYNEUROPATHY: ICD-10-CM

## 2022-09-27 DIAGNOSIS — E11.42 DIABETIC PERIPHERAL NEUROPATHY ASSOCIATED WITH TYPE 2 DIABETES MELLITUS (HCC): ICD-10-CM

## 2022-09-27 DIAGNOSIS — F41.9 ANXIETY: ICD-10-CM

## 2022-09-27 DIAGNOSIS — R60.0 LOCALIZED EDEMA: ICD-10-CM

## 2022-09-27 DIAGNOSIS — I25.10 CORONARY ARTERY DISEASE DUE TO CALCIFIED CORONARY LESION: ICD-10-CM

## 2022-09-27 DIAGNOSIS — I10 PRIMARY HYPERTENSION: ICD-10-CM

## 2022-09-27 DIAGNOSIS — M62.838 MUSCLE SPASMS OF BOTH LOWER EXTREMITIES: ICD-10-CM

## 2022-09-27 DIAGNOSIS — J45.20 MILD INTERMITTENT REACTIVE AIRWAY DISEASE WITHOUT COMPLICATION: ICD-10-CM

## 2022-09-27 DIAGNOSIS — K21.9 GASTROESOPHAGEAL REFLUX DISEASE, UNSPECIFIED WHETHER ESOPHAGITIS PRESENT: ICD-10-CM

## 2022-09-27 DIAGNOSIS — I25.84 CORONARY ARTERY DISEASE DUE TO CALCIFIED CORONARY LESION: ICD-10-CM

## 2022-09-27 DIAGNOSIS — F31.9 BIPOLAR 1 DISORDER (HCC): ICD-10-CM

## 2022-09-27 DIAGNOSIS — J45.40 MODERATE PERSISTENT ASTHMA WITHOUT COMPLICATION: ICD-10-CM

## 2022-09-27 DIAGNOSIS — F33.0 MILD EPISODE OF RECURRENT MAJOR DEPRESSIVE DISORDER (HCC): ICD-10-CM

## 2022-09-27 DIAGNOSIS — E11.65 UNCONTROLLED TYPE 2 DIABETES MELLITUS WITH HYPERGLYCEMIA (HCC): ICD-10-CM

## 2022-09-27 DIAGNOSIS — G47.33 OSA (OBSTRUCTIVE SLEEP APNEA): ICD-10-CM

## 2022-09-27 DIAGNOSIS — M25.50 ARTHRALGIA, UNSPECIFIED JOINT: ICD-10-CM

## 2022-09-27 DIAGNOSIS — E78.49 OTHER HYPERLIPIDEMIA: ICD-10-CM

## 2022-09-27 DIAGNOSIS — E55.9 VITAMIN D DEFICIENCY: ICD-10-CM

## 2022-09-27 DIAGNOSIS — F51.01 PRIMARY INSOMNIA: Primary | ICD-10-CM

## 2022-09-27 LAB
25(OH)D3 SERPL-MCNC: 46.5 NG/ML (ref 30–100)
ANION GAP SERPL CALC-SCNC: 1 MMOL/L (ref 4–13)
BUN SERPL-MCNC: 12 MG/DL (ref 6–23)
CALCIUM SERPL-MCNC: 9.7 MG/DL (ref 8.3–10.4)
CHLORIDE SERPL-SCNC: 107 MMOL/L (ref 101–110)
CO2 SERPL-SCNC: 27 MMOL/L (ref 21–32)
CREAT SERPL-MCNC: 0.6 MG/DL (ref 0.6–1)
EST. AVERAGE GLUCOSE BLD GHB EST-MCNC: 206 MG/DL
GLUCOSE SERPL-MCNC: 166 MG/DL (ref 65–100)
HBA1C MFR BLD: 8.8 % (ref 4.8–5.6)
POTASSIUM SERPL-SCNC: 4.2 MMOL/L (ref 3.5–5.1)
SODIUM SERPL-SCNC: 135 MMOL/L (ref 136–145)

## 2022-09-27 PROCEDURE — 3052F HG A1C>EQUAL 8.0%<EQUAL 9.0%: CPT | Performed by: FAMILY MEDICINE

## 2022-09-27 PROCEDURE — 99214 OFFICE O/P EST MOD 30 MIN: CPT | Performed by: FAMILY MEDICINE

## 2022-09-27 RX ORDER — FUROSEMIDE 40 MG/1
40 TABLET ORAL DAILY
Qty: 60 TABLET | Refills: 3 | Status: ON HOLD | OUTPATIENT
Start: 2022-09-27

## 2022-09-27 RX ORDER — HYDROCODONE BITARTRATE AND ACETAMINOPHEN 10; 325 MG/1; MG/1
1 TABLET ORAL EVERY 8 HOURS PRN
Qty: 90 TABLET | Refills: 0 | Status: ON HOLD | OUTPATIENT
Start: 2022-12-06 | End: 2023-01-05

## 2022-09-27 RX ORDER — DEXLANSOPRAZOLE 60 MG/1
60 CAPSULE, DELAYED RELEASE ORAL DAILY
Qty: 30 CAPSULE | Refills: 3 | Status: ON HOLD | OUTPATIENT
Start: 2022-09-27

## 2022-09-27 RX ORDER — FLUTICASONE FUROATE AND VILANTEROL 200; 25 UG/1; UG/1
1 POWDER RESPIRATORY (INHALATION) DAILY
Qty: 1 EACH | Refills: 11 | Status: ON HOLD | OUTPATIENT
Start: 2022-09-27 | End: 2023-09-27

## 2022-09-27 RX ORDER — LAMOTRIGINE 100 MG/1
100 TABLET ORAL 2 TIMES DAILY
Qty: 60 TABLET | Refills: 3 | Status: ON HOLD | OUTPATIENT
Start: 2022-09-27

## 2022-09-27 RX ORDER — HYDROCODONE BITARTRATE AND ACETAMINOPHEN 10; 325 MG/1; MG/1
1 TABLET ORAL EVERY 8 HOURS PRN
Qty: 90 TABLET | Refills: 0 | Status: ON HOLD | OUTPATIENT
Start: 2022-10-06 | End: 2022-11-05

## 2022-09-27 RX ORDER — ALBUTEROL SULFATE 90 UG/1
1 AEROSOL, METERED RESPIRATORY (INHALATION) EVERY 6 HOURS PRN
Qty: 18 G | Refills: 2 | Status: ON HOLD | OUTPATIENT
Start: 2022-09-27

## 2022-09-27 RX ORDER — GABAPENTIN 600 MG/1
600 TABLET ORAL 2 TIMES DAILY
Qty: 90 TABLET | Refills: 3 | Status: ON HOLD | OUTPATIENT
Start: 2022-09-27 | End: 2023-03-26

## 2022-09-27 RX ORDER — TRAZODONE HYDROCHLORIDE 100 MG/1
100 TABLET ORAL DAILY
Qty: 30 TABLET | Refills: 3 | Status: ON HOLD | OUTPATIENT
Start: 2022-09-27

## 2022-09-27 RX ORDER — ATORVASTATIN CALCIUM 80 MG/1
80 TABLET, FILM COATED ORAL DAILY
Qty: 30 TABLET | Refills: 3 | Status: ON HOLD | OUTPATIENT
Start: 2022-09-27

## 2022-09-27 RX ORDER — DULOXETIN HYDROCHLORIDE 60 MG/1
120 CAPSULE, DELAYED RELEASE ORAL DAILY
Qty: 30 CAPSULE | Refills: 3 | Status: ON HOLD | OUTPATIENT
Start: 2022-09-27

## 2022-09-27 RX ORDER — TIZANIDINE 2 MG/1
2 TABLET ORAL 2 TIMES DAILY
Qty: 60 TABLET | Refills: 3 | Status: ON HOLD | OUTPATIENT
Start: 2022-09-27

## 2022-09-27 RX ORDER — LISINOPRIL 40 MG/1
40 TABLET ORAL EVERY EVENING
Qty: 30 TABLET | Refills: 3 | Status: ON HOLD | OUTPATIENT
Start: 2022-09-27

## 2022-09-27 RX ORDER — METOPROLOL SUCCINATE 50 MG/1
50 TABLET, EXTENDED RELEASE ORAL DAILY
Qty: 30 TABLET | Refills: 3 | Status: ON HOLD | OUTPATIENT
Start: 2022-09-27

## 2022-09-27 RX ORDER — AMLODIPINE BESYLATE 5 MG/1
5 TABLET ORAL DAILY
Qty: 30 TABLET | Refills: 3 | Status: ON HOLD | OUTPATIENT
Start: 2022-09-27

## 2022-09-27 RX ORDER — CLOPIDOGREL BISULFATE 75 MG/1
75 TABLET ORAL DAILY
Qty: 30 TABLET | Refills: 3 | Status: ON HOLD | OUTPATIENT
Start: 2022-09-27

## 2022-09-27 RX ORDER — HYDROXYZINE HYDROCHLORIDE 25 MG/1
25 TABLET, FILM COATED ORAL 3 TIMES DAILY PRN
Qty: 30 TABLET | Refills: 3 | Status: ON HOLD | OUTPATIENT
Start: 2022-09-27

## 2022-09-27 RX ORDER — HYDROCODONE BITARTRATE AND ACETAMINOPHEN 10; 325 MG/1; MG/1
1 TABLET ORAL EVERY 8 HOURS PRN
Qty: 90 TABLET | Refills: 0 | Status: ON HOLD | OUTPATIENT
Start: 2022-11-06 | End: 2022-12-06

## 2022-09-27 RX ORDER — ALBUTEROL SULFATE 90 UG/1
1 POWDER, METERED RESPIRATORY (INHALATION) EVERY 6 HOURS PRN
Qty: 1 EACH | Refills: 11 | Status: ON HOLD | OUTPATIENT
Start: 2022-09-27

## 2022-09-27 RX ORDER — POTASSIUM CHLORIDE 20 MEQ/1
20 TABLET, EXTENDED RELEASE ORAL DAILY
Qty: 30 TABLET | Refills: 3 | Status: ON HOLD | OUTPATIENT
Start: 2022-09-27

## 2022-09-27 RX ORDER — DIAZEPAM 2 MG/1
2 TABLET ORAL 2 TIMES DAILY PRN
Qty: 60 TABLET | Refills: 3 | Status: ON HOLD | OUTPATIENT
Start: 2022-09-27 | End: 2023-01-25

## 2022-09-27 RX ORDER — GALCANEZUMAB 120 MG/ML
INJECTION, SOLUTION SUBCUTANEOUS
Qty: 1 ML | Refills: 11 | Status: CANCELLED | OUTPATIENT
Start: 2022-09-27

## 2022-09-27 ASSESSMENT — PATIENT HEALTH QUESTIONNAIRE - PHQ9
4. FEELING TIRED OR HAVING LITTLE ENERGY: 0
3. TROUBLE FALLING OR STAYING ASLEEP: 0
8. MOVING OR SPEAKING SO SLOWLY THAT OTHER PEOPLE COULD HAVE NOTICED. OR THE OPPOSITE, BEING SO FIGETY OR RESTLESS THAT YOU HAVE BEEN MOVING AROUND A LOT MORE THAN USUAL: 0
2. FEELING DOWN, DEPRESSED OR HOPELESS: 0
1. LITTLE INTEREST OR PLEASURE IN DOING THINGS: 0
SUM OF ALL RESPONSES TO PHQ QUESTIONS 1-9: 0
6. FEELING BAD ABOUT YOURSELF - OR THAT YOU ARE A FAILURE OR HAVE LET YOURSELF OR YOUR FAMILY DOWN: 0
SUM OF ALL RESPONSES TO PHQ9 QUESTIONS 1 & 2: 0
SUM OF ALL RESPONSES TO PHQ QUESTIONS 1-9: 0
5. POOR APPETITE OR OVEREATING: 0
SUM OF ALL RESPONSES TO PHQ QUESTIONS 1-9: 0
7. TROUBLE CONCENTRATING ON THINGS, SUCH AS READING THE NEWSPAPER OR WATCHING TELEVISION: 0
SUM OF ALL RESPONSES TO PHQ QUESTIONS 1-9: 0
10. IF YOU CHECKED OFF ANY PROBLEMS, HOW DIFFICULT HAVE THESE PROBLEMS MADE IT FOR YOU TO DO YOUR WORK, TAKE CARE OF THINGS AT HOME, OR GET ALONG WITH OTHER PEOPLE: 0
9. THOUGHTS THAT YOU WOULD BE BETTER OFF DEAD, OR OF HURTING YOURSELF: 0

## 2022-09-28 ASSESSMENT — ENCOUNTER SYMPTOMS
BLOOD IN STOOL: 0
BLURRED VISION: 0
COUGH: 0
PHOTOPHOBIA: 0
COLOR CHANGE: 0
EYE PAIN: 0
SORE THROAT: 0
NAUSEA: 0
ABDOMINAL PAIN: 0
ABDOMINAL DISTENTION: 0
SHORTNESS OF BREATH: 0

## 2022-09-28 NOTE — PROGRESS NOTES
Missy Ferrara  1970 is a 49 y.o. female ,Established patient, here for evaluation of the following chief complaint(s):   Chief Complaint   Patient presents with    3 Month Follow-Up    Medication Refill    Cholesterol Problem     Pt is fasting          1. Primary insomnia  -     traZODone (DESYREL) 100 MG tablet; Take 1 tablet by mouth daily, Disp-30 tablet, R-3Normal  2. Moderate persistent asthma without complication  -     albuterol sulfate (PROAIR RESPICLICK) 697 (90 Base) MCG/ACT aerosol powder inhalation; Inhale 1 puff into the lungs every 6 hours as needed for Wheezing or Shortness of Breath, Disp-1 each, R-11Normal  -     Fluticasone furoate-vilanterol (BREO ELLIPTA) 200-25 MCG/INH AEPB inhaler; Inhale 1 puff into the lungs daily, Disp-1 each, R-11Normal  3. Mild intermittent reactive airway disease without complication  -     albuterol sulfate HFA (PROVENTIL;VENTOLIN;PROAIR) 108 (90 Base) MCG/ACT inhaler; Inhale 1 puff into the lungs every 6 hours as needed for Wheezing TAKE 1 PUFF BY INHALATION EVERY FOUR (4) HOURS AS NEEDED FOR WHEEZING OR SHORTNESS OF BREATH., Disp-18 g, R-2Normal  4. Primary hypertension  -     amLODIPine (NORVASC) 5 MG tablet; Take 1 tablet by mouth daily, Disp-30 tablet, R-3Normal  -     lisinopril (PRINIVIL;ZESTRIL) 40 MG tablet; Take 1 tablet by mouth every evening, Disp-30 tablet, R-3Normal  -     potassium chloride (KLOR-CON M) 20 MEQ extended release tablet; Take 1 tablet by mouth daily, Disp-30 tablet, R-3Normal  -     metoprolol succinate (TOPROL XL) 50 MG extended release tablet; Take 1 tablet by mouth daily, Disp-30 tablet, R-3Normal  5. Other hyperlipidemia  -     atorvastatin (LIPITOR) 80 MG tablet; Take 1 tablet by mouth daily, Disp-30 tablet, R-3Normal  6. Coronary artery disease due to calcified coronary lesion  -     atorvastatin (LIPITOR) 80 MG tablet;  Take 1 tablet by mouth daily, Disp-30 tablet, R-3Normal  -     clopidogrel (PLAVIX) 75 MG tablet; Take 1 tablet by mouth daily, Disp-30 tablet, R-3Normal  7. Gastroesophageal reflux disease, unspecified whether esophagitis present  -     dexlansoprazole (DEXILANT) 60 MG CPDR delayed release capsule; Take 1 capsule by mouth daily, Disp-30 capsule, R-3Normal  8. Anxiety  -     diazePAM (VALIUM) 2 MG tablet; Take 1 tablet by mouth 2 times daily as needed for Anxiety for up to 120 days. , Disp-60 tablet, R-3Normal  -     hydrOXYzine HCl (ATARAX) 25 MG tablet; Take 1 tablet by mouth 3 times daily as needed for Anxiety, Disp-30 tablet, R-3Normal  9. Mild episode of recurrent major depressive disorder (HCC)  -     DULoxetine (CYMBALTA) 60 MG extended release capsule; Take 2 capsules by mouth daily, Disp-30 capsule, R-3Normal  10. Uncontrolled type 2 diabetes mellitus with hyperglycemia (HCC)  -     empagliflozin (JARDIANCE) 25 MG tablet; Take 1 tablet by mouth daily, Disp-30 tablet, R-3Normal  -     Basic Metabolic Panel; Future  -     Hemoglobin A1C; Future  11. Localized edema  -     furosemide (LASIX) 40 MG tablet; Take 1 tablet by mouth daily, Disp-60 tablet, R-3Normal  12. Peripheral polyneuropathy  -     gabapentin (NEURONTIN) 600 MG tablet; Take 1 tablet by mouth 2 times daily for 180 days. , Disp-90 tablet, R-3Normal  13. Bipolar 1 disorder (HCC)  -     DULoxetine (CYMBALTA) 60 MG extended release capsule; Take 2 capsules by mouth daily, Disp-30 capsule, R-3Normal  -     lamoTRIgine (LAMICTAL) 100 MG tablet; Take 1 tablet by mouth 2 times daily, Disp-60 tablet, R-3Normal  14. Other constipation  -     linaclotide (LINZESS) 145 MCG capsule; Take 1 capsule by mouth daily, Disp-30 capsule, R-3Normal  15. Muscle spasms of both lower extremities  16. Vitamin D deficiency  -     Vitamin D 25 Hydroxy; Future  17. Arthralgia, unspecified joint  -     HYDROcodone-acetaminophen (NORCO)  MG per tablet; Take 1 tablet by mouth every 8 hours as needed for Pain for up to 30 days.  Intended supply: 30 days, Disp-90 tablet, R-0Normal  -     HYDROcodone-acetaminophen (NORCO)  MG per tablet; Take 1 tablet by mouth every 8 hours as needed for Pain for up to 30 days. Intended supply: 30 days, Disp-90 tablet, R-0Normal  -     HYDROcodone-acetaminophen (NORCO)  MG per tablet; Take 1 tablet by mouth every 8 hours as needed for Pain for up to 30 days. Intended supply: 30 days, Disp-90 tablet, R-0Normal    - I have reviewed the patients controlled substance prescription history, as maintained in the [address] [place] [address] prescription monitoring program, so that the prescription(s) for a  controlled substance can be given. - Patient is aware of addictive potential of medication.   - Patient is aware of respiratory suppression and is not experiencing any sedation with medication.   - Patient denies sedation or other side effects from medication  - Patient is instructed on compliance with dosing schedule and acknowledges that no early refill will be provided in the event of non-compliance, theft or loss of medication.   - Patient is aware that they may be subject to random drug testing and pill counts. Return in about 3 months (around 12/27/2022). Subjective   SUBJECTIVE/OBJECTIVE:  Hypertension  This is a chronic problem. The current episode started more than 1 year ago. The problem has been gradually improving since onset. The problem is controlled. Pertinent negatives include no anxiety, blurred vision, chest pain, headaches, palpitations, peripheral edema, PND, shortness of breath or sweats. Diabetes  She presents for her follow-up diabetic visit. She has type 2 diabetes mellitus. Her disease course has been fluctuating. Pertinent negatives for hypoglycemia include no confusion, headaches, pallor, speech difficulty or sweats. Pertinent negatives for diabetes include no blurred vision, no chest pain and no weakness. Joint Pain   The pain is present in the neck and back. This is a chronic problem.  The current episode started more than 1 year ago. The problem occurs constantly. The problem has been unchanged. The pain is at a severity of 7/10. Pertinent negatives include no fever. Review of Systems   Constitutional:  Negative for chills and fever. HENT:  Negative for ear pain, hearing loss and sore throat. Eyes:  Negative for blurred vision, photophobia and pain. Respiratory:  Negative for cough and shortness of breath. Cardiovascular:  Negative for chest pain, palpitations, leg swelling and PND. Gastrointestinal:  Negative for abdominal distention, abdominal pain, blood in stool and nausea. Genitourinary:  Negative for dysuria and urgency. Musculoskeletal:  Positive for arthralgias. Negative for joint swelling and myalgias. Skin:  Negative for color change, pallor and rash. Neurological:  Negative for speech difficulty, weakness, light-headedness and headaches. Hematological:  Negative for adenopathy. Psychiatric/Behavioral:  Negative for agitation, confusion, hallucinations, self-injury and suicidal ideas. Objective   Physical Exam  Constitutional:       Appearance: Normal appearance. HENT:      Head: Normocephalic and atraumatic. Nose: Nose normal.   Eyes:      Extraocular Movements: Extraocular movements intact. Conjunctiva/sclera: Conjunctivae normal.      Pupils: Pupils are equal, round, and reactive to light. Cardiovascular:      Rate and Rhythm: Normal rate and regular rhythm. Pulses: Normal pulses. Heart sounds: Normal heart sounds. Pulmonary:      Effort: Pulmonary effort is normal.      Breath sounds: Normal breath sounds. Abdominal:      General: Abdomen is flat. Bowel sounds are normal.      Palpations: Abdomen is soft. Skin:     General: Skin is warm and dry. Neurological:      General: No focal deficit present. Mental Status: She is alert and oriented to person, place, and time.    Psychiatric:         Mood and Affect: Mood normal. An electronic signature was used to authenticate this note.     --Mo Figueroa MD

## 2022-10-03 DIAGNOSIS — R53.81 MALAISE: ICD-10-CM

## 2022-10-03 DIAGNOSIS — E61.1 IRON DEFICIENCY: Primary | ICD-10-CM

## 2022-10-03 DIAGNOSIS — R42 DIZZINESS: ICD-10-CM

## 2022-10-03 DIAGNOSIS — E03.8 OTHER SPECIFIED HYPOTHYROIDISM: ICD-10-CM

## 2022-10-03 DIAGNOSIS — E87.1 HYPONATREMIA: ICD-10-CM

## 2022-10-16 ENCOUNTER — PATIENT MESSAGE (OUTPATIENT)
Dept: ENDOCRINOLOGY | Age: 52
End: 2022-10-16

## 2022-10-17 RX ORDER — TIRZEPATIDE 7.5 MG/.5ML
7.5 INJECTION, SOLUTION SUBCUTANEOUS WEEKLY
Qty: 2 ML | Refills: 2 | Status: ON HOLD | OUTPATIENT
Start: 2022-10-17

## 2022-10-17 NOTE — TELEPHONE ENCOUNTER
From: Mode Hicks  To: Greta Maurice  Sent: 10/16/2022 4:19 PM EDT  Subject: Kyung Castaneda hope you had a good weekend I am due for my Monjouro wondering if we are increasing the dose ?  If so  Could  You please call it in asap thank you

## 2022-10-17 NOTE — TELEPHONE ENCOUNTER
Cherelle response:    I'm well and I really appreciate you asking. It looks like i've already sent in 5mg mounjaro and if you are ready, the 7.5mg weekly dose is now up at the pharmacy for you. Keep up the good work. Let me know if you ladies need anything. I'll be out of the office for a week but they will be keeping on eye on things here.     Chin

## 2022-11-01 ENCOUNTER — TELEMEDICINE (OUTPATIENT)
Dept: FAMILY MEDICINE CLINIC | Facility: CLINIC | Age: 52
End: 2022-11-01
Payer: MEDICARE

## 2022-11-01 DIAGNOSIS — F31.9 BIPOLAR 1 DISORDER (HCC): ICD-10-CM

## 2022-11-01 DIAGNOSIS — J11.1 INFLUENZA: ICD-10-CM

## 2022-11-01 DIAGNOSIS — L03.116 CELLULITIS OF LEFT LOWER EXTREMITY WITHOUT FOOT: ICD-10-CM

## 2022-11-01 DIAGNOSIS — R11.0 NAUSEA: Primary | ICD-10-CM

## 2022-11-01 LAB
EXP DATE SOLUTION: NORMAL
EXP DATE SWAB: NORMAL
EXPIRATION DATE: NORMAL
INFLUENZA A ANTIGEN, POC: NEGATIVE
INFLUENZA B ANTIGEN, POC: NEGATIVE
LOT NUMBER POC: NORMAL
LOT NUMBER SOLUTION: NORMAL
LOT NUMBER SWAB: NORMAL
SARS-COV-2 RNA, POC: NEGATIVE
VALID INTERNAL CONTROL, POC: YES

## 2022-11-01 PROCEDURE — 96372 THER/PROPH/DIAG INJ SC/IM: CPT | Performed by: PHYSICIAN ASSISTANT

## 2022-11-01 PROCEDURE — 87804 INFLUENZA ASSAY W/OPTIC: CPT | Performed by: PHYSICIAN ASSISTANT

## 2022-11-01 PROCEDURE — 99214 OFFICE O/P EST MOD 30 MIN: CPT | Performed by: PHYSICIAN ASSISTANT

## 2022-11-01 PROCEDURE — 87635 SARS-COV-2 COVID-19 AMP PRB: CPT | Performed by: PHYSICIAN ASSISTANT

## 2022-11-01 RX ORDER — CEFTRIAXONE 1 G/1
1000 INJECTION, POWDER, FOR SOLUTION INTRAMUSCULAR; INTRAVENOUS EVERY 24 HOURS
Status: DISCONTINUED | OUTPATIENT
Start: 2022-11-01 | End: 2022-11-07 | Stop reason: HOSPADM

## 2022-11-01 RX ORDER — CIPROFLOXACIN 500 MG/1
500 TABLET, FILM COATED ORAL 2 TIMES DAILY
Qty: 14 TABLET | Refills: 0 | Status: ON HOLD | OUTPATIENT
Start: 2022-11-01 | End: 2022-11-07 | Stop reason: HOSPADM

## 2022-11-01 RX ORDER — ONDANSETRON 4 MG/1
4 TABLET, ORALLY DISINTEGRATING ORAL 3 TIMES DAILY PRN
Qty: 21 TABLET | Refills: 0 | Status: SHIPPED | OUTPATIENT
Start: 2022-11-01

## 2022-11-01 RX ADMIN — CEFTRIAXONE 1000 MG: 1 INJECTION, POWDER, FOR SOLUTION INTRAMUSCULAR; INTRAVENOUS at 15:07

## 2022-11-01 NOTE — PROGRESS NOTES
Patient: Rafael Anders  YOB: 1970  Patient Age 47 y.o. Patient sex: female  Medical Record:  [id number]  Visit Date: 11/1/2022  Author:  Kristen Palomo    [facility] Virtual  Visit Note Video Conference Note  Location: To Patients electronic /phone device in their home  From Medical provider     Rafael Anders is a 47 y.o. y.o. female  being evaluated by a Virtual Visit (video visit) encounter to address concerns. A caregiver was present when appropriate. Due to this being a TeleHealth encounter (During [facility] emergency), evaluation of the following organ systems was limited: Vitals/Constitutional/EENT/Resp/CV/GI//MS/Neuro/Skin/Heme-Lymph-Imm. Pursuant to the emergency declaration under the [address] [place] [address], [address] [place] [address] authority and the [employer] Act, this Virtual Visit was conducted with patient's (and/or legal guardian's) consent, to reduce the risk of exposure to COVID-19 and provide necessary medical care. Consent:  The patient and/or health care decision maker is aware that that they may receive a bill for this audio-video service, depending on his insurance coverage, and has provided verbal consent to proceed: Yes    Chief Complaint  Rafael Anders  is a 47 y.o. female who was seen by synchronous (real-time) audio-video technology on 11/03/22  8:48 AM  for the following reasons:    Chief Complaint   Patient presents with    Influenza     2 days ago symptoms of feeling cold, achy muscle pain , cough, runny nose -- grandson tested positive for A,      Cellulitis     Started at ankle getting red all of a sudden it was there. She notes its hurts and is hot. It started yesterday at ankle and moving up the leg last night.          Current medical issues addressed today:  She has flu symptoms and first day had nausea and vomiting-- vomiting stopped but feels nauseated and now having diarrhea about 4-5 x a day. ASSESSMENT & PLAN    ICD-10-CM    1. Nausea  R11.0 ondansetron (ZOFRAN-ODT) 4 MG disintegrating tablet     AMB POC COVID-19 COV      2. Cellulitis of left lower extremity without foot  L03.116 cefTRIAXone (ROCEPHIN) injection 1,000 mg      3. Bipolar 1 disorder (HCC)  F31.9       4. Influenza  J11.1 AMB POC RAPID INFLUENZA TEST           Diagnoses and all orders for this visit:  Nausea  -     ondansetron (ZOFRAN-ODT) 4 MG disintegrating tablet; Take 1 tablet by mouth 3 times daily as needed for Nausea or Vomiting  -     AMB POC COVID-19 COV  Cellulitis of left lower extremity without foot  -     cefTRIAXone (ROCEPHIN) injection 1,000 mg  Bipolar 1 disorder (HCC)  Influenza  -     AMB POC RAPID INFLUENZA TEST  Other orders  -     ciprofloxacin (CIPRO) 500 MG tablet; Take 1 tablet by mouth 2 times daily for 7 days   Arsalan Mayo was seen today for influenza and cellulitis. Diagnoses and all orders for this visit:    Nausea  -     ondansetron (ZOFRAN-ODT) 4 MG disintegrating tablet; Take 1 tablet by mouth 3 times daily as needed for Nausea or Vomiting  -     AMB POC COVID-19 COV    Cellulitis of left lower extremity without foot  -     cefTRIAXone (ROCEPHIN) injection 1,000 mg    Bipolar 1 disorder (HCC)    Influenza  -     AMB POC RAPID INFLUENZA TEST    Other orders  -     ciprofloxacin (CIPRO) 500 MG tablet; Take 1 tablet by mouth 2 times daily for 7 days    No follow-up provider specified. Orders Placed This Encounter   Procedures    AMB POC RAPID INFLUENZA TEST    AMB POC COVID-19 COV     Order Specific Question:   Is this test for diagnosis or screening? Answer:   Diagnosis of ill patient     Order Specific Question:   Symptomatic for COVID-19 as defined by CDC? Answer:   Yes     Order Specific Question:   Date of Symptom Onset     Answer:   10/30/2022     Order Specific Question:   Hospitalized for COVID-19?      Answer:   No     Order Specific Question:   Admitted to ICU for COVID-19? Answer:   No     Order Specific Question:   Employed in healthcare setting? Answer:   Unknown     Order Specific Question:   Resident in a congregate (group) care setting? Answer:   No     Order Specific Question:   Pregnant? Answer:   No     Order Specific Question:   Previously tested for COVID-19? Answer:   Unknown     Plan  Plan  Advised to start antibiotics and return if not improving in 5-7 days or sooner if symptoms worsen. Orders Placed This Encounter   Procedures    AMB POC RAPID INFLUENZA TEST    AMB POC COVID-19 COV     Order Specific Question:   Is this test for diagnosis or screening? Answer:   Diagnosis of ill patient     Order Specific Question:   Symptomatic for COVID-19 as defined by CDC? Answer:   Yes     Order Specific Question:   Date of Symptom Onset     Answer:   10/30/2022     Order Specific Question:   Hospitalized for COVID-19? Answer:   No     Order Specific Question:   Admitted to ICU for COVID-19? Answer:   No     Order Specific Question:   Employed in healthcare setting? Answer:   Unknown     Order Specific Question:   Resident in a congregate (group) care setting? Answer:   No     Order Specific Question:   Pregnant? Answer:   No     Order Specific Question:   Previously tested for COVID-19? Answer:   Unknown     I have reviewed the patient's past medical history, social history and family history and vitals. We have discussed treatment plan and follow up and given patient instructions. Patient's questions are answered and we will follow up as indicated. Past Medical history  Allergies:   Allergies   Allergen Reactions    Adhesive Tape Rash    Penicillins Itching, Nausea And Vomiting and Rash       Current Medications:   Current Outpatient Medications   Medication Sig Dispense Refill    ondansetron (ZOFRAN-ODT) 4 MG disintegrating tablet Take 1 tablet by mouth 3 times daily as needed for Nausea or Vomiting 21 tablet 0    ciprofloxacin (CIPRO) 500 MG tablet Take 1 tablet by mouth 2 times daily for 7 days 14 tablet 0    MOUNJARO 7.5 MG/0.5ML SOPN SC injection Inject 0.5 mLs into the skin once a week 2 mL 2    albuterol sulfate (PROAIR RESPICLICK) 440 (90 Base) MCG/ACT aerosol powder inhalation Inhale 1 puff into the lungs every 6 hours as needed for Wheezing or Shortness of Breath 1 each 11    albuterol sulfate HFA (PROVENTIL;VENTOLIN;PROAIR) 108 (90 Base) MCG/ACT inhaler Inhale 1 puff into the lungs every 6 hours as needed for Wheezing TAKE 1 PUFF BY INHALATION EVERY FOUR (4) HOURS AS NEEDED FOR WHEEZING OR SHORTNESS OF BREATH. 18 g 2    amLODIPine (NORVASC) 5 MG tablet Take 1 tablet by mouth daily 30 tablet 3    atorvastatin (LIPITOR) 80 MG tablet Take 1 tablet by mouth daily 30 tablet 3    clopidogrel (PLAVIX) 75 MG tablet Take 1 tablet by mouth daily 30 tablet 3    dexlansoprazole (DEXILANT) 60 MG CPDR delayed release capsule Take 1 capsule by mouth daily 30 capsule 3    diazePAM (VALIUM) 2 MG tablet Take 1 tablet by mouth 2 times daily as needed for Anxiety for up to 120 days. 60 tablet 3    DULoxetine (CYMBALTA) 60 MG extended release capsule Take 2 capsules by mouth daily 30 capsule 3    empagliflozin (JARDIANCE) 25 MG tablet Take 1 tablet by mouth daily 30 tablet 3    Fluticasone furoate-vilanterol (BREO ELLIPTA) 200-25 MCG/INH AEPB inhaler Inhale 1 puff into the lungs daily 1 each 11    furosemide (LASIX) 40 MG tablet Take 1 tablet by mouth daily 60 tablet 3    gabapentin (NEURONTIN) 600 MG tablet Take 1 tablet by mouth 2 times daily for 180 days.  90 tablet 3    tiZANidine (ZANAFLEX) 2 MG tablet Take 1 tablet by mouth in the morning and at bedtime 60 tablet 3    lisinopril (PRINIVIL;ZESTRIL) 40 MG tablet Take 1 tablet by mouth every evening 30 tablet 3    linaclotide (LINZESS) 145 MCG capsule Take 1 capsule by mouth daily 30 capsule 3    hydrOXYzine HCl (ATARAX) 25 MG tablet Take 1 tablet by mouth 3 times daily as needed for Anxiety 30 tablet 3    lamoTRIgine (LAMICTAL) 100 MG tablet Take 1 tablet by mouth 2 times daily 60 tablet 3    potassium chloride (KLOR-CON M) 20 MEQ extended release tablet Take 1 tablet by mouth daily 30 tablet 3    metoprolol succinate (TOPROL XL) 50 MG extended release tablet Take 1 tablet by mouth daily 30 tablet 3    traZODone (DESYREL) 100 MG tablet Take 1 tablet by mouth daily 30 tablet 3    HYDROcodone-acetaminophen (NORCO)  MG per tablet Take 1 tablet by mouth every 8 hours as needed for Pain for up to 30 days. Intended supply: 30 days 90 tablet 0    ipratropium-albuterol (DUONEB) 0.5-2.5 (3) MG/3ML SOLN nebulizer solution Inhale 3 mLs into the lungs every 6 hours as needed for Shortness of Breath INHALE 3ML VIA NEBULIZER EVERY 6 HOURS 360 mL 11    HUMULIN R U-500 KWIKPEN 500 UNIT/ML SOPN concentrated injection pen 200 units before breakfast, 210 units before lunch, 105 units before supper 93 mL 3    UNIFINE PENTIPS PLUS 31G X 6 MM MISC USE WITH INSULIN UP TO 4 TIMES DAILY, E11.65      Galcanezumab-gnlm (EMGALITY) 120 MG/ML SOSY INJECT CONTENTS OF 1 SYRINGE SUBCUTANEOUSLY EVERY 30 DAYS 1 mL 11    Ubrogepant 50 MG TABS Take 1 tablet by mouth at onset of migraine, repeat in two hours if needed. Maximum 2/day up to 4/week. 16 tablet 11    alirocumab (PRALUENT) 75 MG/ML SOAJ injection pen Inject 75 mg into the skin      aspirin 81 MG EC tablet Take 81 mg by mouth every evening      Loteprednol Etabonate (LOTEMAX SM) 0.38 % GEL 1 drop 3x a day x 1 wk, 2x a day x 1 wk, 1x a day x 1 wk, then d/c in both eyes      meloxicam (MOBIC) 15 MG tablet One daily      nitroGLYCERIN (NITROSTAT) 0.4 MG SL tablet PLACE ONE TABLET UNDER TONGUE AS NEEDED FOR CHEST PAIN. MAY REPEAT EVERY 5 MINUTES FOR 2 MORE DOSES.  CALL 911 ON SECOND DOSE.      [START ON 11/6/2022] HYDROcodone-acetaminophen (NORCO)  MG per tablet Take 1 tablet by mouth every 8 hours as needed for Pain for up to 30 days. Intended supply: 30 days 90 tablet 0    [START ON 12/6/2022] HYDROcodone-acetaminophen (NORCO)  MG per tablet Take 1 tablet by mouth every 8 hours as needed for Pain for up to 30 days.  Intended supply: 30 days 90 tablet 0     Current Facility-Administered Medications   Medication Dose Route Frequency Provider Last Rate Last Admin    cefTRIAXone (ROCEPHIN) injection 1,000 mg  1,000 mg IntraMUSCular Q24H Gema Hoyso PA-C   1,000 mg at 11/01/22 1507         Current Problem List:   Patient Active Problem List   Diagnosis    Chest pain    TIA (transient ischemic attack)    Morbid obesity with BMI of 40.0-44.9, adult ([facility]    Vitamin D deficiency    Idiopathic progressive polyneuropathy    SHELBY (obstructive sleep apnea)    Generalized anxiety disorder    Myopia    Pain of right thumb    Morbid obesity (HCC)    CAD (coronary artery disease)    Fatty liver    DM (diabetes mellitus) ([facility]    Vestibular migraine    Lateral epicondylitis    Closed fracture of lower end of left radius with routine healing    Rupture of extensor tendon of left hand    Syncope and collapse    Other fatigue    Hypersomnia    Diastolic CHF, chronic (HCC)    RLS (restless legs syndrome)    Bruxism, sleep-related    Microalbuminuria    Hypercholesterolemia    Elevated cortisol level    S/P CABG x 4    Encounter for medication management    Persistent disorder of initiating or maintaining sleep    Incontinence    Pain in left leg    GERD (gastroesophageal reflux disease)    Psychiatric disorder    Chronic migraine with aura    Pain in left hand    Diabetes type 2, uncontrolled    Carpal tunnel syndrome of left wrist    Nausea    Meniere's disease of right ear    Tendon weakness    Oropharyngeal dysphagia    Abnormal results of other endocrine function studies    Atherosclerosis of native coronary artery of native heart with angina pectoris (HCC)    Vertigo    Postural imbalance    Hypertension    Chronic constipation    Bipolar disorder ([facility]    Displacement of cervical intervertebral disc without myelopathy    Irregular bowel habits    Stiffness of left hand joint    PUD (peptic ulcer disease)    Chronic pain    Diabetic peripheral neuropathy associated with type 2 diabetes mellitus (HCC)    Hypertriglyceridemia    Retained orthopedic hardware    Uncontrolled type 2 diabetes mellitus with hypoglycemia without coma (HCC)    YOON (dyspnea on exertion)    Mild persistent asthma    Polycythemia    Yeast dermatitis    Supraventricular tachycardia (HCC)    Moderate persistent asthma without complication    Unstable angina (HCC)       Social History:   Social History     Tobacco Use    Smoking status: Never    Smokeless tobacco: Never   Substance Use Topics    Alcohol use: Not Currently       Family History:   Family History   Problem Relation Age of Onset    Diabetes Mother     Coronary Art Dis Mother     Coronary Art Dis Father     Diabetes Brother     Coronary Art Dis Paternal Grandfather     Breast Cancer Sister     Diabetes Sister        Surgical History:  Past Surgical History:   Procedure Laterality Date    BREAST LUMPECTOMY      CARDIAC CATHETERIZATION  11/2020    CARDIAC CATHETERIZATION  2009    x 4    CARDIAC PROCEDURE N/A 8/22/2022    LEFT HEART CATH / CORONARY ANGIOGRAPHY W GRAFTS performed by Cuca Solano MD at [address] [place] [address] CATH LAB    [facility]  2004    CORONARY ARTERY BYPASS GRAFT  11/18/2020    X 4    GYN  2010    ovaries rem    NEUROLOGICAL SURGERY      x3, lumbar region, cervical    ORTHOPEDIC SURGERY      left wrist surgery,back surgery(discectomy-removed)    OTHER SURGICAL HISTORY  11/2020    OPEN HEART SX     LISSA AND BSO (CERVIX REMOVED)  2000    TONSILLECTOMY  1979    as a child    UROLOGICAL SURGERY      bladder sling       ROS  Review of Systems   Constitutional: Negative for fever. Respiratory: Negative for shortness of breath. Cardiovascular: Negative for chest pain. Neurological: Negative for syncope. Vitals:  those vailabe due to teleconference  are noted below provided by the patient's device in their home  No data recorded       There is no height or weight on file to calculate BMI. Physical Exam    Vitals reviewed. Constitutional:       Appearance: Normal appearance. HENT:      Head: Atraumatic. Eyes:      Conjunctiva/sclera: Conjunctivae normal.   Pulmonary:      Effort: Pulmonary effort is normal.   Musculoskeletal:      Cervical back: Neck supple. Skin:     Coloration: Skin is not jaundiced or pale. Neurological:      General: No focal deficit present. Mental Status: He is alert. Psychiatric:         Mood and Affect: Mood normal.   Skin - red warm tender raised macular lesions on ankle, and lower leg up to just distal to the knee         We discussed the expected course, resolution and complications of the diagnosis(es) in detail. Medication risks, benefits, costs, interactions, and alternatives were discussed as indicated. I advised him to contact the office if his condition worsens, changes or fails to improve as anticipated. He expressed understanding with the diagnosis(es) and plan. Pursuant to the emergency declaration under the [facility] waiver authority and the [employer] Act, this Virtual  Visit was conducted, with patient's consent, to reduce the patient's risk of exposure to COVID-19 and provide continuity of care for an established patient. Services were provided through a video synchronous discussion -- on [employer] ME virtually to substitute for in-person clinic visit  No follow-ups on file. Patient advised to call the office if symptoms worsen. Leelee Saeed PA-C  8:48 AM  11/3/2022

## 2022-11-02 ENCOUNTER — TELEPHONE (OUTPATIENT)
Dept: ENDOCRINOLOGY | Age: 52
End: 2022-11-02

## 2022-11-02 NOTE — TELEPHONE ENCOUNTER
Inderjit called and stated that they need paper work back from [employer] please let us know if we need to do anything

## 2022-11-03 ENCOUNTER — HOSPITAL ENCOUNTER (EMERGENCY)
Dept: GENERAL RADIOLOGY | Age: 52
Discharge: HOME | DRG: 603 | End: 2022-11-06
Payer: MEDICARE

## 2022-11-03 ENCOUNTER — HOSPITAL ENCOUNTER (INPATIENT)
Age: 52
LOS: 1 days | Discharge: ANOTHER HOSPITAL | DRG: 603 | End: 2022-11-04
Attending: EMERGENCY MEDICINE | Admitting: FAMILY MEDICINE
Payer: MEDICARE

## 2022-11-03 DIAGNOSIS — L03.116 LEFT LEG CELLULITIS: Primary | ICD-10-CM

## 2022-11-03 LAB
ALBUMIN SERPL-MCNC: 3.1 G/DL (ref 3.5–5)
ALBUMIN/GLOB SERPL: 0.6 {RATIO} (ref 0.4–1.6)
ALP SERPL-CCNC: 83 U/L (ref 50–136)
ALT SERPL-CCNC: 53 U/L (ref 12–65)
ANION GAP SERPL CALC-SCNC: 7 MMOL/L (ref 2–11)
AST SERPL-CCNC: 40 U/L (ref 15–37)
BASOPHILS # BLD: 0 K/UL (ref 0–0.2)
BASOPHILS NFR BLD: 0 % (ref 0–2)
BILIRUB SERPL-MCNC: 0.5 MG/DL (ref 0.2–1.1)
BUN SERPL-MCNC: 13 MG/DL (ref 6–23)
CALCIUM SERPL-MCNC: 9.4 MG/DL (ref 8.3–10.4)
CHLORIDE SERPL-SCNC: 99 MMOL/L (ref 101–110)
CO2 SERPL-SCNC: 26 MMOL/L (ref 21–32)
CREAT SERPL-MCNC: 0.8 MG/DL (ref 0.6–1)
DIFFERENTIAL METHOD BLD: ABNORMAL
EOSINOPHIL # BLD: 0.1 K/UL (ref 0–0.8)
EOSINOPHIL NFR BLD: 1 % (ref 0.5–7.8)
ERYTHROCYTE [DISTWIDTH] IN BLOOD BY AUTOMATED COUNT: 12.8 % (ref 11.9–14.6)
GLOBULIN SER CALC-MCNC: 5.4 G/DL (ref 2.8–4.5)
GLUCOSE SERPL-MCNC: 299 MG/DL (ref 65–100)
HCT VFR BLD AUTO: 46.2 % (ref 35.8–46.3)
HGB BLD-MCNC: 15.5 G/DL (ref 11.7–15.4)
IMM GRANULOCYTES # BLD AUTO: 0.2 K/UL (ref 0–0.5)
IMM GRANULOCYTES NFR BLD AUTO: 2 % (ref 0–5)
LACTATE SERPL-SCNC: 1.7 MMOL/L (ref 0.4–2)
LYMPHOCYTES # BLD: 1.9 K/UL (ref 0.5–4.6)
LYMPHOCYTES NFR BLD: 15 % (ref 13–44)
MCH RBC QN AUTO: 28.5 PG (ref 26.1–32.9)
MCHC RBC AUTO-ENTMCNC: 33.5 G/DL (ref 31.4–35)
MCV RBC AUTO: 84.9 FL (ref 82–102)
MONOCYTES # BLD: 1.2 K/UL (ref 0.1–1.3)
MONOCYTES NFR BLD: 10 % (ref 4–12)
NEUTS SEG # BLD: 9 K/UL (ref 1.7–8.2)
NEUTS SEG NFR BLD: 72 % (ref 43–78)
NRBC # BLD: 0 K/UL (ref 0–0.2)
PLATELET # BLD AUTO: 355 K/UL (ref 150–450)
PMV BLD AUTO: 9 FL (ref 9.4–12.3)
POTASSIUM SERPL-SCNC: 3.5 MMOL/L (ref 3.5–5.1)
PROT SERPL-MCNC: 8.5 G/DL (ref 6.3–8.2)
RBC # BLD AUTO: 5.44 M/UL (ref 4.05–5.2)
SODIUM SERPL-SCNC: 132 MMOL/L (ref 133–143)
WBC # BLD AUTO: 12.5 K/UL (ref 4.3–11.1)

## 2022-11-03 PROCEDURE — 83605 ASSAY OF LACTIC ACID: CPT

## 2022-11-03 PROCEDURE — 85025 COMPLETE CBC W/AUTO DIFF WBC: CPT

## 2022-11-03 PROCEDURE — 99285 EMERGENCY DEPT VISIT HI MDM: CPT

## 2022-11-03 PROCEDURE — 73590 X-RAY EXAM OF LOWER LEG: CPT

## 2022-11-03 PROCEDURE — 96365 THER/PROPH/DIAG IV INF INIT: CPT

## 2022-11-03 PROCEDURE — 80053 COMPREHEN METABOLIC PANEL: CPT

## 2022-11-03 PROCEDURE — 96375 TX/PRO/DX INJ NEW DRUG ADDON: CPT

## 2022-11-03 ASSESSMENT — PATIENT HEALTH QUESTIONNAIRE - PHQ9
SUM OF ALL RESPONSES TO PHQ QUESTIONS 1-9: 0
1. LITTLE INTEREST OR PLEASURE IN DOING THINGS: 0
SUM OF ALL RESPONSES TO PHQ QUESTIONS 1-9: 0
SUM OF ALL RESPONSES TO PHQ9 QUESTIONS 1 & 2: 0
2. FEELING DOWN, DEPRESSED OR HOPELESS: 0

## 2022-11-03 ASSESSMENT — PAIN - FUNCTIONAL ASSESSMENT: PAIN_FUNCTIONAL_ASSESSMENT: 0-10

## 2022-11-03 ASSESSMENT — PAIN SCALES - GENERAL: PAINLEVEL_OUTOF10: 10

## 2022-11-04 ENCOUNTER — HOSPITAL ENCOUNTER (INPATIENT)
Age: 52
LOS: 3 days | Discharge: HOME HEALTH | DRG: 638 | End: 2022-11-07
Attending: FAMILY MEDICINE | Admitting: FAMILY MEDICINE
Payer: MEDICARE

## 2022-11-04 ENCOUNTER — APPOINTMENT (OUTPATIENT)
Dept: ULTRASOUND IMAGING | Age: 52
DRG: 603 | End: 2022-11-04
Payer: MEDICARE

## 2022-11-04 VITALS
RESPIRATION RATE: 19 BRPM | TEMPERATURE: 98 F | HEIGHT: 64 IN | WEIGHT: 203 LBS | DIASTOLIC BLOOD PRESSURE: 83 MMHG | HEART RATE: 87 BPM | BODY MASS INDEX: 34.66 KG/M2 | SYSTOLIC BLOOD PRESSURE: 130 MMHG | OXYGEN SATURATION: 96 %

## 2022-11-04 PROBLEM — G43.809 VESTIBULAR MIGRAINE: Status: ACTIVE | Noted: 2019-06-10

## 2022-11-04 PROBLEM — L03.90 CELLULITIS: Status: ACTIVE | Noted: 2022-11-04

## 2022-11-04 PROBLEM — F31.9 BIPOLAR DISORDER (HCC): Status: ACTIVE | Noted: 2020-11-18

## 2022-11-04 LAB
APPEARANCE UR: CLEAR
BACTERIA URNS QL MICRO: NEGATIVE /HPF
BILIRUB UR QL: NEGATIVE
BILIRUB UR QL: NEGATIVE
CASTS URNS QL MICRO: ABNORMAL /LPF
COLOR UR: ABNORMAL
EPI CELLS #/AREA URNS HPF: ABNORMAL /HPF
GLUCOSE BLD STRIP.AUTO-MCNC: 111 MG/DL (ref 65–100)
GLUCOSE BLD STRIP.AUTO-MCNC: 136 MG/DL (ref 65–100)
GLUCOSE BLD STRIP.AUTO-MCNC: 192 MG/DL (ref 65–100)
GLUCOSE BLD STRIP.AUTO-MCNC: 225 MG/DL (ref 65–100)
GLUCOSE UR QL STRIP.AUTO: >1000 MG/DL
GLUCOSE UR STRIP.AUTO-MCNC: >1000 MG/DL
HGB UR QL STRIP: ABNORMAL
KETONES UR QL STRIP.AUTO: 40 MG/DL
KETONES UR-MCNC: 40 MG/DL
LEUKOCYTE ESTERASE UR QL STRIP.AUTO: ABNORMAL
LEUKOCYTE ESTERASE UR QL STRIP: ABNORMAL
NITRITE UR QL STRIP.AUTO: NEGATIVE
NITRITE UR QL: NEGATIVE
PH UR STRIP: 6.5 [PH] (ref 5–9)
PH UR: 6.5 [PH] (ref 5–9)
PROT UR QL: 30 MG/DL
PROT UR STRIP-MCNC: 30 MG/DL
RBC # UR STRIP: ABNORMAL /UL
RBC #/AREA URNS HPF: ABNORMAL /HPF
SERVICE CMNT-IMP: ABNORMAL
SP GR UR REFRACTOMETRY: 1.02 (ref 1–1.02)
SP GR UR: 1.01 (ref 1–1.02)
UROBILINOGEN UR QL STRIP.AUTO: 0.2 EU/DL (ref 0.2–1)
UROBILINOGEN UR QL: 0.2 EU/DL (ref 0.2–1)
WBC URNS QL MICRO: ABNORMAL /HPF

## 2022-11-04 PROCEDURE — G0378 HOSPITAL OBSERVATION PER HR: HCPCS

## 2022-11-04 PROCEDURE — 2580000003 HC RX 258: Performed by: FAMILY MEDICINE

## 2022-11-04 PROCEDURE — 87040 BLOOD CULTURE FOR BACTERIA: CPT

## 2022-11-04 PROCEDURE — 2500000003 HC RX 250 WO HCPCS: Performed by: EMERGENCY MEDICINE

## 2022-11-04 PROCEDURE — 1100000000 HC RM PRIVATE

## 2022-11-04 PROCEDURE — 81001 URINALYSIS AUTO W/SCOPE: CPT

## 2022-11-04 PROCEDURE — 94760 N-INVAS EAR/PLS OXIMETRY 1: CPT

## 2022-11-04 PROCEDURE — 93971 EXTREMITY STUDY: CPT

## 2022-11-04 PROCEDURE — 96372 THER/PROPH/DIAG INJ SC/IM: CPT

## 2022-11-04 PROCEDURE — 6370000000 HC RX 637 (ALT 250 FOR IP): Performed by: FAMILY MEDICINE

## 2022-11-04 PROCEDURE — 2500000003 HC RX 250 WO HCPCS: Performed by: FAMILY MEDICINE

## 2022-11-04 PROCEDURE — 6360000002 HC RX W HCPCS: Performed by: FAMILY MEDICINE

## 2022-11-04 PROCEDURE — 81003 URINALYSIS AUTO W/O SCOPE: CPT

## 2022-11-04 PROCEDURE — 96365 THER/PROPH/DIAG IV INF INIT: CPT

## 2022-11-04 PROCEDURE — 82962 GLUCOSE BLOOD TEST: CPT

## 2022-11-04 PROCEDURE — 6360000002 HC RX W HCPCS: Performed by: EMERGENCY MEDICINE

## 2022-11-04 PROCEDURE — 96375 TX/PRO/DX INJ NEW DRUG ADDON: CPT

## 2022-11-04 RX ORDER — TRAZODONE HYDROCHLORIDE 50 MG/1
100 TABLET ORAL DAILY
Status: DISCONTINUED | OUTPATIENT
Start: 2022-11-04 | End: 2022-11-07 | Stop reason: HOSPADM

## 2022-11-04 RX ORDER — ENOXAPARIN SODIUM 100 MG/ML
40 INJECTION SUBCUTANEOUS EVERY 24 HOURS
Status: DISCONTINUED | OUTPATIENT
Start: 2022-11-04 | End: 2022-11-07 | Stop reason: HOSPADM

## 2022-11-04 RX ORDER — ACETAMINOPHEN 325 MG/1
650 TABLET ORAL EVERY 4 HOURS PRN
Status: DISCONTINUED | OUTPATIENT
Start: 2022-11-04 | End: 2022-11-04 | Stop reason: HOSPADM

## 2022-11-04 RX ORDER — SODIUM CHLORIDE 9 MG/ML
INJECTION, SOLUTION INTRAVENOUS PRN
Status: DISCONTINUED | OUTPATIENT
Start: 2022-11-04 | End: 2022-11-07 | Stop reason: HOSPADM

## 2022-11-04 RX ORDER — ONDANSETRON 2 MG/ML
4 INJECTION INTRAMUSCULAR; INTRAVENOUS ONCE
Status: COMPLETED | OUTPATIENT
Start: 2022-11-04 | End: 2022-11-04

## 2022-11-04 RX ORDER — HYDROXYZINE HYDROCHLORIDE 10 MG/1
25 TABLET, FILM COATED ORAL 3 TIMES DAILY PRN
Status: DISCONTINUED | OUTPATIENT
Start: 2022-11-04 | End: 2022-11-07 | Stop reason: HOSPADM

## 2022-11-04 RX ORDER — ATORVASTATIN CALCIUM 40 MG/1
80 TABLET, FILM COATED ORAL DAILY
Status: DISCONTINUED | OUTPATIENT
Start: 2022-11-04 | End: 2022-11-04 | Stop reason: WASHOUT

## 2022-11-04 RX ORDER — CLOPIDOGREL BISULFATE 75 MG/1
75 TABLET ORAL DAILY
Status: DISCONTINUED | OUTPATIENT
Start: 2022-11-04 | End: 2022-11-07 | Stop reason: HOSPADM

## 2022-11-04 RX ORDER — DULOXETIN HYDROCHLORIDE 60 MG/1
120 CAPSULE, DELAYED RELEASE ORAL DAILY
Status: DISCONTINUED | OUTPATIENT
Start: 2022-11-04 | End: 2022-11-07 | Stop reason: HOSPADM

## 2022-11-04 RX ORDER — INSULIN LISPRO 100 [IU]/ML
0-8 INJECTION, SOLUTION INTRAVENOUS; SUBCUTANEOUS
Status: DISCONTINUED | OUTPATIENT
Start: 2022-11-04 | End: 2022-11-07 | Stop reason: HOSPADM

## 2022-11-04 RX ORDER — ATORVASTATIN CALCIUM 40 MG/1
80 TABLET, FILM COATED ORAL NIGHTLY
Status: DISCONTINUED | OUTPATIENT
Start: 2022-11-04 | End: 2022-11-07 | Stop reason: HOSPADM

## 2022-11-04 RX ORDER — GABAPENTIN 300 MG/1
600 CAPSULE ORAL 2 TIMES DAILY
Status: DISCONTINUED | OUTPATIENT
Start: 2022-11-04 | End: 2022-11-04

## 2022-11-04 RX ORDER — IPRATROPIUM BROMIDE AND ALBUTEROL SULFATE 2.5; .5 MG/3ML; MG/3ML
1 SOLUTION RESPIRATORY (INHALATION) EVERY 6 HOURS PRN
Status: DISCONTINUED | OUTPATIENT
Start: 2022-11-04 | End: 2022-11-07 | Stop reason: HOSPADM

## 2022-11-04 RX ORDER — ASPIRIN 81 MG/1
81 TABLET ORAL EVERY EVENING
Status: DISCONTINUED | OUTPATIENT
Start: 2022-11-04 | End: 2022-11-07 | Stop reason: HOSPADM

## 2022-11-04 RX ORDER — SODIUM CHLORIDE 0.9 % (FLUSH) 0.9 %
5-40 SYRINGE (ML) INJECTION EVERY 12 HOURS SCHEDULED
Status: DISCONTINUED | OUTPATIENT
Start: 2022-11-04 | End: 2022-11-07 | Stop reason: HOSPADM

## 2022-11-04 RX ORDER — INSULIN LISPRO 100 [IU]/ML
0-4 INJECTION, SOLUTION INTRAVENOUS; SUBCUTANEOUS NIGHTLY
Status: DISCONTINUED | OUTPATIENT
Start: 2022-11-04 | End: 2022-11-07 | Stop reason: HOSPADM

## 2022-11-04 RX ORDER — LAMOTRIGINE 100 MG/1
100 TABLET ORAL 2 TIMES DAILY
Status: DISCONTINUED | OUTPATIENT
Start: 2022-11-04 | End: 2022-11-04

## 2022-11-04 RX ORDER — AMLODIPINE BESYLATE 5 MG/1
5 TABLET ORAL DAILY
Status: DISCONTINUED | OUTPATIENT
Start: 2022-11-04 | End: 2022-11-07 | Stop reason: HOSPADM

## 2022-11-04 RX ORDER — GABAPENTIN 300 MG/1
600 CAPSULE ORAL DAILY
Status: DISCONTINUED | OUTPATIENT
Start: 2022-11-04 | End: 2022-11-07 | Stop reason: HOSPADM

## 2022-11-04 RX ORDER — ONDANSETRON 4 MG/1
4 TABLET, ORALLY DISINTEGRATING ORAL EVERY 8 HOURS PRN
Status: DISCONTINUED | OUTPATIENT
Start: 2022-11-04 | End: 2022-11-07 | Stop reason: HOSPADM

## 2022-11-04 RX ORDER — DIAZEPAM 2 MG/1
2 TABLET ORAL 2 TIMES DAILY PRN
Status: DISCONTINUED | OUTPATIENT
Start: 2022-11-04 | End: 2022-11-07 | Stop reason: HOSPADM

## 2022-11-04 RX ORDER — ALBUTEROL SULFATE 90 UG/1
1 AEROSOL, METERED RESPIRATORY (INHALATION) EVERY 6 HOURS PRN
Status: DISCONTINUED | OUTPATIENT
Start: 2022-11-04 | End: 2022-11-07 | Stop reason: HOSPADM

## 2022-11-04 RX ORDER — POLYETHYLENE GLYCOL 3350 17 G/17G
17 POWDER, FOR SOLUTION ORAL DAILY PRN
Status: DISCONTINUED | OUTPATIENT
Start: 2022-11-04 | End: 2022-11-07 | Stop reason: HOSPADM

## 2022-11-04 RX ORDER — ONDANSETRON 2 MG/ML
4 INJECTION INTRAMUSCULAR; INTRAVENOUS EVERY 6 HOURS PRN
Status: DISCONTINUED | OUTPATIENT
Start: 2022-11-04 | End: 2022-11-07 | Stop reason: HOSPADM

## 2022-11-04 RX ORDER — ACETAMINOPHEN 325 MG/1
650 TABLET ORAL EVERY 6 HOURS PRN
Status: DISCONTINUED | OUTPATIENT
Start: 2022-11-04 | End: 2022-11-07 | Stop reason: HOSPADM

## 2022-11-04 RX ORDER — DULOXETIN HYDROCHLORIDE 60 MG/1
120 CAPSULE, DELAYED RELEASE ORAL DAILY
Status: DISCONTINUED | OUTPATIENT
Start: 2022-11-04 | End: 2022-11-04 | Stop reason: SDUPTHER

## 2022-11-04 RX ORDER — METOPROLOL SUCCINATE 50 MG/1
50 TABLET, EXTENDED RELEASE ORAL DAILY
Status: DISCONTINUED | OUTPATIENT
Start: 2022-11-04 | End: 2022-11-07 | Stop reason: HOSPADM

## 2022-11-04 RX ORDER — ACETAMINOPHEN 650 MG/1
650 SUPPOSITORY RECTAL EVERY 6 HOURS PRN
Status: DISCONTINUED | OUTPATIENT
Start: 2022-11-04 | End: 2022-11-07 | Stop reason: HOSPADM

## 2022-11-04 RX ORDER — CLINDAMYCIN PHOSPHATE 600 MG/50ML
600 INJECTION INTRAVENOUS EVERY 6 HOURS
Status: DISCONTINUED | OUTPATIENT
Start: 2022-11-04 | End: 2022-11-06

## 2022-11-04 RX ORDER — MORPHINE SULFATE 4 MG/ML
4 INJECTION, SOLUTION INTRAMUSCULAR; INTRAVENOUS
Status: COMPLETED | OUTPATIENT
Start: 2022-11-04 | End: 2022-11-04

## 2022-11-04 RX ORDER — CLINDAMYCIN PHOSPHATE 600 MG/50ML
600 INJECTION INTRAVENOUS
Status: COMPLETED | OUTPATIENT
Start: 2022-11-04 | End: 2022-11-04

## 2022-11-04 RX ORDER — AMLODIPINE BESYLATE 5 MG/1
5 TABLET ORAL DAILY
Status: DISCONTINUED | OUTPATIENT
Start: 2022-11-04 | End: 2022-11-04 | Stop reason: SDUPTHER

## 2022-11-04 RX ORDER — DEXTROSE MONOHYDRATE 100 MG/ML
INJECTION, SOLUTION INTRAVENOUS CONTINUOUS PRN
Status: DISCONTINUED | OUTPATIENT
Start: 2022-11-04 | End: 2022-11-07 | Stop reason: HOSPADM

## 2022-11-04 RX ORDER — HYDROCODONE BITARTRATE AND ACETAMINOPHEN 10; 325 MG/1; MG/1
1 TABLET ORAL EVERY 8 HOURS PRN
Status: DISCONTINUED | OUTPATIENT
Start: 2022-11-04 | End: 2022-11-07 | Stop reason: HOSPADM

## 2022-11-04 RX ORDER — PANTOPRAZOLE SODIUM 40 MG/1
40 TABLET, DELAYED RELEASE ORAL
Status: DISCONTINUED | OUTPATIENT
Start: 2022-11-04 | End: 2022-11-07 | Stop reason: HOSPADM

## 2022-11-04 RX ORDER — LAMOTRIGINE 100 MG/1
100 TABLET ORAL DAILY
Status: DISCONTINUED | OUTPATIENT
Start: 2022-11-04 | End: 2022-11-07 | Stop reason: HOSPADM

## 2022-11-04 RX ORDER — CLOPIDOGREL BISULFATE 75 MG/1
75 TABLET ORAL DAILY
Status: DISCONTINUED | OUTPATIENT
Start: 2022-11-04 | End: 2022-11-04 | Stop reason: SDUPTHER

## 2022-11-04 RX ORDER — LISINOPRIL 20 MG/1
40 TABLET ORAL EVERY EVENING
Status: DISCONTINUED | OUTPATIENT
Start: 2022-11-04 | End: 2022-11-07 | Stop reason: HOSPADM

## 2022-11-04 RX ORDER — SODIUM CHLORIDE 0.9 % (FLUSH) 0.9 %
5-40 SYRINGE (ML) INJECTION PRN
Status: DISCONTINUED | OUTPATIENT
Start: 2022-11-04 | End: 2022-11-07 | Stop reason: HOSPADM

## 2022-11-04 RX ORDER — FUROSEMIDE 40 MG/1
40 TABLET ORAL DAILY
Status: DISCONTINUED | OUTPATIENT
Start: 2022-11-04 | End: 2022-11-07 | Stop reason: HOSPADM

## 2022-11-04 RX ADMIN — METOPROLOL SUCCINATE 50 MG: 50 TABLET, EXTENDED RELEASE ORAL at 21:52

## 2022-11-04 RX ADMIN — SODIUM CHLORIDE, PRESERVATIVE FREE 5 ML: 5 INJECTION INTRAVENOUS at 21:53

## 2022-11-04 RX ADMIN — PANTOPRAZOLE SODIUM 40 MG: 40 TABLET, DELAYED RELEASE ORAL at 21:52

## 2022-11-04 RX ADMIN — ENOXAPARIN SODIUM 40 MG: 100 INJECTION SUBCUTANEOUS at 21:51

## 2022-11-04 RX ADMIN — ACETAMINOPHEN 650 MG: 325 TABLET, FILM COATED ORAL at 06:20

## 2022-11-04 RX ADMIN — GABAPENTIN 600 MG: 300 CAPSULE ORAL at 21:52

## 2022-11-04 RX ADMIN — INSULIN HUMAN 210 UNITS: 500 INJECTION, SOLUTION SUBCUTANEOUS at 13:07

## 2022-11-04 RX ADMIN — MORPHINE SULFATE 4 MG: 4 INJECTION INTRAVENOUS at 01:41

## 2022-11-04 RX ADMIN — ASPIRIN 81 MG: 81 TABLET, COATED ORAL at 18:11

## 2022-11-04 RX ADMIN — CLINDAMYCIN IN 5 PERCENT DEXTROSE 600 MG: 12 INJECTION, SOLUTION INTRAVENOUS at 18:09

## 2022-11-04 RX ADMIN — CLOPIDOGREL BISULFATE 75 MG: 75 TABLET ORAL at 21:52

## 2022-11-04 RX ADMIN — CLINDAMYCIN PHOSPHATE 600 MG: 600 INJECTION, SOLUTION INTRAVENOUS at 02:00

## 2022-11-04 RX ADMIN — DULOXETINE HYDROCHLORIDE 120 MG: 60 CAPSULE, DELAYED RELEASE ORAL at 21:52

## 2022-11-04 RX ADMIN — LAMOTRIGINE 100 MG: 100 TABLET ORAL at 21:52

## 2022-11-04 RX ADMIN — ATORVASTATIN CALCIUM 80 MG: 40 TABLET, FILM COATED ORAL at 21:52

## 2022-11-04 RX ADMIN — SODIUM CHLORIDE, PRESERVATIVE FREE 10 ML: 5 INJECTION INTRAVENOUS at 13:19

## 2022-11-04 RX ADMIN — TRAZODONE HYDROCHLORIDE 100 MG: 50 TABLET ORAL at 21:52

## 2022-11-04 RX ADMIN — HYDROCODONE BITARTRATE AND ACETAMINOPHEN 1 TABLET: 10; 325 TABLET ORAL at 18:17

## 2022-11-04 RX ADMIN — ONDANSETRON 4 MG: 2 INJECTION INTRAMUSCULAR; INTRAVENOUS at 01:39

## 2022-11-04 RX ADMIN — LISINOPRIL 40 MG: 20 TABLET ORAL at 18:10

## 2022-11-04 RX ADMIN — CLINDAMYCIN IN 5 PERCENT DEXTROSE 600 MG: 12 INJECTION, SOLUTION INTRAVENOUS at 13:16

## 2022-11-04 RX ADMIN — AMLODIPINE BESYLATE 5 MG: 5 TABLET ORAL at 18:11

## 2022-11-04 ASSESSMENT — ENCOUNTER SYMPTOMS: BACK PAIN: 0

## 2022-11-04 ASSESSMENT — PAIN SCALES - GENERAL
PAINLEVEL_OUTOF10: 5
PAINLEVEL_OUTOF10: 8
PAINLEVEL_OUTOF10: 10
PAINLEVEL_OUTOF10: 8
PAINLEVEL_OUTOF10: 0
PAINLEVEL_OUTOF10: 10

## 2022-11-04 ASSESSMENT — PAIN DESCRIPTION - ORIENTATION
ORIENTATION: LEFT

## 2022-11-04 ASSESSMENT — PAIN DESCRIPTION - LOCATION
LOCATION: LEG
LOCATION: ANKLE
LOCATION: LEG
LOCATION: HEAD
LOCATION: LEG

## 2022-11-04 ASSESSMENT — PAIN DESCRIPTION - DESCRIPTORS
DESCRIPTORS: BURNING;THROBBING;STABBING
DESCRIPTORS: ACHING
DESCRIPTORS: THROBBING;BURNING;ACHING

## 2022-11-04 NOTE — ED PROVIDER NOTES
Emergency Department Provider Note                   PCP:                Klaudia Segal MD               Age: 47 y.o. Sex: female       ICD-10-CM    1. Left leg cellulitis  L03. [address] To Admit 11/04/2022 04:29:01 AM        MDM  Number of Diagnoses or Management Options  Diagnosis management comments: Patient with left lower leg cellulitis failing outpatient therapy.        Amount and/or Complexity of Data Reviewed  Clinical lab tests: ordered and reviewed  Tests in the radiology section of CPT®: ordered and reviewed  Review and summarize past medical records: yes  Independent visualization of images, tracings, or specimens: yes    Risk of Complications, Morbidity, and/or Mortality  Presenting problems: moderate  Diagnostic procedures: moderate  Management options: moderate    Patient Progress  Patient progress: stable             Orders Placed This Encounter   Procedures    Culture, Blood 1    Culture, Blood 1    XR TIBIA FIBULA LEFT (2 VIEWS)    CBC with Auto Differential    Comprehensive Metabolic Panel    Lactic Acid    Urinalysis w rflx microscopic    POCT Urinalysis no Micro    POCT Urinalysis no Micro    Vascular duplex lower extremity venous left        Medications   clindamycin (CLEOCIN) 600 mg in dextrose 5 % 50 mL IVPB (0 mg IntraVENous Stopped 11/4/22 0242)   morphine sulfate (PF) injection 4 mg (4 mg IntraVENous Given 11/4/22 0141)   ondansetron (ZOFRAN) injection 4 mg (4 mg IntraVENous Given 11/4/22 0139)       New Prescriptions    No medications on file        Darren Devries is a 47 y.o. female who presents to the Emergency Department with chief complaint of    Chief Complaint   Patient presents with    Cellulitis      Patient is a 50-year-old female with a history of insulin-dependent diabetes, hypertension, four-vessel CABG 2 years ago vein harvested from the left leg who presents with left lower leg anterior medial redness swelling and warmth since Tuesday. Patient was seen by her PCP on Tuesday and given a prescription for Cipro which she is taken for 2 days and her redness and swelling have become worse. Patient states she is unable to walk on the leg. The history is provided by the patient. Location:  Leg  Leg location:  L lower leg  Pain details:     Quality:  Aching and dull    Radiates to:  Does not radiate    Severity:  Moderate    Onset quality:  Gradual    Duration:  2 days    Timing:  Constant    Progression:  Worsening  Chronicity:  New  Dislocation: no    Prior injury to area:  No  Relieved by:  Nothing  Worsened by: Activity  Ineffective treatments:  None tried  Associated symptoms: swelling    Associated symptoms: no back pain, no fatigue, no fever, no itching, no muscle weakness, no neck pain, no numbness and no stiffness    Risk factors: no concern for non-accidental trauma        Review of Systems   Constitutional:  Negative for fatigue and fever. Musculoskeletal:  Negative for back pain, neck pain and stiffness. Skin:  Positive for rash (Left lower extremity redness swelling warmth). Negative for itching. All other systems reviewed and are negative.     Past Medical History:   Diagnosis Date    Arrhythmia     palpatations    Asthma     Bipolar disorder ([address]     Coronary artery disease     cardiac cath 11/5/2020    Diabetic neuropathy (HCC)     Fatty liver     GERD (gastroesophageal reflux disease)     Heart failure ([address]     Hypercholesterolemia     Hypertension     Hypertriglyceridemia     Meniere's disease     Migraine     Morbid obesity ([address]     Obstructive sleep apnea     CPAP    Palpitations     Peptic ulcer disease 2009    Psychiatric disorder     bipolar    Syncope and collapse     TIA (transient ischemic attack)     pt denies- states she had a migraine causing face tingling        Past Surgical History:   Procedure Laterality Date    BREAST LUMPECTOMY      CARDIAC CATHETERIZATION  11/2020    CARDIAC CATHETERIZATION 2009    x 4    CARDIAC PROCEDURE N/A 8/22/2022    LEFT HEART CATH / CORONARY ANGIOGRAPHY W GRAFTS performed by Kelley Chavez MD at [facility]  2004    CORONARY ARTERY BYPASS GRAFT  11/18/2020    X 4    GYN  2010    ovaries rem    NEUROLOGICAL SURGERY      x3, lumbar region, cervical    ORTHOPEDIC SURGERY      left wrist surgery,back surgery(discectomy-removed)    OTHER SURGICAL HISTORY  11/2020    OPEN HEART SX     LISSA AND BSO (CERVIX REMOVED)  2000    TONSILLECTOMY  1979    as a child    UROLOGICAL SURGERY      bladder sling        Family History   Problem Relation Age of Onset    Diabetes Mother     Coronary Art Dis Mother     Coronary Art Dis Father     Diabetes Brother     Coronary Art Dis Paternal Grandfather     Breast Cancer Sister     Diabetes Sister         Social History     Socioeconomic History    Marital status:      Spouse name: None    Number of children: None    Years of education: None    Highest education level: None   Tobacco Use    Smoking status: Never    Smokeless tobacco: Never   Vaping Use    Vaping Use: Never used   Substance and Sexual Activity    Alcohol use: Not Currently    Drug use: Not Currently     Types: Marijuana Don Fogo)    Sexual activity: Yes         Adhesive tape and Penicillins     Previous Medications    ALBUTEROL SULFATE (PROAIR RESPICLICK) 523 (90 BASE) MCG/ACT AEROSOL POWDER INHALATION    Inhale 1 puff into the lungs every 6 hours as needed for Wheezing or Shortness of Breath    ALBUTEROL SULFATE HFA (PROVENTIL;VENTOLIN;PROAIR) 108 (90 BASE) MCG/ACT INHALER    Inhale 1 puff into the lungs every 6 hours as needed for Wheezing TAKE 1 PUFF BY INHALATION EVERY FOUR (4) HOURS AS NEEDED FOR WHEEZING OR SHORTNESS OF BREATH.     ALIROCUMAB (PRALUENT) 75 MG/ML SOAJ INJECTION PEN    Inject 75 mg into the skin    AMLODIPINE (NORVASC) 5 MG TABLET    Take 1 tablet by mouth daily    ASPIRIN 81 MG EC TABLET    Take 81 mg by mouth every evening    ATORVASTATIN (LIPITOR) 80 MG TABLET    Take 1 tablet by mouth daily    CIPROFLOXACIN (CIPRO) 500 MG TABLET    Take 1 tablet by mouth 2 times daily for 7 days    CLOPIDOGREL (PLAVIX) 75 MG TABLET    Take 1 tablet by mouth daily    DEXLANSOPRAZOLE (DEXILANT) 60 MG CPDR DELAYED RELEASE CAPSULE    Take 1 capsule by mouth daily    DIAZEPAM (VALIUM) 2 MG TABLET    Take 1 tablet by mouth 2 times daily as needed for Anxiety for up to 120 days. DULOXETINE (CYMBALTA) 60 MG EXTENDED RELEASE CAPSULE    Take 2 capsules by mouth daily    EMPAGLIFLOZIN (JARDIANCE) 25 MG TABLET    Take 1 tablet by mouth daily    FLUTICASONE FUROATE-VILANTEROL (BREO ELLIPTA) 200-25 MCG/INH AEPB INHALER    Inhale 1 puff into the lungs daily    FUROSEMIDE (LASIX) 40 MG TABLET    Take 1 tablet by mouth daily    GABAPENTIN (NEURONTIN) 600 MG TABLET    Take 1 tablet by mouth 2 times daily for 180 days. GALCANEZUMAB-GNLM (EMGALITY) 120 MG/ML SOSY    INJECT CONTENTS OF 1 SYRINGE SUBCUTANEOUSLY EVERY 30 DAYS    HUMULIN R U-500 KWIKPEN 500 UNIT/ML SOPN CONCENTRATED INJECTION PEN    200 units before breakfast, 210 units before lunch, 105 units before supper    HYDROCODONE-ACETAMINOPHEN (NORCO)  MG PER TABLET    Take 1 tablet by mouth every 8 hours as needed for Pain for up to 30 days. Intended supply: 30 days    HYDROCODONE-ACETAMINOPHEN (NORCO)  MG PER TABLET    Take 1 tablet by mouth every 8 hours as needed for Pain for up to 30 days. Intended supply: 30 days    HYDROCODONE-ACETAMINOPHEN (NORCO)  MG PER TABLET    Take 1 tablet by mouth every 8 hours as needed for Pain for up to 30 days.  Intended supply: 30 days    HYDROXYZINE HCL (ATARAX) 25 MG TABLET    Take 1 tablet by mouth 3 times daily as needed for Anxiety    IPRATROPIUM-ALBUTEROL (DUONEB) 0.5-2.5 (3) MG/3ML SOLN NEBULIZER SOLUTION    Inhale 3 mLs into the lungs every 6 hours as needed for Shortness of Breath INHALE 3ML VIA NEBULIZER EVERY 6 HOURS LAMOTRIGINE (LAMICTAL) 100 MG TABLET    Take 1 tablet by mouth 2 times daily    LINACLOTIDE (LINZESS) 145 MCG CAPSULE    Take 1 capsule by mouth daily    LISINOPRIL (PRINIVIL;ZESTRIL) 40 MG TABLET    Take 1 tablet by mouth every evening    LOTEPREDNOL ETABONATE (LOTEMAX SM) 0.38 % GEL    1 drop 3x a day x 1 wk, 2x a day x 1 wk, 1x a day x 1 wk, then d/c in both eyes    MELOXICAM (MOBIC) 15 MG TABLET    One daily    METOPROLOL SUCCINATE (TOPROL XL) 50 MG EXTENDED RELEASE TABLET    Take 1 tablet by mouth daily    MOUNJARO 7.5 MG/0.5ML SOPN SC INJECTION    Inject 0.5 mLs into the skin once a week    NITROGLYCERIN (NITROSTAT) 0.4 MG SL TABLET    PLACE ONE TABLET UNDER TONGUE AS NEEDED FOR CHEST PAIN. MAY REPEAT EVERY 5 MINUTES FOR 2 MORE DOSES. CALL 911 ON SECOND DOSE.    ONDANSETRON (ZOFRAN-ODT) 4 MG DISINTEGRATING TABLET    Take 1 tablet by mouth 3 times daily as needed for Nausea or Vomiting    POTASSIUM CHLORIDE (KLOR-CON M) 20 MEQ EXTENDED RELEASE TABLET    Take 1 tablet by mouth daily    TIZANIDINE (ZANAFLEX) 2 MG TABLET    Take 1 tablet by mouth in the morning and at bedtime    TRAZODONE (DESYREL) 100 MG TABLET    Take 1 tablet by mouth daily    UBROGEPANT 50 MG TABS    Take 1 tablet by mouth at onset of migraine, repeat in two hours if needed. Maximum 2/day up to 4/week. UNIFINE PENTIPS PLUS 31G X 6 MM MISC    USE WITH INSULIN UP TO 4 TIMES DAILY, E11.65        Vitals signs and nursing note reviewed. Patient Vitals for the past 4 hrs:   BP SpO2   11/04/22 0300 (!) 144/70 93 %   11/04/22 0200 (!) 149/76 96 %   11/04/22 0130 122/80 96 %   11/04/22 0122 (!) 124/111 98 %          Physical Exam  Vitals and nursing note reviewed. Constitutional:       Appearance: Normal appearance. HENT:      Head: Normocephalic and atraumatic. Nose: Nose normal.      Mouth/Throat:      Mouth: Mucous membranes are moist.      Pharynx: Oropharynx is clear. Eyes:      Extraocular Movements: Extraocular movements intact. Conjunctiva/sclera: Conjunctivae normal.      Pupils: Pupils are equal, round, and reactive to light. Cardiovascular:      Rate and Rhythm: Normal rate. Pulses: Normal pulses. Pulmonary:      Effort: Pulmonary effort is normal.   Abdominal:      General: Abdomen is flat. Bowel sounds are normal.      Palpations: Abdomen is soft. Musculoskeletal:         General: Normal range of motion. Cervical back: Normal range of motion and neck supple. Skin:     General: Skin is warm and dry. Capillary Refill: Capillary refill takes less than 2 seconds. Comments: Left lower extremity anterior medial aspect redness swelling warmth consistent with cellulitis. Patient has 2+ dorsalis pedis pulse. Neurological:      General: No focal deficit present. Mental Status: She is alert and oriented to person, place, and time. Mental status is at baseline. Psychiatric:         Mood and Affect: Mood normal.         Behavior: Behavior normal.         Thought Content: Thought content normal.         Judgment: Judgment normal.        Procedures    Results for orders placed or performed during the hospital encounter of 11/03/22   Culture, Blood 1    Specimen: Blood   Result Value Ref Range    Special Requests LEFT  HAND        Culture PENDING    XR TIBIA FIBULA LEFT (2 VIEWS)    Narrative    LEFT TIB-FIB AP AND LATERAL VIEWS    HISTORY: Cellulitis. FINDINGS: Radha Slim are present in the medial posterior soft tissues of the left  calf. There are no aggressive osseous lesions. There is no displaced fracture. Impression    Staples in the soft tissues of the medial posterior left calf.    CBC with Auto Differential   Result Value Ref Range    WBC 12.5 (H) 4.3 - 11.1 K/uL    RBC 5.44 (H) 4.05 - 5.2 M/uL    Hemoglobin 15.5 (H) 11.7 - 15.4 g/dL    Hematocrit 46.2 35.8 - 46.3 %    MCV 84.9 82 - 102 FL    MCH 28.5 26.1 - 32.9 PG    MCHC 33.5 31.4 - 35.0 g/dL    RDW 12.8 11.9 - 14.6 %    Platelets 214 935 - 450 K/uL    MPV 9.0 (L) 9.4 - 12.3 FL    nRBC 0.00 0.0 - 0.2 K/uL    Differential Type AUTOMATED      Seg Neutrophils 72 43 - 78 %    Lymphocytes 15 13 - 44 %    Monocytes 10 4.0 - 12.0 %    Eosinophils % 1 0.5 - 7.8 %    Basophils 0 0.0 - 2.0 %    Immature Granulocytes 2 0.0 - 5.0 %    Segs Absolute 9.0 (H) 1.7 - 8.2 K/UL    Absolute Lymph # 1.9 0.5 - 4.6 K/UL    Absolute Mono # 1.2 0.1 - 1.3 K/UL    Absolute Eos # 0.1 0.0 - 0.8 K/UL    Basophils Absolute 0.0 0.0 - 0.2 K/UL    Absolute Immature Granulocyte 0.2 0.0 - 0.5 K/UL   Comprehensive Metabolic Panel   Result Value Ref Range    Sodium 132 (L) 133 - 143 mmol/L    Potassium 3.5 3.5 - 5.1 mmol/L    Chloride 99 (L) 101 - 110 mmol/L    CO2 26 21 - 32 mmol/L    Anion Gap 7 2 - 11 mmol/L    Glucose 299 (H) 65 - 100 mg/dL    BUN 13 6 - 23 MG/DL    Creatinine 0.80 0.6 - 1.0 MG/DL    Est, Glom Filt Rate >60 >60 ml/min/1.73m2    Calcium 9.4 8.3 - 10.4 MG/DL    Total Bilirubin 0.5 0.2 - 1.1 MG/DL    ALT 53 12 - 65 U/L    AST 40 (H) 15 - 37 U/L    Alk Phosphatase 83 50 - 136 U/L    Total Protein 8.5 (H) 6.3 - 8.2 g/dL    Albumin 3.1 (L) 3.5 - 5.0 g/dL    Globulin 5.4 (H) 2.8 - 4.5 g/dL    Albumin/Globulin Ratio 0.6 0.4 - 1.6     Lactic Acid   Result Value Ref Range    Lactic Acid, Plasma 1.7 0.4 - 2.0 MMOL/L   Urinalysis w rflx microscopic   Result Value Ref Range    Color, UA YELLOW/STRAW      Appearance CLEAR      Specific Gravity, UA 1.022 1.001 - 1.023      pH, Urine 6.5 5.0 - 9.0      Protein, UA 30 (A) NEG mg/dL    Glucose, UA >1000 mg/dL    Ketones, Urine 40 (A) NEG mg/dL    Bilirubin Urine Negative NEG      Blood, Urine TRACE (A) NEG      Urobilinogen, Urine 0.2 0.2 - 1.0 EU/dL    Nitrite, Urine Negative NEG      Leukocyte Esterase, Urine SMALL (A) NEG      WBC, UA 20-50 (A) NORM /hpf    RBC, UA 0-5 (A) NORM /hpf    Epithelial Cells UA 0-5 (A) NORM /hpf    BACTERIA, URINE Negative (A) NORM /hpf    Casts 0-2 (A) NORM /lpf   POCT Urinalysis no Micro   Result Value Ref Range    Specific Gravity, Urine, POC 1.015 1.001 - 1.023      pH, Urine, POC 6.5 5.0 - 9.0      Protein, Urine, POC 30 (A) NEG mg/dL    Glucose, UA POC >1,000 (A) NEG mg/dL    Ketones, Urine, POC 40 (A) NEG mg/dL    Bilirubin, Urine, POC Negative NEG      Blood, UA POC Trace Hgb (A) NEG      URINE UROBILINOGEN POC 0.2 0.2 - 1.0 EU/dL    Nitrate, Urine, POC Negative NEG      Leukocyte Est, UA POC TRACE (A) NEG      Performed by: Jass Rm    Vascular duplex lower extremity venous left    Narrative    EXAM: Left leg venous ultrasound. INDICATION: Pain and swelling. COMPARISON: Prior ultrasound on May 14, 2021. TECHNIQUE: Evaluation of the left leg veins was performed utilizing grey-scale,  color Doppler and duplex ultrasound. FINDINGS: The left common femoral, superficial femoral, deep femoral, popliteal,  posterior tibialis, peroneal and greater saphenous veins are patent and  compressible, with normal response to augmentation. Impression    No evidence of left leg DVT. Vascular duplex lower extremity venous left   Final Result   No evidence of left leg DVT. XR TIBIA FIBULA LEFT (2 VIEWS)   Final Result   Staples in the soft tissues of the medial posterior left calf. Voice dictation software was used during the making of this note. This software is not perfect and grammatical and other typographical errors may be present. This note has not been completely proofread for errors.        Raghu Espinosa MD  11/04/22 0570

## 2022-11-04 NOTE — PROGRESS NOTES
Physician Progress Note      [id number]  :                       1970  ADMIT DATE:       2022 7:49 AM  DISCH DATE:  RESPONDING  PROVIDER #:        Nayana Grayson MD          QUERY TEXT:    Pt admitted with LLE cellulitis. Pt noted to have DM type 2. If possible,   please document in progress notes and discharge summary the relationship, if   any, between cellulitis and DM. The medical record reflects the following:  Risk Factors: 47 y.o. female with medical history of HTN, DM2, CAD, migraines   who presented to ED with LLE pain and swelling. Patient reports symptoms   started 4 days ago. She went to see her PCP and was diagnosed with cellulitis   and prescribed Cipro. She has taken 2 days of Cipro, but reports worsening. Clinical Indicators: LLE cellulitis and type 2 DM  Treatment: Blood cultures, clindamycin, PRN pian medication    Thank you,  Ashwini Mcclendon RN, BSN, CDI  [email or web address]  . Options provided:  -- LLE cellulitis associated with Diabetes  -- LLE cellulitis unrelated to Diabetes  -- Other - I will add my own diagnosis  -- Disagree - Not applicable / Not valid  -- Disagree - Clinically unable to determine / Unknown  -- Refer to Clinical Documentation Reviewer    PROVIDER RESPONSE TEXT:    LLE cellulitis associated with Diabetes.     Query created by: Jeri Webb on 2022 8:58 AM      Electronically signed by:  Nayana Grayson MD 2022 7:51 PM

## 2022-11-04 NOTE — ED NOTES
Report given to Mando Arizmendi. Care transferred at this time.      Mellisa Patel RN  11/04/22 4356

## 2022-11-04 NOTE — CARE COORDINATION
SW met with patient who confirmed demographic information, states that she lives with her  and has family that are local and supportive. Patient is seen by Dr. Bud Gordon for primary care and is current. Patient does not use assistive devices, does not require ADL assistance, and denies any falls. No needs identified from [facility]IKI at this time, will continue to follow until discharge.      ASSESSMENT NOTE    Attending Physician: Vinay Martínez MD;Paloma*  Admit Problem: Cellulitis [V22.32]  Date/Time of Admission: 11/4/2022  7:49 AM  Problem List:  Patient Active Problem List   Diagnosis    Chest pain    TIA (transient ischemic attack)    Morbid obesity with BMI of 40.0-44.9, adult ([facility]    Vitamin D deficiency    Idiopathic progressive polyneuropathy    SHELBY (obstructive sleep apnea)    Generalized anxiety disorder    Myopia    Pain of right thumb    Morbid obesity ([facility]    CAD (coronary artery disease)    Fatty liver    DM (diabetes mellitus) ([facility]    Vestibular migraine    Lateral epicondylitis    Closed fracture of lower end of left radius with routine healing    Rupture of extensor tendon of left hand    Syncope and collapse    Other fatigue    Hypersomnia    Diastolic CHF, chronic (HCC)    RLS (restless legs syndrome)    Bruxism, sleep-related    Microalbuminuria    Hypercholesterolemia    Elevated cortisol level    S/P CABG x 4    Encounter for medication management    Persistent disorder of initiating or maintaining sleep    Incontinence    Pain in left leg    GERD (gastroesophageal reflux disease)    Psychiatric disorder    Chronic migraine with aura    Pain in left hand    Diabetes type 2, uncontrolled    Carpal tunnel syndrome of left wrist    Nausea    Meniere's disease of right ear    Tendon weakness    Oropharyngeal dysphagia    Abnormal results of other endocrine function studies    Atherosclerosis of native coronary artery of native heart with angina pectoris (HCC)    Vertigo    Postural imbalance Hypertension    Chronic constipation    Bipolar disorder (HCC)    Displacement of cervical intervertebral disc without myelopathy    Irregular bowel habits    Stiffness of left hand joint    PUD (peptic ulcer disease)    Chronic pain    Diabetic peripheral neuropathy associated with type 2 diabetes mellitus (HCC)    Hypertriglyceridemia    Retained orthopedic hardware    Uncontrolled type 2 diabetes mellitus with hypoglycemia without coma (HCC)    YOON (dyspnea on exertion)    Mild persistent asthma    Polycythemia    Yeast dermatitis    Supraventricular tachycardia (HCC)    Moderate persistent asthma without complication    Unstable angina ([facility]    Cellulitis       Service Assessment  Patient Orientation Alert and Oriented, Situation, Self, Place, Person   Cognition Alert   History Provided By Patient   Primary Caregiver Self   Accompanied By/Relationship     Support Systems Spouse/Significant Other   Patient's Healthcare Decision Maker is:     PCP Verified by CM Yes   Last Visit to PCP     Prior Functional Level Independent in ADLs/IADLs   Current Functional Level Independent in ADLs/IADLs   Can patient return to prior living arrangement Yes   Ability to make needs known: Good   Family able to assist with home care needs: Yes   Would you like for me to discuss the discharge plan with any other family members/significant others, and if so, who?  Yes ( and Son)   Financial Resources Medicare,  Resources Other (Comment) (N/A)   CM/SW Referral       Social/Functional History  Lives With Spouse   Type of [address] [place] [address] Access     Entrance Stairs - Number of Steps     Entrance Stairs - Rails     Bathroom Shower/Tub     Bathroom Toilet     Bathroom Equipment     Bathroom Accessibility     Home Equipment     Receives Help From Family   ADL Assistance     [employer]     Grooming     Feeding     Toileting     [address]     Meal Prep     Laundry     Vacuuming Cleaning     Gardening     Yard Work     Driving     Shopping          Other (Comment)     Homemaking Responsibilities     Meal Prep Responsibility     Laundry Responsibility     Cleaning Responsibility     Bill Paying/Finance [address] [employer]     Other (Comment)     Ambulation Assistance     Transfer Assistance     Active      Patient's  Info     Mode of Transportation     Education     Occupation     Type of Occupation       Discharge Planning   Type of [address] Prior To Admission     Potential Assistance Needed     DME     DME     DME Ordered? Potential Assistance Purchasing Medications     Meds-to-Beds: Does the patient want to have any new prescriptions delivered to bedside prior to discharge? Type of Home Care Services     Patient expects to be discharged to: House   Follow Up Appointment: Best Day/Time     One/Two Story Residence:     # of Interior Steps     Height of Each Step (in)     [employer] Available     History of Falls? No     Services At/After Discharge  Transition of Care Consult (CM Consult): Internal Home Health     Internal Hospice     Reason Outside Agency [address]     Partner SNF     Reason Why Partner SNF Not Chosen     Internal Comfort Care     Reason Outside [address] Discharge     [address] Provided? Mode of Transport at [facility] Time of Discharge     Confirm Follow Up Transport       Condition of Participation: Discharge Planning  The plan for Transition of Care is related to the following treatment goals: The Patient and/or Patient Representative was provided with a Choice of Provider?      Name of the Patient Representative who was provided with the Choice of Provider and agrees with the Discharge Plan? The Patient and/or Patient Representative Agree with the Discharge Plan? Freedom of Choice list was provided with basic dialogue that supports the individualized plan of care/goals, treatment preferences, and shares the quality data associated with the providers?        Documentation for Discharge Appeal  Discharge Appealed by     Date notified by [facility] of appeal request:     Time notified by [facility] of appeal request:     Detailed Notice of Discharge given to:     Date Notice of Discharge given:     Time Notice of Discharge given:     Date records sent to [facility]     Time records sent to  Yudelka Salcido     Date Notified of Outcome     Time Notified of Outcome     Outcome of appeal           ALBERTO Herrera 11/04/22 9:34 AM      [address] [facility], [address] [place] [address] Work   [address] [facility]

## 2022-11-04 NOTE — ED NOTES
TRANSFER - OUT REPORT:    Verbal report given to BROOKE Holliday on Shruthi Barajas  being transferred to  at [address] for routine progression of patient care       Report consisted of patient's Situation, Background, Assessment and   Recommendations(SBAR). Information from the following report(s) Nurse Handoff Report and Event Log was reviewed with the receiving nurse. Lines:   Peripheral IV 11/03/22 Left Antecubital (Active)   Line Status Blood return noted 11/03/22 2035   Phlebitis Assessment No symptoms 11/03/22 2035   Infiltration Assessment 0 11/03/22 2035   Alcohol Cap Used No 11/03/22 2035   Dressing Status New dressing applied 11/03/22 2035   Dressing Type Transparent 11/03/22 2035   Dressing Intervention New 11/03/22 2035        Opportunity for questions and clarification was provided.       Patient transported with:  Sarita Ramos RN  11/04/22 3432

## 2022-11-04 NOTE — ED TRIAGE NOTES
Pt to ED with c/o cellulitis to left lower leg. Pt states started on Tuesday. Pt states went to pcp on Tuesday and was given a shot of rocephin and cipro to take at home. Pt states redness swelling and pain worse. PT denies fever at home. Pt states so painful unable to put weight on left leg. Warmth noted. Pt alert ambulatory and in NAD at this time.

## 2022-11-04 NOTE — ASSESSMENT & PLAN NOTE
- Appears to be on high doses of insulin  - Continue home insulin  - Hold PO meds  - SSI ordered  - Diabetic diet

## 2022-11-04 NOTE — H&P
Hospitalist History and Physical   Admit Date:  No admission date for patient encounter. Name:  Slava Pringle   Age:  47 y.o. Sex:  female  :  1970   MRN:  [id number]     Presenting Complaint: LLE pain and swelling  Reason(s) for Admission: Cellulitis [L03.90]     History of Present Illness:   Slava Pringle is a 47 y.o. female with medical history of HTN, DM2, CAD, migraines who presented to ED with LLE pain and swelling. Patient reports symptoms started 4 days ago. She went to see her PCP and was diagnosed with cellulitis and prescribed Cipro. She has taken 2 days of Cipro, but reports worsening. She states the pain is worse now and causing difficulty with ambulation. Upon ER evaluation, WBC is 12.5. No evidence of DVT on US. Hospitalist asked to admit. Due to bed availability, patient transferred to [facility] for further care. Review of Systems:  10 systems reviewed and negative except as noted in HPI. Assessment & Plan:   * Cellulitis  Assessment & Plan  - Unresponsive to outpatient treatment  - Started on clinda in ER, will continue  - Blood cultures ordered in ER  - PRN pain meds    Bipolar disorder ([facility] [address]  Assessment & Plan  - Continue home meds    Diastolic CHF, chronic (HCC)  Assessment & Plan  - Stable  - Continue home meds    Vestibular migraine  Assessment & Plan  - Of note    DM (diabetes mellitus) ([facility] [address]  Assessment & Plan  - Appears to be on high doses of insulin  - Continue home insulin  - Hold PO meds  - SSI ordered  - Diabetic diet    CAD (coronary artery disease)  Assessment & Plan  - Reports multiple prior CABGs  - Continue home meds    SHELBY (obstructive sleep apnea)  Assessment & Plan  - Of note    Morbid obesity with BMI of 40.0-44.9, adult [facility])  Assessment & Plan  - Of note      Disposition: inpatient    Past medical history reviewed.     Past Medical History:   Diagnosis Date    Arrhythmia     palpatations    Asthma     Bipolar disorder ([facility] [address] Coronary artery disease     cardiac cath 11/5/2020    Diabetic neuropathy (HCC)     Fatty liver     GERD (gastroesophageal reflux disease)     Heart failure ([facility] [address]     Hypercholesterolemia     Hypertension     Hypertriglyceridemia     Meniere's disease     Migraine     Morbid obesity ([facility] [address]     Obstructive sleep apnea     CPAP    Palpitations     Peptic ulcer disease 2009    Psychiatric disorder     bipolar    Syncope and collapse     TIA (transient ischemic attack)     pt denies- states she had a migraine causing face tingling     Past surgical history reviewed. Past Surgical History:   Procedure Laterality Date    BREAST LUMPECTOMY      CARDIAC CATHETERIZATION  11/2020    CARDIAC CATHETERIZATION  2009    x 4    CARDIAC PROCEDURE N/A 8/22/2022    LEFT HEART CATH / CORONARY ANGIOGRAPHY W GRAFTS performed by Luca Wilkes MD at [address] [facility]  2004    CORONARY ARTERY BYPASS GRAFT  11/18/2020    X 4    GYN  2010    ovaries rem    NEUROLOGICAL SURGERY      x3, lumbar region, cervical    ORTHOPEDIC SURGERY      left wrist surgery,back surgery(discectomy-removed)    OTHER SURGICAL HISTORY  11/2020    OPEN HEART SX     LISSA AND BSO (CERVIX REMOVED)  2000    TONSILLECTOMY  1979    as a child    UROLOGICAL SURGERY      bladder sling      Allergies   Allergen Reactions    Adhesive Tape Rash    Penicillins Itching, Nausea And Vomiting and Rash      Social History     Tobacco Use    Smoking status: Never    Smokeless tobacco: Never   Substance Use Topics    Alcohol use: Not Currently      Family History   Problem Relation Age of Onset    Diabetes Mother     Coronary Art Dis Mother     Coronary Art Dis Father     Diabetes Brother     Coronary Art Dis Paternal Grandfather     Breast Cancer Sister     Diabetes Sister       Family history reviewed and noncontributory to patient's acute condition; no relevant family history unless otherwise noted above.   Immunization History   Administered Date(s) Administered    Influenza Virus Vaccine 09/01/2015, 10/26/2016, 09/25/2019, 10/01/2021    Influenza, FLUARIX, FLULAVAL, FLUZONE (age 10 mo+) AND AFLURIA, (age 1 y+), PF, 0.5mL 09/25/2019    Influenza, FLUCELVAX, (age 10 mo+), MDCK, PF, 0.5mL 11/09/2020    PPD Test 11/18/2020     PTA Medications:  Current Outpatient Medications   Medication Instructions    albuterol sulfate (PROAIR RESPICLICK) 142 (90 Base) MCG/ACT aerosol powder inhalation 1 puff, Inhalation, EVERY 6 HOURS PRN    albuterol sulfate HFA (PROVENTIL;VENTOLIN;PROAIR) 108 (90 Base) MCG/ACT inhaler 1 puff, Inhalation, EVERY 6 HOURS PRN, TAKE 1 PUFF BY INHALATION EVERY FOUR (4) HOURS AS NEEDED FOR WHEEZING OR SHORTNESS OF BREATH.    amLODIPine (NORVASC) 5 mg, Oral, DAILY    aspirin 81 mg, Oral, EVERY EVENING    atorvastatin (LIPITOR) 80 mg, Oral, DAILY    ciprofloxacin (CIPRO) 500 mg, Oral, 2 TIMES DAILY    clopidogrel (PLAVIX) 75 mg, Oral, DAILY    dexlansoprazole (DEXILANT) 60 mg, Oral, DAILY    diazePAM (VALIUM) 2 mg, Oral, 2 TIMES DAILY PRN    DULoxetine (CYMBALTA) 120 mg, Oral, DAILY    empagliflozin (JARDIANCE) 25 mg, Oral, DAILY    Fluticasone furoate-vilanterol (BREO ELLIPTA) 200-25 MCG/INH AEPB inhaler 1 puff, Inhalation, DAILY    furosemide (LASIX) 40 mg, Oral, DAILY    gabapentin (NEURONTIN) 600 mg, Oral, 2 TIMES DAILY    Galcanezumab-gnlm (EMGALITY) 120 MG/ML SOSY INJECT CONTENTS OF 1 SYRINGE SUBCUTANEOUSLY EVERY 30 DAYS    HUMULIN R U-500 KWIKPEN 500 UNIT/ML SOPN concentrated injection pen 200 units before breakfast, 210 units before lunch, 105 units before supper    HYDROcodone-acetaminophen (NORCO)  MG per tablet 1 tablet, Oral, EVERY 8 HOURS PRN, Intended supply: 30 days    [START ON 11/6/2022] HYDROcodone-acetaminophen (NORCO)  MG per tablet 1 tablet, Oral, EVERY 8 HOURS PRN, Intended supply: 30 days    [START ON 12/6/2022] HYDROcodone-acetaminophen (NORCO)  MG per tablet 1 tablet, Oral, EVERY 8 HOURS PRN, Intended supply: 30 days    hydrOXYzine HCl (ATARAX) 25 mg, Oral, 3 TIMES DAILY PRN    ipratropium-albuterol (DUONEB) 0.5-2.5 (3) MG/3ML SOLN nebulizer solution 3 mLs, Inhalation, EVERY 6 HOURS PRN, INHALE 3ML VIA NEBULIZER EVERY 6 HOURS    lamoTRIgine (LAMICTAL) 100 mg, Oral, 2 TIMES DAILY    linaclotide (LINZESS) 145 mcg, Oral, DAILY    lisinopril (PRINIVIL;ZESTRIL) 40 mg, Oral, EVERY EVENING    Loteprednol Etabonate (LOTEMAX SM) 0.38 % GEL 1 drop 3x a day x 1 wk, 2x a day x 1 wk, 1x a day x 1 wk, then d/c in both eyes    meloxicam (MOBIC) 15 MG tablet One daily    metoprolol succinate (TOPROL XL) 50 mg, Oral, DAILY    Mounjaro 7.5 mg, SubCUTAneous, WEEKLY    nitroGLYCERIN (NITROSTAT) 0.4 MG SL tablet PLACE ONE TABLET UNDER TONGUE AS NEEDED FOR CHEST PAIN. MAY REPEAT EVERY 5 MINUTES FOR 2 MORE DOSES. CALL 911 ON SECOND DOSE.    ondansetron (ZOFRAN-ODT) 4 mg, Oral, 3 TIMES DAILY PRN    potassium chloride (KLOR-CON M) 20 MEQ extended release tablet 20 mEq, Oral, DAILY    Praluent 75 mg, SubCUTAneous    tiZANidine (ZANAFLEX) 2 mg, Oral, 2 times daily    traZODone (DESYREL) 100 mg, Oral, DAILY    Ubrogepant 50 MG TABS Take 1 tablet by mouth at onset of migraine, repeat in two hours if needed. Maximum 2/day up to 4/week. UNIFINE PENTIPS PLUS 31G X 6 MM MISC USE WITH INSULIN UP TO 4 TIMES DAILY, E11.65       Objective:   No data found. Estimated body mass index is 34.84 kg/m² as calculated from the following:    Height as of 11/3/22: 5' 4\" (1.626 m). Weight as of 11/3/22: 203 lb (92.1 kg). No intake or output data in the 24 hours ending 11/04/22 0724      Physical Exam:  General:    Well nourished. No overt distress  Head:  Normocephalic, atraumatic  Eyes:  Sclerae appear normal.  Pupils equally round. HENT:  Nares appear normal, no drainage. Moist mucous membranes  Neck:  No restricted ROM. Trachea midline  CV:   RRR. S1/S2 auscultated  Lungs:   CTAB. No wheezing, rhonchi, or rales. Appears even, unlabored  Abdomen: Bowel sounds present. Soft, nontender, nondistended. Extremities: Warm and dry. LLE with splotchy, beefy-red patches over lower leg. Heel is very erythematous and painful to light touch. Skin:     No rashes. Normal turgor. Normal coloration  Neuro:  Cranial nerves II-XII grossly intact. Sensation intact  Psych:  Normal mood and affect.   Alert and oriented x3    Data Ordered and Personally Reviewed:    Last 24hr Labs:  Recent Results (from the past 24 hour(s))   CBC with Auto Differential    Collection Time: 11/03/22  8:34 PM   Result Value Ref Range    WBC 12.5 (H) 4.3 - 11.1 K/uL    RBC 5.44 (H) 4.05 - 5.2 M/uL    Hemoglobin 15.5 (H) 11.7 - 15.4 g/dL    Hematocrit 46.2 35.8 - 46.3 %    MCV 84.9 82 - 102 FL    MCH 28.5 26.1 - 32.9 PG    MCHC 33.5 31.4 - 35.0 g/dL    RDW 12.8 11.9 - 14.6 %    Platelets 410 311 - 078 K/uL    MPV 9.0 (L) 9.4 - 12.3 FL    nRBC 0.00 0.0 - 0.2 K/uL    Differential Type AUTOMATED      Seg Neutrophils 72 43 - 78 %    Lymphocytes 15 13 - 44 %    Monocytes 10 4.0 - 12.0 %    Eosinophils % 1 0.5 - 7.8 %    Basophils 0 0.0 - 2.0 %    Immature Granulocytes 2 0.0 - 5.0 %    Segs Absolute 9.0 (H) 1.7 - 8.2 K/UL    Absolute Lymph # 1.9 0.5 - 4.6 K/UL    Absolute Mono # 1.2 0.1 - 1.3 K/UL    Absolute Eos # 0.1 0.0 - 0.8 K/UL    Basophils Absolute 0.0 0.0 - 0.2 K/UL    Absolute Immature Granulocyte 0.2 0.0 - 0.5 K/UL   Comprehensive Metabolic Panel    Collection Time: 11/03/22  8:34 PM   Result Value Ref Range    Sodium 132 (L) 133 - 143 mmol/L    Potassium 3.5 3.5 - 5.1 mmol/L    Chloride 99 (L) 101 - 110 mmol/L    CO2 26 21 - 32 mmol/L    Anion Gap 7 2 - 11 mmol/L    Glucose 299 (H) 65 - 100 mg/dL    BUN 13 6 - 23 MG/DL    Creatinine 0.80 0.6 - 1.0 MG/DL    Est, Glom Filt Rate >60 >60 ml/min/1.73m2    Calcium 9.4 8.3 - 10.4 MG/DL    Total Bilirubin 0.5 0.2 - 1.1 MG/DL    ALT 53 12 - 65 U/L    AST 40 (H) 15 - 37 U/L    Alk Phosphatase 83 50 - 136 U/L Total Protein 8.5 (H) 6.3 - 8.2 g/dL    Albumin 3.1 (L) 3.5 - 5.0 g/dL    Globulin 5.4 (H) 2.8 - 4.5 g/dL    Albumin/Globulin Ratio 0.6 0.4 - 1.6     Lactic Acid    Collection Time: 11/03/22  8:34 PM   Result Value Ref Range    Lactic Acid, Plasma 1.7 0.4 - 2.0 MMOL/L   POCT Urinalysis no Micro    Collection Time: 11/04/22  1:23 AM   Result Value Ref Range    Specific Gravity, Urine, POC 1.015 1.001 - 1.023      pH, Urine, POC 6.5 5.0 - 9.0      Protein, Urine, POC 30 (A) NEG mg/dL    Glucose, UA POC >1,000 (A) NEG mg/dL    Ketones, Urine, POC 40 (A) NEG mg/dL    Bilirubin, Urine, POC Negative NEG      Blood, UA POC Trace Hgb (A) NEG      URINE UROBILINOGEN POC 0.2 0.2 - 1.0 EU/dL    Nitrate, Urine, POC Negative NEG      Leukocyte Est, UA POC TRACE (A) NEG      Performed by: Nena Roth    Urinalysis w rflx microscopic    Collection Time: 11/04/22  1:28 AM   Result Value Ref Range    Color, UA YELLOW/STRAW      Appearance CLEAR      Specific Gravity, UA 1.022 1.001 - 1.023      pH, Urine 6.5 5.0 - 9.0      Protein, UA 30 (A) NEG mg/dL    Glucose, UA >1000 mg/dL    Ketones, Urine 40 (A) NEG mg/dL    Bilirubin Urine Negative NEG      Blood, Urine TRACE (A) NEG      Urobilinogen, Urine 0.2 0.2 - 1.0 EU/dL    Nitrite, Urine Negative NEG      Leukocyte Esterase, Urine SMALL (A) NEG      WBC, UA 20-50 (A) NORM /hpf    RBC, UA 0-5 (A) NORM /hpf    Epithelial Cells UA 0-5 (A) NORM /hpf    BACTERIA, URINE Negative (A) NORM /hpf    Casts 0-2 (A) NORM /lpf   Culture, Blood 1    Collection Time: 11/04/22  1:59 AM    Specimen: Blood   Result Value Ref Range    Special Requests LEFT  HAND        Culture PENDING        Signed:  Jose Henley MD

## 2022-11-04 NOTE — PROGRESS NOTES
TRANSFER - IN REPORT:    Verbal report received from Elzbieta De Oliveira RN on Doreen Schreiber  being received from [facility]-ER for routine progression of patient care      Report consisted of patient's Situation, Background, Assessment and   Recommendations(SBAR). Information from the following report(s) Nurse Handoff Report was reviewed with the receiving nurse. Opportunity for questions and clarification was provided. Assessment completed upon patient's arrival to unit and care assumed.

## 2022-11-04 NOTE — ASSESSMENT & PLAN NOTE
- Unresponsive to outpatient treatment  - Started on clinda in ER, will continue  - Blood cultures ordered in ER  - PRN pain meds  11/6/2022: improved on antibiotics

## 2022-11-05 LAB
ANION GAP SERPL CALC-SCNC: 7 MMOL/L (ref 2–11)
BASOPHILS # BLD: 0 K/UL (ref 0–0.2)
BASOPHILS NFR BLD: 0 % (ref 0–2)
BUN SERPL-MCNC: 29 MG/DL (ref 6–23)
CALCIUM SERPL-MCNC: 9.7 MG/DL (ref 8.3–10.4)
CHLORIDE SERPL-SCNC: 103 MMOL/L (ref 101–110)
CO2 SERPL-SCNC: 25 MMOL/L (ref 21–32)
CREAT SERPL-MCNC: 1.41 MG/DL (ref 0.6–1)
DIFFERENTIAL METHOD BLD: ABNORMAL
EOSINOPHIL # BLD: 0.2 K/UL (ref 0–0.8)
EOSINOPHIL NFR BLD: 1 % (ref 0.5–7.8)
ERYTHROCYTE [DISTWIDTH] IN BLOOD BY AUTOMATED COUNT: 12.6 % (ref 11.9–14.6)
GLUCOSE BLD STRIP.AUTO-MCNC: 119 MG/DL (ref 65–100)
GLUCOSE BLD STRIP.AUTO-MCNC: 123 MG/DL (ref 65–100)
GLUCOSE BLD STRIP.AUTO-MCNC: 126 MG/DL (ref 65–100)
GLUCOSE BLD STRIP.AUTO-MCNC: 149 MG/DL (ref 65–100)
GLUCOSE BLD STRIP.AUTO-MCNC: 189 MG/DL (ref 65–100)
GLUCOSE SERPL-MCNC: 131 MG/DL (ref 65–100)
HCT VFR BLD AUTO: 41.5 % (ref 35.8–46.3)
HGB BLD-MCNC: 14 G/DL (ref 11.7–15.4)
IMM GRANULOCYTES # BLD AUTO: 0.8 K/UL (ref 0–0.5)
IMM GRANULOCYTES NFR BLD AUTO: 5 % (ref 0–5)
LYMPHOCYTES # BLD: 2.1 K/UL (ref 0.5–4.6)
LYMPHOCYTES NFR BLD: 13 % (ref 13–44)
MAGNESIUM SERPL-MCNC: 2 MG/DL (ref 1.8–2.4)
MCH RBC QN AUTO: 27.9 PG (ref 26.1–32.9)
MCHC RBC AUTO-ENTMCNC: 33.7 G/DL (ref 31.4–35)
MCV RBC AUTO: 82.8 FL (ref 82–102)
MONOCYTES # BLD: 1.4 K/UL (ref 0.1–1.3)
MONOCYTES NFR BLD: 9 % (ref 4–12)
NEUTS SEG # BLD: 11.4 K/UL (ref 1.7–8.2)
NEUTS SEG NFR BLD: 72 % (ref 43–78)
NRBC # BLD: 0 K/UL (ref 0–0.2)
PLATELET # BLD AUTO: 351 K/UL (ref 150–450)
PLATELET COMMENT: ADEQUATE
PMV BLD AUTO: 8.9 FL (ref 9.4–12.3)
POTASSIUM SERPL-SCNC: 3.3 MMOL/L (ref 3.5–5.1)
RBC # BLD AUTO: 5.01 M/UL (ref 4.05–5.2)
RBC MORPH BLD: ABNORMAL
SERVICE CMNT-IMP: ABNORMAL
SODIUM SERPL-SCNC: 135 MMOL/L (ref 133–143)
WBC # BLD AUTO: 15.9 K/UL (ref 4.3–11.1)
WBC MORPH BLD: ABNORMAL

## 2022-11-05 PROCEDURE — 83735 ASSAY OF MAGNESIUM: CPT

## 2022-11-05 PROCEDURE — 6370000000 HC RX 637 (ALT 250 FOR IP): Performed by: FAMILY MEDICINE

## 2022-11-05 PROCEDURE — 1100000000 HC RM PRIVATE

## 2022-11-05 PROCEDURE — 94640 AIRWAY INHALATION TREATMENT: CPT

## 2022-11-05 PROCEDURE — 36415 COLL VENOUS BLD VENIPUNCTURE: CPT

## 2022-11-05 PROCEDURE — 80048 BASIC METABOLIC PNL TOTAL CA: CPT

## 2022-11-05 PROCEDURE — 2580000003 HC RX 258: Performed by: FAMILY MEDICINE

## 2022-11-05 PROCEDURE — 82962 GLUCOSE BLOOD TEST: CPT

## 2022-11-05 PROCEDURE — 94760 N-INVAS EAR/PLS OXIMETRY 1: CPT

## 2022-11-05 PROCEDURE — 96375 TX/PRO/DX INJ NEW DRUG ADDON: CPT

## 2022-11-05 PROCEDURE — 6360000002 HC RX W HCPCS: Performed by: FAMILY MEDICINE

## 2022-11-05 PROCEDURE — 94761 N-INVAS EAR/PLS OXIMETRY MLT: CPT

## 2022-11-05 PROCEDURE — 85025 COMPLETE CBC W/AUTO DIFF WBC: CPT

## 2022-11-05 PROCEDURE — 96366 THER/PROPH/DIAG IV INF ADDON: CPT

## 2022-11-05 PROCEDURE — 2500000003 HC RX 250 WO HCPCS: Performed by: FAMILY MEDICINE

## 2022-11-05 PROCEDURE — 96372 THER/PROPH/DIAG INJ SC/IM: CPT

## 2022-11-05 RX ORDER — SODIUM CHLORIDE, SODIUM LACTATE, POTASSIUM CHLORIDE, AND CALCIUM CHLORIDE .6; .31; .03; .02 G/100ML; G/100ML; G/100ML; G/100ML
500 INJECTION, SOLUTION INTRAVENOUS ONCE
Status: COMPLETED | OUTPATIENT
Start: 2022-11-05 | End: 2022-11-05

## 2022-11-05 RX ORDER — 0.9 % SODIUM CHLORIDE 0.9 %
500 INTRAVENOUS SOLUTION INTRAVENOUS ONCE
Status: COMPLETED | OUTPATIENT
Start: 2022-11-05 | End: 2022-11-05

## 2022-11-05 RX ADMIN — CLINDAMYCIN IN 5 PERCENT DEXTROSE 600 MG: 12 INJECTION, SOLUTION INTRAVENOUS at 06:24

## 2022-11-05 RX ADMIN — CLINDAMYCIN IN 5 PERCENT DEXTROSE 600 MG: 12 INJECTION, SOLUTION INTRAVENOUS at 18:01

## 2022-11-05 RX ADMIN — ASPIRIN 81 MG: 81 TABLET, COATED ORAL at 17:59

## 2022-11-05 RX ADMIN — SODIUM CHLORIDE, PRESERVATIVE FREE 10 ML: 5 INJECTION INTRAVENOUS at 09:44

## 2022-11-05 RX ADMIN — TRAZODONE HYDROCHLORIDE 100 MG: 50 TABLET ORAL at 21:05

## 2022-11-05 RX ADMIN — SODIUM CHLORIDE 500 ML: 9 INJECTION, SOLUTION INTRAVENOUS at 05:43

## 2022-11-05 RX ADMIN — MOMETASONE FUROATE AND FORMOTEROL FUMARATE DIHYDRATE 2 PUFF: 200; 5 AEROSOL RESPIRATORY (INHALATION) at 21:35

## 2022-11-05 RX ADMIN — CLOPIDOGREL BISULFATE 75 MG: 75 TABLET ORAL at 21:04

## 2022-11-05 RX ADMIN — GABAPENTIN 600 MG: 300 CAPSULE ORAL at 21:13

## 2022-11-05 RX ADMIN — CLINDAMYCIN IN 5 PERCENT DEXTROSE 600 MG: 12 INJECTION, SOLUTION INTRAVENOUS at 00:27

## 2022-11-05 RX ADMIN — ATORVASTATIN CALCIUM 80 MG: 40 TABLET, FILM COATED ORAL at 21:03

## 2022-11-05 RX ADMIN — DULOXETINE HYDROCHLORIDE 120 MG: 60 CAPSULE, DELAYED RELEASE ORAL at 21:04

## 2022-11-05 RX ADMIN — ENOXAPARIN SODIUM 40 MG: 100 INJECTION SUBCUTANEOUS at 21:04

## 2022-11-05 RX ADMIN — ONDANSETRON 4 MG: 2 INJECTION INTRAMUSCULAR; INTRAVENOUS at 09:42

## 2022-11-05 RX ADMIN — CLINDAMYCIN IN 5 PERCENT DEXTROSE 600 MG: 12 INJECTION, SOLUTION INTRAVENOUS at 12:55

## 2022-11-05 RX ADMIN — PANTOPRAZOLE SODIUM 40 MG: 40 TABLET, DELAYED RELEASE ORAL at 21:06

## 2022-11-05 RX ADMIN — FUROSEMIDE 40 MG: 40 TABLET ORAL at 09:43

## 2022-11-05 RX ADMIN — SODIUM CHLORIDE, POTASSIUM CHLORIDE, SODIUM LACTATE AND CALCIUM CHLORIDE 500 ML: 600; 310; 30; 20 INJECTION, SOLUTION INTRAVENOUS at 13:05

## 2022-11-05 RX ADMIN — SODIUM CHLORIDE, PRESERVATIVE FREE 5 ML: 5 INJECTION INTRAVENOUS at 21:07

## 2022-11-05 RX ADMIN — AMLODIPINE BESYLATE 5 MG: 5 TABLET ORAL at 17:59

## 2022-11-05 RX ADMIN — MOMETASONE FUROATE AND FORMOTEROL FUMARATE DIHYDRATE 2 PUFF: 200; 5 AEROSOL RESPIRATORY (INHALATION) at 07:55

## 2022-11-05 ASSESSMENT — PAIN DESCRIPTION - LOCATION: LOCATION: ANKLE;LEG

## 2022-11-05 ASSESSMENT — PAIN DESCRIPTION - ORIENTATION: ORIENTATION: LEFT

## 2022-11-05 NOTE — PROGRESS NOTES
At AM vitals, BP was 86/60. MAP 68. HR 92. Pt stated she felt dizzy. BG was taken and it was 132. Her last BP med was 50mg metoprolol ER around 2200. Dr. Edu Anderson paged and notified. 500mL NS bolus ordered and given per Dr. Edu Anderson. F/u BP after bolus was 109/62. Pt reports she no longer feels dizzy.

## 2022-11-05 NOTE — PROGRESS NOTES
Pt currently resting in bed. Hourly rounds completed. Pt verbalized less and discomfort on her left leg. Pt's leg also not as red as it was. Pt has been refusing her U-500 insulin d/t her BG levels. Dr Kaley Siddiqi notified via perfect serve message and said Sabrina Veronica. Pt denies needs at this time. Report given to charge RN.

## 2022-11-05 NOTE — PROGRESS NOTES
This is a non-billable note. Patient admitted by my partner this morning. Management per H&P. Wound outlined by skin pen.

## 2022-11-05 NOTE — PROGRESS NOTES
Hospitalist Progress Note   Admit Date:  2022  7:49 AM   Name:  Krysta Pop   Age:  47 y.o. Sex:  female  :  1970   MRN:  [id number]   Room:  [facility]/    Presenting Complaint: No chief complaint on file. Reason(s) for Admission: Cellulitis [L03.90]     Hospital Course:   47 y.o. female with medical history of HTN, DM2, CAD, migraines who presented to ED with LLE pain and swelling. Patient reports symptoms started 4 days ago. She went to see her PCP and was diagnosed with cellulitis and prescribed Cipro. She has taken 2 days of Cipro, but reports worsening. She states the pain is worse now and causing difficulty with ambulation. Upon ER evaluation, WBC is 12.5. No evidence of DVT on US. Hospitalist asked to admit. Due to bed availability, patient transferred to [facility] for further care. Subjective & 24hr Events (22): Wound improved today.  May consider transition to oral antibiotics      Assessment & Plan:   * Cellulitis  Assessment & Plan  - Unresponsive to outpatient treatment  - Started on clinda in ER, will continue  - Blood cultures ordered in ER  - PRN pain meds  2022: improved on antibiotics    Bipolar disorder ([address]  Assessment & Plan  - Continue home meds  2022: stable    Diastolic CHF, chronic (HCC)  Assessment & Plan  - Stable  - Continue home meds    Vestibular migraine  Assessment & Plan  - Of note    DM (diabetes mellitus) ([address]  Assessment & Plan  - Appears to be on high doses of insulin  - Continue home insulin  - Hold PO meds  - SSI ordered  - Diabetic diet    CAD (coronary artery disease)  Assessment & Plan  - Reports multiple prior CABGs  - Continue home meds    SHELBY (obstructive sleep apnea)  Assessment & Plan  - Of note    Morbid obesity with BMI of 40.0-44.9, adult (HCC)  Assessment & Plan  Increased risk of all cause mortality, complicating care  - healthy lifestyle at discharge          Anticipated discharge needs:      Home with outpatient follow up    Diet:  ADULT DIET; Regular; 4 carb choices (60 gm/meal); Low Fat/Low Chol/High Fiber/2 gm Na  DVT PPx: enoxaparin  Code status: Full Code    Hospital Problems:  Principal Problem:    Cellulitis  Active Problems: Morbid obesity with BMI of 40.0-44.9, adult (HCC)    SHELBY (obstructive sleep apnea)    CAD (coronary artery disease)    DM (diabetes mellitus) (HCC)    Vestibular migraine    Diastolic CHF, chronic (HCC)    Bipolar disorder ([facility]  Resolved Problems:    * No resolved hospital problems. *      Objective:   Patient Vitals for the past 24 hrs:   Temp Pulse Resp BP SpO2   11/05/22 1532 98.1 °F (36.7 °C) 74 16 125/71 93 %   11/05/22 1209 97.7 °F (36.5 °C) 95 -- 116/68 96 %   11/05/22 0756 -- 78 -- -- 93 %   11/05/22 0755 97.7 °F (36.5 °C) 78 16 102/62 93 %   11/05/22 0641 -- 76 -- 109/62 94 %   11/05/22 0447 97.9 °F (36.6 °C) 92 16 86/60 93 %   11/05/22 0114 97.7 °F (36.5 °C) 85 18 (!) 92/53 92 %   11/04/22 2048 -- 79 -- -- 93 %   11/04/22 1941 97.9 °F (36.6 °C) 79 18 (!) 116/58 92 %       Oxygen Therapy  SpO2: 93 %  Pulse Oximeter Device Mode: Intermittent  Pulse Oximeter Device Location: Right, Finger  O2 Device: None (Room air)    Estimated body mass index is 34.84 kg/m² as calculated from the following:    Height as of this encounter: 5' 4\" (1.626 m). Weight as of this encounter: 203 lb (92.1 kg). Intake/Output Summary (Last 24 hours) at 11/5/2022 1911  Last data filed at 11/5/2022 1805  Gross per 24 hour   Intake 1200 ml   Output --   Net 1200 ml         Blood pressure 125/71, pulse 74, temperature 98.1 °F (36.7 °C), temperature source Oral, resp. rate 16, height 5' 4\" (1.626 m), weight 203 lb (92.1 kg), SpO2 93 %. Physical Exam  Vitals and nursing note reviewed. Constitutional:       General: She is not in acute distress. Appearance: She is obese. She is not diaphoretic. Eyes:      Extraocular Movements: Extraocular movements intact.    Cardiovascular:      Rate and Rhythm: Normal rate. Pulmonary:      Effort: Pulmonary effort is normal. No respiratory distress. Abdominal:      General: There is no distension. Musculoskeletal:         General: No deformity. Skin:     Coloration: Skin is not jaundiced or pale. Findings: Erythema and lesion present. Comments: LLE cellulitis improved since photos, within borders of wound dated 11/4   Neurological:      General: No focal deficit present. Mental Status: She is alert.    Psychiatric:         Mood and Affect: Mood normal.         Behavior: Behavior normal.           I have personally reviewed labs and tests showing:  Recent Labs:  Recent Results (from the past 48 hour(s))   CBC with Auto Differential    Collection Time: 11/03/22  8:34 PM   Result Value Ref Range    WBC 12.5 (H) 4.3 - 11.1 K/uL    RBC 5.44 (H) 4.05 - 5.2 M/uL    Hemoglobin 15.5 (H) 11.7 - 15.4 g/dL    Hematocrit 46.2 35.8 - 46.3 %    MCV 84.9 82 - 102 FL    MCH 28.5 26.1 - 32.9 PG    MCHC 33.5 31.4 - 35.0 g/dL    RDW 12.8 11.9 - 14.6 %    Platelets 074 001 - 461 K/uL    MPV 9.0 (L) 9.4 - 12.3 FL    nRBC 0.00 0.0 - 0.2 K/uL    Differential Type AUTOMATED      Seg Neutrophils 72 43 - 78 %    Lymphocytes 15 13 - 44 %    Monocytes 10 4.0 - 12.0 %    Eosinophils % 1 0.5 - 7.8 %    Basophils 0 0.0 - 2.0 %    Immature Granulocytes 2 0.0 - 5.0 %    Segs Absolute 9.0 (H) 1.7 - 8.2 K/UL    Absolute Lymph # 1.9 0.5 - 4.6 K/UL    Absolute Mono # 1.2 0.1 - 1.3 K/UL    Absolute Eos # 0.1 0.0 - 0.8 K/UL    Basophils Absolute 0.0 0.0 - 0.2 K/UL    Absolute Immature Granulocyte 0.2 0.0 - 0.5 K/UL   Comprehensive Metabolic Panel    Collection Time: 11/03/22  8:34 PM   Result Value Ref Range    Sodium 132 (L) 133 - 143 mmol/L    Potassium 3.5 3.5 - 5.1 mmol/L    Chloride 99 (L) 101 - 110 mmol/L    CO2 26 21 - 32 mmol/L    Anion Gap 7 2 - 11 mmol/L    Glucose 299 (H) 65 - 100 mg/dL    BUN 13 6 - 23 MG/DL    Creatinine 0.80 0.6 - 1.0 MG/DL    Est, Glom Filt Rate >60 >60 ml/min/1.73m2    Calcium 9.4 8.3 - 10.4 MG/DL    Total Bilirubin 0.5 0.2 - 1.1 MG/DL    ALT 53 12 - 65 U/L    AST 40 (H) 15 - 37 U/L    Alk Phosphatase 83 50 - 136 U/L    Total Protein 8.5 (H) 6.3 - 8.2 g/dL    Albumin 3.1 (L) 3.5 - 5.0 g/dL    Globulin 5.4 (H) 2.8 - 4.5 g/dL    Albumin/Globulin Ratio 0.6 0.4 - 1.6     Lactic Acid    Collection Time: 11/03/22  8:34 PM   Result Value Ref Range    Lactic Acid, Plasma 1.7 0.4 - 2.0 MMOL/L   POCT Urinalysis no Micro    Collection Time: 11/04/22  1:23 AM   Result Value Ref Range    Specific Gravity, Urine, POC 1.015 1.001 - 1.023      pH, Urine, POC 6.5 5.0 - 9.0      Protein, Urine, POC 30 (A) NEG mg/dL    Glucose, UA POC >1,000 (A) NEG mg/dL    Ketones, Urine, POC 40 (A) NEG mg/dL    Bilirubin, Urine, POC Negative NEG      Blood, UA POC Trace Hgb (A) NEG      URINE UROBILINOGEN POC 0.2 0.2 - 1.0 EU/dL    Nitrate, Urine, POC Negative NEG      Leukocyte Est, UA POC TRACE (A) NEG      Performed by: Eddie Soto    Urinalysis w rflx microscopic    Collection Time: 11/04/22  1:28 AM   Result Value Ref Range    Color, UA YELLOW/STRAW      Appearance CLEAR      Specific Gravity, UA 1.022 1.001 - 1.023      pH, Urine 6.5 5.0 - 9.0      Protein, UA 30 (A) NEG mg/dL    Glucose, UA >1000 mg/dL    Ketones, Urine 40 (A) NEG mg/dL    Bilirubin Urine Negative NEG      Blood, Urine TRACE (A) NEG      Urobilinogen, Urine 0.2 0.2 - 1.0 EU/dL    Nitrite, Urine Negative NEG      Leukocyte Esterase, Urine SMALL (A) NEG      WBC, UA 20-50 (A) NORM /hpf    RBC, UA 0-5 (A) NORM /hpf    Epithelial Cells UA 0-5 (A) NORM /hpf    BACTERIA, URINE Negative (A) NORM /hpf    Casts 0-2 (A) NORM /lpf   Culture, Blood 1    Collection Time: 11/04/22  1:59 AM    Specimen: Blood   Result Value Ref Range    Special Requests LEFT  HAND        Culture NO GROWTH 1 DAY     POCT Glucose    Collection Time: 11/04/22  9:28 AM   Result Value Ref Range    POC Glucose 192 (H) 65 - 100 mg/dL    Performed by: Maria Luisa    POCT Glucose    Collection Time: 11/04/22 11:44 AM   Result Value Ref Range    POC Glucose 225 (H) 65 - 100 mg/dL    Performed by: Genny    POCT Glucose    Collection Time: 11/04/22  5:42 PM   Result Value Ref Range    POC Glucose 136 (H) 65 - 100 mg/dL    Performed by: [email or web address]    POCT Glucose    Collection Time: 11/04/22  9:33 PM   Result Value Ref Range    POC Glucose 111 (H) 65 - 100 mg/dL    Performed by: Shahana    POCT Glucose    Collection Time: 11/05/22  4:53 AM   Result Value Ref Range    POC Glucose 126 (H) 65 - 100 mg/dL    Performed by: BritanyAccurICingrid    Basic Metabolic Panel w/ Reflex to MG    Collection Time: 11/05/22  5:33 AM   Result Value Ref Range    Sodium 135 133 - 143 mmol/L    Potassium 3.3 (L) 3.5 - 5.1 mmol/L    Chloride 103 101 - 110 mmol/L    CO2 25 21 - 32 mmol/L    Anion Gap 7 2 - 11 mmol/L    Glucose 131 (H) 65 - 100 mg/dL    BUN 29 (H) 6 - 23 MG/DL    Creatinine 1.41 (H) 0.6 - 1.0 MG/DL    Est, Glom Filt Rate 45 (L) >60 ml/min/1.73m2    Calcium 9.7 8.3 - 10.4 MG/DL   CBC with Auto Differential    Collection Time: 11/05/22  5:33 AM   Result Value Ref Range    WBC 15.9 (H) 4.3 - 11.1 K/uL    RBC 5.01 4.05 - 5.2 M/uL    Hemoglobin 14.0 11.7 - 15.4 g/dL    Hematocrit 41.5 35.8 - 46.3 %    MCV 82.8 82.0 - 102.0 FL    MCH 27.9 26.1 - 32.9 PG    MCHC 33.7 31.4 - 35.0 g/dL    RDW 12.6 11.9 - 14.6 %    Platelets 665 389 - 015 K/uL    MPV 8.9 (L) 9.4 - 12.3 FL    nRBC 0.00 0.0 - 0.2 K/uL    Seg Neutrophils 72 43 - 78 %    Lymphocytes 13 13 - 44 %    Monocytes 9 4.0 - 12.0 %    Eosinophils % 1 0.5 - 7.8 %    Basophils 0 0.0 - 2.0 %    Immature Granulocytes 5 0.0 - 5.0 %    Segs Absolute 11.4 (H) 1.7 - 8.2 K/UL    Absolute Lymph # 2.1 0.5 - 4.6 K/UL    Absolute Mono # 1.4 (H) 0.1 - 1.3 K/UL    Absolute Eos # 0.2 0.0 - 0.8 K/UL    Basophils Absolute 0.0 0.0 - 0.2 K/UL    Absolute Immature Granulocyte 0.8 (H) 0.0 - 0.5 K/UL    RBC Comment NORMOCYTIC/NORMOCHROMIC      WBC Comment Result Confirmed By Smear      Platelet Comment ADEQUATE      Differential Type AUTOMATED     Magnesium    Collection Time: 11/05/22  5:33 AM   Result Value Ref Range    Magnesium 2.0 1.8 - 2.4 mg/dL   POCT Glucose    Collection Time: 11/05/22  7:55 AM   Result Value Ref Range    POC Glucose 119 (H) 65 - 100 mg/dL    Performed by: Zoe    POCT Glucose    Collection Time: 11/05/22 12:05 PM   Result Value Ref Range    POC Glucose 123 (H) 65 - 100 mg/dL    Performed by: Zoe    POCT Glucose    Collection Time: 11/05/22  3:34 PM   Result Value Ref Range    POC Glucose 149 (H) 65 - 100 mg/dL    Performed by: Zoe        I have personally reviewed imaging studies showing:   Other Studies:  No orders to display       Current Meds:  Current Facility-Administered Medications   Medication Dose Route Frequency    insulin lispro (HUMALOG) injection vial 0-8 Units  0-8 Units SubCUTAneous TID WC    insulin lispro (HUMALOG) injection vial 0-4 Units  0-4 Units SubCUTAneous Nightly    aspirin EC tablet 81 mg  81 mg Oral QPM    ipratropium-albuterol (DUONEB) nebulizer solution 3 mL  1 vial Inhalation Q6H PRN    albuterol sulfate HFA (PROVENTIL;VENTOLIN;PROAIR) 108 (90 Base) MCG/ACT inhaler 1 puff  1 puff Inhalation Q6H PRN    diazePAM (VALIUM) tablet 2 mg  2 mg Oral BID PRN    [Held by provider] furosemide (LASIX) tablet 40 mg  40 mg Oral Daily    [Held by provider] lisinopril (PRINIVIL;ZESTRIL) tablet 40 mg  40 mg Oral QPM    linaclotide (LINZESS) capsule 145 mcg (Patient Supplied)  145 mcg Oral Daily    hydrOXYzine HCl (ATARAX) tablet 25 mg  25 mg Oral TID PRN    metoprolol succinate (TOPROL XL) extended release tablet 50 mg  50 mg Oral Daily    traZODone (DESYREL) tablet 100 mg  100 mg Oral Daily    HYDROcodone-acetaminophen (NORCO)  MG per tablet 1 tablet  1 tablet Oral Q8H PRN    pantoprazole (PROTONIX) tablet 40 mg  40 mg Oral QAM AC    mometasone-formoterol (DULERA) 200-5 MCG/ACT inhaler 2 puff  2 puff Inhalation BID    sodium chloride flush 0.9 % injection 5-40 mL  5-40 mL IntraVENous 2 times per day    sodium chloride flush 0.9 % injection 5-40 mL  5-40 mL IntraVENous PRN    0.9 % sodium chloride infusion   IntraVENous PRN    enoxaparin (LOVENOX) injection 40 mg  40 mg SubCUTAneous Q24H    ondansetron (ZOFRAN-ODT) disintegrating tablet 4 mg  4 mg Oral Q8H PRN    Or    ondansetron (ZOFRAN) injection 4 mg  4 mg IntraVENous Q6H PRN    polyethylene glycol (GLYCOLAX) packet 17 g  17 g Oral Daily PRN    acetaminophen (TYLENOL) tablet 650 mg  650 mg Oral Q6H PRN    Or    acetaminophen (TYLENOL) suppository 650 mg  650 mg Rectal Q6H PRN    clindamycin (CLEOCIN) 600 mg in dextrose 5 % 50 mL IVPB  600 mg IntraVENous Q6H    glucose chewable tablet 16 g  4 tablet Oral PRN    dextrose bolus 10% 125 mL  125 mL IntraVENous PRN    Or    dextrose bolus 10% 250 mL  250 mL IntraVENous PRN    glucagon (rDNA) injection 1 mg  1 mg SubCUTAneous PRN    dextrose 10 % infusion   IntraVENous Continuous PRN    insulin regular human (humuLIN R U-500) 500 UNIT/ML concentrated injection vial 210 Units  210 Units SubCUTAneous Daily before lunch    insulin regular human (humuLIN R U-500) 500 UNIT/ML concentrated injection vial 105 Units  105 Units SubCUTAneous Dinner    gabapentin (NEURONTIN) capsule 600 mg  600 mg Oral Daily    lamoTRIgine (LAMICTAL) tablet 100 mg  100 mg Oral Daily    insulin regular human (humuLIN R U-500) 500 UNIT/ML concentrated injection vial 200 Units  200 Units SubCUTAneous Daily with breakfast    amLODIPine (NORVASC) tablet 5 mg  5 mg Oral Daily    atorvastatin (LIPITOR) tablet 80 mg  80 mg Oral Nightly    clopidogrel (PLAVIX) tablet 75 mg  75 mg Oral Daily    DULoxetine (CYMBALTA) extended release capsule 120 mg  120 mg Oral Daily       Signed:  Blair Kemp MD

## 2022-11-06 LAB
ANION GAP SERPL CALC-SCNC: 6 MMOL/L (ref 2–11)
BASOPHILS # BLD: 0 K/UL (ref 0–0.2)
BASOPHILS NFR BLD: 0 % (ref 0–2)
BUN SERPL-MCNC: 17 MG/DL (ref 6–23)
CALCIUM SERPL-MCNC: 9.2 MG/DL (ref 8.3–10.4)
CHLORIDE SERPL-SCNC: 106 MMOL/L (ref 101–110)
CO2 SERPL-SCNC: 27 MMOL/L (ref 21–32)
CREAT SERPL-MCNC: 0.73 MG/DL (ref 0.6–1)
DIFFERENTIAL METHOD BLD: ABNORMAL
EOSINOPHIL # BLD: 0.2 K/UL (ref 0–0.8)
EOSINOPHIL NFR BLD: 2 % (ref 0.5–7.8)
ERYTHROCYTE [DISTWIDTH] IN BLOOD BY AUTOMATED COUNT: 12.6 % (ref 11.9–14.6)
GLUCOSE BLD STRIP.AUTO-MCNC: 165 MG/DL (ref 65–100)
GLUCOSE BLD STRIP.AUTO-MCNC: 177 MG/DL (ref 65–100)
GLUCOSE BLD STRIP.AUTO-MCNC: 181 MG/DL (ref 65–100)
GLUCOSE BLD STRIP.AUTO-MCNC: 196 MG/DL (ref 65–100)
GLUCOSE SERPL-MCNC: 183 MG/DL (ref 65–100)
HCT VFR BLD AUTO: 40.9 % (ref 35.8–46.3)
HGB BLD-MCNC: 13.5 G/DL (ref 11.7–15.4)
IMM GRANULOCYTES # BLD AUTO: 0.9 K/UL (ref 0–0.5)
IMM GRANULOCYTES NFR BLD AUTO: 8 % (ref 0–5)
LYMPHOCYTES # BLD: 3 K/UL (ref 0.5–4.6)
LYMPHOCYTES NFR BLD: 27 % (ref 13–44)
MAGNESIUM SERPL-MCNC: 2.2 MG/DL (ref 1.8–2.4)
MCH RBC QN AUTO: 27.8 PG (ref 26.1–32.9)
MCHC RBC AUTO-ENTMCNC: 33 G/DL (ref 31.4–35)
MCV RBC AUTO: 84.2 FL (ref 82–102)
MONOCYTES # BLD: 0.9 K/UL (ref 0.1–1.3)
MONOCYTES NFR BLD: 8 % (ref 4–12)
NEUTS SEG # BLD: 6 K/UL (ref 1.7–8.2)
NEUTS SEG NFR BLD: 55 % (ref 43–78)
NRBC # BLD: 0 K/UL (ref 0–0.2)
PLATELET # BLD AUTO: 327 K/UL (ref 150–450)
PLATELET COMMENT: ADEQUATE
PMV BLD AUTO: 8.7 FL (ref 9.4–12.3)
POTASSIUM SERPL-SCNC: 3.3 MMOL/L (ref 3.5–5.1)
RBC # BLD AUTO: 4.86 M/UL (ref 4.05–5.2)
RBC MORPH BLD: ABNORMAL
SERVICE CMNT-IMP: ABNORMAL
SODIUM SERPL-SCNC: 139 MMOL/L (ref 133–143)
WBC # BLD AUTO: 11 K/UL (ref 4.3–11.1)
WBC MORPH BLD: ABNORMAL

## 2022-11-06 PROCEDURE — 1100000000 HC RM PRIVATE

## 2022-11-06 PROCEDURE — 80048 BASIC METABOLIC PNL TOTAL CA: CPT

## 2022-11-06 PROCEDURE — 6370000000 HC RX 637 (ALT 250 FOR IP): Performed by: FAMILY MEDICINE

## 2022-11-06 PROCEDURE — 36415 COLL VENOUS BLD VENIPUNCTURE: CPT

## 2022-11-06 PROCEDURE — 94760 N-INVAS EAR/PLS OXIMETRY 1: CPT

## 2022-11-06 PROCEDURE — 6360000002 HC RX W HCPCS: Performed by: FAMILY MEDICINE

## 2022-11-06 PROCEDURE — 82962 GLUCOSE BLOOD TEST: CPT

## 2022-11-06 PROCEDURE — 94640 AIRWAY INHALATION TREATMENT: CPT

## 2022-11-06 PROCEDURE — 2580000003 HC RX 258: Performed by: FAMILY MEDICINE

## 2022-11-06 PROCEDURE — 83735 ASSAY OF MAGNESIUM: CPT

## 2022-11-06 PROCEDURE — 94761 N-INVAS EAR/PLS OXIMETRY MLT: CPT

## 2022-11-06 PROCEDURE — 2500000003 HC RX 250 WO HCPCS: Performed by: FAMILY MEDICINE

## 2022-11-06 PROCEDURE — 96366 THER/PROPH/DIAG IV INF ADDON: CPT

## 2022-11-06 PROCEDURE — 85025 COMPLETE CBC W/AUTO DIFF WBC: CPT

## 2022-11-06 PROCEDURE — 96372 THER/PROPH/DIAG INJ SC/IM: CPT

## 2022-11-06 RX ORDER — CLINDAMYCIN HYDROCHLORIDE 150 MG/1
300 CAPSULE ORAL EVERY 6 HOURS SCHEDULED
Status: DISCONTINUED | OUTPATIENT
Start: 2022-11-06 | End: 2022-11-07 | Stop reason: HOSPADM

## 2022-11-06 RX ADMIN — PANTOPRAZOLE SODIUM 40 MG: 40 TABLET, DELAYED RELEASE ORAL at 21:22

## 2022-11-06 RX ADMIN — GABAPENTIN 600 MG: 300 CAPSULE ORAL at 21:22

## 2022-11-06 RX ADMIN — CLINDAMYCIN IN 5 PERCENT DEXTROSE 600 MG: 12 INJECTION, SOLUTION INTRAVENOUS at 00:27

## 2022-11-06 RX ADMIN — HYDROCODONE BITARTRATE AND ACETAMINOPHEN 1 TABLET: 10; 325 TABLET ORAL at 18:07

## 2022-11-06 RX ADMIN — CLINDAMYCIN IN 5 PERCENT DEXTROSE 600 MG: 12 INJECTION, SOLUTION INTRAVENOUS at 12:06

## 2022-11-06 RX ADMIN — CLINDAMYCIN IN 5 PERCENT DEXTROSE 600 MG: 12 INJECTION, SOLUTION INTRAVENOUS at 06:04

## 2022-11-06 RX ADMIN — AMLODIPINE BESYLATE 5 MG: 5 TABLET ORAL at 18:04

## 2022-11-06 RX ADMIN — CLINDAMYCIN HYDROCHLORIDE 300 MG: 150 CAPSULE ORAL at 18:04

## 2022-11-06 RX ADMIN — TRAZODONE HYDROCHLORIDE 100 MG: 50 TABLET ORAL at 21:22

## 2022-11-06 RX ADMIN — ATORVASTATIN CALCIUM 80 MG: 40 TABLET, FILM COATED ORAL at 21:22

## 2022-11-06 RX ADMIN — CLOPIDOGREL BISULFATE 75 MG: 75 TABLET ORAL at 21:22

## 2022-11-06 RX ADMIN — ENOXAPARIN SODIUM 40 MG: 100 INJECTION SUBCUTANEOUS at 21:26

## 2022-11-06 RX ADMIN — MOMETASONE FUROATE AND FORMOTEROL FUMARATE DIHYDRATE 2 PUFF: 200; 5 AEROSOL RESPIRATORY (INHALATION) at 20:26

## 2022-11-06 RX ADMIN — MOMETASONE FUROATE AND FORMOTEROL FUMARATE DIHYDRATE 2 PUFF: 200; 5 AEROSOL RESPIRATORY (INHALATION) at 07:58

## 2022-11-06 RX ADMIN — DULOXETINE HYDROCHLORIDE 120 MG: 60 CAPSULE, DELAYED RELEASE ORAL at 21:22

## 2022-11-06 RX ADMIN — SODIUM CHLORIDE, PRESERVATIVE FREE 5 ML: 5 INJECTION INTRAVENOUS at 09:00

## 2022-11-06 RX ADMIN — ASPIRIN 81 MG: 81 TABLET, COATED ORAL at 18:04

## 2022-11-06 ASSESSMENT — PAIN DESCRIPTION - PAIN TYPE: TYPE: ACUTE PAIN

## 2022-11-06 ASSESSMENT — PAIN DESCRIPTION - ORIENTATION
ORIENTATION: LEFT
ORIENTATION: LEFT;LOWER

## 2022-11-06 ASSESSMENT — PAIN SCALES - GENERAL
PAINLEVEL_OUTOF10: 3
PAINLEVEL_OUTOF10: 5

## 2022-11-06 ASSESSMENT — PAIN DESCRIPTION - LOCATION
LOCATION: ANKLE
LOCATION: BACK;LEG

## 2022-11-06 ASSESSMENT — PAIN DESCRIPTION - FREQUENCY: FREQUENCY: CONTINUOUS

## 2022-11-06 ASSESSMENT — PAIN DESCRIPTION - DESCRIPTORS: DESCRIPTORS: ACHING;BURNING

## 2022-11-06 NOTE — PLAN OF CARE
Problem: Discharge Planning  Goal: Discharge to home or other facility with appropriate resources  Outcome: Progressing  Flowsheets (Taken 11/5/2022 1945)  Discharge to home or other facility with appropriate resources: Identify barriers to discharge with patient and caregiver     Problem: Chronic Conditions and Co-morbidities  Goal: Patient's chronic conditions and co-morbidity symptoms are monitored and maintained or improved  Outcome: Progressing  Flowsheets (Taken 11/5/2022 1945)  Care Plan - Patient's Chronic Conditions and Co-Morbidity Symptoms are Monitored and Maintained or Improved: Monitor and assess patient's chronic conditions and comorbid symptoms for stability, deterioration, or improvement     Problem: Pain  Goal: Verbalizes/displays adequate comfort level or baseline comfort level  Outcome: Progressing     Problem: Respiratory - Adult  Goal: Achieves optimal ventilation and oxygenation  11/6/2022 0148 by Destiny Nicole RN  Outcome: Progressing  11/6/2022 0040 by Krissy Dowell RCP  Outcome: Progressing  Flowsheets  Taken 11/5/2022 1945 by Destiny Nicole RN  Achieves optimal ventilation and oxygenation: Encourage broncho-pulmonary hygiene including cough, deep breathe, incentive spirometry  Taken 11/4/2022 2309 by Krissy Dowell RCP  Achieves optimal ventilation and oxygenation:   Assess for changes in respiratory status   Position to facilitate oxygenation and minimize respiratory effort   Respiratory therapy support as indicated   Oxygen supplementation based on oxygen saturation or arterial blood gases   Assess and instruct to report shortness of breath or any respiratory difficulty

## 2022-11-06 NOTE — PROGRESS NOTES
Hospitalist Progress Note   Admit Date:  2022  7:49 AM   Name:  Cl Noriega   Age:  47 y.o. Sex:  female  :  1970   MRN:  [id number]   Room:  [facility]    Presenting Complaint: No chief complaint on file. Reason(s) for Admission: Cellulitis [L03.90]     Hospital Course:   47 y.o. female with medical history of HTN, DM2, CAD, migraines who presented to ED with LLE pain and swelling. Patient reports symptoms started 4 days ago. She went to see her PCP and was diagnosed with cellulitis and prescribed Cipro. She has taken 2 days of Cipro, but reports worsening. She states the pain is worse now and causing difficulty with ambulation. Upon ER evaluation, WBC is 12.5. No evidence of DVT on US. Hospitalist asked to admit. Due to bed availability, patient transferred to [facility] for further care. Subjective & 24hr Events (22): Wound improved again today. Transition to oral antibiotics.       Assessment & Plan:   * Cellulitis  Assessment & Plan  - Unresponsive to outpatient treatment  - Started on clinda in ER, will continue  - Blood cultures ordered in ER  - PRN pain meds  2022: improved on antibiotics    Bipolar disorder ([address]  Assessment & Plan  - Continue home meds  2022: stable    Diastolic CHF, chronic (HCC)  Assessment & Plan  - Stable  - Continue home meds    Vestibular migraine  Assessment & Plan  - Of note    DM (diabetes mellitus) ([address]  Assessment & Plan  - Appears to be on high doses of insulin  - Continue home insulin  - Hold PO meds  - SSI ordered  - Diabetic diet    CAD (coronary artery disease)  Assessment & Plan  - Reports multiple prior CABGs  - Continue home meds    SHELBY (obstructive sleep apnea)  Assessment & Plan  - Of note    Morbid obesity with BMI of 40.0-44.9, adult (HCC)  Assessment & Plan  Increased risk of all cause mortality, complicating care  - healthy lifestyle at discharge          Anticipated discharge needs:      Home with outpatient follow up    Diet:  ADULT DIET; Regular; 4 carb choices (60 gm/meal); Low Fat/Low Chol/High Fiber/2 gm Na  DVT PPx: enoxaparin  Code status: Full Code    Hospital Problems:  Principal Problem:    Cellulitis  Active Problems: Morbid obesity with BMI of 40.0-44.9, adult (HCC)    SHELBY (obstructive sleep apnea)    CAD (coronary artery disease)    DM (diabetes mellitus) (HCC)    Vestibular migraine    Diastolic CHF, chronic (HCC)    Bipolar disorder ([facility]  Resolved Problems:    * No resolved hospital problems. *      Objective:   Patient Vitals for the past 24 hrs:   Temp Pulse Resp BP SpO2   11/06/22 1549 97.7 °F (36.5 °C) 79 16 138/66 97 %   11/06/22 1118 97.7 °F (36.5 °C) 77 12 (!) 140/81 93 %   11/06/22 0810 97.9 °F (36.6 °C) 78 12 127/79 91 %   11/06/22 0759 -- 80 16 -- 92 %   11/06/22 0326 98.2 °F (36.8 °C) 90 16 (!) 106/51 90 %   11/05/22 2305 97.7 °F (36.5 °C) 74 17 107/60 92 %   11/05/22 2135 -- 80 -- -- 95 %   11/05/22 2024 97.7 °F (36.5 °C) 76 18 109/64 95 %         Oxygen Therapy  SpO2: 97 %  Pulse Oximeter Device Mode: Intermittent  Pulse Oximeter Device Location: Right, Finger  O2 Device: None (Room air)    Estimated body mass index is 34.84 kg/m² as calculated from the following:    Height as of this encounter: 5' 4\" (1.626 m). Weight as of this encounter: 203 lb (92.1 kg). Intake/Output Summary (Last 24 hours) at 11/6/2022 1823  Last data filed at 11/6/2022 1253  Gross per 24 hour   Intake 410 ml   Output --   Net 410 ml           Blood pressure 138/66, pulse 79, temperature 97.7 °F (36.5 °C), temperature source Oral, resp. rate 16, height 5' 4\" (1.626 m), weight 203 lb (92.1 kg), SpO2 97 %. Physical Exam  Vitals and nursing note reviewed. Constitutional:       General: She is not in acute distress. Appearance: She is obese. She is not diaphoretic. Eyes:      Extraocular Movements: Extraocular movements intact. Cardiovascular:      Rate and Rhythm: Normal rate.    Pulmonary: Effort: Pulmonary effort is normal. No respiratory distress. Abdominal:      General: There is no distension. Musculoskeletal:         General: No deformity. Skin:     Coloration: Skin is not jaundiced or pale. Findings: Erythema and lesion present. Comments: LLE cellulitis improved see photos, within borders of wound dated 11/4   Neurological:      General: No focal deficit present. Mental Status: She is alert.    Psychiatric:         Mood and Affect: Mood normal.         Behavior: Behavior normal.                     I have personally reviewed labs and tests showing:  Recent Labs:  Recent Results (from the past 48 hour(s))   POCT Glucose    Collection Time: 11/04/22  9:33 PM   Result Value Ref Range    POC Glucose 111 (H) 65 - 100 mg/dL    Performed by: [employer]    POCT Glucose    Collection Time: 11/05/22  4:53 AM   Result Value Ref Range    POC Glucose 126 (H) 65 - 100 mg/dL    Performed by: [employer]    Basic Metabolic Panel w/ Reflex to MG    Collection Time: 11/05/22  5:33 AM   Result Value Ref Range    Sodium 135 133 - 143 mmol/L    Potassium 3.3 (L) 3.5 - 5.1 mmol/L    Chloride 103 101 - 110 mmol/L    CO2 25 21 - 32 mmol/L    Anion Gap 7 2 - 11 mmol/L    Glucose 131 (H) 65 - 100 mg/dL    BUN 29 (H) 6 - 23 MG/DL    Creatinine 1.41 (H) 0.6 - 1.0 MG/DL    Est, Glom Filt Rate 45 (L) >60 ml/min/1.73m2    Calcium 9.7 8.3 - 10.4 MG/DL   CBC with Auto Differential    Collection Time: 11/05/22  5:33 AM   Result Value Ref Range    WBC 15.9 (H) 4.3 - 11.1 K/uL    RBC 5.01 4.05 - 5.2 M/uL    Hemoglobin 14.0 11.7 - 15.4 g/dL    Hematocrit 41.5 35.8 - 46.3 %    MCV 82.8 82.0 - 102.0 FL    MCH 27.9 26.1 - 32.9 PG    MCHC 33.7 31.4 - 35.0 g/dL    RDW 12.6 11.9 - 14.6 %    Platelets 087 435 - 670 K/uL    MPV 8.9 (L) 9.4 - 12.3 FL    nRBC 0.00 0.0 - 0.2 K/uL    Seg Neutrophils 72 43 - 78 %    Lymphocytes 13 13 - 44 %    Monocytes 9 4.0 - 12.0 %    Eosinophils % 1 0.5 - 7.8 %    Basophils 0 0.0 - 2.0 %    Immature Granulocytes 5 0.0 - 5.0 %    Segs Absolute 11.4 (H) 1.7 - 8.2 K/UL    Absolute Lymph # 2.1 0.5 - 4.6 K/UL    Absolute Mono # 1.4 (H) 0.1 - 1.3 K/UL    Absolute Eos # 0.2 0.0 - 0.8 K/UL    Basophils Absolute 0.0 0.0 - 0.2 K/UL    Absolute Immature Granulocyte 0.8 (H) 0.0 - 0.5 K/UL    RBC Comment NORMOCYTIC/NORMOCHROMIC      WBC Comment Result Confirmed By Smear      Platelet Comment ADEQUATE      Differential Type AUTOMATED     Magnesium    Collection Time: 11/05/22  5:33 AM   Result Value Ref Range    Magnesium 2.0 1.8 - 2.4 mg/dL   POCT Glucose    Collection Time: 11/05/22  7:55 AM   Result Value Ref Range    POC Glucose 119 (H) 65 - 100 mg/dL    Performed by: [employer]    POCT Glucose    Collection Time: 11/05/22 12:05 PM   Result Value Ref Range    POC Glucose 123 (H) 65 - 100 mg/dL    Performed by: [employer]    POCT Glucose    Collection Time: 11/05/22  3:34 PM   Result Value Ref Range    POC Glucose 149 (H) 65 - 100 mg/dL    Performed by: [employer]    POCT Glucose    Collection Time: 11/05/22  9:18 PM   Result Value Ref Range    POC Glucose 189 (H) 65 - 100 mg/dL    Performed by: Shahana    Basic Metabolic Panel w/ Reflex to MG    Collection Time: 11/06/22  5:39 AM   Result Value Ref Range    Sodium 139 133 - 143 mmol/L    Potassium 3.3 (L) 3.5 - 5.1 mmol/L    Chloride 106 101 - 110 mmol/L    CO2 27 21 - 32 mmol/L    Anion Gap 6 2 - 11 mmol/L    Glucose 183 (H) 65 - 100 mg/dL    BUN 17 6 - 23 MG/DL    Creatinine 0.73 0.6 - 1.0 MG/DL    Est, Glom Filt Rate >60 >60 ml/min/1.73m2    Calcium 9.2 8.3 - 10.4 MG/DL   CBC with Auto Differential    Collection Time: 11/06/22  5:39 AM   Result Value Ref Range    WBC 11.0 4.3 - 11.1 K/uL    RBC 4.86 4.05 - 5.2 M/uL    Hemoglobin 13.5 11.7 - 15.4 g/dL    Hematocrit 40.9 35.8 - 46.3 %    MCV 84.2 82.0 - 102.0 FL    MCH 27.8 26.1 - 32.9 PG    MCHC 33.0 31.4 - 35.0 g/dL RDW 12.6 11.9 - 14.6 %    Platelets 099 408 - 597 K/uL    MPV 8.7 (L) 9.4 - 12.3 FL    nRBC 0.00 0.0 - 0.2 K/uL    Seg Neutrophils 55 43 - 78 %    Lymphocytes 27 13 - 44 %    Monocytes 8 4.0 - 12.0 %    Eosinophils % 2 0.5 - 7.8 %    Basophils 0 0.0 - 2.0 %    Immature Granulocytes 8 (H) 0.0 - 5.0 %    Segs Absolute 6.0 1.7 - 8.2 K/UL    Absolute Lymph # 3.0 0.5 - 4.6 K/UL    Absolute Mono # 0.9 0.1 - 1.3 K/UL    Absolute Eos # 0.2 0.0 - 0.8 K/UL    Basophils Absolute 0.0 0.0 - 0.2 K/UL    Absolute Immature Granulocyte 0.9 (H) 0.0 - 0.5 K/UL    RBC Comment NORMOCYTIC/NORMOCHROMIC      WBC Comment Result Confirmed By Smear      Platelet Comment ADEQUATE      Differential Type AUTOMATED     Magnesium    Collection Time: 11/06/22  5:39 AM   Result Value Ref Range    Magnesium 2.2 1.8 - 2.4 mg/dL   POCT Glucose    Collection Time: 11/06/22  6:50 AM   Result Value Ref Range    POC Glucose 181 (H) 65 - 100 mg/dL    Performed by: Nikki    POCT Glucose    Collection Time: 11/06/22 11:19 AM   Result Value Ref Range    POC Glucose 177 (H) 65 - 100 mg/dL    Performed by: Zoe    POCT Glucose    Collection Time: 11/06/22  3:52 PM   Result Value Ref Range    POC Glucose 165 (H) 65 - 100 mg/dL    Performed by: SamanthaCAROL        I have personally reviewed imaging studies showing:   Other Studies:  No orders to display       Current Meds:  Current Facility-Administered Medications   Medication Dose Route Frequency    clindamycin (CLEOCIN) capsule 300 mg  300 mg Oral 4 times per day    insulin lispro (HUMALOG) injection vial 0-8 Units  0-8 Units SubCUTAneous TID WC    insulin lispro (HUMALOG) injection vial 0-4 Units  0-4 Units SubCUTAneous Nightly    aspirin EC tablet 81 mg  81 mg Oral QPM    ipratropium-albuterol (DUONEB) nebulizer solution 3 mL  1 vial Inhalation Q6H PRN    albuterol sulfate HFA (PROVENTIL;VENTOLIN;PROAIR) 108 (90 Base) MCG/ACT inhaler 1 puff  1 puff Inhalation Q6H PRN diazePAM (VALIUM) tablet 2 mg  2 mg Oral BID PRN    [Held by provider] furosemide (LASIX) tablet 40 mg  40 mg Oral Daily    [Held by provider] lisinopril (PRINIVIL;ZESTRIL) tablet 40 mg  40 mg Oral QPM    linaclotide (LINZESS) capsule 145 mcg (Patient Supplied)  145 mcg Oral Daily    hydrOXYzine HCl (ATARAX) tablet 25 mg  25 mg Oral TID PRN    metoprolol succinate (TOPROL XL) extended release tablet 50 mg  50 mg Oral Daily    traZODone (DESYREL) tablet 100 mg  100 mg Oral Daily    HYDROcodone-acetaminophen (NORCO)  MG per tablet 1 tablet  1 tablet Oral Q8H PRN    pantoprazole (PROTONIX) tablet 40 mg  40 mg Oral QAM AC    mometasone-formoterol (DULERA) 200-5 MCG/ACT inhaler 2 puff  2 puff Inhalation BID    sodium chloride flush 0.9 % injection 5-40 mL  5-40 mL IntraVENous 2 times per day    sodium chloride flush 0.9 % injection 5-40 mL  5-40 mL IntraVENous PRN    0.9 % sodium chloride infusion   IntraVENous PRN    enoxaparin (LOVENOX) injection 40 mg  40 mg SubCUTAneous Q24H    ondansetron (ZOFRAN-ODT) disintegrating tablet 4 mg  4 mg Oral Q8H PRN    Or    ondansetron (ZOFRAN) injection 4 mg  4 mg IntraVENous Q6H PRN    polyethylene glycol (GLYCOLAX) packet 17 g  17 g Oral Daily PRN    acetaminophen (TYLENOL) tablet 650 mg  650 mg Oral Q6H PRN    Or    acetaminophen (TYLENOL) suppository 650 mg  650 mg Rectal Q6H PRN    glucose chewable tablet 16 g  4 tablet Oral PRN    dextrose bolus 10% 125 mL  125 mL IntraVENous PRN    Or    dextrose bolus 10% 250 mL  250 mL IntraVENous PRN    glucagon (rDNA) injection 1 mg  1 mg SubCUTAneous PRN    dextrose 10 % infusion   IntraVENous Continuous PRN    [Held by provider] insulin regular human (humuLIN R U-500) 500 UNIT/ML concentrated injection vial 210 Units  210 Units SubCUTAneous Daily before lunch    [Held by provider] insulin regular human (humuLIN R U-500) 500 UNIT/ML concentrated injection vial 105 Units  105 Units SubCUTAneous Dinner    gabapentin (NEURONTIN) capsule 600 mg  600 mg Oral Daily    lamoTRIgine (LAMICTAL) tablet 100 mg  100 mg Oral Daily    [Held by provider] insulin regular human (humuLIN R U-500) 500 UNIT/ML concentrated injection vial 200 Units  200 Units SubCUTAneous Daily with breakfast    amLODIPine (NORVASC) tablet 5 mg  5 mg Oral Daily    atorvastatin (LIPITOR) tablet 80 mg  80 mg Oral Nightly    clopidogrel (PLAVIX) tablet 75 mg  75 mg Oral Daily    DULoxetine (CYMBALTA) extended release capsule 120 mg  120 mg Oral Daily       Signed:  Rose Oneil MD

## 2022-11-06 NOTE — PROGRESS NOTES
Reviewed notes for new spiritual concerns. [address] [facility] [place]     FULL CODE     NO DIRECTIVES     OBESITY     HX TIA          Will follow as needed.

## 2022-11-06 NOTE — PLAN OF CARE
Problem: Respiratory - Adult  Goal: Achieves optimal ventilation and oxygenation  Outcome: Progressing  Flowsheets (Taken 11/4/2022 1886)  Achieves optimal ventilation and oxygenation:   Assess for changes in respiratory status   Position to facilitate oxygenation and minimize respiratory effort   Respiratory therapy support as indicated   Oxygen supplementation based on oxygen saturation or arterial blood gases   Assess and instruct to report shortness of breath or any respiratory difficulty

## 2022-11-07 VITALS
HEIGHT: 64 IN | BODY MASS INDEX: 34.66 KG/M2 | OXYGEN SATURATION: 94 % | HEART RATE: 64 BPM | WEIGHT: 203 LBS | TEMPERATURE: 97.3 F | DIASTOLIC BLOOD PRESSURE: 77 MMHG | RESPIRATION RATE: 16 BRPM | SYSTOLIC BLOOD PRESSURE: 150 MMHG

## 2022-11-07 LAB
ANION GAP SERPL CALC-SCNC: 5 MMOL/L (ref 2–11)
BASOPHILS # BLD: 0.2 K/UL (ref 0–0.2)
BASOPHILS NFR BLD MANUAL: 2 % (ref 0–2)
BUN SERPL-MCNC: 9 MG/DL (ref 6–23)
CALCIUM SERPL-MCNC: 9.2 MG/DL (ref 8.3–10.4)
CHLORIDE SERPL-SCNC: 106 MMOL/L (ref 101–110)
CO2 SERPL-SCNC: 30 MMOL/L (ref 21–32)
CREAT SERPL-MCNC: 0.55 MG/DL (ref 0.6–1)
DIFFERENTIAL METHOD BLD: ABNORMAL
EOSINOPHIL # BLD: 0.1 K/UL (ref 0–0.8)
EOSINOPHIL NFR BLD MANUAL: 1 % (ref 1–8)
ERYTHROCYTE [DISTWIDTH] IN BLOOD BY AUTOMATED COUNT: 12.4 % (ref 11.9–14.6)
GLUCOSE BLD STRIP.AUTO-MCNC: 155 MG/DL (ref 65–100)
GLUCOSE BLD STRIP.AUTO-MCNC: 184 MG/DL (ref 65–100)
GLUCOSE SERPL-MCNC: 170 MG/DL (ref 65–100)
HCT VFR BLD AUTO: 40.7 % (ref 35.8–46.3)
HGB BLD-MCNC: 13.4 G/DL (ref 11.7–15.4)
LYMPHOCYTES # BLD: 2.4 K/UL (ref 0.5–4.6)
LYMPHOCYTES NFR BLD MANUAL: 25 % (ref 16–44)
MAGNESIUM SERPL-MCNC: 2.1 MG/DL (ref 1.8–2.4)
MCH RBC QN AUTO: 27.8 PG (ref 26.1–32.9)
MCHC RBC AUTO-ENTMCNC: 32.9 G/DL (ref 31.4–35)
MCV RBC AUTO: 84.4 FL (ref 82–102)
METAMYELOCYTES NFR BLD MANUAL: 1 %
MONOCYTES # BLD: 0.9 K/UL (ref 0.1–1.3)
MONOCYTES NFR BLD MANUAL: 9 % (ref 3–9)
MYELOCYTES NFR BLD MANUAL: 3 %
NEUTS BAND NFR BLD MANUAL: 4 % (ref 0–6)
NEUTS SEG # BLD: 5.7 K/UL (ref 1.7–8.2)
NEUTS SEG NFR BLD MANUAL: 55 % (ref 47–75)
NRBC # BLD: 0 K/UL (ref 0–0.2)
PLATELET # BLD AUTO: 369 K/UL (ref 150–450)
PLATELET COMMENT: ADEQUATE
PMV BLD AUTO: 8.5 FL (ref 9.4–12.3)
POTASSIUM SERPL-SCNC: 3.3 MMOL/L (ref 3.5–5.1)
RBC # BLD AUTO: 4.82 M/UL (ref 4.05–5.2)
RBC MORPH BLD: ABNORMAL
SERVICE CMNT-IMP: ABNORMAL
SERVICE CMNT-IMP: ABNORMAL
SODIUM SERPL-SCNC: 141 MMOL/L (ref 133–143)
WBC # BLD AUTO: 9.5 K/UL (ref 4.3–11.1)

## 2022-11-07 PROCEDURE — 6370000000 HC RX 637 (ALT 250 FOR IP): Performed by: FAMILY MEDICINE

## 2022-11-07 PROCEDURE — 83735 ASSAY OF MAGNESIUM: CPT

## 2022-11-07 PROCEDURE — 2580000003 HC RX 258: Performed by: FAMILY MEDICINE

## 2022-11-07 PROCEDURE — 94640 AIRWAY INHALATION TREATMENT: CPT

## 2022-11-07 PROCEDURE — 94760 N-INVAS EAR/PLS OXIMETRY 1: CPT

## 2022-11-07 PROCEDURE — 85025 COMPLETE CBC W/AUTO DIFF WBC: CPT

## 2022-11-07 PROCEDURE — 82962 GLUCOSE BLOOD TEST: CPT

## 2022-11-07 PROCEDURE — 36415 COLL VENOUS BLD VENIPUNCTURE: CPT

## 2022-11-07 PROCEDURE — 80048 BASIC METABOLIC PNL TOTAL CA: CPT

## 2022-11-07 RX ORDER — CLINDAMYCIN HYDROCHLORIDE 300 MG/1
300 CAPSULE ORAL 4 TIMES DAILY
Qty: 28 CAPSULE | Refills: 0 | Status: SHIPPED | OUTPATIENT
Start: 2022-11-07 | End: 2022-11-14

## 2022-11-07 RX ADMIN — MOMETASONE FUROATE AND FORMOTEROL FUMARATE DIHYDRATE 2 PUFF: 200; 5 AEROSOL RESPIRATORY (INHALATION) at 10:24

## 2022-11-07 RX ADMIN — SODIUM CHLORIDE, PRESERVATIVE FREE 5 ML: 5 INJECTION INTRAVENOUS at 00:29

## 2022-11-07 RX ADMIN — CLINDAMYCIN HYDROCHLORIDE 300 MG: 150 CAPSULE ORAL at 11:51

## 2022-11-07 RX ADMIN — CLINDAMYCIN HYDROCHLORIDE 300 MG: 150 CAPSULE ORAL at 06:23

## 2022-11-07 RX ADMIN — CLINDAMYCIN HYDROCHLORIDE 300 MG: 150 CAPSULE ORAL at 00:26

## 2022-11-07 ASSESSMENT — PAIN SCALES - GENERAL: PAINLEVEL_OUTOF10: 0

## 2022-11-07 NOTE — DISCHARGE SUMMARY
Hospitalist Discharge Summary   Admit Date:  2022  7:49 AM   DC Note date: 2022  Name:  Ricardo Guzman   Age:  47 y.o. Sex:  female  :  1970   MRN:  [id number]   Room:  [facility]  PCP:  Susana Manriquez MD    Presenting Complaint: No chief complaint on file. Initial Admission Diagnosis: Cellulitis [L03.90]     Problem List for this Hospitalization (present on admission):    Principal Problem:    Cellulitis  Active Problems:    Class 1 obesity due to excess calories with serious comorbidity and body mass index (BMI) of 34.0 to 34.9 in adult    SHELBY (obstructive sleep apnea)    CAD (coronary artery disease)    Type 2 diabetes mellitus with hyperglycemia, with long-term current use of insulin (HCC)    Vestibular migraine    Diastolic CHF, chronic (HCC)    Bipolar disorder ([facility] [address]  Resolved Problems:    * No resolved hospital problems. *      Hospital Course:  52F PMHx HTN, DM2, CAD, migraines who presented to ED with LLE pain and swelling. Patient reported symptoms started 4 days prior. She went to see her PCP and was diagnosed with cellulitis and prescribed Cipro. She had taken 2 days of Cipro, but reported worsening. She stated the pain was worse and causing difficulty with ambulation. Upon ER evaluation, WBC is 12.5. No evidence of DVT on US. Hospitalist admitted. Due to bed availability, patient transferred to [facility] for further care. Cellulitis improved on antibiotics IV. Transitioned to po with further improvement. Determined appropriate for discharge . Disposition: home with outpatient follow up  Diet: ADULT DIET; Regular; 4 carb choices (60 gm/meal); Low Fat/Low Chol/High Fiber/2 gm Na  Code Status: Full Code    Follow Ups:   Follow-up Information     Susana Manriquez MD. Schedule an appointment as soon as possible   for a visit in 1 week(s).     Specialty: Family Medicine  Why: Transition of Care Management  Contact information:  [address] [place] [address] [facility] 160  [phone]                       Time spent in patient discharge and coordination 37 minutes. Follow up labs/diagnostics (ultimately defer to outpatient provider):  Resolution of cellulitis    Plan was discussed with patient, nurse, . All questions answered. Patient was stable at time of discharge. Instructions given to call a physician or return if any concerns. Current Discharge Medication List        START taking these medications    Details   clindamycin (CLEOCIN) 300 MG capsule Take 1 capsule by mouth 4 times daily for 7 days  Qty: 28 capsule, Refills: 0           CONTINUE these medications which have NOT CHANGED    Details   ondansetron (ZOFRAN-ODT) 4 MG disintegrating tablet Take 1 tablet by mouth 3 times daily as needed for Nausea or Vomiting  Qty: 21 tablet, Refills: 0    Associated Diagnoses: Nausea      MOUNJARO 7.5 MG/0.5ML SOPN SC injection Inject 0.5 mLs into the skin once a week  Qty: 2 mL, Refills: 2      albuterol sulfate (PROAIR RESPICLICK) 246 (90 Base) MCG/ACT aerosol powder inhalation Inhale 1 puff into the lungs every 6 hours as needed for Wheezing or Shortness of Breath  Qty: 1 each, Refills: 11    Associated Diagnoses: Moderate persistent asthma without complication      albuterol sulfate HFA (PROVENTIL;VENTOLIN;PROAIR) 108 (90 Base) MCG/ACT inhaler Inhale 1 puff into the lungs every 6 hours as needed for Wheezing TAKE 1 PUFF BY INHALATION EVERY FOUR (4) HOURS AS NEEDED FOR WHEEZING OR SHORTNESS OF BREATH.   Qty: 18 g, Refills: 2    Associated Diagnoses: Mild intermittent reactive airway disease without complication      amLODIPine (NORVASC) 5 MG tablet Take 1 tablet by mouth daily  Qty: 30 tablet, Refills: 3    Associated Diagnoses: Primary hypertension      atorvastatin (LIPITOR) 80 MG tablet Take 1 tablet by mouth daily  Qty: 30 tablet, Refills: 3    Associated Diagnoses: Other hyperlipidemia; Coronary artery disease due to calcified coronary lesion      clopidogrel (PLAVIX) 75 MG tablet Take 1 tablet by mouth daily  Qty: 30 tablet, Refills: 3    Associated Diagnoses: Coronary artery disease due to calcified coronary lesion      dexlansoprazole (DEXILANT) 60 MG CPDR delayed release capsule Take 1 capsule by mouth daily  Qty: 30 capsule, Refills: 3    Associated Diagnoses: Gastroesophageal reflux disease, unspecified whether esophagitis present      diazePAM (VALIUM) 2 MG tablet Take 1 tablet by mouth 2 times daily as needed for Anxiety for up to 120 days. Qty: 60 tablet, Refills: 3    Associated Diagnoses: Anxiety      DULoxetine (CYMBALTA) 60 MG extended release capsule Take 2 capsules by mouth daily  Qty: 30 capsule, Refills: 3    Associated Diagnoses: Mild episode of recurrent major depressive disorder ([address] Bipolar 1 disorder ([facility])      empagliflozin (JARDIANCE) 25 MG tablet Take 1 tablet by mouth daily  Qty: 30 tablet, Refills: 3    Associated Diagnoses: Uncontrolled type 2 diabetes mellitus with hyperglycemia ([facility])      Fluticasone furoate-vilanterol (BREO ELLIPTA) 200-25 MCG/INH AEPB inhaler Inhale 1 puff into the lungs daily  Qty: 1 each, Refills: 11    Associated Diagnoses: Moderate persistent asthma without complication      furosemide (LASIX) 40 MG tablet Take 1 tablet by mouth daily  Qty: 60 tablet, Refills: 3    Associated Diagnoses: Localized edema      gabapentin (NEURONTIN) 600 MG tablet Take 1 tablet by mouth 2 times daily for 180 days.   Qty: 90 tablet, Refills: 3    Associated Diagnoses: Peripheral polyneuropathy      tiZANidine (ZANAFLEX) 2 MG tablet Take 1 tablet by mouth in the morning and at bedtime  Qty: 60 tablet, Refills: 3      lisinopril (PRINIVIL;ZESTRIL) 40 MG tablet Take 1 tablet by mouth every evening  Qty: 30 tablet, Refills: 3    Associated Diagnoses: Primary hypertension      linaclotide (LINZESS) 145 MCG capsule Take 1 capsule by mouth daily  Qty: 30 capsule, Refills: 3    Associated Diagnoses: Other constipation      hydrOXYzine HCl (ATARAX) 25 MG tablet Take 1 tablet by mouth 3 times daily as needed for Anxiety  Qty: 30 tablet, Refills: 3    Associated Diagnoses: Anxiety      lamoTRIgine (LAMICTAL) 100 MG tablet Take 1 tablet by mouth 2 times daily  Qty: 60 tablet, Refills: 3    Associated Diagnoses: Bipolar 1 disorder ([facility])      potassium chloride (KLOR-CON M) 20 MEQ extended release tablet Take 1 tablet by mouth daily  Qty: 30 tablet, Refills: 3    Associated Diagnoses: Primary hypertension      metoprolol succinate (TOPROL XL) 50 MG extended release tablet Take 1 tablet by mouth daily  Qty: 30 tablet, Refills: 3    Associated Diagnoses: Primary hypertension      traZODone (DESYREL) 100 MG tablet Take 1 tablet by mouth daily  Qty: 30 tablet, Refills: 3    Associated Diagnoses: Primary insomnia      HYDROcodone-acetaminophen (NORCO)  MG per tablet Take 1 tablet by mouth every 8 hours as needed for Pain for up to 30 days. Intended supply: 30 days  Qty: 90 tablet, Refills: 0    Comments: Reduce doses taken as pain becomes manageable  Associated Diagnoses: Arthralgia, unspecified joint      ipratropium-albuterol (DUONEB) 0.5-2.5 (3) MG/3ML SOLN nebulizer solution Inhale 3 mLs into the lungs every 6 hours as needed for Shortness of Breath INHALE 3ML VIA NEBULIZER EVERY 6 HOURS  Qty: 360 mL, Refills: 11    Associated Diagnoses:  Moderate persistent asthma without complication      HUMULIN R U-500 KWIKPEN 500 UNIT/ML SOPN concentrated injection pen 200 units before breakfast, 210 units before lunch, 105 units before supper  Qty: 93 mL, Refills: 3    Associated Diagnoses: Uncontrolled type 2 diabetes mellitus with hyperglycemia ([facility])      UNIFINE PENTIPS PLUS 31G X 6 MM MISC USE WITH INSULIN UP TO 4 TIMES DAILY, E11.65      Galcanezumab-gnlm (EMGALITY) 120 MG/ML SOSY INJECT CONTENTS OF 1 SYRINGE SUBCUTANEOUSLY EVERY 30 DAYS  Qty: 1 mL, Refills: 11      Ubrogepant 50 MG TABS Take 1 tablet by mouth at onset of migraine, repeat in two hours if needed. Maximum 2/day up to 4/week. Qty: 16 tablet, Refills: 11      alirocumab (PRALUENT) 75 MG/ML SOAJ injection pen Inject 75 mg into the skin      aspirin 81 MG EC tablet Take 81 mg by mouth every evening      Loteprednol Etabonate (LOTEMAX SM) 0.38 % GEL 1 drop 3x a day x 1 wk, 2x a day x 1 wk, 1x a day x 1 wk, then d/c in both eyes      meloxicam (MOBIC) 15 MG tablet One daily      nitroGLYCERIN (NITROSTAT) 0.4 MG SL tablet PLACE ONE TABLET UNDER TONGUE AS NEEDED FOR CHEST PAIN. MAY REPEAT EVERY 5 MINUTES FOR 2 MORE DOSES. CALL 911 ON SECOND DOSE.           STOP taking these medications       ciprofloxacin (CIPRO) 500 MG tablet Comments:   Reason for Stopping:               Procedures done this admission:  * No surgery found *    Consults this admission:  None    Echocardiogram results:  No results found for this or any previous visit. Diagnostic Imaging/Tests:   XR TIBIA FIBULA LEFT (2 VIEWS)    Result Date: 11/3/2022  Staples in the soft tissues of the medial posterior left calf. Vascular duplex lower extremity venous left    Result Date: 11/4/2022  No evidence of left leg DVT.        Labs: Results:       BMP, Mg, Phos Recent Labs     11/05/22 0533 11/06/22 0539 11/07/22  0500    139 141   K 3.3* 3.3* 3.3*    106 106   CO2 25 27 30   ANIONGAP 7 6 5   BUN 29* 17 9   CREATININE 1.41* 0.73 0.55*   LABGLOM 45* >60 >60   CALCIUM 9.7 9.2 9.2   GLUCOSE 131* 183* 170*   MG 2.0 2.2 2.1      CBC Recent Labs     11/05/22 0533 11/06/22  0539 11/07/22  0500   WBC 15.9* 11.0 9.5   RBC 5.01 4.86 4.82   HGB 14.0 13.5 13.4   HCT 41.5 40.9 40.7   MCV 82.8 84.2 84.4   MCH 27.9 27.8 27.8   MCHC 33.7 33.0 32.9   RDW 12.6 12.6 12.4    327 369   MPV 8.9* 8.7* 8.5*   NRBC 0.00 0.00 0.00   SEGS 72 55 55   LYMPHOPCT 13 27 25   EOSRELPCT 1 2 1   MONOPCT 9 8 9   BASOPCT 0 0 2   IMMGRAN 5 8*  --    SEGSABS 11.4* 6.0 5.7   LYMPHSABS 2.1 3.0 2.4   EOSABS 0.2 0.2 0.1   MONOSABS 1.4* 0.9 0.9   BASOSABS 0.0 0.0 0.2   ABSIMMGRAN 0.8* 0.9*  --       LFT No results for input(s): BILITOT, BILIDIR, ALKPHOS, AST, ALT, PROT, LABALBU, GLOB in the last 72 hours.    Cardiac  Lab Results   Component Value Date/Time    TROPHS 4.9 08/21/2022 07:00 PM    TROPHS 7.8 08/21/2022 01:39 PM    TROPHS 4.1 08/21/2022 10:45 AM      Coags Lab Results   Component Value Date/Time    PROTIME 15.8 11/18/2020 02:11 PM    PROTIME 13.2 11/04/2020 12:26 PM    INR 1.2 11/18/2020 02:11 PM    INR 1.0 11/04/2020 12:26 PM    APTT 24.9 11/18/2020 02:11 PM      A1c Lab Results   Component Value Date/Time    LABA1C 8.8 09/27/2022 10:40 AM    LABA1C 9.6 06/27/2022 10:14 AM    LABA1C 9.8 04/05/2022 08:52 AM     09/27/2022 10:40 AM     06/27/2022 10:14 AM     04/05/2022 08:52 AM      Lipids Lab Results   Component Value Date/Time    CHOL 191 08/22/2022 03:32 AM    LDLCALC 115.6 08/22/2022 03:32 AM    LABVLDL 37.4 08/22/2022 03:32 AM    HDL 38 08/22/2022 03:32 AM    CHOLHDLRATIO 5.0 08/22/2022 03:32 AM    TRIG 187 08/22/2022 03:32 AM      Thyroid  No results found for: Amberll Kid     Most Recent UA Lab Results   Component Value Date/Time    COLORU YELLOW/STRAW 11/04/2022 01:28 AM    APPEARANCE CLEAR 11/04/2022 01:28 AM    SPECGRAV 1.022 11/04/2022 01:28 AM    LABPH 6.5 11/04/2022 01:28 AM    PROTEINU 30 11/04/2022 01:28 AM    GLUCOSEU >1000 11/04/2022 01:28 AM    GLUCOSEU >1,000 11/04/2022 01:23 AM    KETUA 40 11/04/2022 01:28 AM    BILIRUBINUR Negative 11/04/2022 01:28 AM    BILIRUBINUR Negative 11/17/2020 07:57 AM    BLOODU TRACE 11/04/2022 01:28 AM    BLOODU Trace Hgb 11/04/2022 01:23 AM    UROBILINOGEN 0.2 11/04/2022 01:28 AM    NITRU Negative 11/04/2022 01:28 AM    LEUKOCYTESUR SMALL 11/04/2022 01:28 AM    WBCUA 20-50 11/04/2022 01:28 AM    RBCUA 0-5 11/04/2022 01:28 AM    EPITHUA 0-5 11/04/2022 01:28 AM    BACTERIA Negative 11/04/2022 01:28 AM    LABCAST 0-2 11/04/2022 01:28 AM Recent Labs     11/04/22  0159   CULTURE NO GROWTH 3 DAYS       All Labs from Last 24 Hrs:  Recent Results (from the past 24 hour(s))   POCT Glucose    Collection Time: 11/06/22  3:52 PM   Result Value Ref Range    POC Glucose 165 (H) 65 - 100 mg/dL    Performed by: Zoe    POCT Glucose    Collection Time: 11/06/22  9:05 PM   Result Value Ref Range    POC Glucose 196 (H) 65 - 100 mg/dL    Performed by: Shahana    Basic Metabolic Panel w/ Reflex to MG    Collection Time: 11/07/22  5:00 AM   Result Value Ref Range    Sodium 141 133 - 143 mmol/L    Potassium 3.3 (L) 3.5 - 5.1 mmol/L    Chloride 106 101 - 110 mmol/L    CO2 30 21 - 32 mmol/L    Anion Gap 5 2 - 11 mmol/L    Glucose 170 (H) 65 - 100 mg/dL    BUN 9 6 - 23 MG/DL    Creatinine 0.55 (L) 0.6 - 1.0 MG/DL    Est, Glom Filt Rate >60 >60 ml/min/1.73m2    Calcium 9.2 8.3 - 10.4 MG/DL   CBC with Auto Differential    Collection Time: 11/07/22  5:00 AM   Result Value Ref Range    WBC 9.5 4.3 - 11.1 K/uL    RBC 4.82 4.05 - 5.2 M/uL    Hemoglobin 13.4 11.7 - 15.4 g/dL    Hematocrit 40.7 35.8 - 46.3 %    MCV 84.4 82.0 - 102.0 FL    MCH 27.8 26.1 - 32.9 PG    MCHC 32.9 31.4 - 35.0 g/dL    RDW 12.4 11.9 - 14.6 %    Platelets 273 999 - 087 K/uL    MPV 8.5 (L) 9.4 - 12.3 FL    nRBC 0.00 0.0 - 0.2 K/uL    Seg Neutrophils 55 47 - 75 %    Bands 4 0 - 6 %    Lymphocytes 25 16 - 44 %    Monocytes 9 3 - 9 %    Eosinophils % 1 1 - 8 %    Basophils 2 0 - 2 %    Metamyelocytes 1 %    Myelocytes 3 %    Segs Absolute 5.7 1.7 - 8.2 K/UL    Absolute Lymph # 2.4 0.5 - 4.6 K/UL    Absolute Mono # 0.9 0.1 - 1.3 K/UL    Absolute Eos # 0.1 0.0 - 0.8 K/UL    Basophils Absolute 0.2 0.0 - 0.2 K/UL    RBC Comment NORMOCYTIC/NORMOCHROMIC      Platelet Comment ADEQUATE      Differential Type MANUAL     Magnesium    Collection Time: 11/07/22  5:00 AM   Result Value Ref Range    Magnesium 2.1 1.8 - 2.4 mg/dL   POCT Glucose    Collection Time: 11/07/22  7:35 AM Result Value Ref Range    POC Glucose 155 (H) 65 - 100 mg/dL    Performed by: Genny    POCT Glucose    Collection Time: 11/07/22 11:06 AM   Result Value Ref Range    POC Glucose 184 (H) 65 - 100 mg/dL    Performed by: John        Allergies   Allergen Reactions    Adhesive Tape Rash    Penicillins Itching, Nausea And Vomiting and Rash     Immunization History   Administered Date(s) Administered    Influenza Virus Vaccine 09/01/2015, 10/26/2016, 09/25/2019, 10/01/2021    Influenza, FLUARIX, FLULAVAL, FLUZONE (age 10 mo+) AND AFLURIA, (age 1 y+), PF, 0.5mL 09/25/2019    Influenza, FLUCELVAX, (age 10 mo+), MDCK, PF, 0.5mL 11/09/2020    PPD Test 11/18/2020       Recent Vital Data:  Patient Vitals for the past 24 hrs:   Temp Pulse Resp BP SpO2   11/07/22 1025 -- 64 16 -- 94 %   11/07/22 0737 97.3 °F (36.3 °C) 78 17 (!) 150/77 93 %   11/07/22 0300 97.7 °F (36.5 °C) 75 18 135/73 91 %   11/06/22 2314 97.9 °F (36.6 °C) 77 18 122/76 94 %   11/06/22 2026 -- 76 15 -- 96 %   11/06/22 1955 -- 80 -- -- --   11/06/22 1934 97.9 °F (36.6 °C) 78 18 132/83 95 %   11/06/22 1549 97.7 °F (36.5 °C) 79 16 138/66 97 %       Oxygen Therapy  SpO2: 94 %  Pulse Oximeter Device Mode: Intermittent  Pulse Oximeter Device Location: Finger  O2 Device: None (Room air)    Estimated body mass index is 34.84 kg/m² as calculated from the following:    Height as of this encounter: 5' 4\" (1.626 m). Weight as of this encounter: 203 lb (92.1 kg). Intake/Output Summary (Last 24 hours) at 11/7/2022 1200  Last data filed at 11/6/2022 1253  Gross per 24 hour   Intake 410 ml   Output --   Net 410 ml       Physical Exam  Vitals and nursing note reviewed. Constitutional:       General: She is not in acute distress. Appearance: She is obese. She is not diaphoretic. Eyes:      Extraocular Movements: Extraocular movements intact. Cardiovascular:      Rate and Rhythm: Normal rate.    Pulmonary:      Effort: Pulmonary effort is normal. No respiratory distress. Abdominal:      General: There is no distension. Musculoskeletal:         General: No deformity. Skin:     Coloration: Skin is not jaundiced or pale. Findings: Erythema and lesion present. Comments: LLE cellulitis improved, within borders of wound dated 11/4   Neurological:      General: No focal deficit present. Mental Status: She is alert.    Psychiatric:         Mood and Affect: Mood normal.         Behavior: Behavior normal.         Signed:  Trish Das MD

## 2022-11-07 NOTE — CARE COORDINATION
Patient care plan reviewed in interdisciplinary rounds with the following disciplines: MD, nursing, case management, therapy, and nutrition services. Pt is improving and will likely be ready for d/c soon (possibly today). Pt will d/c home with her family. She is insured and is established with a primary care provider. No discharge planning needs anticipated.

## 2022-11-08 ENCOUNTER — CARE COORDINATION (OUTPATIENT)
Dept: CARE COORDINATION | Facility: CLINIC | Age: 52
End: 2022-11-08

## 2022-11-08 NOTE — CARE COORDINATION
[facility] Care Transitions Initial Follow Up Call    Call within 2 business days of discharge: Yes    LPN Care Coordinator contacted the patient by telephone to perform post hospital discharge assessment. Verified name and  with patient as identifiers. Provided introduction to self, and explanation of the LPN Care Coordinator role. Patient: Autumn Rasmussen Patient : 1970   MRN: [id number]  Reason for Admission: Cellulitis  Discharge Date: 22 RARS: Readmission Risk Score: 11.4      Last Discharge  Street       Date Complaint Diagnosis Description Type Department Provider    22   Admission (Discharged) Delvis Vance MD            Was this an external facility discharge? No Discharge Facility: [place] [address] to be reviewed by the provider   Additional needs identified to be addressed with provider: No  none               Method of communication with provider: none. Spoke with patient states fine. Patient denies any concerns during this phone call. Patient states will schedule an hospital follow up with PCP. LPN Care Coordinator reviewed discharge instructions with patient who verbalized understanding. The patient was given an opportunity to ask questions and does not have any further questions or concerns at this time. Were discharge instructions available to patient? Yes. Reviewed appropriate site of care based on symptoms and resources available to patient including: PCP  When to call 911. The patient agrees to contact the PCP office for questions related to their healthcare. Advance Care Planning:   Does patient have an Advance Directive: decision maker updated. Medication reconciliation was performed with patient, who verbalizes understanding of administration of home medications.  Medications reviewed, 1111F entered: N/A    Was patient discharged with a pulse oximeter? no    Non-face-to-face services provided:  Obtained and reviewed discharge summary and/or continuity of care documents    Offered patient enrollment in the Remote Patient Monitoring (RPM) program for in-home monitoring: NA.    Care Transitions 24 Hour Call    Do you have a copy of your discharge instructions?: Yes  Do you have all of your prescriptions and are they filled?: Yes  Have you been contacted by a [facility] Pharmacist?: No  Have you scheduled your follow up appointment?: No (Comment: Patient states  will call PCP for a hospital flu . Patient states already has a regular 3month appointment)  Do you have support at home?: Partner/Spouse/SO  Do you feel like you have everything you need to keep you well at home?: Yes  Are you an active caregiver in your home?: No  Care Transitions Interventions  No Identified Needs         Follow Up  Future Appointments   Date Time Provider Cole Leggett   11/18/2022 11:30 AM Michela Vega PA-C [facility] AMB   12/14/2022 10:00 AM Mony Travis MD [facility]   12/29/2022  8:45 AM Alexa Mcdaniel MD [facility] AMB   2/17/2023  8:40 AM Torrie Arnold MD [facility] AM[facility]   5/24/2023  1:30 PM Curt Miller MD [facility] (2-) Neurology -      Goals Addressed                      This Visit's Progress      Medication Management (pt-stated)         I will take my medication as directed. Barriers: none  Plan for overcoming my barriers: Patient states will take all medication as prescribed  Confidence: 9/10  Anticipated Goal Completion Date: within 30 minutes               LPN Care Coordinator provided contact information. Plan for follow-up call in 5-7 days based on severity of symptoms and risk factors. Plan for next call: follow-up appointment-Discuss if follow up appointment .     Jose Rodriguez LPN

## 2022-11-09 LAB
BACTERIA SPEC CULT: NORMAL
SERVICE CMNT-IMP: NORMAL

## 2022-11-11 DIAGNOSIS — T14.8XXA INFECTED BLISTER: Primary | ICD-10-CM

## 2022-11-11 DIAGNOSIS — L08.9 INFECTED BLISTER: Primary | ICD-10-CM

## 2022-11-11 RX ORDER — MUPIROCIN CALCIUM 20 MG/G
CREAM TOPICAL
Qty: 30 G | Refills: 0 | Status: SHIPPED | OUTPATIENT
Start: 2022-11-11 | End: 2022-12-11

## 2022-11-15 ENCOUNTER — CARE COORDINATION (OUTPATIENT)
Dept: CARE COORDINATION | Facility: CLINIC | Age: 52
End: 2022-11-15

## 2022-11-15 NOTE — CARE COORDINATION
Care Transitions Outreach Attempt    Call within 2 business days of discharge: Yes   Attempted to reach patient for transitions of care follow up. Unable to reach patient. Will attempt to contact patient next business day. Patient: Cl Noriega Patient : 1970 MRN: [id number]    Last Discharge [address]       Date Complaint Diagnosis Description Type Department Provider    22   Admission (Discharged) Yolanda Green MD              Was this an external facility discharge?  No Discharge Facility: [facility]    Noted following upcoming appointments from discharge chart review:   [facility] follow up appointment(s):   Future Appointments   Date Time Provider Cole Leggett   2022  8:45 AM Sivakumar Keller MD [facility] AM[facility]   2022 11:30 AM GRADY Santana [facility] AMB   2022  3:00 PM Sivakumar Keller MD [facility] AMB   2022 10:00 AM Sarah Carson MD [facility] AM[facility]   2022  8:45 AM Sivakumar Keller MD [facility] AMB   2023  8:40 AM Juan Barclay MD [facility] AMB   2023  1:30 PM Bess Ma MD [facility] (2-) Neurology -     Non-[facility] follow up appointment(s):

## 2022-11-16 ENCOUNTER — CARE COORDINATION (OUTPATIENT)
Dept: CARE COORDINATION | Facility: CLINIC | Age: 52
End: 2022-11-16

## 2022-11-16 ENCOUNTER — PATIENT MESSAGE (OUTPATIENT)
Dept: ENDOCRINOLOGY | Age: 52
End: 2022-11-16

## 2022-11-16 DIAGNOSIS — E11.65 UNCONTROLLED TYPE 2 DIABETES MELLITUS WITH HYPERGLYCEMIA (HCC): Primary | ICD-10-CM

## 2022-11-16 NOTE — CARE COORDINATION
Care Transitions Outreach Attempt    Call within 2 business days of discharge: Yes   2nd Attempted to reach patient for transitions of care follow up. Unable to reach patient. Will re-open if phone call is returned. Episode Resolved. Patient: Kellee Boyle Patient : 1970 MRN: [id number]    Last Discharge [address]       Date Complaint Diagnosis Description Type Department Provider    22   Admission (Discharged) Cleo Peres MD              Was this an external facility discharge?  No Discharge Facility: [facility]    Noted following upcoming appointments from discharge chart review:   [facility] follow up appointment(s):   Future Appointments   Date Time Provider Cole Leggett   2022  8:45 AM Malika Bejarano MD [facility] AMB   2022 11:30 AM GRADY Emanuel [facility] AMB   2022  3:00 PM Malika Bejarano MD [facility] AMB   2022 10:00 AM Danny Hendricks [facility]   2022  8:45 AM Malika Bejarano MD [facility] AMB   2023  8:40 AM Haven Poe MD [facility] AMB   2023  1:30 PM Martha Pisano MD [facility] (2-) Neurology -     Non-[facility] follow up appointment(s): n/a

## 2022-11-17 NOTE — TELEPHONE ENCOUNTER
From: Cl Noriega  To: Elsie Kahn  Sent: 11/16/2022 12:46 PM EST  Subject: Monjouro: test strips    I am in need of refills on test strips and if we are going up on [place] I need that sent in too the pharmacy also hasnt been filling my praluent either sejal why Im switching my harmacies at beginning of year

## 2022-11-18 ENCOUNTER — HOSPITAL ENCOUNTER (EMERGENCY)
Age: 52
Discharge: HOME | End: 2022-11-18
Payer: MEDICARE

## 2022-11-18 ENCOUNTER — HOSPITAL ENCOUNTER (EMERGENCY)
Dept: ULTRASOUND IMAGING | Age: 52
Discharge: HOME | End: 2022-11-21
Payer: MEDICARE

## 2022-11-18 ENCOUNTER — OFFICE VISIT (OUTPATIENT)
Dept: FAMILY MEDICINE CLINIC | Facility: CLINIC | Age: 52
End: 2022-11-18
Payer: MEDICARE

## 2022-11-18 ENCOUNTER — HOSPITAL ENCOUNTER (EMERGENCY)
Dept: GENERAL RADIOLOGY | Age: 52
Discharge: HOME | End: 2022-11-21
Payer: MEDICARE

## 2022-11-18 VITALS
DIASTOLIC BLOOD PRESSURE: 60 MMHG | HEART RATE: 88 BPM | TEMPERATURE: 97.6 F | WEIGHT: 199 LBS | OXYGEN SATURATION: 97 % | SYSTOLIC BLOOD PRESSURE: 130 MMHG | RESPIRATION RATE: 15 BRPM | HEIGHT: 64 IN | BODY MASS INDEX: 33.97 KG/M2

## 2022-11-18 DIAGNOSIS — L03.119 CELLULITIS OF LOWER EXTREMITY, UNSPECIFIED LATERALITY: Primary | ICD-10-CM

## 2022-11-18 DIAGNOSIS — R23.8 SKIN BULLA: ICD-10-CM

## 2022-11-18 DIAGNOSIS — L03.116 CELLULITIS OF LEFT LOWER LEG: Primary | ICD-10-CM

## 2022-11-18 LAB
ALBUMIN SERPL-MCNC: 3.3 G/DL (ref 3.5–5)
ALBUMIN/GLOB SERPL: 0.8 {RATIO} (ref 0.4–1.6)
ALP SERPL-CCNC: 68 U/L (ref 50–130)
ALT SERPL-CCNC: 30 U/L (ref 12–65)
ANION GAP SERPL CALC-SCNC: 5 MMOL/L (ref 2–11)
AST SERPL-CCNC: 38 U/L (ref 15–37)
BASOPHILS # BLD: 0.1 K/UL (ref 0–0.2)
BASOPHILS NFR BLD: 1 % (ref 0–2)
BILIRUB SERPL-MCNC: 0.9 MG/DL (ref 0.2–1.1)
BUN SERPL-MCNC: 13 MG/DL (ref 6–23)
CALCIUM SERPL-MCNC: 9.4 MG/DL (ref 8.3–10.4)
CHLORIDE SERPL-SCNC: 109 MMOL/L (ref 101–110)
CO2 SERPL-SCNC: 25 MMOL/L (ref 21–32)
CREAT SERPL-MCNC: 0.62 MG/DL (ref 0.6–1)
DIFFERENTIAL METHOD BLD: ABNORMAL
EOSINOPHIL # BLD: 0.1 K/UL (ref 0–0.8)
EOSINOPHIL NFR BLD: 1 % (ref 0.5–7.8)
ERYTHROCYTE [DISTWIDTH] IN BLOOD BY AUTOMATED COUNT: 12.9 % (ref 11.9–14.6)
GLOBULIN SER CALC-MCNC: 4.2 G/DL (ref 2.8–4.5)
GLUCOSE SERPL-MCNC: 173 MG/DL (ref 65–100)
HCT VFR BLD AUTO: 40.3 % (ref 35.8–46.3)
HGB BLD-MCNC: 13.1 G/DL (ref 11.7–15.4)
IMM GRANULOCYTES # BLD AUTO: 0.1 K/UL (ref 0–0.5)
IMM GRANULOCYTES NFR BLD AUTO: 1 % (ref 0–5)
LACTATE SERPL-SCNC: 1.3 MMOL/L (ref 0.4–2)
LYMPHOCYTES # BLD: 2.3 K/UL (ref 0.5–4.6)
LYMPHOCYTES NFR BLD: 24 % (ref 13–44)
MCH RBC QN AUTO: 28.2 PG (ref 26.1–32.9)
MCHC RBC AUTO-ENTMCNC: 32.5 G/DL (ref 31.4–35)
MCV RBC AUTO: 86.9 FL (ref 82–102)
MONOCYTES # BLD: 0.6 K/UL (ref 0.1–1.3)
MONOCYTES NFR BLD: 7 % (ref 4–12)
NEUTS SEG # BLD: 6.4 K/UL (ref 1.7–8.2)
NEUTS SEG NFR BLD: 67 % (ref 43–78)
NRBC # BLD: 0 K/UL (ref 0–0.2)
PLATELET # BLD AUTO: 335 K/UL (ref 150–450)
PMV BLD AUTO: 9 FL (ref 9.4–12.3)
POTASSIUM SERPL-SCNC: 4.7 MMOL/L (ref 3.5–5.1)
PROT SERPL-MCNC: 7.5 G/DL (ref 6.3–8.2)
RBC # BLD AUTO: 4.64 M/UL (ref 4.05–5.2)
SODIUM SERPL-SCNC: 139 MMOL/L (ref 133–143)
WBC # BLD AUTO: 9.6 K/UL (ref 4.3–11.1)

## 2022-11-18 PROCEDURE — 93971 EXTREMITY STUDY: CPT

## 2022-11-18 PROCEDURE — 85025 COMPLETE CBC W/AUTO DIFF WBC: CPT

## 2022-11-18 PROCEDURE — 83605 ASSAY OF LACTIC ACID: CPT

## 2022-11-18 PROCEDURE — 80053 COMPREHEN METABOLIC PANEL: CPT

## 2022-11-18 PROCEDURE — 73590 X-RAY EXAM OF LOWER LEG: CPT

## 2022-11-18 PROCEDURE — 99284 EMERGENCY DEPT VISIT MOD MDM: CPT

## 2022-11-18 PROCEDURE — 99214 OFFICE O/P EST MOD 30 MIN: CPT | Performed by: FAMILY MEDICINE

## 2022-11-18 RX ORDER — CEPHALEXIN 500 MG/1
500 CAPSULE ORAL 3 TIMES DAILY
Qty: 21 CAPSULE | Refills: 0 | Status: SHIPPED | OUTPATIENT
Start: 2022-11-18 | End: 2022-11-25 | Stop reason: SDUPTHER

## 2022-11-18 RX ORDER — TIRZEPATIDE 10 MG/.5ML
10 INJECTION, SOLUTION SUBCUTANEOUS WEEKLY
Qty: 6 ML | Refills: 1 | Status: SHIPPED | OUTPATIENT
Start: 2022-11-18

## 2022-11-18 RX ORDER — SULFAMETHOXAZOLE AND TRIMETHOPRIM 800; 160 MG/1; MG/1
1 TABLET ORAL 2 TIMES DAILY
Qty: 20 TABLET | Refills: 0 | Status: SHIPPED | OUTPATIENT
Start: 2022-11-18 | End: 2022-11-25 | Stop reason: SDUPTHER

## 2022-11-18 RX ORDER — HYDROCODONE BITARTRATE AND ACETAMINOPHEN 5; 325 MG/1; MG/1
1 TABLET ORAL EVERY 6 HOURS PRN
Qty: 10 TABLET | Refills: 0 | Status: SHIPPED | OUTPATIENT
Start: 2022-11-18 | End: 2022-11-21

## 2022-11-18 RX ORDER — GLUCOSAMINE HCL/CHONDROITIN SU 500-400 MG
CAPSULE ORAL
Qty: 200 STRIP | Refills: 3 | Status: SHIPPED | OUTPATIENT
Start: 2022-11-18

## 2022-11-18 ASSESSMENT — ENCOUNTER SYMPTOMS
NAUSEA: 0
DIARRHEA: 0
ABDOMINAL PAIN: 0
COLOR CHANGE: 0
VOMITING: 0
SHORTNESS OF BREATH: 0

## 2022-11-18 NOTE — Clinical Note
Nichole Jain was seen and treated in our emergency department on 11/18/2022. She may return to work on 11/21/2022. If you have any questions or concerns, please don't hesitate to call.       CAR Mcfarlane

## 2022-11-18 NOTE — ED NOTES
I have reviewed discharge instructions with the patient. The patient verbalized understanding. Ambulatory to exit. No questions.  Prescriptions x 3 in hand     Gerald Siddiqi RN  11/18/22 0909

## 2022-11-18 NOTE — Clinical Note
Edwige Hendricks was seen and treated in our emergency department on 11/18/2022. She may return to work on 11/21/2022. If you have any questions or concerns, please don't hesitate to call.       CAR Zaman

## 2022-11-18 NOTE — ED PROVIDER NOTES
Emergency Department Provider Note                   PCP:                Santos Feliciano MD               Age: 47 y.o. Sex: female       ICD-10-CM    1. Cellulitis of left lower leg  L03.116 sulfamethoxazole-trimethoprim (BACTRIM DS) 800-160 MG per tablet     HYDROcodone-acetaminophen (NORCO) 5-325 MG per tablet          DISPOSITION Decision To Discharge 11/18/2022 12:05:42 PM       MDM  Number of Diagnoses or Management Options  Cellulitis of left lower leg  Diagnosis management comments: Vital signs reviewed, patient stable, NAD, afebrile, nontoxic in appearance     No tachycardia, afebrile, respiration rate 18. Do not feel a sepsis rule out is warranted at this time. Will obtain CBC, CMP, lactic  Will obtain duplex left lower extremity   x-ray left lower extremity    No Leukocytosis, no anemia noted on CBC  No concerning findings on CMP  Lactic acid within normal limits at 1.3    Duplex left lower extremity negative for DVT    X-ray left lower extremity radiographic evidence of osteomyelitis, no soft tissue gas is generalized. Based on history, physical exam, I do not feel further lab work nor imaging is warranted at this time in the emergency department. Urgent or emergent findings. Patient's does have what appears to be a mild cellulitis at this time and I believe she can be discharged home with a prescription for Keflex and Bactrim with close follow-up to her primary care provider. I discussed physical exam findings, laboratory and/or imaging findings, treatment and follow-up with the patient and those who were present. I answered any questions they had. They verbalized that they understood and were in agreement with treatment and disposition. I discussed signs and symptoms that would warrant a prompt return to the emergency department with the patient. I included the signs and symptoms on discharge paperwork. Patient verbalized that they understood.      Patient discharged home in stable condition with prescription for Keflex and Bactrim. She is to contact her primary care provider either today or on Monday to schedule follow-up beginning of next week. Return to ED precautions reiterated    Discussed this patient with my attending, Dr. Reyna Gardner, who is in agreement with treatment and disposition. Amount and/or Complexity of Data Reviewed  Clinical lab tests: ordered and reviewed  Tests in the radiology section of CPT®: ordered and reviewed  Review and summarize past medical records: yes  Independent visualization of images, tracings, or specimens: yes (Independent visualization of imaging)    Risk of Complications, Morbidity, and/or Mortality  Presenting problems: moderate  Diagnostic procedures: moderate  Management options: moderate    Patient Progress  Patient progress: stable             Orders Placed This Encounter   Procedures    XR TIBIA FIBULA LEFT (2 VIEWS)    CBC with Auto Differential    Comprehensive Metabolic Panel    Lactic Acid    POCT Urine Dipstick    Vascular duplex lower extremity venous left        Medications - No data to display    Discharge Medication List as of 11/18/2022 12:14 PM        START taking these medications    Details   cephALEXin (KEFLEX) 500 MG capsule Take 1 capsule by mouth 3 times daily for 7 days, Disp-21 capsule, R-0Print      sulfamethoxazole-trimethoprim (BACTRIM DS) 800-160 MG per tablet Take 1 tablet by mouth 2 times daily for 10 days, Disp-20 tablet, R-0Print      HYDROcodone-acetaminophen (NORCO) 5-325 MG per tablet Take 1 tablet by mouth every 6 hours as needed for Pain for up to 3 days. Intended supply: 3 days.  Take lowest dose possible to manage pain, Disp-10 tablet, R-0Print              aMdisyn Vargas is a 47 y.o. female who presents to the Emergency Department with chief complaint of    Chief Complaint   Patient presents with    Cellulitis      48 yo female with hx of hypertension, hypercholesterolemia, CAD, diabetic neuropathy, heart failure presents to the emergency department today with chief complaint of worsening pain and redness in left lower extremity the past 5 days. Patient was admitted to the hospital and treated for cellulitis on 11/3/22. Was discharged home with a prescription for oral clindamycin. Patient states that she finished clindamycin 5 days ago and redness, pain and swelling began again. With her primary care provider today due to concern for returning redness. Primary care provider referred patient to the emergency department. The history is provided by the patient and the spouse. No  was used. All other systems reviewed and are negative unless otherwise stated in the history of present illness section. Review of Systems   Constitutional:  Negative for chills, fatigue and fever. Respiratory:  Negative for shortness of breath. Cardiovascular:  Negative for chest pain and palpitations. Gastrointestinal:  Negative for abdominal pain, diarrhea, nausea and vomiting. Skin:  Negative for color change. Redness, swelling, tenderness left lower extremity   Neurological:  Negative for weakness and headaches. All other systems reviewed and are negative.     Past Medical History:   Diagnosis Date    Arrhythmia     palpatations    Asthma     Bipolar disorder ([address]     Coronary artery disease     cardiac cath 11/5/2020    Diabetic neuropathy (HCC)     Fatty liver     GERD (gastroesophageal reflux disease)     Heart failure ([address]     Hypercholesterolemia     Hypertension     Hypertriglyceridemia     Meniere's disease     Migraine     Morbid obesity ([address]     Obstructive sleep apnea     CPAP    Palpitations     Peptic ulcer disease 2009    Psychiatric disorder     bipolar    Syncope and collapse     TIA (transient ischemic attack)     pt denies- states she had a migraine causing face tingling        Past Surgical History:   Procedure Laterality Date    BREAST LUMPECTOMY CARDIAC CATHETERIZATION  11/2020    CARDIAC CATHETERIZATION  2009    x 4    CARDIAC PROCEDURE N/A 8/22/2022    LEFT HEART CATH / CORONARY ANGIOGRAPHY W GRAFTS performed by Drake Jaramillo MD at [facility]  2004    CORONARY ARTERY BYPASS GRAFT  11/18/2020    X 4    GYN  2010    ovaries rem    NEUROLOGICAL SURGERY      x3, lumbar region, cervical    ORTHOPEDIC SURGERY      left wrist surgery,back surgery(discectomy-removed)    OTHER SURGICAL HISTORY  11/2020    OPEN HEART SX     LISSA AND BSO (CERVIX REMOVED)  2000    TONSILLECTOMY  1979    as a child    UROLOGICAL SURGERY      bladder sling        Family History   Problem Relation Age of Onset    Diabetes Mother     Coronary Art Dis Mother     Coronary Art Dis Father     Diabetes Brother     Coronary Art Dis Paternal Grandfather     Breast Cancer Sister     Diabetes Sister         Social History     Socioeconomic History    Marital status:      Spouse name: None    Number of children: None    Years of education: None    Highest education level: None   Tobacco Use    Smoking status: Never    Smokeless tobacco: Never   Vaping Use    Vaping Use: Never used   Substance and Sexual Activity    Alcohol use: Not Currently    Drug use: Not Currently     Types: Marijuana Benell Goldberg)    Sexual activity: Yes        Allergies: Adhesive tape and Penicillins    Discharge Medication List as of 11/18/2022 12:14 PM        CONTINUE these medications which have NOT CHANGED    Details   mupirocin (BACTROBAN) 2 % cream Apply 3 times daily. , Disp-30 g, R-0, Normal      ondansetron (ZOFRAN-ODT) 4 MG disintegrating tablet Take 1 tablet by mouth 3 times daily as needed for Nausea or Vomiting, Disp-21 tablet, R-0Normal      MOUNJARO 7.5 MG/0.5ML SOPN SC injection Inject 0.5 mLs into the skin once a week, Disp-2 mL, R-2, DAWNormal      albuterol sulfate (PROAIR RESPICLICK) 733 (90 Base) MCG/ACT aerosol powder inhalation Inhale 1 puff into the lungs every 6 hours as needed for Wheezing or Shortness of Breath, Disp-1 each, R-11Normal      albuterol sulfate HFA (PROVENTIL;VENTOLIN;PROAIR) 108 (90 Base) MCG/ACT inhaler Inhale 1 puff into the lungs every 6 hours as needed for Wheezing TAKE 1 PUFF BY INHALATION EVERY FOUR (4) HOURS AS NEEDED FOR WHEEZING OR SHORTNESS OF BREATH., Disp-18 g, R-2Normal      amLODIPine (NORVASC) 5 MG tablet Take 1 tablet by mouth daily, Disp-30 tablet, R-3Normal      atorvastatin (LIPITOR) 80 MG tablet Take 1 tablet by mouth daily, Disp-30 tablet, R-3Normal      clopidogrel (PLAVIX) 75 MG tablet Take 1 tablet by mouth daily, Disp-30 tablet, R-3Normal      dexlansoprazole (DEXILANT) 60 MG CPDR delayed release capsule Take 1 capsule by mouth daily, Disp-30 capsule, R-3Normal      diazePAM (VALIUM) 2 MG tablet Take 1 tablet by mouth 2 times daily as needed for Anxiety for up to 120 days. , Disp-60 tablet, R-3Normal      DULoxetine (CYMBALTA) 60 MG extended release capsule Take 2 capsules by mouth daily, Disp-30 capsule, R-3Normal      empagliflozin (JARDIANCE) 25 MG tablet Take 1 tablet by mouth daily, Disp-30 tablet, R-3Normal      Fluticasone furoate-vilanterol (BREO ELLIPTA) 200-25 MCG/INH AEPB inhaler Inhale 1 puff into the lungs daily, Disp-1 each, R-11Normal      furosemide (LASIX) 40 MG tablet Take 1 tablet by mouth daily, Disp-60 tablet, R-3Normal      gabapentin (NEURONTIN) 600 MG tablet Take 1 tablet by mouth 2 times daily for 180 days. , Disp-90 tablet, R-3Normal      tiZANidine (ZANAFLEX) 2 MG tablet Take 1 tablet by mouth in the morning and at bedtime, Disp-60 tablet, R-3Normal      lisinopril (PRINIVIL;ZESTRIL) 40 MG tablet Take 1 tablet by mouth every evening, Disp-30 tablet, R-3Normal      linaclotide (LINZESS) 145 MCG capsule Take 1 capsule by mouth daily, Disp-30 capsule, R-3Normal      hydrOXYzine HCl (ATARAX) 25 MG tablet Take 1 tablet by mouth 3 times daily as needed for Anxiety, Disp-30 tablet, R-3Normal      lamoTRIgine (LAMICTAL) 100 MG tablet Take 1 tablet by mouth 2 times daily, Disp-60 tablet, R-3Normal      potassium chloride (KLOR-CON M) 20 MEQ extended release tablet Take 1 tablet by mouth daily, Disp-30 tablet, R-3Normal      metoprolol succinate (TOPROL XL) 50 MG extended release tablet Take 1 tablet by mouth daily, Disp-30 tablet, R-3Normal      traZODone (DESYREL) 100 MG tablet Take 1 tablet by mouth daily, Disp-30 tablet, R-3Normal      ipratropium-albuterol (DUONEB) 0.5-2.5 (3) MG/3ML SOLN nebulizer solution Inhale 3 mLs into the lungs every 6 hours as needed for Shortness of Breath INHALE 3ML VIA NEBULIZER EVERY 6 HOURS, Disp-360 mL, R-11Normal      HUMULIN R U-500 KWIKPEN 500 UNIT/ML SOPN concentrated injection pen 200 units before breakfast, 210 units before lunch, 105 units before supper, Disp-93 mL, R-3, DAWNormal      UNIFINE PENTIPS PLUS 31G X 6 MM MISC USE WITH INSULIN UP TO 4 TIMES DAILY, E11.65, DAWHistorical Med      Galcanezumab-gnlm (EMGALITY) 120 MG/ML SOSY INJECT CONTENTS OF 1 SYRINGE SUBCUTANEOUSLY EVERY 30 DAYS, Disp-1 mL, R-11Normal      Ubrogepant 50 MG TABS Take 1 tablet by mouth at onset of migraine, repeat in two hours if needed. Maximum 2/day up to 4/week., Disp-16 tablet, R-11Normal      alirocumab (PRALUENT) 75 MG/ML SOAJ injection pen Inject 75 mg into the skinHistorical Med      aspirin 81 MG EC tablet Take 81 mg by mouth every eveningHistorical Med      Loteprednol Etabonate (LOTEMAX SM) 0.38 % GEL 1 drop 3x a day x 1 wk, 2x a day x 1 wk, 1x a day x 1 wk, then d/c in both eyesHistorical Med      meloxicam (MOBIC) 15 MG tablet One dailyHistorical Med      nitroGLYCERIN (NITROSTAT) 0.4 MG SL tablet PLACE ONE TABLET UNDER TONGUE AS NEEDED FOR CHEST PAIN. MAY REPEAT EVERY 5 MINUTES FOR 2 MORE DOSES. CALL 911 ON SECOND DOSE. Historical Med              Vitals signs and nursing note reviewed.    Patient Vitals for the past 4 hrs:   Temp Pulse Resp BP SpO2   11/18/22 1224 -- 88 15 130/60 --   11/18/22 0959 97.6 °F (36.4 °C) 83 18 133/76 97 %          Physical Exam  Vitals and nursing note reviewed. Constitutional:       General: She is not in acute distress. Appearance: Normal appearance. She is obese. She is not ill-appearing, toxic-appearing or diaphoretic. HENT:      Head: Normocephalic and atraumatic. Eyes:      General: No scleral icterus. Extraocular Movements: Extraocular movements intact. Conjunctiva/sclera: Conjunctivae normal.   Cardiovascular:      Rate and Rhythm: Normal rate. Pulses: Normal pulses. Heart sounds: Normal heart sounds. Comments: PT and DP pulse on left lower extremity 2+  Pulmonary:      Effort: Pulmonary effort is normal.      Breath sounds: Normal breath sounds. Abdominal:      General: Bowel sounds are normal.      Palpations: Abdomen is soft. Tenderness: There is no abdominal tenderness. There is no guarding or rebound. Musculoskeletal:         General: Normal range of motion. Cervical back: Normal range of motion. Right lower leg: No edema. Left lower leg: Edema (Mild edema left lower extremity) present. Skin:     General: Skin is warm and dry. Capillary Refill: Capillary refill takes less than 2 seconds. Coloration: Skin is not jaundiced or pale. Findings: Erythema (Erythema anterior aspect of left lower extremity) present. No bruising, lesion or rash. Comments: Healing blister/scab that is peeling away at medial aspect of left calcaneus just distal to fibula   Neurological:      General: No focal deficit present. Mental Status: She is alert and oriented to person, place, and time. Psychiatric:         Mood and Affect: Mood normal.         Behavior: Behavior normal.         Thought Content:  Thought content normal.         Judgment: Judgment normal.                  Procedures        Results for orders placed or performed during the hospital encounter of 11/18/22 XR TIBIA FIBULA LEFT (2 VIEWS)    Narrative    History: Erythema and swelling of the left leg    EXAM: 2 views left leg    FINDINGS: No fracture or dislocation. No plain film evidence of osteomyelitis. Impression    No acute findings.    CBC with Auto Differential   Result Value Ref Range    WBC 9.6 4.3 - 11.1 K/uL    RBC 4.64 4.05 - 5.2 M/uL    Hemoglobin 13.1 11.7 - 15.4 g/dL    Hematocrit 40.3 35.8 - 46.3 %    MCV 86.9 82.0 - 102.0 FL    MCH 28.2 26.1 - 32.9 PG    MCHC 32.5 31.4 - 35.0 g/dL    RDW 12.9 11.9 - 14.6 %    Platelets 146 460 - 961 K/uL    MPV 9.0 (L) 9.4 - 12.3 FL    nRBC 0.00 0.0 - 0.2 K/uL    Differential Type AUTOMATED      Seg Neutrophils 67 43 - 78 %    Lymphocytes 24 13 - 44 %    Monocytes 7 4.0 - 12.0 %    Eosinophils % 1 0.5 - 7.8 %    Basophils 1 0.0 - 2.0 %    Immature Granulocytes 1 0.0 - 5.0 %    Segs Absolute 6.4 1.7 - 8.2 K/UL    Absolute Lymph # 2.3 0.5 - 4.6 K/UL    Absolute Mono # 0.6 0.1 - 1.3 K/UL    Absolute Eos # 0.1 0.0 - 0.8 K/UL    Basophils Absolute 0.1 0.0 - 0.2 K/UL    Absolute Immature Granulocyte 0.1 0.0 - 0.5 K/UL   Comprehensive Metabolic Panel   Result Value Ref Range    Sodium 139 133 - 143 mmol/L    Potassium 4.7 3.5 - 5.1 mmol/L    Chloride 109 101 - 110 mmol/L    CO2 25 21 - 32 mmol/L    Anion Gap 5 2 - 11 mmol/L    Glucose 173 (H) 65 - 100 mg/dL    BUN 13 6 - 23 MG/DL    Creatinine 0.62 0.6 - 1.0 MG/DL    Est, Glom Filt Rate >60 >60 ml/min/1.73m2    Calcium 9.4 8.3 - 10.4 MG/DL    Total Bilirubin 0.9 0.2 - 1.1 MG/DL    ALT 30 12 - 65 U/L    AST 38 (H) 15 - 37 U/L    Alk Phosphatase 68 50 - 130 U/L    Total Protein 7.5 6.3 - 8.2 g/dL    Albumin 3.3 (L) 3.5 - 5.0 g/dL    Globulin 4.2 2.8 - 4.5 g/dL    Albumin/Globulin Ratio 0.8 0.4 - 1.6     Lactic Acid   Result Value Ref Range    Lactic Acid, Plasma 1.3 0.4 - 2.0 MMOL/L   Vascular duplex lower extremity venous left    Narrative    HISTORY: Left lower extremity swelling and pain    Exam: Left lower extremity ultrasound    Technique: Realtime grayscale and color Doppler imaging is performed in the  longitudinal and transverse planes. FINDINGS: There is normal flow, augmentation, and compressibility within the  deep venous system from the groin to popliteal fossa. Posterior tibial vein and  peroneal vein are also normal. No Baker's cyst.    IMPRESSIONS: No evidence of deep venous thrombosis within the left lower  extremity. Vascular duplex lower extremity venous left   Final Result      XR TIBIA FIBULA LEFT (2 VIEWS)   Final Result   No acute findings. Voice dictation software was used during the making of this note. This software is not perfect and grammatical and other typographical errors may be present. This note has not been completely proofread for errors.       Ry Castro  11/18/22 [id number]

## 2022-11-18 NOTE — DISCHARGE INSTRUCTIONS
Evaluated in emergency department today for increasing redness after completing treatment for cellulitis    Lab work today is very reassuring  Ultrasound negative for blood clot  X-ray unremarkable    Prescription antibiotics to Keflex and Bactrim. Begin these antibiotics today    Prescription for short course of pain medication. This medication is a narcotic. Do not drink alcohol with this medication, do not drive or operate heavy machinery. Take caution as this medication increased risk for falls and may be causing to be groggy or foggy. Do not take other sedatives while taking this medication    Contact your primary care provider today or Monday to schedule a follow-up next week. Please return to the emergency department if increased redness, swelling, pain despite medications. Development of fevers or chills.   General worsening of her condition

## 2022-11-18 NOTE — TELEPHONE ENCOUNTER
Cherelle response:    I hope everything is going a little more smoothly with you and your sister. I've been working on her pump. I hope your foot got the care it needed after that blister      I sent in test strips and the 10mg dose of mounjaro (90 day supply). I'll plan to see you again before we make another dose increase.      Take care    ~Arleen

## 2022-11-18 NOTE — ED TRIAGE NOTES
Pt states that she's been dealing with cellulitis on her left lower leg since the beginning of the month. Pt was admitted and was given IV antibiotics which helped but it started getting worse again after she finished her antibiotics at home.

## 2022-11-22 ASSESSMENT — ENCOUNTER SYMPTOMS
SORE THROAT: 0
SHORTNESS OF BREATH: 0
BLOOD IN STOOL: 0
COUGH: 0
PHOTOPHOBIA: 0
EYE PAIN: 0
ABDOMINAL DISTENTION: 0
ABDOMINAL PAIN: 0
NAUSEA: 0
COLOR CHANGE: 0

## 2022-11-22 NOTE — PROGRESS NOTES
Annamarie Lazaro  1970 is a 47 y.o. female ,Established patient, here for evaluation of the following chief complaint(s): No chief complaint on file. 1. Cellulitis of lower extremity, unspecified laterality---DISCUSSED CASE WITH ER PHYSICIAN AND PATIENT WILL BE SENT THERE TODAY---SHE MAY NEED MORE IV ANTIBIOTICS      No follow-ups on file. Subjective   SUBJECTIVE/OBJECTIVE:  Patient states that she initially went to the ER on November 3rd with redness that started in her left leg and gradually got worse. ---SHE HAD BEEN TRIED ON PO CIPRO FOR 2 DAYS BUT NO BETTER AND SHE WAS UNABLE TO BEAR WEIGHT ON THE LEG.--SHE TESTED NEGATIVE FOR DVT IN HOSPITAL    She was admitted and placed on IV antibiotics and d/deloris on clindamycin. She finished the clindamycin  and for several days the leg has gotten red again. Also HER FOOT HAS BEEN PEELING. Leg Pain   The incident occurred more than 1 week ago. There was no injury mechanism. The pain is at a severity of 4/10. Associated symptoms include an inability to bear weight. Review of Systems   Constitutional:  Negative for chills and fever. HENT:  Negative for ear pain, hearing loss and sore throat. Eyes:  Negative for photophobia and pain. Respiratory:  Negative for cough and shortness of breath. Cardiovascular:  Negative for chest pain, palpitations and leg swelling. Gastrointestinal:  Negative for abdominal distention, abdominal pain, blood in stool and nausea. Genitourinary:  Negative for dysuria and urgency. Musculoskeletal:  Negative for joint swelling and myalgias. Skin:  Negative for color change, pallor and rash. Neurological:  Negative for speech difficulty, weakness, light-headedness and headaches. Hematological:  Negative for adenopathy. Psychiatric/Behavioral:  Negative for agitation, confusion, hallucinations, self-injury and suicidal ideas.          Objective   Physical Exam  Constitutional: Appearance: Normal appearance. HENT:      Head: Normocephalic and atraumatic. Nose: Nose normal.   Eyes:      Extraocular Movements: Extraocular movements intact. Conjunctiva/sclera: Conjunctivae normal.      Pupils: Pupils are equal, round, and reactive to light. Cardiovascular:      Rate and Rhythm: Normal rate and regular rhythm. Pulses: Normal pulses. Heart sounds: Normal heart sounds. Pulmonary:      Effort: Pulmonary effort is normal.      Breath sounds: Normal breath sounds. Abdominal:      General: Abdomen is flat. Bowel sounds are normal.      Palpations: Abdomen is soft. Skin:     General: Skin is warm and dry. Neurological:      General: No focal deficit present. Mental Status: She is alert and oriented to person, place, and time. Psychiatric:         Mood and Affect: Mood normal.                 An electronic signature was used to authenticate this note.     --Nestor Sales MD

## 2022-11-23 ENCOUNTER — OFFICE VISIT (OUTPATIENT)
Dept: FAMILY MEDICINE CLINIC | Facility: CLINIC | Age: 52
End: 2022-11-23
Payer: MEDICARE

## 2022-11-23 VITALS
HEART RATE: 80 BPM | OXYGEN SATURATION: 95 % | SYSTOLIC BLOOD PRESSURE: 114 MMHG | DIASTOLIC BLOOD PRESSURE: 70 MMHG | BODY MASS INDEX: 34.49 KG/M2 | HEIGHT: 64 IN | TEMPERATURE: 97.7 F | WEIGHT: 202 LBS

## 2022-11-23 DIAGNOSIS — Z79.4 TYPE 2 DIABETES MELLITUS WITH HYPERGLYCEMIA, WITH LONG-TERM CURRENT USE OF INSULIN (HCC): ICD-10-CM

## 2022-11-23 DIAGNOSIS — L03.119 CELLULITIS OF LOWER EXTREMITY, UNSPECIFIED LATERALITY: Primary | ICD-10-CM

## 2022-11-23 DIAGNOSIS — E11.65 TYPE 2 DIABETES MELLITUS WITH HYPERGLYCEMIA, WITH LONG-TERM CURRENT USE OF INSULIN (HCC): ICD-10-CM

## 2022-11-23 PROCEDURE — 3078F DIAST BP <80 MM HG: CPT | Performed by: FAMILY MEDICINE

## 2022-11-23 PROCEDURE — 3052F HG A1C>EQUAL 8.0%<EQUAL 9.0%: CPT | Performed by: FAMILY MEDICINE

## 2022-11-23 PROCEDURE — 3074F SYST BP LT 130 MM HG: CPT | Performed by: FAMILY MEDICINE

## 2022-11-23 PROCEDURE — 99213 OFFICE O/P EST LOW 20 MIN: CPT | Performed by: FAMILY MEDICINE

## 2022-11-23 ASSESSMENT — PATIENT HEALTH QUESTIONNAIRE - PHQ9
9. THOUGHTS THAT YOU WOULD BE BETTER OFF DEAD, OR OF HURTING YOURSELF: 0
SUM OF ALL RESPONSES TO PHQ QUESTIONS 1-9: 0
SUM OF ALL RESPONSES TO PHQ QUESTIONS 1-9: 0
6. FEELING BAD ABOUT YOURSELF - OR THAT YOU ARE A FAILURE OR HAVE LET YOURSELF OR YOUR FAMILY DOWN: 0
3. TROUBLE FALLING OR STAYING ASLEEP: 0
2. FEELING DOWN, DEPRESSED OR HOPELESS: 0
SUM OF ALL RESPONSES TO PHQ QUESTIONS 1-9: 0
7. TROUBLE CONCENTRATING ON THINGS, SUCH AS READING THE NEWSPAPER OR WATCHING TELEVISION: 0
SUM OF ALL RESPONSES TO PHQ9 QUESTIONS 1 & 2: 0
5. POOR APPETITE OR OVEREATING: 0
8. MOVING OR SPEAKING SO SLOWLY THAT OTHER PEOPLE COULD HAVE NOTICED. OR THE OPPOSITE, BEING SO FIGETY OR RESTLESS THAT YOU HAVE BEEN MOVING AROUND A LOT MORE THAN USUAL: 0
4. FEELING TIRED OR HAVING LITTLE ENERGY: 0
1. LITTLE INTEREST OR PLEASURE IN DOING THINGS: 0
10. IF YOU CHECKED OFF ANY PROBLEMS, HOW DIFFICULT HAVE THESE PROBLEMS MADE IT FOR YOU TO DO YOUR WORK, TAKE CARE OF THINGS AT HOME, OR GET ALONG WITH OTHER PEOPLE: 0
SUM OF ALL RESPONSES TO PHQ QUESTIONS 1-9: 0

## 2022-11-25 DIAGNOSIS — L03.116 CELLULITIS OF LEFT LOWER LEG: ICD-10-CM

## 2022-11-25 RX ORDER — SULFAMETHOXAZOLE AND TRIMETHOPRIM 800; 160 MG/1; MG/1
1 TABLET ORAL 2 TIMES DAILY
Qty: 10 TABLET | Refills: 0 | Status: SHIPPED | OUTPATIENT
Start: 2022-11-25 | End: 2022-11-30

## 2022-11-25 RX ORDER — CEPHALEXIN 500 MG/1
500 CAPSULE ORAL 3 TIMES DAILY
Qty: 21 CAPSULE | Refills: 0 | Status: SHIPPED | OUTPATIENT
Start: 2022-11-25 | End: 2022-12-02

## 2022-11-25 ASSESSMENT — ENCOUNTER SYMPTOMS
BLOOD IN STOOL: 0
ABDOMINAL DISTENTION: 0
SHORTNESS OF BREATH: 0
EYE PAIN: 0
COLOR CHANGE: 0
PHOTOPHOBIA: 0
ABDOMINAL PAIN: 0
NAUSEA: 0
SORE THROAT: 0
COUGH: 0

## 2022-11-25 NOTE — PROGRESS NOTES
Jed Pulse  1970 is a 47 y.o. female ,Established patient, here for evaluation of the following chief complaint(s):   Chief Complaint   Patient presents with    Follow-Up from Hospital     Was there for 3 hours and they did a bunch of test, and sent her home with double antibiotics, it is a little better but the pain is very bad still. 1. Cellulitis of lower extremity, unspecified laterality--ADVISED TO KEEP LEG ELEVATED WHEN IN SITTING POSITION-----ALSO BE SURE TO WATCH BLOOD SUGARS and be compliant with diet as high sugars can retard healing. No follow-ups on file. Subjective   SUBJECTIVE/OBJECTIVE:  She is here to follow up on cellulitis of the lower extremity which had gotten worse after previous hospitalization. She was sent to the ER on the last visit as a result of return of cellulitis after treatment with iv and po clindamycin. Today the redness has gone down but still having pain. At the most recent ER visit a venous doppler was done which was NEGATIVE FOR DVT. Xray was also done which showed no fracture no dislocation and NO EVIDENCE FOR OSTEOMYELITIS. She was placed on KEFLEX and BACTRIM which she continues to take. Leg Pain   The incident occurred more than 1 week ago. There was no injury mechanism. The pain is at a severity of 3/10. The pain is mild. The pain has been Fluctuating since onset. Pertinent negatives include no inability to bear weight, loss of motion or loss of sensation. The treatment provided mild relief. Review of Systems   Constitutional:  Negative for chills and fever. HENT:  Negative for ear pain, hearing loss and sore throat. Eyes:  Negative for photophobia and pain. Respiratory:  Negative for cough and shortness of breath. Cardiovascular:  Negative for chest pain, palpitations and leg swelling. Gastrointestinal:  Negative for abdominal distention, abdominal pain, blood in stool and nausea.    Genitourinary: Negative for dysuria and urgency. Musculoskeletal:  Negative for joint swelling and myalgias. Skin:  Negative for color change, pallor and rash. Neurological:  Negative for speech difficulty, weakness, light-headedness and headaches. Hematological:  Negative for adenopathy. Psychiatric/Behavioral:  Negative for agitation, confusion, hallucinations, self-injury and suicidal ideas. Objective   Physical Exam  Constitutional:       Appearance: Normal appearance. HENT:      Head: Normocephalic and atraumatic. Nose: Nose normal.   Eyes:      Extraocular Movements: Extraocular movements intact. Conjunctiva/sclera: Conjunctivae normal.      Pupils: Pupils are equal, round, and reactive to light. Cardiovascular:      Rate and Rhythm: Normal rate and regular rhythm. Pulses: Normal pulses. Heart sounds: Normal heart sounds. Pulmonary:      Effort: Pulmonary effort is normal.      Breath sounds: Normal breath sounds. Abdominal:      General: Abdomen is flat. Bowel sounds are normal.      Palpations: Abdomen is soft. Musculoskeletal:         General: Tenderness present. No swelling or deformity. Right lower leg: No edema. Left lower leg: No edema. Comments: Her leg appears less tender to palpation and the erythema has decreased. Skin:     General: Skin is warm and dry. Neurological:      General: No focal deficit present. Mental Status: She is alert and oriented to person, place, and time. Psychiatric:         Mood and Affect: Mood normal.                 An electronic signature was used to authenticate this note.     --Dylon Arizmendi MD

## 2022-11-29 DIAGNOSIS — L03.119 CELLULITIS OF LOWER EXTREMITY, UNSPECIFIED LATERALITY: Primary | ICD-10-CM

## 2022-11-29 DIAGNOSIS — M79.606 PAIN OF LOWER EXTREMITY, UNSPECIFIED LATERALITY: ICD-10-CM

## 2022-11-29 RX ORDER — CLINDAMYCIN HYDROCHLORIDE 300 MG/1
300 CAPSULE ORAL 4 TIMES DAILY
Qty: 40 CAPSULE | Refills: 0 | Status: SHIPPED | OUTPATIENT
Start: 2022-11-29 | End: 2022-12-09

## 2022-11-29 RX ORDER — OXYCODONE HYDROCHLORIDE 15 MG/1
15 TABLET ORAL DAILY PRN
Qty: 14 TABLET | Refills: 0 | Status: SHIPPED | OUTPATIENT
Start: 2022-11-29 | End: 2022-12-13

## 2022-12-14 ENCOUNTER — OFFICE VISIT (OUTPATIENT)
Dept: CARDIOLOGY CLINIC | Age: 52
End: 2022-12-14
Payer: MEDICARE

## 2022-12-14 VITALS
HEART RATE: 62 BPM | DIASTOLIC BLOOD PRESSURE: 62 MMHG | SYSTOLIC BLOOD PRESSURE: 106 MMHG | HEIGHT: 64 IN | WEIGHT: 201 LBS | BODY MASS INDEX: 34.31 KG/M2

## 2022-12-14 DIAGNOSIS — I25.10 CORONARY ARTERY DISEASE DUE TO CALCIFIED CORONARY LESION: ICD-10-CM

## 2022-12-14 DIAGNOSIS — E78.00 HYPERCHOLESTEROLEMIA: ICD-10-CM

## 2022-12-14 DIAGNOSIS — I25.84 CORONARY ARTERY DISEASE DUE TO CALCIFIED CORONARY LESION: ICD-10-CM

## 2022-12-14 DIAGNOSIS — I10 PRIMARY HYPERTENSION: ICD-10-CM

## 2022-12-14 DIAGNOSIS — R60.0 LOCALIZED EDEMA: ICD-10-CM

## 2022-12-14 DIAGNOSIS — E78.49 OTHER HYPERLIPIDEMIA: ICD-10-CM

## 2022-12-14 DIAGNOSIS — I25.810 CORONARY ARTERY DISEASE INVOLVING CORONARY BYPASS GRAFT OF NATIVE HEART WITHOUT ANGINA PECTORIS: Primary | ICD-10-CM

## 2022-12-14 DIAGNOSIS — I50.32 DIASTOLIC CHF, CHRONIC (HCC): ICD-10-CM

## 2022-12-14 PROCEDURE — 3078F DIAST BP <80 MM HG: CPT | Performed by: INTERNAL MEDICINE

## 2022-12-14 PROCEDURE — 3074F SYST BP LT 130 MM HG: CPT | Performed by: INTERNAL MEDICINE

## 2022-12-14 PROCEDURE — 99214 OFFICE O/P EST MOD 30 MIN: CPT | Performed by: INTERNAL MEDICINE

## 2022-12-14 RX ORDER — AMLODIPINE BESYLATE 5 MG/1
5 TABLET ORAL DAILY
Qty: 90 TABLET | Refills: 3 | Status: SHIPPED | OUTPATIENT
Start: 2022-12-14 | End: 2022-12-14 | Stop reason: ALTCHOICE

## 2022-12-14 RX ORDER — LISINOPRIL 40 MG/1
40 TABLET ORAL EVERY EVENING
Qty: 90 TABLET | Refills: 3 | Status: SHIPPED | OUTPATIENT
Start: 2022-12-14

## 2022-12-14 RX ORDER — FUROSEMIDE 40 MG/1
40 TABLET ORAL DAILY
Qty: 90 TABLET | Refills: 3 | Status: SHIPPED | OUTPATIENT
Start: 2022-12-14

## 2022-12-14 RX ORDER — CLOPIDOGREL BISULFATE 75 MG/1
75 TABLET ORAL DAILY
Qty: 90 TABLET | Refills: 3 | Status: SHIPPED | OUTPATIENT
Start: 2022-12-14

## 2022-12-14 RX ORDER — ATORVASTATIN CALCIUM 80 MG/1
80 TABLET, FILM COATED ORAL DAILY
Qty: 90 TABLET | Refills: 3 | Status: SHIPPED | OUTPATIENT
Start: 2022-12-14

## 2022-12-14 RX ORDER — POTASSIUM CHLORIDE 20 MEQ/1
20 TABLET, EXTENDED RELEASE ORAL DAILY
Qty: 90 TABLET | Refills: 3 | Status: SHIPPED | OUTPATIENT
Start: 2022-12-14

## 2022-12-14 NOTE — PROGRESS NOTES
[facility] CARDIOLOGY  [address], [address]  PHONE: [phone]      22    NAME:  Margie Alberto  : 1970  MRN: [id number]       SUBJECTIVE:   Margie Alberto is a 47 y.o. female seen for a follow up visit regarding the following:     Chief Complaint   Patient presents with    Hypertension         HPI:    No cp or chicas. No orthopnea or pnd. No palpitations or syncope. She has lost 41 pounds. Past Medical History, Past Surgical History, Family history, Social History, and Medications were all reviewed with the patient today and updated as necessary. Current Outpatient Medications   Medication Sig Dispense Refill    mupirocin (BACTROBAN) 2 % ointment APPLY 3 TIMES DAILY. MOUNJARO 10 MG/0.5ML SOPN SC injection Inject 0.5 mLs into the skin once a week 6 mL 1    ondansetron (ZOFRAN-ODT) 4 MG disintegrating tablet Take 1 tablet by mouth 3 times daily as needed for Nausea or Vomiting 21 tablet 0    albuterol sulfate (PROAIR RESPICLICK) 072 (90 Base) MCG/ACT aerosol powder inhalation Inhale 1 puff into the lungs every 6 hours as needed for Wheezing or Shortness of Breath 1 each 11    albuterol sulfate HFA (PROVENTIL;VENTOLIN;PROAIR) 108 (90 Base) MCG/ACT inhaler Inhale 1 puff into the lungs every 6 hours as needed for Wheezing TAKE 1 PUFF BY INHALATION EVERY FOUR (4) HOURS AS NEEDED FOR WHEEZING OR SHORTNESS OF BREATH. 18 g 2    amLODIPine (NORVASC) 5 MG tablet Take 1 tablet by mouth daily 30 tablet 3    atorvastatin (LIPITOR) 80 MG tablet Take 1 tablet by mouth daily 30 tablet 3    clopidogrel (PLAVIX) 75 MG tablet Take 1 tablet by mouth daily 30 tablet 3    dexlansoprazole (DEXILANT) 60 MG CPDR delayed release capsule Take 1 capsule by mouth daily 30 capsule 3    diazePAM (VALIUM) 2 MG tablet Take 1 tablet by mouth 2 times daily as needed for Anxiety for up to 120 days.  60 tablet 3    DULoxetine (CYMBALTA) 60 MG extended release capsule Take 2 capsules by mouth daily 30 capsule 3    empagliflozin (JARDIANCE) 25 MG tablet Take 1 tablet by mouth daily 30 tablet 3    Fluticasone furoate-vilanterol (BREO ELLIPTA) 200-25 MCG/INH AEPB inhaler Inhale 1 puff into the lungs daily 1 each 11    furosemide (LASIX) 40 MG tablet Take 1 tablet by mouth daily 60 tablet 3    gabapentin (NEURONTIN) 600 MG tablet Take 1 tablet by mouth 2 times daily for 180 days. (Patient taking differently: Take 600 mg by mouth daily.) 90 tablet 3    tiZANidine (ZANAFLEX) 2 MG tablet Take 1 tablet by mouth in the morning and at bedtime 60 tablet 3    lisinopril (PRINIVIL;ZESTRIL) 40 MG tablet Take 1 tablet by mouth every evening 30 tablet 3    linaclotide (LINZESS) 145 MCG capsule Take 1 capsule by mouth daily 30 capsule 3    hydrOXYzine HCl (ATARAX) 25 MG tablet Take 1 tablet by mouth 3 times daily as needed for Anxiety 30 tablet 3    lamoTRIgine (LAMICTAL) 100 MG tablet Take 1 tablet by mouth 2 times daily (Patient taking differently: Take 100 mg by mouth daily) 60 tablet 3    potassium chloride (KLOR-CON M) 20 MEQ extended release tablet Take 1 tablet by mouth daily 30 tablet 3    metoprolol succinate (TOPROL XL) 50 MG extended release tablet Take 1 tablet by mouth daily 30 tablet 3    traZODone (DESYREL) 100 MG tablet Take 1 tablet by mouth daily 30 tablet 3    ipratropium-albuterol (DUONEB) 0.5-2.5 (3) MG/3ML SOLN nebulizer solution Inhale 3 mLs into the lungs every 6 hours as needed for Shortness of Breath INHALE 3ML VIA NEBULIZER EVERY 6 HOURS 360 mL 11    HUMULIN R U-500 KWIKPEN 500 UNIT/ML SOPN concentrated injection pen 200 units before breakfast, 210 units before lunch, 105 units before supper 93 mL 3    Galcanezumab-gnlm (EMGALITY) 120 MG/ML SOSY INJECT CONTENTS OF 1 SYRINGE SUBCUTANEOUSLY EVERY 30 DAYS 1 mL 11    Ubrogepant 50 MG TABS Take 1 tablet by mouth at onset of migraine, repeat in two hours if needed. Maximum 2/day up to 4/week.  16 tablet 11    alirocumab (PRALUENT) 75 MG/ML SOAJ injection pen Inject 75 mg into the skin      aspirin 81 MG EC tablet Take 81 mg by mouth every evening      Loteprednol Etabonate (LOTEMAX SM) 0.38 % GEL 1 drop 3x a day x 1 wk, 2x a day x 1 wk, 1x a day x 1 wk, then d/c in both eyes      meloxicam (MOBIC) 15 MG tablet One daily      nitroGLYCERIN (NITROSTAT) 0.4 MG SL tablet PLACE ONE TABLET UNDER TONGUE AS NEEDED FOR CHEST PAIN. MAY REPEAT EVERY 5 MINUTES FOR 2 MORE DOSES. CALL 911 ON SECOND DOSE.      blood glucose monitor strips Check blood glucose 2 times daily. E11.65 200 strip 3    UNIFINE PENTIPS PLUS 31G X 6 MM MISC USE WITH INSULIN UP TO 4 TIMES DAILY, E11.65       No current facility-administered medications for this visit. Social History     Tobacco Use    Smoking status: Never    Smokeless tobacco: Never   Substance Use Topics    Alcohol use: Not Currently              PHYSICAL EXAM:   /62   Pulse 62   Ht 5' 4\" (1.626 m)   Wt 201 lb (91.2 kg)   BMI 34.50 kg/m²    Constitutional: Oriented to person, place, and time. Appears well-developed and well-nourished. Head: Normocephalic and atraumatic. Neck: Neck supple. Cardiovascular: Normal rate and regular rhythm with no murmur -No JVP  Pulmonary/Chest: Breath sounds normal.   Abdominal: Soft. Musculoskeletal: No edema. Neurological: Alert and oriented to person, place, and time. Skin: Skin is warm and dry. Psychiatric: Normal mood and affect. Vitals reviewed. Wt Readings from Last 3 Encounters:   12/14/22 201 lb (91.2 kg)   11/23/22 202 lb (91.6 kg)   11/18/22 199 lb (90.3 kg)       Medical problems and test results were reviewed with the patient today. ASSESSMENT and PLAN    1. Coronary artery disease involving coronary bypass graft of native heart without angina pectoris  Stable. Continue asa      2. Diastolic CHF, chronic (HCC)  Stable. Continue lasix      3. Hypercholesterolemia  Stable. Continue lipitor      4.  Primary hypertension  Stable. Continue lisinopril- stop norvasc as some dizzziness since weight loss and bp low      5. Localized edema  Stable. Continue lsaix- stop norvasc         Return in about 6 months (around 6/14/2023).          Danny Plasencia MD  12/14/2022  9:46 AM

## 2022-12-20 DIAGNOSIS — G43.109 CHRONIC MIGRAINE WITH AURA: Primary | ICD-10-CM

## 2022-12-20 NOTE — TELEPHONE ENCOUNTER
[facility] Daisha sent fax wanting 90 day supply of Emgality sent n. I called and spoke with the pt, she is fine with that. Just send the rx to THE [facility]. Rx is in and ready to be reviewed and signed.

## 2022-12-21 RX ORDER — GALCANEZUMAB 120 MG/ML
INJECTION, SOLUTION SUBCUTANEOUS
Qty: 3 ML | Refills: 3 | Status: SHIPPED | OUTPATIENT
Start: 2022-12-21

## 2022-12-22 ENCOUNTER — PATIENT MESSAGE (OUTPATIENT)
Dept: ENDOCRINOLOGY | Age: 52
End: 2022-12-22

## 2022-12-22 DIAGNOSIS — E11.65 UNCONTROLLED TYPE 2 DIABETES MELLITUS WITH HYPERGLYCEMIA (HCC): Primary | ICD-10-CM

## 2022-12-23 ENCOUNTER — TELEPHONE (OUTPATIENT)
Dept: ENDOCRINOLOGY | Age: 52
End: 2022-12-23

## 2022-12-23 NOTE — TELEPHONE ENCOUNTER
Patient stated that she is out of test strips (One touch Verio)and need them called into THE [facility] before they close for 3 days.

## 2022-12-27 RX ORDER — ALIROCUMAB 75 MG/ML
INJECTION, SOLUTION SUBCUTANEOUS
Qty: 2 ML | Refills: 0 | OUTPATIENT
Start: 2022-12-27

## 2022-12-27 RX ORDER — ALIROCUMAB 75 MG/ML
75 INJECTION, SOLUTION SUBCUTANEOUS
Qty: 6 ADJUSTABLE DOSE PRE-FILLED PEN SYRINGE | Refills: 3 | Status: SHIPPED | OUTPATIENT
Start: 2022-12-27

## 2022-12-27 NOTE — TELEPHONE ENCOUNTER
From: Melba Diaz  To: Martha Crews  Sent: 12/22/2022 7:32 PM EST  Subject: Neida 2    Also she keeps bottoming out at night 3am

## 2022-12-29 ENCOUNTER — OFFICE VISIT (OUTPATIENT)
Dept: FAMILY MEDICINE CLINIC | Facility: CLINIC | Age: 52
End: 2022-12-29
Payer: MEDICARE

## 2022-12-29 VITALS
TEMPERATURE: 97.7 F | DIASTOLIC BLOOD PRESSURE: 70 MMHG | BODY MASS INDEX: 33.63 KG/M2 | HEART RATE: 81 BPM | WEIGHT: 197 LBS | HEIGHT: 64 IN | SYSTOLIC BLOOD PRESSURE: 100 MMHG | OXYGEN SATURATION: 97 %

## 2022-12-29 DIAGNOSIS — Z12.31 ENCOUNTER FOR SCREENING MAMMOGRAM FOR MALIGNANT NEOPLASM OF BREAST: ICD-10-CM

## 2022-12-29 DIAGNOSIS — F51.01 PRIMARY INSOMNIA: ICD-10-CM

## 2022-12-29 DIAGNOSIS — I25.84 CORONARY ARTERY DISEASE DUE TO CALCIFIED CORONARY LESION: ICD-10-CM

## 2022-12-29 DIAGNOSIS — E55.9 VITAMIN D DEFICIENCY: ICD-10-CM

## 2022-12-29 DIAGNOSIS — M62.838 MUSCLE SPASM: ICD-10-CM

## 2022-12-29 DIAGNOSIS — F31.9 BIPOLAR 1 DISORDER (HCC): ICD-10-CM

## 2022-12-29 DIAGNOSIS — E11.65 UNCONTROLLED TYPE 2 DIABETES MELLITUS WITH HYPERGLYCEMIA (HCC): ICD-10-CM

## 2022-12-29 DIAGNOSIS — M25.552 LEFT HIP PAIN: ICD-10-CM

## 2022-12-29 DIAGNOSIS — M25.59 PAIN IN OTHER JOINT: ICD-10-CM

## 2022-12-29 DIAGNOSIS — J45.40 MODERATE PERSISTENT ASTHMA WITHOUT COMPLICATION: ICD-10-CM

## 2022-12-29 DIAGNOSIS — M54.50 CHRONIC BILATERAL LOW BACK PAIN WITHOUT SCIATICA: Primary | ICD-10-CM

## 2022-12-29 DIAGNOSIS — I10 PRIMARY HYPERTENSION: ICD-10-CM

## 2022-12-29 DIAGNOSIS — G62.9 PERIPHERAL POLYNEUROPATHY: ICD-10-CM

## 2022-12-29 DIAGNOSIS — J45.20 MILD INTERMITTENT REACTIVE AIRWAY DISEASE WITHOUT COMPLICATION: ICD-10-CM

## 2022-12-29 DIAGNOSIS — R60.0 LOCALIZED EDEMA: ICD-10-CM

## 2022-12-29 DIAGNOSIS — I25.10 CORONARY ARTERY DISEASE DUE TO CALCIFIED CORONARY LESION: ICD-10-CM

## 2022-12-29 DIAGNOSIS — E78.49 OTHER HYPERLIPIDEMIA: ICD-10-CM

## 2022-12-29 DIAGNOSIS — K21.9 GASTROESOPHAGEAL REFLUX DISEASE, UNSPECIFIED WHETHER ESOPHAGITIS PRESENT: ICD-10-CM

## 2022-12-29 DIAGNOSIS — G89.29 CHRONIC BILATERAL LOW BACK PAIN WITHOUT SCIATICA: Primary | ICD-10-CM

## 2022-12-29 DIAGNOSIS — K59.09 OTHER CONSTIPATION: ICD-10-CM

## 2022-12-29 DIAGNOSIS — F41.9 ANXIETY: ICD-10-CM

## 2022-12-29 DIAGNOSIS — F33.0 MILD EPISODE OF RECURRENT MAJOR DEPRESSIVE DISORDER (HCC): ICD-10-CM

## 2022-12-29 LAB
25(OH)D3 SERPL-MCNC: 40.5 NG/ML (ref 30–100)
ALBUMIN SERPL-MCNC: 4.1 G/DL (ref 3.5–5)
ALBUMIN/GLOB SERPL: 1.2 {RATIO} (ref 0.4–1.6)
ALP SERPL-CCNC: 77 U/L (ref 50–136)
ALT SERPL-CCNC: 32 U/L (ref 12–65)
ANION GAP SERPL CALC-SCNC: 8 MMOL/L (ref 2–11)
AST SERPL-CCNC: 12 U/L (ref 15–37)
BASOPHILS # BLD: 0.1 K/UL (ref 0–0.2)
BASOPHILS NFR BLD: 1 % (ref 0–2)
BILIRUB SERPL-MCNC: 0.7 MG/DL (ref 0.2–1.1)
BUN SERPL-MCNC: 9 MG/DL (ref 6–23)
CALCIUM SERPL-MCNC: 9.5 MG/DL (ref 8.3–10.4)
CHLORIDE SERPL-SCNC: 108 MMOL/L (ref 101–110)
CHOLEST SERPL-MCNC: 208 MG/DL
CO2 SERPL-SCNC: 25 MMOL/L (ref 21–32)
CREAT SERPL-MCNC: 0.7 MG/DL (ref 0.6–1)
DIFFERENTIAL METHOD BLD: ABNORMAL
EOSINOPHIL # BLD: 0.3 K/UL (ref 0–0.8)
EOSINOPHIL NFR BLD: 3 % (ref 0.5–7.8)
ERYTHROCYTE [DISTWIDTH] IN BLOOD BY AUTOMATED COUNT: 13.4 % (ref 11.9–14.6)
EST. AVERAGE GLUCOSE BLD GHB EST-MCNC: 180 MG/DL
GLOBULIN SER CALC-MCNC: 3.4 G/DL (ref 2.8–4.5)
GLUCOSE SERPL-MCNC: 222 MG/DL (ref 65–100)
HBA1C MFR BLD: 7.9 % (ref 4.8–5.6)
HCT VFR BLD AUTO: 45.8 % (ref 35.8–46.3)
HDLC SERPL-MCNC: 44 MG/DL (ref 40–60)
HDLC SERPL: 4.7 {RATIO}
HGB BLD-MCNC: 14.5 G/DL (ref 11.7–15.4)
IMM GRANULOCYTES # BLD AUTO: 0 K/UL (ref 0–0.5)
IMM GRANULOCYTES NFR BLD AUTO: 0 % (ref 0–5)
LDLC SERPL CALC-MCNC: 120.6 MG/DL
LYMPHOCYTES # BLD: 2.4 K/UL (ref 0.5–4.6)
LYMPHOCYTES NFR BLD: 27 % (ref 13–44)
MCH RBC QN AUTO: 27.5 PG (ref 26.1–32.9)
MCHC RBC AUTO-ENTMCNC: 31.7 G/DL (ref 31.4–35)
MCV RBC AUTO: 86.9 FL (ref 82–102)
MONOCYTES # BLD: 0.7 K/UL (ref 0.1–1.3)
MONOCYTES NFR BLD: 8 % (ref 4–12)
NEUTS SEG # BLD: 5.4 K/UL (ref 1.7–8.2)
NEUTS SEG NFR BLD: 61 % (ref 43–78)
NRBC # BLD: 0 K/UL (ref 0–0.2)
PLATELET # BLD AUTO: 347 K/UL (ref 150–450)
PMV BLD AUTO: 9.4 FL (ref 9.4–12.3)
POTASSIUM SERPL-SCNC: 3.8 MMOL/L (ref 3.5–5.1)
PROT SERPL-MCNC: 7.5 G/DL (ref 6.3–8.2)
RBC # BLD AUTO: 5.27 M/UL (ref 4.05–5.2)
SODIUM SERPL-SCNC: 141 MMOL/L (ref 133–143)
TRIGL SERPL-MCNC: 217 MG/DL (ref 35–150)
VLDLC SERPL CALC-MCNC: 43.4 MG/DL (ref 6–23)
WBC # BLD AUTO: 9 K/UL (ref 4.3–11.1)

## 2022-12-29 PROCEDURE — 3074F SYST BP LT 130 MM HG: CPT | Performed by: FAMILY MEDICINE

## 2022-12-29 PROCEDURE — 3078F DIAST BP <80 MM HG: CPT | Performed by: FAMILY MEDICINE

## 2022-12-29 PROCEDURE — 3051F HG A1C>EQUAL 7.0%<8.0%: CPT | Performed by: FAMILY MEDICINE

## 2022-12-29 PROCEDURE — 99214 OFFICE O/P EST MOD 30 MIN: CPT | Performed by: FAMILY MEDICINE

## 2022-12-29 RX ORDER — ATORVASTATIN CALCIUM 80 MG/1
80 TABLET, FILM COATED ORAL DAILY
Qty: 90 TABLET | Refills: 3 | Status: SHIPPED | OUTPATIENT
Start: 2022-12-29

## 2022-12-29 RX ORDER — CLOPIDOGREL BISULFATE 75 MG/1
75 TABLET ORAL DAILY
Qty: 90 TABLET | Refills: 3 | Status: SHIPPED | OUTPATIENT
Start: 2022-12-29

## 2022-12-29 RX ORDER — DEXLANSOPRAZOLE 60 MG/1
60 CAPSULE, DELAYED RELEASE ORAL DAILY
Qty: 30 CAPSULE | Refills: 3 | Status: SHIPPED | OUTPATIENT
Start: 2022-12-29

## 2022-12-29 RX ORDER — DULOXETIN HYDROCHLORIDE 60 MG/1
120 CAPSULE, DELAYED RELEASE ORAL DAILY
Qty: 30 CAPSULE | Refills: 3 | Status: SHIPPED | OUTPATIENT
Start: 2022-12-29

## 2022-12-29 RX ORDER — DIAZEPAM 2 MG/1
2 TABLET ORAL 2 TIMES DAILY PRN
Qty: 60 TABLET | Refills: 3 | Status: SHIPPED | OUTPATIENT
Start: 2022-12-29 | End: 2023-04-28

## 2022-12-29 RX ORDER — HYDROCODONE BITARTRATE AND ACETAMINOPHEN 10; 325 MG/1; MG/1
1 TABLET ORAL EVERY 8 HOURS PRN
Qty: 90 TABLET | Refills: 0 | Status: SHIPPED | OUTPATIENT
Start: 2023-03-06 | End: 2023-04-05

## 2022-12-29 RX ORDER — LISINOPRIL 40 MG/1
40 TABLET ORAL EVERY EVENING
Qty: 90 TABLET | Refills: 3 | Status: CANCELLED | OUTPATIENT
Start: 2022-12-29

## 2022-12-29 RX ORDER — HYDROCODONE BITARTRATE AND ACETAMINOPHEN 10; 325 MG/1; MG/1
1 TABLET ORAL EVERY 8 HOURS PRN
Qty: 90 TABLET | Refills: 0 | Status: SHIPPED | OUTPATIENT
Start: 2023-01-06 | End: 2023-02-05

## 2022-12-29 RX ORDER — HYDROCODONE BITARTRATE AND ACETAMINOPHEN 10; 325 MG/1; MG/1
TABLET ORAL
COMMUNITY
Start: 2022-12-06 | End: 2022-12-29 | Stop reason: SDUPTHER

## 2022-12-29 RX ORDER — FUROSEMIDE 40 MG/1
40 TABLET ORAL DAILY
Qty: 90 TABLET | Refills: 3 | Status: SHIPPED | OUTPATIENT
Start: 2022-12-29

## 2022-12-29 RX ORDER — ALBUTEROL SULFATE 90 UG/1
1 POWDER, METERED RESPIRATORY (INHALATION) EVERY 6 HOURS PRN
Qty: 1 EACH | Refills: 11 | Status: SHIPPED | OUTPATIENT
Start: 2022-12-29

## 2022-12-29 RX ORDER — HYDROXYZINE HYDROCHLORIDE 25 MG/1
25 TABLET, FILM COATED ORAL 3 TIMES DAILY PRN
Qty: 30 TABLET | Refills: 3 | Status: SHIPPED | OUTPATIENT
Start: 2022-12-29

## 2022-12-29 RX ORDER — METOPROLOL SUCCINATE 50 MG/1
50 TABLET, EXTENDED RELEASE ORAL DAILY
Qty: 30 TABLET | Refills: 3 | Status: SHIPPED | OUTPATIENT
Start: 2022-12-29

## 2022-12-29 RX ORDER — LAMOTRIGINE 100 MG/1
100 TABLET ORAL DAILY
Qty: 30 TABLET | Refills: 3 | Status: SHIPPED | OUTPATIENT
Start: 2022-12-29

## 2022-12-29 RX ORDER — TRAZODONE HYDROCHLORIDE 100 MG/1
100 TABLET ORAL DAILY
Qty: 30 TABLET | Refills: 3 | Status: SHIPPED | OUTPATIENT
Start: 2022-12-29

## 2022-12-29 RX ORDER — ALBUTEROL SULFATE 90 UG/1
1 AEROSOL, METERED RESPIRATORY (INHALATION) EVERY 6 HOURS PRN
Qty: 18 G | Refills: 2 | Status: SHIPPED | OUTPATIENT
Start: 2022-12-29

## 2022-12-29 RX ORDER — TIZANIDINE 2 MG/1
2 TABLET ORAL 2 TIMES DAILY
Qty: 60 TABLET | Refills: 3 | Status: SHIPPED | OUTPATIENT
Start: 2022-12-29

## 2022-12-29 RX ORDER — HYDROCODONE BITARTRATE AND ACETAMINOPHEN 10; 325 MG/1; MG/1
1 TABLET ORAL EVERY 8 HOURS PRN
Qty: 90 TABLET | Refills: 0 | Status: SHIPPED | OUTPATIENT
Start: 2023-02-06 | End: 2023-03-08

## 2022-12-29 RX ORDER — GABAPENTIN 600 MG/1
600 TABLET ORAL 2 TIMES DAILY
Qty: 90 TABLET | Refills: 3 | Status: SHIPPED | OUTPATIENT
Start: 2022-12-29 | End: 2023-06-27

## 2022-12-29 ASSESSMENT — PATIENT HEALTH QUESTIONNAIRE - PHQ9
SUM OF ALL RESPONSES TO PHQ QUESTIONS 1-9: 0
SUM OF ALL RESPONSES TO PHQ QUESTIONS 1-9: 0
10. IF YOU CHECKED OFF ANY PROBLEMS, HOW DIFFICULT HAVE THESE PROBLEMS MADE IT FOR YOU TO DO YOUR WORK, TAKE CARE OF THINGS AT HOME, OR GET ALONG WITH OTHER PEOPLE: 0
7. TROUBLE CONCENTRATING ON THINGS, SUCH AS READING THE NEWSPAPER OR WATCHING TELEVISION: 0
SUM OF ALL RESPONSES TO PHQ QUESTIONS 1-9: 0
SUM OF ALL RESPONSES TO PHQ9 QUESTIONS 1 & 2: 0
2. FEELING DOWN, DEPRESSED OR HOPELESS: 0
9. THOUGHTS THAT YOU WOULD BE BETTER OFF DEAD, OR OF HURTING YOURSELF: 0
8. MOVING OR SPEAKING SO SLOWLY THAT OTHER PEOPLE COULD HAVE NOTICED. OR THE OPPOSITE, BEING SO FIGETY OR RESTLESS THAT YOU HAVE BEEN MOVING AROUND A LOT MORE THAN USUAL: 0
1. LITTLE INTEREST OR PLEASURE IN DOING THINGS: 0
5. POOR APPETITE OR OVEREATING: 0
6. FEELING BAD ABOUT YOURSELF - OR THAT YOU ARE A FAILURE OR HAVE LET YOURSELF OR YOUR FAMILY DOWN: 0
4. FEELING TIRED OR HAVING LITTLE ENERGY: 0
3. TROUBLE FALLING OR STAYING ASLEEP: 0
SUM OF ALL RESPONSES TO PHQ QUESTIONS 1-9: 0

## 2022-12-31 NOTE — PROGRESS NOTES
[facility]  1970 is a 47 y.o. female ,Established patient, here for evaluation of the following chief complaint(s):   Chief Complaint   Patient presents with    3 Month Follow-Up     Pt is fasting- Still having pain and tenderness in left leg when touching     Medication Refill          1. Chronic bilateral low back pain without sciatica  -     HYDROcodone-acetaminophen (NORCO)  MG per tablet; Take 1 tablet by mouth every 8 hours as needed for Pain for up to 30 days. Max Daily Amount: 3 tablets, Disp-90 tablet, R-0Normal  -     HYDROcodone-acetaminophen (NORCO)  MG per tablet; Take 1 tablet by mouth every 8 hours as needed for Pain for up to 30 days. Intended supply: 30 days Max Daily Amount: 3 tablets, Disp-90 tablet, R-0Normal  -     HYDROcodone-acetaminophen (NORCO)  MG per tablet; Take 1 tablet by mouth every 8 hours as needed for Pain for up to 30 days. Intended supply: 30 days Max Daily Amount: 3 tablets, Disp-90 tablet, R-0Normal  2. Moderate persistent asthma without complication  -     albuterol sulfate (PROAIR RESPICLICK) 564 (90 Base) MCG/ACT aerosol powder inhalation; Inhale 1 puff into the lungs every 6 hours as needed for Wheezing or Shortness of Breath, Disp-1 each, R-11Normal  3. Mild intermittent reactive airway disease without complication  -     albuterol sulfate HFA (PROVENTIL;VENTOLIN;PROAIR) 108 (90 Base) MCG/ACT inhaler; Inhale 1 puff into the lungs every 6 hours as needed for Wheezing TAKE 1 PUFF BY INHALATION EVERY FOUR (4) HOURS AS NEEDED FOR WHEEZING OR SHORTNESS OF BREATH., Disp-18 g, R-2Normal  4. Other hyperlipidemia  -     atorvastatin (LIPITOR) 80 MG tablet; Take 1 tablet by mouth daily, Disp-90 tablet, R-3Normal  -     Lipid Panel; Future  5. Coronary artery disease due to calcified coronary lesion--stopped norvasc--on praluent. -     atorvastatin (LIPITOR) 80 MG tablet;  Take 1 tablet by mouth daily, Disp-90 tablet, R-3Normal  -     clopidogrel (PLAVIX) 75 MG tablet; Take 1 tablet by mouth daily, Disp-90 tablet, R-3Normal  6. Gastroesophageal reflux disease, unspecified whether esophagitis present  -     dexlansoprazole (DEXILANT) 60 MG CPDR delayed release capsule; Take 1 capsule by mouth daily, Disp-30 capsule, R-3Normal  7. Anxiety  -     diazePAM (VALIUM) 2 MG tablet; Take 1 tablet by mouth 2 times daily as needed for Anxiety for up to 120 days. , Disp-60 tablet, R-3Normal  -     hydrOXYzine HCl (ATARAX) 25 MG tablet; Take 1 tablet by mouth 3 times daily as needed for Anxiety, Disp-30 tablet, R-3Normal  8. Mild episode of recurrent major depressive disorder (HCC)  -     DULoxetine (CYMBALTA) 60 MG extended release capsule; Take 2 capsules by mouth daily, Disp-30 capsule, R-3Normal  9. Bipolar 1 disorder (HCC)  -     DULoxetine (CYMBALTA) 60 MG extended release capsule; Take 2 capsules by mouth daily, Disp-30 capsule, R-3Normal  -     lamoTRIgine (LAMICTAL) 100 MG tablet; Take 1 tablet by mouth daily, Disp-30 tablet, R-3Normal  10. Uncontrolled type 2 diabetes mellitus with hyperglycemia (HCC)---SHE IS OFF TRULICITY AND ON MOUNJARO---has lost 40 lbs. -     empagliflozin (JARDIANCE) 25 MG tablet; Take 1 tablet by mouth daily, Disp-30 tablet, R-3Normal  -     CBC with Auto Differential; Future  -     Comprehensive Metabolic Panel; Future  -     Hemoglobin A1C; Future  11. Localized edema  -     furosemide (LASIX) 40 MG tablet; Take 1 tablet by mouth daily, Disp-90 tablet, R-3Normal  12. Peripheral polyneuropathy  -     gabapentin (NEURONTIN) 600 MG tablet; Take 1 tablet by mouth 2 times daily for 180 days. , Disp-90 tablet, R-3Normal  13. Other constipation  -     linaclotide (LINZESS) 145 MCG capsule; Take 1 capsule by mouth daily, Disp-30 capsule, R-3Normal  14. Primary hypertension  -     metoprolol succinate (TOPROL XL) 50 MG extended release tablet; Take 1 tablet by mouth daily, Disp-30 tablet, R-3Normal  15.  Primary insomnia  -     traZODone (DESYREL) 100 MG tablet; Take 1 tablet by mouth daily, Disp-30 tablet, R-3Normal  16. Pain in other joint  -     HYDROcodone-acetaminophen (NORCO)  MG per tablet; Take 1 tablet by mouth every 8 hours as needed for Pain for up to 30 days. Max Daily Amount: 3 tablets, Disp-90 tablet, R-0Normal  -     HYDROcodone-acetaminophen (NORCO)  MG per tablet; Take 1 tablet by mouth every 8 hours as needed for Pain for up to 30 days. Intended supply: 30 days Max Daily Amount: 3 tablets, Disp-90 tablet, R-0Normal  -     HYDROcodone-acetaminophen (NORCO)  MG per tablet; Take 1 tablet by mouth every 8 hours as needed for Pain for up to 30 days. Intended supply: 30 days Max Daily Amount: 3 tablets, Disp-90 tablet, R-0Normal  17. Muscle spasm  18. Vitamin D deficiency  -     Vitamin D 25 Hydroxy; Future  19. Encounter for screening mammogram for malignant neoplasm of breast  -     [facility] BIBI DIGITAL SCREEN BILATERAL; Future  20. Left hip pain  -     XR HIP 2-3 VW W PELVIS LEFT; Future    - I have reviewed the patients controlled substance prescription history, as maintained in the [place] prescription monitoring program, so that the prescription(s) for a  controlled substance can be given. - Patient is aware of addictive potential of medication.   - Patient is aware of respiratory suppression and is not experiencing any sedation with medication.   - Patient denies sedation or other side effects from medication  - Patient is instructed on compliance with dosing schedule and acknowledges that no early refill will be provided in the event of non-compliance, theft or loss of medication.   - Patient is aware that they may be subject to random drug testing and pill counts. Return in about 3 months (around 3/29/2023). Subjective   SUBJECTIVE/OBJECTIVE:  HER CELLULITIS IS BETTER BUT STILL HAS PAIN IN AREA.     Medication Refill  Pertinent negatives include no abdominal pain, chest pain, chills, coughing, fever, headaches, joint swelling, myalgias, nausea, rash, sore throat or weakness. Back Pain  This is a chronic problem. The current episode started more than 1 year ago. The problem occurs daily. The problem is unchanged. The pain is at a severity of 7/10. Pertinent negatives include no abdominal pain, chest pain, dysuria, fever, headaches or weakness. Hip Pain   The incident occurred more than 1 week ago. There was no injury mechanism. The pain is present in the left hip. The pain is at a severity of 3/10. Hypertension  This is a chronic problem. The current episode started more than 1 year ago. The problem has been gradually improving since onset. Pertinent negatives include no anxiety, blurred vision, chest pain, headaches, palpitations, peripheral edema, PND, shortness of breath or sweats. Diabetes  She presents for her follow-up (SHE IS FOLLOWED BY ENDOCRINOLOGY) diabetic visit. She has type 2 diabetes mellitus. Her disease course has been fluctuating. Pertinent negatives for hypoglycemia include no confusion, headaches, pallor, speech difficulty or sweats. Pertinent negatives for diabetes include no blurred vision, no chest pain and no weakness. Asthma  There is no cough, difficulty breathing, hoarse voice, shortness of breath or wheezing. This is a chronic problem. The problem occurs daily. The problem has been unchanged. Pertinent negatives include no chest pain, ear congestion, ear pain, fever, headaches, myalgias, PND, postnasal drip, sore throat or sweats. Her past medical history is significant for asthma. Review of Systems   Constitutional:  Negative for chills and fever. HENT:  Negative for ear pain, hearing loss, hoarse voice, postnasal drip and sore throat. Eyes:  Negative for blurred vision, photophobia and pain. Respiratory:  Negative for cough, shortness of breath and wheezing. Cardiovascular:  Negative for chest pain, palpitations, leg swelling and PND. Gastrointestinal:  Negative for abdominal distention, abdominal pain, blood in stool and nausea. Genitourinary:  Negative for dysuria and urgency. Musculoskeletal:  Positive for back pain. Negative for joint swelling and myalgias. Skin:  Negative for color change, pallor and rash. Neurological:  Negative for speech difficulty, weakness, light-headedness and headaches. Hematological:  Negative for adenopathy. Psychiatric/Behavioral:  Negative for agitation, confusion, hallucinations, self-injury and suicidal ideas. Objective   Physical Exam  Constitutional:       Appearance: Normal appearance. HENT:      Head: Normocephalic and atraumatic. Nose: Nose normal.   Eyes:      Extraocular Movements: Extraocular movements intact. Conjunctiva/sclera: Conjunctivae normal.      Pupils: Pupils are equal, round, and reactive to light. Cardiovascular:      Rate and Rhythm: Normal rate and regular rhythm. Pulses: Normal pulses. Heart sounds: Normal heart sounds. Pulmonary:      Effort: Pulmonary effort is normal.      Breath sounds: Normal breath sounds. Abdominal:      General: Abdomen is flat. Bowel sounds are normal.      Palpations: Abdomen is soft. Skin:     General: Skin is warm and dry. Neurological:      General: No focal deficit present. Mental Status: She is alert and oriented to person, place, and time. Psychiatric:         Mood and Affect: Mood normal.                 An electronic signature was used to authenticate this note.     --Ximena Houston MD

## 2023-01-04 ENCOUNTER — OFFICE VISIT (OUTPATIENT)
Dept: ENDOCRINOLOGY | Age: 53
End: 2023-01-04
Payer: MEDICARE

## 2023-01-04 VITALS — SYSTOLIC BLOOD PRESSURE: 118 MMHG | BODY MASS INDEX: 33.64 KG/M2 | WEIGHT: 196 LBS | DIASTOLIC BLOOD PRESSURE: 80 MMHG

## 2023-01-04 DIAGNOSIS — E78.00 PURE HYPERCHOLESTEROLEMIA, UNSPECIFIED: ICD-10-CM

## 2023-01-04 DIAGNOSIS — I10 ESSENTIAL (PRIMARY) HYPERTENSION: ICD-10-CM

## 2023-01-04 DIAGNOSIS — I50.32 CHRONIC DIASTOLIC (CONGESTIVE) HEART FAILURE (HCC): ICD-10-CM

## 2023-01-04 DIAGNOSIS — E11.65 UNCONTROLLED TYPE 2 DIABETES MELLITUS WITH HYPERGLYCEMIA (HCC): Primary | ICD-10-CM

## 2023-01-04 DIAGNOSIS — E55.9 VITAMIN D DEFICIENCY: ICD-10-CM

## 2023-01-04 PROCEDURE — 95251 CONT GLUC MNTR ANALYSIS I&R: CPT | Performed by: PHYSICIAN ASSISTANT

## 2023-01-04 PROCEDURE — 3079F DIAST BP 80-89 MM HG: CPT | Performed by: PHYSICIAN ASSISTANT

## 2023-01-04 PROCEDURE — 3074F SYST BP LT 130 MM HG: CPT | Performed by: PHYSICIAN ASSISTANT

## 2023-01-04 PROCEDURE — 99214 OFFICE O/P EST MOD 30 MIN: CPT | Performed by: PHYSICIAN ASSISTANT

## 2023-01-04 RX ORDER — GLUCOSAMINE HCL/CHONDROITIN SU 500-400 MG
CAPSULE ORAL
Qty: 200 STRIP | Refills: 3 | Status: SHIPPED | OUTPATIENT
Start: 2023-01-04

## 2023-01-04 RX ORDER — INSULIN HUMAN 500 [IU]/ML
INJECTION, SOLUTION SUBCUTANEOUS
Qty: 93 ML | Refills: 3
Start: 2023-01-04

## 2023-01-04 NOTE — PROGRESS NOTES
[facility] ENDOCRINOLOGY   AND   THYROID NODULE CLINIC    Kyle Andrade PA-C  [facility] Endocrinology and Thyroid Nodule Clinic  [address], [address]  [place], [place] Maulik Burroughs  Phone [phone]  Facsimile [phone]          Lilly Duncan is a 47 y.o. female seen 1/4/2023 for follow up evaluation of Type 2 diabetes        Assessment and Plan:    In office COVID-19 PPE worn and precautions taken    Interpretation of 72 hour glucose monitor: At least 72 hours of data were reviewed. The patient utilizes a dexcom G6 continuous glucose monitoring system. The average glucose during the reviewed timeframe was 205 with a standard deviation of 38. There is a pattern of constant fasting postprandial hyperglycemia after skipping last U-500 injection. 1. Uncontrolled type 2 diabetes mellitus with hyperglycemia ([address]  Patient with type 2 diabetes exhibiting improved overall health with greater than 20 pound weight loss on Dimitri GLP-1. Patient is fearful of taking her third dose of U-500 before supper due to overnight hypoglycemia. After lengthy discussion I have encouraged her to take her typical 200 units before breakfast and to take her lunchtime 190 units before supper not taking any insulin before lunch so that she has better 24-hour coverage. She often skips lunch. Diet and exercise were discussed. Progress in weight loss applauded and encouraged    - UT CONTINUOUS GLUCOSE MONITORING ANALYSIS I&R  - HUMULIN R U-500 KWIKPEN 500 UNIT/ML SOPN concentrated injection pen; 200 units before breakfast, 190 units before supper  Dispense: 93 mL; Refill: 3  - blood glucose monitor strips; Check blood glucose 2 times daily. E11.65  Dispense: 200 strip; Refill: 3    2. Vitamin D deficiency      3. Essential (primary) hypertension  BP Readings from Last 3 Encounters:   01/04/23 118/80   12/29/22 100/70   12/14/22 106/62         4.  Pure hypercholesterolemia, unspecified  Last LDL well above goal when unable to obtain PCSK9    5. Chronic diastolic (congestive) heart failure (HCC)  Would benefit from agents with cardiovascular endpoint data            Viktoria Reason was seen today for diabetes. Diagnoses and all orders for this visit:    Uncontrolled type 2 diabetes mellitus with hyperglycemia (HCC)  -     FL CONTINUOUS GLUCOSE MONITORING ANALYSIS I&R  -     HUMULIN R U-500 KWIKPEN 500 UNIT/ML SOPN concentrated injection pen; 200 units before breakfast, 190 units before supper  -     blood glucose monitor strips; Check blood glucose 2 times daily.  E11.65    Vitamin D deficiency    Essential (primary) hypertension    Pure hypercholesterolemia, unspecified    Chronic diastolic (congestive) heart failure (HCC)          History of Present Illness:      1/4/2023   Interim diabetes HPI:  Pt doing well with self care but under significant stress with personal issues and caring for loved ones    Interim medical history changes:   Off amlodipine after weight loss with fatigue/dizziness  Cellulitis of left lower extremity    Lifestyle Update:  Significant stress with  with new DX of dementia and live-in sister with tpe 1 diabetes  Less fried food, better food choices    Current Regimen: Mounjaro 10mg on Friday, Jardiance 25mg,  Humulin R U-500 200 units  before breakfast, 180 before lunch and 95 units before supper, novolog for correction 5/50>150  using correction daily    Glucose data:    Home blood glucose monitoring frequency:   By review of CGM download over past 30 days  Average blood glucose 205 ± 38  Time in range 27.5%  High 72.4%, Very High 12.9%  Low 0.1%, Very Low 0.1%     Typical Standard Deviation   Fasting 195 28   AC lunch 199 50   AC supper 228 41   Bedtime 212 29     Blood glucose levels are uncontrolled, most significant elevations are constant    Failed past therapies:   tried [place] and farxiga with coverage issues, bydureon with no improvement, victoza with no benefit, metformin with no control  Relevant co morbidities:    Denies HX pancreatitis, DKA, gastroparesis, foot ulcer    Optho:  Last eye exam was 6/2022 and demonstrated no diabetic retinopathy. Eye care specialist is my [facility]     Obesity:         Body mass index is 33.64 kg/m². decreasing steadily      Wt Readings from Last 3 Encounters:   01/04/23 196 lb (88.9 kg)   12/29/22 197 lb (89.4 kg)   12/14/22 201 lb (91.2 kg)         CardioVascular:    CABG x4    TIA? ? Renal:    Under care of nephro? no    On ARB/ACE-I  lisinopril - 40 MG        8/24/2016: Urine microalbumin to creatinine ratio 14 (negative). 5/7/2018: Serum creatinine 0.56.                           09/05/2018      Cr 0.49,                            01/30/2019      Cr 0.65, GFR >60                           03/25/2019      Cr 0.67,                            04/30/2019      Cr 0.62, , microalbumin/Cr ratio 5.0                           10/03/2019      Cr 0.85, GFR 82                           11/05/2020      Cr 0.53,                            04/01/2021      Cr  0.83, GFR 83                               10/07/2021      Cr 0.61,                                   01/05/2022      Cr 0.67,                                   06/27/2022      Cr 0.70, GFR >60       12/29/2022 Cr 0.70, GFR >60     Lipids:     Current therapy atorvastatin - 80 MG  clopidogrel - 75 MG with good compliance HOWEVER, often having difficultly obtaining PCSK9    9/16/2016: Total cholesterol 197, triglycerides 449, HDL 31, LDL cannot be calculated  secondary to high triglycerides. 1/12/2017: Total cholesterol 237, triglycerides 509, HDL 38, LDL cannot be calculated  secondary to high triglycerides. 2/5/2018: Total cholesterol 288, triglycerides 1321, HDL 32, LDL cannot be calculated  secondary to high triglycerides. 5/7/2018:  Total cholesterol 242, triglycerides 644, HDL 39, LDL cannot be calculated  secondary to high triglycerides.                                09/05/2018  TC- 245, LDL- 129, VLDL- 69,  HDL- 47, TG- 346                                  Was given RX for atorvastatin 40mg but never received RX                               09/05/2018  TC- 245, LDL- 129, VLDL- 69,  HDL- 47, TG- 346               Now compliant with atorvastatin 40mg                           03/05/2019  TC- 127, LDL- 45, VLDL- 46,  HDL- 36, TG- 231                           10/03/2019  TC- 219, LDL- 101, VLDL- 79,  HDL- 39, TG- 393                           11/04/2020  TC- 208, LDL- 129, VLDL- 32,  HDL- 47, TG- 179               On atorvastatin 80mg                           S/p CABGx4 Nov 2020 04/01/2021  TC- 192, LDL- 123,  VLDL- 36,  HDL- 33, TG- 202                      06/17/2022  TC- 180, LDL- 78.2, VLDL- 49.8,  HDL- 52, TG- 249       12/27/2022  TC- 208, LDL- 120.6, VLDL- 43.4,  HDL- 44, TG- 217 (unable to obtain praluent)        Lab Results   Component Value Date    CHOL 208 (H) 12/29/2022    CHOL 191 08/22/2022    CHOL 180 06/27/2022     Lab Results   Component Value Date    LDLCALC 120.6 (H) 12/29/2022    LDLCALC 115.6 (H) 08/22/2022    LDLCALC 78.2 06/27/2022      Lab Results   Component Value Date    LABVLDL 43.4 (H) 12/29/2022    LABVLDL 37.4 (H) 08/22/2022    LABVLDL 49.8 (H) 06/27/2022    VLDL 56 (H) 04/05/2022    VLDL 37 03/31/2022    VLDL 42 (H) 01/05/2022      Lab Results   Component Value Date    HDL 44 12/29/2022    HDL 38 (L) 08/22/2022    HDL 52 06/27/2022      Lab Results   Component Value Date    HDL 44 12/29/2022    HDL 38 (L) 08/22/2022    HDL 52 06/27/2022      Lab Results   Component Value Date    TRIG 217 (H) 12/29/2022    TRIG 187 (H) 08/22/2022    TRIG 249 (H) 06/27/2022     Lab Results   Component Value Date    CHOLHDLRATIO 4.7 12/29/2022    CHOLHDLRATIO 5.0 08/22/2022    CHOLHDLRATIO 3.5 06/27/2022         Hemoglobin A1c:  11/8/2011: 8.3%. 10/20/2015: 12.2%. 10/11/2016: 10.0%. 1/6/2016:  7.6%. 5/15/2017: 8.8%. 2/5/2018: 11.3%. 5/7/2018: 11.4%. 09/06/2018: 10.0%   01/02/2019: 8.0%   04/30/2019: 6.9%!!!   10/03/2019: 11.1%   02/04/2020: 9.8%   08/04/2020: 7.8%   11/04/2020: 7.1%   04/05/2021: 5.0% (inconsistent with BGL readings)   05/13/2021: 7.7%   10/07/2021: 8.8%   01/05/2022: 8.9%  06/27/2022: 9.6%  12/29/2022:  7.9%!! Hemoglobin A1C, POC   Date Value Ref Range Status   05/13/2021 7.7 % Final   02/04/2020 9.8 % Final        Thyroid:    8/15/2016: TSH 3.270.                           8/14/2017: TSH 1.950.                           09/06/2018: TSH 1.790                           03/25/2019: TSH 1.810                           02/04/2020: TSH 1.150                               07/15/2021: TSH 2.200                         Lab Results   Component Value Date/Time    TSH 0.682 03/31/2022 08:57 AM    TSH 2.200 07/15/2021 12:16 PM    TSH 1.150 02/04/2020 10:36 AM                                           Vitamin D:                              03/28/2018      25.2                           09/06/2018      44.5                           03/25/2019      53.8                           08/04/2020      31.8                           04/01/2021      29.9 (daily D3 5k, and week 50k ergocalciferol)                           10/07/2021          37.7                                    01/05/2022      22.3                              06/27/2022      42.4      12/29/2022 40.5                   Diabetes labs                           10/22/2019 C-peptide 2.6 (fasting and concomitant BGL  131)      Allergies & Medications:  Reviewed in chart. Review of Systems    Vital Signs:  /80 (Site: Left Upper Arm, Position: Sitting)   Wt 196 lb (88.9 kg)   BMI 33.64 kg/m²       Physical Exam  Constitutional:       Appearance: Normal appearance. Comments: Central obesity   HENT:      Head: Normocephalic.    Neck: Thyroid: No thyroid mass or thyromegaly. Vascular: No carotid bruit. Cardiovascular:      Rate and Rhythm: Normal rate and regular rhythm. Pulmonary:      Effort: Pulmonary effort is normal.      Breath sounds: Normal breath sounds. Abdominal:      Palpations: Abdomen is soft. Musculoskeletal:      Cervical back: Neck supple. Right lower leg: No edema. Left lower leg: No edema. Feet:      Right foot:      Protective Sensation: 3 sites tested. 3 sites sensed. Skin integrity: Dry skin present. Left foot:      Protective Sensation: 3 sites tested. 3 sites sensed. Skin integrity: Dry skin present. Lymphadenopathy:      Cervical: No cervical adenopathy. Skin:     General: Skin is warm and dry. Comments: Healing area of cellulitis over left medial ankle   Neurological:      General: No focal deficit present. Mental Status: She is alert. Sensory: Sensation is intact. Psychiatric:         Mood and Affect: Mood normal.         Behavior: Behavior normal.         Thought Content: Thought content normal.         Judgment: Judgment normal.           Return 3-4 mo, for Diabetes DM2 Follow-Up. Portions of this note were generated with the assistance of voice recogniton software. As such, some errors in transcription may be present.

## 2023-01-05 NOTE — TELEPHONE ENCOUNTER
Spoke with pharmacist and he stated that the patient was a few days early for her refill but he will bill it to where she can pick them up today.

## 2023-01-11 ENCOUNTER — PATIENT MESSAGE (OUTPATIENT)
Dept: ENDOCRINOLOGY | Age: 53
End: 2023-01-11

## 2023-01-11 DIAGNOSIS — E11.65 UNCONTROLLED TYPE 2 DIABETES MELLITUS WITH HYPERGLYCEMIA (HCC): ICD-10-CM

## 2023-01-12 RX ORDER — INSULIN HUMAN 500 [IU]/ML
INJECTION, SOLUTION SUBCUTANEOUS
Qty: 93 ML | Refills: 3
Start: 2023-01-12

## 2023-01-12 NOTE — TELEPHONE ENCOUNTER
Cherelle response:    Hi,  Sorry to hear you are having lows but that means you are probably a little more insulin sensitive which is good news. Lets lower ALL of your U-500 by about 10%    So, decrease the 200 before breakfast down to 180 and the 190 before supper down to 170    If lows continue, we can reduce the insulin further.  Focus on eating protein     ~Arleen

## 2023-01-12 NOTE — TELEPHONE ENCOUNTER
From: Margie Alberto  To: Eliot Foster  Sent: 1/11/2023 12:25 PM EST  Subject: Insulin    Hello  So I took 200 units in am my sugars were perfect all day at dinner I took 150 units within an hour I bottomed out couldnt get sugar to go past 112 then for 2 days I was low I havent took any night insulin in a couple days so tonight Im going to try 90 units I was down to 85 anyways ! Im scared to sleep when its so low do to the fact that no one wakes up to the low alarms!  My sugar was 52 at one point

## 2023-01-20 ENCOUNTER — TELEPHONE (OUTPATIENT)
Dept: ENDOCRINOLOGY | Age: 53
End: 2023-01-20

## 2023-01-20 NOTE — TELEPHONE ENCOUNTER
*PA added to list TAT ~2wks. *      Called pt made aware our team will initiate the PA for [facility] TAT ~2wks she and expressed understanding. Pt stated she hasn't taken her insulin, last taken~ last week  Sugars range 170 and below , checking via Verio meter+ Dexcom    170 @3pm 1/20/23 via both devices with same readings    Pt said she currently  feels great but when she takes the insulin she  \"bottoms out' and feels worse and sugars drop to 50 within a hour.      Next appt 2/17/23

## 2023-01-20 NOTE — TELEPHONE ENCOUNTER
Pt LVM stated her ins will not cover Mounjaro 10mg without a PA. Per formulary [facility] Medicare covers: Ozempic + Trulicity    How would you like to proceed?

## 2023-01-24 ENCOUNTER — PATIENT MESSAGE (OUTPATIENT)
Dept: ENDOCRINOLOGY | Age: 53
End: 2023-01-24

## 2023-01-24 NOTE — TELEPHONE ENCOUNTER
Cherelle response:    Hi,  Checking in. Are you getting your meds and doing ok off the insulin? It is possible you need a little at a much lower dose to avoid high sugars. Let me know how you are doing and what you are doing in the event we need to make changes. I'm proud of you.     I'm still praying for you and hope the legal stuff went in your favor    ~Arleen

## 2023-02-01 DIAGNOSIS — Z12.31 ENCOUNTER FOR SCREENING MAMMOGRAM FOR MALIGNANT NEOPLASM OF BREAST: ICD-10-CM

## 2023-02-03 NOTE — TELEPHONE ENCOUNTER
From: Radha Lentz  To: Kamala Davis  Sent: 1/24/2023 6:24 PM EST  Subject: hi from duglas Malcolm,  Checking in. Are you getting your meds and doing ok off the insulin? It is possible you need a little at a much lower dose to avoid high sugars. Let me know how you are doing and what you are doing in the event we need to make changes. I'm proud of you.     I'm still praying for you and hope the legal stuff went in your favor    ~Duglas

## 2023-02-10 NOTE — TELEPHONE ENCOUNTER
Cherelle response:    The pa for the mounjaro 10mg dose is approved. You may need to call around and see who has it. I reduce the RX to 7.5mg if that is all you can find in the area. I'm sad to hear about Enida. I see multiple times where there is a spike in sugar without any carbs entered into the pump, lots of auto correction and few corrections by the user entering information. Its important that she enter carbs and glucose level into the pump multiple times a day.  If not eating, zero carbs, if eating carbs BEFORE eating and the glucose readings    DKA is a disease of insulin deficiency, she needs insulin for every snack and meal unless eating to raise a low sugar and then , only 15 g of carbs    ~Arleen

## 2023-02-28 ENCOUNTER — TELEPHONE (OUTPATIENT)
Dept: CARDIOLOGY CLINIC | Age: 53
End: 2023-02-28

## 2023-02-28 DIAGNOSIS — R00.2 PALPITATIONS: Primary | ICD-10-CM

## 2023-02-28 NOTE — TELEPHONE ENCOUNTER
Pt c/o increased palpitations x 2 days occurring through the day and dizziness on standing. VS stable 149/82 HR 72.  States she is hydrating with water. Asking if she needs to wear a monitor.

## 2023-02-28 NOTE — TELEPHONE ENCOUNTER
----- Message from Sahil Vaughn, Sadie Ernst Escobar [place] sent at 2/28/2023  3:21 PM EST -----  Regarding: FW: Jeni Granado    ----- Message -----  From: Arcelia Johnson  Sent: 2/28/2023   3:13 PM EST  To: [facility]  Subject: Jeni Arteaga  I have been feeling like kip pass out and really tired and having a lot of pallopatations what should I do ?

## 2023-03-01 NOTE — TELEPHONE ENCOUNTER
Called pt and informed per Dr. Julia Kapadia would like for her to wear a 7 day heart monitor. Pt voiced understanding and made appointment on 03-03-23 at 10:45 am in the [place] office for monitor placement.  Pt voiced understanding of date,time and location of appointment./wc

## 2023-03-02 ENCOUNTER — TELEPHONE (OUTPATIENT)
Dept: CARDIOLOGY CLINIC | Age: 53
End: 2023-03-02

## 2023-03-02 NOTE — TELEPHONE ENCOUNTER
----- Message from Sahil Vaughn, Sadie Ernst Escobar [place] sent at 2/28/2023  3:21 PM EST -----  Regarding: FW: Jeni Granado    ----- Message -----  From: Arcelia Johnson  Sent: 2/28/2023   3:13 PM EST  To: [facility]  Subject: Jeni Arteaga  I have been feeling like kip pass out and really tired and having a lot of pallopatations what should I do ?

## 2023-03-28 ENCOUNTER — TELEPHONE (OUTPATIENT)
Dept: NEUROLOGY | Age: 53
End: 2023-03-28

## 2023-03-28 DIAGNOSIS — G43.009 MIGRAINE WITHOUT AURA AND WITHOUT STATUS MIGRAINOSUS, NOT INTRACTABLE: Primary | ICD-10-CM

## 2023-03-30 ENCOUNTER — OFFICE VISIT (OUTPATIENT)
Dept: FAMILY MEDICINE CLINIC | Facility: CLINIC | Age: 53
End: 2023-03-30

## 2023-03-30 VITALS
SYSTOLIC BLOOD PRESSURE: 108 MMHG | DIASTOLIC BLOOD PRESSURE: 68 MMHG | BODY MASS INDEX: 31.86 KG/M2 | TEMPERATURE: 98 F | HEART RATE: 73 BPM | OXYGEN SATURATION: 98 % | WEIGHT: 186.6 LBS | HEIGHT: 64 IN

## 2023-03-30 DIAGNOSIS — F51.01 PRIMARY INSOMNIA: ICD-10-CM

## 2023-03-30 DIAGNOSIS — E78.49 OTHER HYPERLIPIDEMIA: ICD-10-CM

## 2023-03-30 DIAGNOSIS — M54.50 CHRONIC BILATERAL LOW BACK PAIN WITHOUT SCIATICA: ICD-10-CM

## 2023-03-30 DIAGNOSIS — F41.9 ANXIETY: ICD-10-CM

## 2023-03-30 DIAGNOSIS — E11.65 UNCONTROLLED TYPE 2 DIABETES MELLITUS WITH HYPERGLYCEMIA (HCC): ICD-10-CM

## 2023-03-30 DIAGNOSIS — K21.9 GASTROESOPHAGEAL REFLUX DISEASE, UNSPECIFIED WHETHER ESOPHAGITIS PRESENT: ICD-10-CM

## 2023-03-30 DIAGNOSIS — F33.0 MILD EPISODE OF RECURRENT MAJOR DEPRESSIVE DISORDER (HCC): ICD-10-CM

## 2023-03-30 DIAGNOSIS — I10 PRIMARY HYPERTENSION: ICD-10-CM

## 2023-03-30 DIAGNOSIS — G89.29 CHRONIC BILATERAL LOW BACK PAIN WITHOUT SCIATICA: ICD-10-CM

## 2023-03-30 DIAGNOSIS — I25.84 CORONARY ARTERY DISEASE DUE TO CALCIFIED CORONARY LESION: ICD-10-CM

## 2023-03-30 DIAGNOSIS — E55.9 VITAMIN D DEFICIENCY: Primary | ICD-10-CM

## 2023-03-30 DIAGNOSIS — R60.0 LOCALIZED EDEMA: ICD-10-CM

## 2023-03-30 DIAGNOSIS — I25.10 CORONARY ARTERY DISEASE DUE TO CALCIFIED CORONARY LESION: ICD-10-CM

## 2023-03-30 DIAGNOSIS — M25.59 PAIN IN OTHER JOINT: ICD-10-CM

## 2023-03-30 DIAGNOSIS — K59.09 OTHER CONSTIPATION: ICD-10-CM

## 2023-03-30 DIAGNOSIS — G43.109 CHRONIC MIGRAINE WITH AURA: ICD-10-CM

## 2023-03-30 DIAGNOSIS — F31.9 BIPOLAR 1 DISORDER (HCC): ICD-10-CM

## 2023-03-30 DIAGNOSIS — G62.9 PERIPHERAL POLYNEUROPATHY: ICD-10-CM

## 2023-03-30 LAB
25(OH)D3 SERPL-MCNC: 77.6 NG/ML (ref 30–100)
ALBUMIN SERPL-MCNC: 4 G/DL (ref 3.5–5)
ALBUMIN/GLOB SERPL: 1.3 (ref 0.4–1.6)
ALP SERPL-CCNC: 68 U/L (ref 50–136)
ALT SERPL-CCNC: 34 U/L (ref 12–65)
ANION GAP SERPL CALC-SCNC: 8 MMOL/L (ref 2–11)
AST SERPL-CCNC: 15 U/L (ref 15–37)
BASOPHILS # BLD: 0.1 K/UL (ref 0–0.2)
BASOPHILS NFR BLD: 1 % (ref 0–2)
BILIRUB SERPL-MCNC: 0.9 MG/DL (ref 0.2–1.1)
BUN SERPL-MCNC: 11 MG/DL (ref 6–23)
CALCIUM SERPL-MCNC: 9.6 MG/DL (ref 8.3–10.4)
CHLORIDE SERPL-SCNC: 111 MMOL/L (ref 101–110)
CHOLEST SERPL-MCNC: 92 MG/DL
CO2 SERPL-SCNC: 25 MMOL/L (ref 21–32)
CREAT SERPL-MCNC: 0.6 MG/DL (ref 0.6–1)
DIFFERENTIAL METHOD BLD: ABNORMAL
EOSINOPHIL # BLD: 0.2 K/UL (ref 0–0.8)
EOSINOPHIL NFR BLD: 2 % (ref 0.5–7.8)
ERYTHROCYTE [DISTWIDTH] IN BLOOD BY AUTOMATED COUNT: 13.2 % (ref 11.9–14.6)
EST. AVERAGE GLUCOSE BLD GHB EST-MCNC: 223 MG/DL
GLOBULIN SER CALC-MCNC: 3.2 G/DL (ref 2.8–4.5)
GLUCOSE SERPL-MCNC: 179 MG/DL (ref 65–100)
HBA1C MFR BLD: 9.4 % (ref 4.8–5.6)
HCT VFR BLD AUTO: 47.6 % (ref 35.8–46.3)
HDLC SERPL-MCNC: 43 MG/DL (ref 40–60)
HDLC SERPL: 2.1
HGB BLD-MCNC: 15.3 G/DL (ref 11.7–15.4)
IMM GRANULOCYTES # BLD AUTO: 0 K/UL (ref 0–0.5)
IMM GRANULOCYTES NFR BLD AUTO: 0 % (ref 0–5)
LDLC SERPL CALC-MCNC: 21.6 MG/DL
LYMPHOCYTES # BLD: 2.6 K/UL (ref 0.5–4.6)
LYMPHOCYTES NFR BLD: 25 % (ref 13–44)
MCH RBC QN AUTO: 28.1 PG (ref 26.1–32.9)
MCHC RBC AUTO-ENTMCNC: 32.1 G/DL (ref 31.4–35)
MCV RBC AUTO: 87.3 FL (ref 82–102)
MONOCYTES # BLD: 0.6 K/UL (ref 0.1–1.3)
MONOCYTES NFR BLD: 5 % (ref 4–12)
NEUTS SEG # BLD: 6.9 K/UL (ref 1.7–8.2)
NEUTS SEG NFR BLD: 67 % (ref 43–78)
NRBC # BLD: 0 K/UL (ref 0–0.2)
PLATELET # BLD AUTO: 340 K/UL (ref 150–450)
PMV BLD AUTO: 9.3 FL (ref 9.4–12.3)
POTASSIUM SERPL-SCNC: 4.2 MMOL/L (ref 3.5–5.1)
PROT SERPL-MCNC: 7.2 G/DL (ref 6.3–8.2)
RBC # BLD AUTO: 5.45 M/UL (ref 4.05–5.2)
SODIUM SERPL-SCNC: 144 MMOL/L (ref 133–143)
TRIGL SERPL-MCNC: 137 MG/DL (ref 35–150)
VLDLC SERPL CALC-MCNC: 27.4 MG/DL (ref 6–23)
WBC # BLD AUTO: 10.4 K/UL (ref 4.3–11.1)

## 2023-03-30 RX ORDER — TIZANIDINE 2 MG/1
2 TABLET ORAL 2 TIMES DAILY
Qty: 60 TABLET | Refills: 3 | Status: SHIPPED | OUTPATIENT
Start: 2023-03-30

## 2023-03-30 RX ORDER — HYDROCODONE BITARTRATE AND ACETAMINOPHEN 10; 325 MG/1; MG/1
1 TABLET ORAL EVERY 8 HOURS PRN
Qty: 90 TABLET | Refills: 0 | Status: SHIPPED | OUTPATIENT
Start: 2023-04-30 | End: 2023-05-30

## 2023-03-30 RX ORDER — FUROSEMIDE 40 MG/1
40 TABLET ORAL DAILY
Qty: 90 TABLET | Refills: 3 | Status: SHIPPED | OUTPATIENT
Start: 2023-03-30

## 2023-03-30 RX ORDER — LAMOTRIGINE 100 MG/1
100 TABLET ORAL DAILY
Qty: 30 TABLET | Refills: 3 | Status: SHIPPED | OUTPATIENT
Start: 2023-03-30

## 2023-03-30 RX ORDER — GABAPENTIN 600 MG/1
600 TABLET ORAL 2 TIMES DAILY
Qty: 90 TABLET | Refills: 3 | Status: SHIPPED | OUTPATIENT
Start: 2023-03-30 | End: 2023-09-26

## 2023-03-30 RX ORDER — ATORVASTATIN CALCIUM 80 MG/1
80 TABLET, FILM COATED ORAL DAILY
Qty: 90 TABLET | Refills: 3 | Status: SHIPPED | OUTPATIENT
Start: 2023-03-30

## 2023-03-30 RX ORDER — DEXLANSOPRAZOLE 60 MG/1
60 CAPSULE, DELAYED RELEASE ORAL DAILY
Qty: 30 CAPSULE | Refills: 3 | Status: SHIPPED | OUTPATIENT
Start: 2023-03-30

## 2023-03-30 RX ORDER — METOPROLOL SUCCINATE 50 MG/1
50 TABLET, EXTENDED RELEASE ORAL DAILY
Qty: 30 TABLET | Refills: 3 | Status: SHIPPED | OUTPATIENT
Start: 2023-03-30

## 2023-03-30 RX ORDER — TRAZODONE HYDROCHLORIDE 100 MG/1
100 TABLET ORAL DAILY
Qty: 30 TABLET | Refills: 3 | Status: SHIPPED | OUTPATIENT
Start: 2023-03-30

## 2023-03-30 RX ORDER — CLOPIDOGREL BISULFATE 75 MG/1
75 TABLET ORAL DAILY
Qty: 90 TABLET | Refills: 3 | Status: SHIPPED | OUTPATIENT
Start: 2023-03-30

## 2023-03-30 RX ORDER — HYDROCODONE BITARTRATE AND ACETAMINOPHEN 10; 325 MG/1; MG/1
1 TABLET ORAL EVERY 8 HOURS PRN
Qty: 90 TABLET | Refills: 0 | Status: SHIPPED | OUTPATIENT
Start: 2023-03-30 | End: 2023-04-29

## 2023-03-30 RX ORDER — DIAZEPAM 2 MG/1
2 TABLET ORAL 2 TIMES DAILY PRN
Qty: 60 TABLET | Refills: 3 | Status: SHIPPED | OUTPATIENT
Start: 2023-03-30 | End: 2023-07-28

## 2023-03-30 RX ORDER — ESTRADIOL 0.1 MG/G
CREAM VAGINAL
COMMUNITY
Start: 2023-03-13

## 2023-03-30 RX ORDER — DULOXETIN HYDROCHLORIDE 60 MG/1
120 CAPSULE, DELAYED RELEASE ORAL DAILY
Qty: 30 CAPSULE | Refills: 3 | Status: SHIPPED | OUTPATIENT
Start: 2023-03-30

## 2023-03-30 RX ORDER — HYDROCODONE BITARTRATE AND ACETAMINOPHEN 10; 325 MG/1; MG/1
1 TABLET ORAL EVERY 8 HOURS PRN
Qty: 90 TABLET | Refills: 0 | Status: SHIPPED | OUTPATIENT
Start: 2023-05-30 | End: 2023-06-29

## 2023-03-30 RX ORDER — HYDROXYZINE HYDROCHLORIDE 25 MG/1
25 TABLET, FILM COATED ORAL 3 TIMES DAILY PRN
Qty: 30 TABLET | Refills: 3 | Status: SHIPPED | OUTPATIENT
Start: 2023-03-30

## 2023-03-30 RX ORDER — AMLODIPINE BESYLATE 5 MG/1
5 TABLET ORAL DAILY
COMMUNITY
Start: 2023-01-16

## 2023-03-30 SDOH — ECONOMIC STABILITY: FOOD INSECURITY: WITHIN THE PAST 12 MONTHS, THE FOOD YOU BOUGHT JUST DIDN'T LAST AND YOU DIDN'T HAVE MONEY TO GET MORE.: NEVER TRUE

## 2023-03-30 SDOH — ECONOMIC STABILITY: HOUSING INSECURITY
IN THE LAST 12 MONTHS, WAS THERE A TIME WHEN YOU DID NOT HAVE A STEADY PLACE TO SLEEP OR SLEPT IN A SHELTER (INCLUDING NOW)?: NO

## 2023-03-30 SDOH — ECONOMIC STABILITY: INCOME INSECURITY: HOW HARD IS IT FOR YOU TO PAY FOR THE VERY BASICS LIKE FOOD, HOUSING, MEDICAL CARE, AND HEATING?: SOMEWHAT HARD

## 2023-03-30 SDOH — ECONOMIC STABILITY: FOOD INSECURITY: WITHIN THE PAST 12 MONTHS, YOU WORRIED THAT YOUR FOOD WOULD RUN OUT BEFORE YOU GOT MONEY TO BUY MORE.: NEVER TRUE

## 2023-03-30 ASSESSMENT — PATIENT HEALTH QUESTIONNAIRE - PHQ9
2. FEELING DOWN, DEPRESSED OR HOPELESS: 0
SUM OF ALL RESPONSES TO PHQ QUESTIONS 1-9: 0
7. TROUBLE CONCENTRATING ON THINGS, SUCH AS READING THE NEWSPAPER OR WATCHING TELEVISION: 0
8. MOVING OR SPEAKING SO SLOWLY THAT OTHER PEOPLE COULD HAVE NOTICED. OR THE OPPOSITE, BEING SO FIGETY OR RESTLESS THAT YOU HAVE BEEN MOVING AROUND A LOT MORE THAN USUAL: 0
SUM OF ALL RESPONSES TO PHQ QUESTIONS 1-9: 0
10. IF YOU CHECKED OFF ANY PROBLEMS, HOW DIFFICULT HAVE THESE PROBLEMS MADE IT FOR YOU TO DO YOUR WORK, TAKE CARE OF THINGS AT HOME, OR GET ALONG WITH OTHER PEOPLE: 0
1. LITTLE INTEREST OR PLEASURE IN DOING THINGS: 0
3. TROUBLE FALLING OR STAYING ASLEEP: 0
4. FEELING TIRED OR HAVING LITTLE ENERGY: 0
6. FEELING BAD ABOUT YOURSELF - OR THAT YOU ARE A FAILURE OR HAVE LET YOURSELF OR YOUR FAMILY DOWN: 0
5. POOR APPETITE OR OVEREATING: 0
9. THOUGHTS THAT YOU WOULD BE BETTER OFF DEAD, OR OF HURTING YOURSELF: 0
SUM OF ALL RESPONSES TO PHQ QUESTIONS 1-9: 0
SUM OF ALL RESPONSES TO PHQ QUESTIONS 1-9: 0
SUM OF ALL RESPONSES TO PHQ9 QUESTIONS 1 & 2: 0

## 2023-03-31 ASSESSMENT — ENCOUNTER SYMPTOMS
SORE THROAT: 0
PHOTOPHOBIA: 0
ABDOMINAL PAIN: 0
BLURRED VISION: 0
ABDOMINAL DISTENTION: 0
COUGH: 0
SHORTNESS OF BREATH: 0
EYE PAIN: 0
BACK PAIN: 1
BLOOD IN STOOL: 0
COLOR CHANGE: 0
NAUSEA: 0
ORTHOPNEA: 0

## 2023-03-31 NOTE — PROGRESS NOTES
coughing, fever, headaches, joint swelling, myalgias, nausea, rash, sore throat or weakness. Hip Pain   The incident occurred more than 1 week ago. There was no injury mechanism. The pain is at a severity of 10/10. The pain is severe. The pain has been Fluctuating since onset. Back Pain  This is a chronic problem. The current episode started more than 1 year ago. The problem occurs daily. The problem is unchanged. The pain is at a severity of 9/10. The pain is severe. Pertinent negatives include no abdominal pain, chest pain, dysuria, fever, headaches or weakness. Diabetes  She presents for her follow-up diabetic visit. She has type 2 diabetes mellitus. Her disease course has been fluctuating. Pertinent negatives for hypoglycemia include no confusion, headaches, pallor, speech difficulty or sweats. Pertinent negatives for diabetes include no blurred vision, no chest pain and no weakness. Hypertension  This is a chronic problem. The current episode started in the past 7 days. The problem has been rapidly improving since onset. Associated symptoms include anxiety. Pertinent negatives include no blurred vision, chest pain, headaches, orthopnea, palpitations, peripheral edema, shortness of breath or sweats. Review of Systems   Constitutional:  Negative for chills and fever. HENT:  Negative for ear pain, hearing loss and sore throat. Eyes:  Negative for blurred vision, photophobia and pain. Respiratory:  Negative for cough and shortness of breath. Cardiovascular:  Negative for chest pain, palpitations, orthopnea and leg swelling. Gastrointestinal:  Negative for abdominal distention, abdominal pain, blood in stool and nausea. Genitourinary:  Negative for dysuria and urgency. Musculoskeletal:  Positive for back pain. Negative for joint swelling and myalgias. Skin:  Negative for color change, pallor and rash.    Neurological:  Negative for speech difficulty, weakness, light-headedness and

## 2023-04-27 ENCOUNTER — PATIENT MESSAGE (OUTPATIENT)
Dept: ENDOCRINOLOGY | Age: 53
End: 2023-04-27

## 2023-04-27 DIAGNOSIS — E11.65 UNCONTROLLED TYPE 2 DIABETES MELLITUS WITH HYPERGLYCEMIA (HCC): ICD-10-CM

## 2023-04-27 RX ORDER — ALIROCUMAB 75 MG/ML
75 INJECTION, SOLUTION SUBCUTANEOUS
Qty: 6 ADJUSTABLE DOSE PRE-FILLED PEN SYRINGE | Refills: 3 | Status: SHIPPED | OUTPATIENT
Start: 2023-04-27

## 2023-05-04 ENCOUNTER — TELEPHONE (OUTPATIENT)
Dept: ENDOCRINOLOGY | Age: 53
End: 2023-05-04

## 2023-05-16 ENCOUNTER — OFFICE VISIT (OUTPATIENT)
Dept: PULMONOLOGY | Age: 53
End: 2023-05-16
Payer: MEDICARE

## 2023-05-16 VITALS
HEIGHT: 64 IN | OXYGEN SATURATION: 98 % | TEMPERATURE: 98 F | RESPIRATION RATE: 20 BRPM | BODY MASS INDEX: 31.92 KG/M2 | DIASTOLIC BLOOD PRESSURE: 80 MMHG | SYSTOLIC BLOOD PRESSURE: 120 MMHG | HEART RATE: 75 BPM | WEIGHT: 187 LBS

## 2023-05-16 DIAGNOSIS — J45.20 MILD INTERMITTENT REACTIVE AIRWAY DISEASE WITHOUT COMPLICATION: ICD-10-CM

## 2023-05-16 DIAGNOSIS — E66.09 CLASS 1 OBESITY DUE TO EXCESS CALORIES WITH SERIOUS COMORBIDITY AND BODY MASS INDEX (BMI) OF 34.0 TO 34.9 IN ADULT: ICD-10-CM

## 2023-05-16 DIAGNOSIS — J45.40 MODERATE PERSISTENT ASTHMA WITHOUT COMPLICATION: Primary | ICD-10-CM

## 2023-05-16 PROBLEM — J45.30 MILD PERSISTENT ASTHMA: Status: RESOLVED | Noted: 2022-05-03 | Resolved: 2023-05-16

## 2023-05-16 LAB — FENO: 22 PPB

## 2023-05-16 PROCEDURE — 3079F DIAST BP 80-89 MM HG: CPT | Performed by: INTERNAL MEDICINE

## 2023-05-16 PROCEDURE — 99214 OFFICE O/P EST MOD 30 MIN: CPT | Performed by: INTERNAL MEDICINE

## 2023-05-16 PROCEDURE — 3074F SYST BP LT 130 MM HG: CPT | Performed by: INTERNAL MEDICINE

## 2023-05-16 RX ORDER — FLUTICASONE FUROATE AND VILANTEROL 200; 25 UG/1; UG/1
1 POWDER RESPIRATORY (INHALATION) DAILY
Qty: 1 EACH | Refills: 11 | Status: CANCELLED | OUTPATIENT
Start: 2023-05-16

## 2023-05-16 RX ORDER — ALBUTEROL SULFATE 90 UG/1
1 AEROSOL, METERED RESPIRATORY (INHALATION) EVERY 6 HOURS PRN
Qty: 18 G | Refills: 2 | Status: SHIPPED | OUTPATIENT
Start: 2023-05-16

## 2023-05-16 RX ORDER — FLUTICASONE FUROATE, UMECLIDINIUM BROMIDE AND VILANTEROL TRIFENATATE 200; 62.5; 25 UG/1; UG/1; UG/1
1 POWDER RESPIRATORY (INHALATION) DAILY
Qty: 1 EACH | Refills: 11 | Status: SHIPPED | OUTPATIENT
Start: 2023-05-16

## 2023-05-16 RX ORDER — MONTELUKAST SODIUM 10 MG/1
10 TABLET ORAL NIGHTLY
Qty: 30 TABLET | Refills: 11 | Status: SHIPPED | OUTPATIENT
Start: 2023-05-16

## 2023-05-16 RX ORDER — IPRATROPIUM BROMIDE AND ALBUTEROL SULFATE 2.5; .5 MG/3ML; MG/3ML
1 SOLUTION RESPIRATORY (INHALATION) EVERY 4 HOURS
Qty: 120 EACH | Refills: 11 | Status: SHIPPED | OUTPATIENT
Start: 2023-05-16

## 2023-05-16 ASSESSMENT — PULMONARY FUNCTION TESTS: FENO: 22

## 2023-05-16 NOTE — PROGRESS NOTES
[address] [facility], [facility]  [phone]    Name:  Ricardo Guzman  YOB: 1970   MRN: [id number]      Office Visit: 2023     ASSESSMENT AND PLAN:  (Medical Decision Making)    Impression: 47 y.o. female female with moderate persistent asthma still using albuterol and DuoNebs multiple times on a daily basis despite normalized PFTs and seemingly dramatically improved symptoms. 1. Moderate persistent asthma without complication  Given ongoing heavy use of albuterol we discussed trying to cut that back and prove that she actually needs these acute treatments. I went over the meantime try to augment her therapy to Trelegy plus Singulair and we will follow-up with her in 6 months to review her symptom control, short acting inhaler use and we could still consider biologic asthma therapy. Of note her FeNO was only 22 and her peripheral absolute eosinophils were only 200 cells making me think that Biologics may be less helpful though still could be reasonable to attempt if she were to clearly remain poorly controlled. - albuterol sulfate HFA (PROVENTIL;VENTOLIN;PROAIR) 108 (90 Base) MCG/ACT inhaler; Inhale 1 puff into the lungs every 6 hours as needed for Wheezing TAKE 1 PUFF BY INHALATION EVERY FOUR (4) HOURS AS NEEDED FOR WHEEZING OR SHORTNESS OF BREATH. Dispense: 18 g; Refill: 2  - ipratropium-albuterol (DUONEB) 0.5-2.5 (3) MG/3ML SOLN nebulizer solution; Inhale 3 mLs into the lungs every 4 hours If this patient is Medicare please file under Medicare Part B DX: Moderate persistent asthma without complication [I09.66]  Dispense: 120 each; Refill: 11  - NITRIC OXIDE  GAS DETERMINATION  - fluticasone-umeclidin-vilant (TRELEGY ELLIPTA) 200-62.5-25 MCG/ACT AEPB inhaler; Inhale 1 puff into the lungs daily  Dispense: 1 each; Refill: 11  - montelukast (SINGULAIR) 10 MG tablet;  Take 1 tablet by mouth nightly  Dispense: 30 tablet;

## 2023-05-24 ENCOUNTER — OFFICE VISIT (OUTPATIENT)
Dept: NEUROLOGY | Age: 53
End: 2023-05-24
Payer: MEDICARE

## 2023-05-24 ENCOUNTER — TELEPHONE (OUTPATIENT)
Age: 53
End: 2023-05-24

## 2023-05-24 VITALS
DIASTOLIC BLOOD PRESSURE: 66 MMHG | BODY MASS INDEX: 32.2 KG/M2 | OXYGEN SATURATION: 95 % | SYSTOLIC BLOOD PRESSURE: 103 MMHG | HEART RATE: 72 BPM | WEIGHT: 188.6 LBS | HEIGHT: 64 IN

## 2023-05-24 DIAGNOSIS — R29.3 POSTURAL IMBALANCE: ICD-10-CM

## 2023-05-24 DIAGNOSIS — Z79.899 ENCOUNTER FOR MEDICATION MANAGEMENT: ICD-10-CM

## 2023-05-24 DIAGNOSIS — G43.809 VESTIBULAR MIGRAINE: Primary | ICD-10-CM

## 2023-05-24 DIAGNOSIS — G25.81 RLS (RESTLESS LEGS SYNDROME): ICD-10-CM

## 2023-05-24 DIAGNOSIS — E11.42 DIABETIC PERIPHERAL NEUROPATHY ASSOCIATED WITH TYPE 2 DIABETES MELLITUS (HCC): ICD-10-CM

## 2023-05-24 PROBLEM — E55.9 VITAMIN D DEFICIENCY: Status: RESOLVED | Noted: 2018-07-06 | Resolved: 2023-05-24

## 2023-05-24 PROCEDURE — 3046F HEMOGLOBIN A1C LEVEL >9.0%: CPT | Performed by: PSYCHIATRY & NEUROLOGY

## 2023-05-24 PROCEDURE — 99214 OFFICE O/P EST MOD 30 MIN: CPT | Performed by: PSYCHIATRY & NEUROLOGY

## 2023-05-24 PROCEDURE — 3078F DIAST BP <80 MM HG: CPT | Performed by: PSYCHIATRY & NEUROLOGY

## 2023-05-24 PROCEDURE — 3074F SYST BP LT 130 MM HG: CPT | Performed by: PSYCHIATRY & NEUROLOGY

## 2023-05-24 RX ORDER — VERAPAMIL HYDROCHLORIDE 40 MG/1
40 TABLET ORAL 3 TIMES DAILY
Qty: 90 TABLET | Refills: 3 | Status: SHIPPED | OUTPATIENT
Start: 2023-05-24

## 2023-05-24 ASSESSMENT — ENCOUNTER SYMPTOMS
BLURRED VISION: 0
DOUBLE VISION: 0
BACK PAIN: 1
CONSTIPATION: 0

## 2023-05-24 ASSESSMENT — VISUAL ACUITY: OU: 1

## 2023-05-24 NOTE — PROGRESS NOTES
NEUROLOGY  RETURN  OFFICE VISIT [de-identified]                     5/24/2023  Corinne Linn is a 47 y.o. female here for [de-identified]        Referred by     Henry Bustamante MD     Chief Complaint:   Chief Complaint   Patient presents with    Follow-up     Pt says Emgality is not lasting anymore, dizzy spells, headaches         55yo woman // migraine e3tc. Saba Thomas Last 6/1/2022[de-identified]  Chronic migraine with aura  Persistent disorder of initiating or maintaining sleep  RLS (restless legs syndrome)  Vertigo  Postural imbalance  Encounter for medication management  Other orders  -     Galcanezumab-gnlm (EMGALITY) 120 MG/ML SOSY; INJECT CONTENTS OF 1 SYRINGE SUBCUTANEOUSLY EVERY 30 DAYS  -     Ubrogepant 50 MG TABS; Take 1 tablet by mouth at onset of migraine, repeat in two hours if needed. Maximum 2/day up to 4/week. 1.   Doing well on medication regimen. 2.  Continue meds for prophylaxis and abort suzi. 3.  Diary. RTO one yr. Active Problems:    * No active hospital problems. *  Resolved Problems:    * No resolved hospital problems.  *    Past Medical History:   Diagnosis Date    Arrhythmia     palpatations    Asthma     Bipolar disorder ([address]     Coronary artery disease     cardiac cath 11/5/2020    Diabetic neuropathy (HCC)     Fatty liver     GERD (gastroesophageal reflux disease)     Heart failure ([address]     Hypercholesterolemia     Hypertension     Hypertriglyceridemia     Meniere's disease     Migraine     Morbid obesity ([address]     Obstructive sleep apnea     CPAP    Palpitations     Peptic ulcer disease 2009    Psychiatric disorder     bipolar    Syncope and collapse     TIA (transient ischemic attack)     pt denies- states she had a migraine causing face tingling     Past Surgical History:   Procedure Laterality Date    BREAST LUMPECTOMY      CARDIAC CATHETERIZATION  11/2020    CARDIAC CATHETERIZATION  2009    x 4    CARDIAC PROCEDURE N/A 8/22/2022    LEFT HEART CATH / CORONARY ANGIOGRAPHY W GRAFTS

## 2023-05-24 NOTE — TELEPHONE ENCOUNTER
Called and spoke with pt. Pt was at Dr. Grazyna Hou office today. Her bp was 103/68 while sitting. Concerned about low bp. Has been feeling like she was going to pass out when stands. Stated Dr. Brenda Dominguez wrote a script for Verapamil 40 mg tid for her migraines. Pharmacist told her this was a bp med. Has not started. Currently taking Jardiance 25 mg daily,Lasix 40 mg daily,Metoprolol Succinate 50 mg daily and Lisinopril 40 mg daily. Was taking Amlodipine but this was stopped on 12-14-22 per Dr. Gus Craft. Had pt take orthostatics while on the phone. Results as follows:   Lying 105/49 pulse 68  Sitting 141/74 pulse 71  Standing 102/69 pulse 76. Stated is staying hydrated. Informed will speak with Dr. Gus Craft tomorrow and return her call. May be later in the day tomorrow.   Pt voiced understanding./wc

## 2023-05-24 NOTE — TELEPHONE ENCOUNTER
----- Message from Avis Nye MA sent at 5/24/2023  1:35 PM EDT -----  Regarding: FW: Blood pressure  Contact: [phone]    ----- Message -----  From: Heriberto Kumari  Sent: 5/24/2023   1:35 PM EDT  To: [facility] Clinical [facility]  Subject: Blood pressure                                   Dr Vahe Monk  I am sitting downstairs at dr Maryan Hodgkin office and my blood pressure is 103/68  For the past month everytime I stand up I get real light headed and feel like Im going to pass out what can I do to help this

## 2023-05-25 ENCOUNTER — TELEPHONE (OUTPATIENT)
Age: 53
End: 2023-05-25

## 2023-05-25 RX ORDER — LISINOPRIL 20 MG/1
20 TABLET ORAL DAILY
Qty: 90 TABLET | Refills: 1 | Status: SHIPPED | OUTPATIENT
Start: 2023-05-25

## 2023-05-25 NOTE — TELEPHONE ENCOUNTER
----- Message from Isiah Gonzalez MD sent at 5/25/2023 10:24 AM EDT -----  Regarding: RE: Blood pressure  Contact: [phone]  Decrease lisinopril 20mg qd  ----- Message -----  From: Sudha Riggins LPN  Sent: 6/73/5357   6:03 PM EDT  To: Isiah Gonzalez MD  Subject: FW: Blood pressure                               Please advise.   Thanks/wc  ----- Message -----  From: Mari Ma MA  Sent: 5/24/2023   1:36 PM EDT  To: [facility]  Subject: FW: Blood pressure                                 ----- Message -----  From: Arturo Wakefield  Sent: 5/24/2023   1:35 PM EDT  To: [facility] Clinical Staff  Subject: Blood pressure                                   Dr Allan Delong  I am sitting downstairs at dr Micaela Gutierrez office and my blood pressure is 103/68  For the past month everytime I stand up I get real light headed and feel like Im going to pass out what can I do to help this

## 2023-05-25 NOTE — TELEPHONE ENCOUNTER
Spoke with pt. Instructed to reduce the Lisinopril to 20 mg daily. Break the current Lisinopril 40 mg in half and take 1/2 tab daily. Call if any problems or concerns. Pt agreed to do so.  Changed Lisinopril to 20 mg daily on med list./wc

## 2023-05-25 NOTE — TELEPHONE ENCOUNTER
----- Message from Adam Bowman MD sent at 5/25/2023 10:24 AM EDT -----  Regarding: RE: Blood pressure  Contact: [phone]  Decrease lisinopril 20mg qd  ----- Message -----  From: Casey Buckner LPN  Sent: 1/19/1093   6:03 PM EDT  To: Adam Bowman MD  Subject: FW: Blood pressure                               Please advise.   Thanks/wc  ----- Message -----  From: Cameron Gandara MA  Sent: 5/24/2023   1:36 PM EDT  To: [facility]  Subject: FW: Blood pressure                                 ----- Message -----  From: Muna Aguirre  Sent: 5/24/2023   1:35 PM EDT  To: [facility] Clinical Staff  Subject: Blood pressure                                   Dr Praveen Alvarado  I am sitting downstairs at dr Jeff Taylor office and my blood pressure is 103/68  For the past month everytime I stand up I get real light headed and feel like Im going to pass out what can I do to help this

## 2023-05-31 ENCOUNTER — TELEPHONE (OUTPATIENT)
Age: 53
End: 2023-05-31

## 2023-05-31 NOTE — TELEPHONE ENCOUNTER
----- Message from Arden Thurman MD sent at 5/25/2023 10:24 AM EDT -----  Regarding: RE: Blood pressure  Contact: [phone]  Decrease lisinopril 20mg qd  ----- Message -----  From: Cristela Gerardo LPN  Sent: 8/29/9618   6:03 PM EDT  To: Arden Thurman MD  Subject: FW: Blood pressure                               Please advise.   Thanks/wc  ----- Message -----  From: Carlee Rosario MA  Sent: 5/24/2023   1:36 PM EDT  To: [facility]  Subject: FW: Blood pressure                                 ----- Message -----  From: Luis Bishop  Sent: 5/24/2023   1:35 PM EDT  To: [facility] Clinical Staff  Subject: Blood pressure                                   Dr Saman Fernandez  I am sitting downstairs at dr Nelda Salvador office and my blood pressure is 103/68  For the past month everytime I stand up I get real light headed and feel like Im going to pass out what can I do to help this

## 2023-05-31 NOTE — TELEPHONE ENCOUNTER
Spoke with pt. Received message last week of lowering the Lisinopril to 20 mg daily. Stated bp is better but the dizziness is still the same. Instructed pt to see her family doctor. Could be a middle ear problem.   Pt agreed to do so./wc

## 2023-06-06 ENCOUNTER — NURSE TRIAGE (OUTPATIENT)
Dept: OTHER | Facility: CLINIC | Age: 53
End: 2023-06-06

## 2023-06-06 NOTE — TELEPHONE ENCOUNTER
Received call from Colletta Browns at [facility], caller not on line. Complaint: hypotension and feels like she might pass out     Practice Name: [facility]    Caller's telephone number verified as [phone]    Connected with caller via phone, please see below triage      Subjective: Caller states \"Dr. Isaias Watkins cut my lisinopril in half. Today it was 95/68. I am wondering if I can stop taking my lisinopril completely. \"     Current Symptoms: hypotension, dizzy     Onset: 2 weeks ago;     Associated Symptoms: NA    Pain Severity: denies    Temperature: denies     What has been tried: electrolytes      LMP: NA Pregnant: NA    Recommended disposition: Go to [facility]    Care advice provided, patient verbalizes understanding; denies any other questions or concerns; instructed to call back for any new or worsening symptoms. Patient/Caller agrees with recommended disposition; writer provided warm transfer to [employer] at [facility] for appointment scheduling    Attention Provider: Thank you for allowing me to participate in the care of your patient. The patient was connected to triage in response to information provided to the ECC/PSC. Please do not respond through this encounter as the response is not directed to a shared pool.         Reason for Disposition   Systolic BP < 90 and NOT dizzy, lightheaded or weak     Dizziness comes and goes    Protocols used: Blood Pressure - Low-ADULT-OH

## 2023-06-30 ENCOUNTER — OFFICE VISIT (OUTPATIENT)
Dept: FAMILY MEDICINE CLINIC | Facility: CLINIC | Age: 53
End: 2023-06-30
Payer: MEDICARE

## 2023-06-30 VITALS
OXYGEN SATURATION: 97 % | SYSTOLIC BLOOD PRESSURE: 118 MMHG | BODY MASS INDEX: 31.58 KG/M2 | WEIGHT: 185 LBS | DIASTOLIC BLOOD PRESSURE: 68 MMHG | HEART RATE: 78 BPM | HEIGHT: 64 IN

## 2023-06-30 DIAGNOSIS — M25.50 ARTHRALGIA, UNSPECIFIED JOINT: ICD-10-CM

## 2023-06-30 DIAGNOSIS — E11.65 UNCONTROLLED TYPE 2 DIABETES MELLITUS WITH HYPERGLYCEMIA (HCC): ICD-10-CM

## 2023-06-30 DIAGNOSIS — E78.49 OTHER HYPERLIPIDEMIA: ICD-10-CM

## 2023-06-30 DIAGNOSIS — K59.09 OTHER CONSTIPATION: ICD-10-CM

## 2023-06-30 DIAGNOSIS — Z00.00 MEDICARE ANNUAL WELLNESS VISIT, SUBSEQUENT: ICD-10-CM

## 2023-06-30 DIAGNOSIS — F31.9 BIPOLAR 1 DISORDER (HCC): ICD-10-CM

## 2023-06-30 DIAGNOSIS — R60.0 LOCALIZED EDEMA: ICD-10-CM

## 2023-06-30 DIAGNOSIS — F51.01 PRIMARY INSOMNIA: ICD-10-CM

## 2023-06-30 DIAGNOSIS — I25.10 CORONARY ARTERY DISEASE DUE TO CALCIFIED CORONARY LESION: ICD-10-CM

## 2023-06-30 DIAGNOSIS — K21.9 GASTROESOPHAGEAL REFLUX DISEASE, UNSPECIFIED WHETHER ESOPHAGITIS PRESENT: ICD-10-CM

## 2023-06-30 DIAGNOSIS — J45.40 MODERATE PERSISTENT ASTHMA WITHOUT COMPLICATION: ICD-10-CM

## 2023-06-30 DIAGNOSIS — G43.109 CHRONIC MIGRAINE WITH AURA: ICD-10-CM

## 2023-06-30 DIAGNOSIS — F33.0 MILD EPISODE OF RECURRENT MAJOR DEPRESSIVE DISORDER (HCC): ICD-10-CM

## 2023-06-30 DIAGNOSIS — G62.9 PERIPHERAL POLYNEUROPATHY: ICD-10-CM

## 2023-06-30 DIAGNOSIS — F41.9 ANXIETY: ICD-10-CM

## 2023-06-30 DIAGNOSIS — I25.84 CORONARY ARTERY DISEASE DUE TO CALCIFIED CORONARY LESION: ICD-10-CM

## 2023-06-30 DIAGNOSIS — E55.9 VITAMIN D DEFICIENCY: Primary | ICD-10-CM

## 2023-06-30 DIAGNOSIS — R11.0 NAUSEA: ICD-10-CM

## 2023-06-30 LAB
25(OH)D3 SERPL-MCNC: 39.2 NG/ML (ref 30–100)
ALBUMIN SERPL-MCNC: 4.2 G/DL (ref 3.5–5)
ALBUMIN/GLOB SERPL: 1.2 (ref 0.4–1.6)
ALP SERPL-CCNC: 81 U/L (ref 50–136)
ALT SERPL-CCNC: 41 U/L (ref 12–65)
ANION GAP SERPL CALC-SCNC: 6 MMOL/L (ref 2–11)
AST SERPL-CCNC: 15 U/L (ref 15–37)
BASOPHILS # BLD: 0.1 K/UL (ref 0–0.2)
BASOPHILS NFR BLD: 1 % (ref 0–2)
BILIRUB SERPL-MCNC: 1.2 MG/DL (ref 0.2–1.1)
BUN SERPL-MCNC: 10 MG/DL (ref 6–23)
CALCIUM SERPL-MCNC: 9.4 MG/DL (ref 8.3–10.4)
CHLORIDE SERPL-SCNC: 109 MMOL/L (ref 101–110)
CHOLEST SERPL-MCNC: 84 MG/DL
CO2 SERPL-SCNC: 25 MMOL/L (ref 21–32)
CREAT SERPL-MCNC: 0.7 MG/DL (ref 0.6–1)
DIFFERENTIAL METHOD BLD: ABNORMAL
EOSINOPHIL # BLD: 0.3 K/UL (ref 0–0.8)
EOSINOPHIL NFR BLD: 3 % (ref 0.5–7.8)
ERYTHROCYTE [DISTWIDTH] IN BLOOD BY AUTOMATED COUNT: 13.1 % (ref 11.9–14.6)
GLOBULIN SER CALC-MCNC: 3.6 G/DL (ref 2.8–4.5)
GLUCOSE SERPL-MCNC: 140 MG/DL (ref 65–100)
HCT VFR BLD AUTO: 47.6 % (ref 35.8–46.3)
HDLC SERPL-MCNC: 47 MG/DL (ref 40–60)
HDLC SERPL: 1.8
HGB BLD-MCNC: 15.5 G/DL (ref 11.7–15.4)
IMM GRANULOCYTES # BLD AUTO: 0 K/UL (ref 0–0.5)
IMM GRANULOCYTES NFR BLD AUTO: 0 % (ref 0–5)
LDLC SERPL CALC-MCNC: 3.4 MG/DL
LYMPHOCYTES # BLD: 3.5 K/UL (ref 0.5–4.6)
LYMPHOCYTES NFR BLD: 35 % (ref 13–44)
MCH RBC QN AUTO: 28.4 PG (ref 26.1–32.9)
MCHC RBC AUTO-ENTMCNC: 32.6 G/DL (ref 31.4–35)
MCV RBC AUTO: 87.3 FL (ref 82–102)
MONOCYTES # BLD: 0.7 K/UL (ref 0.1–1.3)
MONOCYTES NFR BLD: 7 % (ref 4–12)
NEUTS SEG # BLD: 5.4 K/UL (ref 1.7–8.2)
NEUTS SEG NFR BLD: 54 % (ref 43–78)
NRBC # BLD: 0 K/UL (ref 0–0.2)
PLATELET # BLD AUTO: 337 K/UL (ref 150–450)
PMV BLD AUTO: 9.4 FL (ref 9.4–12.3)
POTASSIUM SERPL-SCNC: 4.4 MMOL/L (ref 3.5–5.1)
PROT SERPL-MCNC: 7.8 G/DL (ref 6.3–8.2)
RBC # BLD AUTO: 5.45 M/UL (ref 4.05–5.2)
SODIUM SERPL-SCNC: 140 MMOL/L (ref 133–143)
TRIGL SERPL-MCNC: 168 MG/DL (ref 35–150)
VLDLC SERPL CALC-MCNC: 33.6 MG/DL (ref 6–23)
WBC # BLD AUTO: 10 K/UL (ref 4.3–11.1)

## 2023-06-30 PROCEDURE — 3052F HG A1C>EQUAL 8.0%<EQUAL 9.0%: CPT | Performed by: FAMILY MEDICINE

## 2023-06-30 PROCEDURE — 3074F SYST BP LT 130 MM HG: CPT | Performed by: FAMILY MEDICINE

## 2023-06-30 PROCEDURE — G0439 PPPS, SUBSEQ VISIT: HCPCS | Performed by: FAMILY MEDICINE

## 2023-06-30 PROCEDURE — 3078F DIAST BP <80 MM HG: CPT | Performed by: FAMILY MEDICINE

## 2023-06-30 RX ORDER — FUROSEMIDE 40 MG/1
40 TABLET ORAL DAILY
Qty: 90 TABLET | Refills: 3 | Status: SHIPPED | OUTPATIENT
Start: 2023-06-30

## 2023-06-30 RX ORDER — HYDROCODONE BITARTRATE AND ACETAMINOPHEN 10; 325 MG/1; MG/1
1 TABLET ORAL EVERY 8 HOURS PRN
Qty: 90 TABLET | Refills: 0 | Status: SHIPPED | OUTPATIENT
Start: 2023-06-30 | End: 2023-07-30

## 2023-06-30 RX ORDER — GABAPENTIN 600 MG/1
600 TABLET ORAL 2 TIMES DAILY
Qty: 90 TABLET | Refills: 3 | Status: SHIPPED | OUTPATIENT
Start: 2023-06-30 | End: 2023-12-27

## 2023-06-30 RX ORDER — GALCANEZUMAB 120 MG/ML
INJECTION, SOLUTION SUBCUTANEOUS
Qty: 3 ML | Refills: 3 | Status: CANCELLED | OUTPATIENT
Start: 2023-06-30

## 2023-06-30 RX ORDER — LAMOTRIGINE 100 MG/1
100 TABLET ORAL DAILY
Qty: 30 TABLET | Refills: 3 | Status: SHIPPED | OUTPATIENT
Start: 2023-06-30

## 2023-06-30 RX ORDER — ALBUTEROL SULFATE 90 UG/1
1 POWDER, METERED RESPIRATORY (INHALATION) EVERY 6 HOURS PRN
Qty: 1 EACH | Refills: 11 | Status: SHIPPED | OUTPATIENT
Start: 2023-06-30

## 2023-06-30 RX ORDER — HYDROCODONE BITARTRATE AND ACETAMINOPHEN 10; 325 MG/1; MG/1
1 TABLET ORAL EVERY 8 HOURS PRN
Qty: 90 TABLET | Refills: 0 | Status: SHIPPED | OUTPATIENT
Start: 2023-07-30 | End: 2023-08-29

## 2023-06-30 RX ORDER — DEXLANSOPRAZOLE 60 MG/1
60 CAPSULE, DELAYED RELEASE ORAL DAILY
Qty: 30 CAPSULE | Refills: 3 | Status: SHIPPED | OUTPATIENT
Start: 2023-06-30

## 2023-06-30 RX ORDER — ONDANSETRON 4 MG/1
4 TABLET, ORALLY DISINTEGRATING ORAL 3 TIMES DAILY PRN
Qty: 21 TABLET | Refills: 0 | Status: SHIPPED | OUTPATIENT
Start: 2023-06-30

## 2023-06-30 RX ORDER — HYDROCODONE BITARTRATE AND ACETAMINOPHEN 10; 325 MG/1; MG/1
1 TABLET ORAL EVERY 8 HOURS PRN
Qty: 90 TABLET | Refills: 0 | Status: SHIPPED | OUTPATIENT
Start: 2023-08-30 | End: 2023-09-29

## 2023-06-30 RX ORDER — TIZANIDINE 2 MG/1
2 TABLET ORAL 2 TIMES DAILY
Qty: 60 TABLET | Refills: 3 | Status: SHIPPED | OUTPATIENT
Start: 2023-06-30

## 2023-06-30 RX ORDER — TRAZODONE HYDROCHLORIDE 100 MG/1
100 TABLET ORAL DAILY
Qty: 30 TABLET | Refills: 3 | Status: SHIPPED | OUTPATIENT
Start: 2023-06-30

## 2023-06-30 RX ORDER — ALIROCUMAB 75 MG/ML
75 INJECTION, SOLUTION SUBCUTANEOUS
Qty: 6 ADJUSTABLE DOSE PRE-FILLED PEN SYRINGE | Refills: 3 | Status: SHIPPED | OUTPATIENT
Start: 2023-06-30

## 2023-06-30 RX ORDER — DULOXETIN HYDROCHLORIDE 60 MG/1
120 CAPSULE, DELAYED RELEASE ORAL DAILY
Qty: 30 CAPSULE | Refills: 3 | Status: SHIPPED | OUTPATIENT
Start: 2023-06-30

## 2023-06-30 RX ORDER — FLUTICASONE FUROATE, UMECLIDINIUM BROMIDE AND VILANTEROL TRIFENATATE 200; 62.5; 25 UG/1; UG/1; UG/1
1 POWDER RESPIRATORY (INHALATION) DAILY
Qty: 1 EACH | Refills: 11 | Status: SHIPPED | OUTPATIENT
Start: 2023-06-30

## 2023-06-30 RX ORDER — ATORVASTATIN CALCIUM 80 MG/1
80 TABLET, FILM COATED ORAL DAILY
Qty: 90 TABLET | Refills: 3 | Status: SHIPPED | OUTPATIENT
Start: 2023-06-30

## 2023-06-30 RX ORDER — DIAZEPAM 2 MG/1
2 TABLET ORAL 2 TIMES DAILY PRN
Qty: 60 TABLET | Refills: 3 | Status: SHIPPED | OUTPATIENT
Start: 2023-06-30 | End: 2023-10-28

## 2023-06-30 RX ORDER — HYDROXYZINE HYDROCHLORIDE 25 MG/1
25 TABLET, FILM COATED ORAL 3 TIMES DAILY PRN
Qty: 30 TABLET | Refills: 3 | Status: SHIPPED | OUTPATIENT
Start: 2023-06-30

## 2023-06-30 RX ORDER — CLOPIDOGREL BISULFATE 75 MG/1
75 TABLET ORAL DAILY
Qty: 90 TABLET | Refills: 3 | Status: SHIPPED | OUTPATIENT
Start: 2023-06-30

## 2023-06-30 ASSESSMENT — PATIENT HEALTH QUESTIONNAIRE - PHQ9
7. TROUBLE CONCENTRATING ON THINGS, SUCH AS READING THE NEWSPAPER OR WATCHING TELEVISION: 0
1. LITTLE INTEREST OR PLEASURE IN DOING THINGS: 0
4. FEELING TIRED OR HAVING LITTLE ENERGY: 0
SUM OF ALL RESPONSES TO PHQ QUESTIONS 1-9: 0
SUM OF ALL RESPONSES TO PHQ9 QUESTIONS 1 & 2: 0
SUM OF ALL RESPONSES TO PHQ QUESTIONS 1-9: 0
9. THOUGHTS THAT YOU WOULD BE BETTER OFF DEAD, OR OF HURTING YOURSELF: 0
8. MOVING OR SPEAKING SO SLOWLY THAT OTHER PEOPLE COULD HAVE NOTICED. OR THE OPPOSITE, BEING SO FIGETY OR RESTLESS THAT YOU HAVE BEEN MOVING AROUND A LOT MORE THAN USUAL: 0
3. TROUBLE FALLING OR STAYING ASLEEP: 0
2. FEELING DOWN, DEPRESSED OR HOPELESS: 0
SUM OF ALL RESPONSES TO PHQ QUESTIONS 1-9: 0
5. POOR APPETITE OR OVEREATING: 0
6. FEELING BAD ABOUT YOURSELF - OR THAT YOU ARE A FAILURE OR HAVE LET YOURSELF OR YOUR FAMILY DOWN: 0
SUM OF ALL RESPONSES TO PHQ QUESTIONS 1-9: 0
10. IF YOU CHECKED OFF ANY PROBLEMS, HOW DIFFICULT HAVE THESE PROBLEMS MADE IT FOR YOU TO DO YOUR WORK, TAKE CARE OF THINGS AT HOME, OR GET ALONG WITH OTHER PEOPLE: 0

## 2023-06-30 ASSESSMENT — LIFESTYLE VARIABLES
HOW MANY STANDARD DRINKS CONTAINING ALCOHOL DO YOU HAVE ON A TYPICAL DAY: 1 OR 2
HOW OFTEN DO YOU HAVE A DRINK CONTAINING ALCOHOL: MONTHLY OR LESS

## 2023-07-01 LAB
EST. AVERAGE GLUCOSE BLD GHB EST-MCNC: 183 MG/DL
HBA1C MFR BLD: 8 % (ref 4.8–5.6)

## 2023-07-12 ENCOUNTER — OFFICE VISIT (OUTPATIENT)
Dept: ENDOCRINOLOGY | Age: 53
End: 2023-07-12
Payer: MEDICARE

## 2023-07-12 VITALS — WEIGHT: 184 LBS | BODY MASS INDEX: 31.58 KG/M2 | DIASTOLIC BLOOD PRESSURE: 70 MMHG | SYSTOLIC BLOOD PRESSURE: 115 MMHG

## 2023-07-12 DIAGNOSIS — I10 PRIMARY HYPERTENSION: ICD-10-CM

## 2023-07-12 DIAGNOSIS — E11.65 UNCONTROLLED TYPE 2 DIABETES MELLITUS WITH HYPERGLYCEMIA (HCC): Primary | ICD-10-CM

## 2023-07-12 DIAGNOSIS — E78.00 HYPERCHOLESTEROLEMIA: ICD-10-CM

## 2023-07-12 DIAGNOSIS — E55.9 VITAMIN D DEFICIENCY: ICD-10-CM

## 2023-07-12 DIAGNOSIS — I50.32 CHRONIC DIASTOLIC (CONGESTIVE) HEART FAILURE (HCC): ICD-10-CM

## 2023-07-12 PROCEDURE — 3052F HG A1C>EQUAL 8.0%<EQUAL 9.0%: CPT | Performed by: PHYSICIAN ASSISTANT

## 2023-07-12 PROCEDURE — 3078F DIAST BP <80 MM HG: CPT | Performed by: PHYSICIAN ASSISTANT

## 2023-07-12 PROCEDURE — 99214 OFFICE O/P EST MOD 30 MIN: CPT | Performed by: PHYSICIAN ASSISTANT

## 2023-07-12 PROCEDURE — 3074F SYST BP LT 130 MM HG: CPT | Performed by: PHYSICIAN ASSISTANT

## 2023-07-12 RX ORDER — INSULIN HUMAN 500 [IU]/ML
INJECTION, SOLUTION SUBCUTANEOUS
Qty: 54 ML | Refills: 3 | Status: SHIPPED | OUTPATIENT
Start: 2023-07-12

## 2023-07-12 RX ORDER — TIRZEPATIDE 15 MG/.5ML
15 INJECTION, SOLUTION SUBCUTANEOUS WEEKLY
Qty: 6 ML | Refills: 3 | Status: SHIPPED | OUTPATIENT
Start: 2023-07-12

## 2023-07-12 RX ORDER — EMPAGLIFLOZIN 25 MG/1
25 TABLET, FILM COATED ORAL DAILY
Qty: 90 TABLET | Refills: 3 | Status: SHIPPED | OUTPATIENT
Start: 2023-07-12

## 2023-07-27 ENCOUNTER — TELEMEDICINE (OUTPATIENT)
Dept: FAMILY MEDICINE CLINIC | Facility: CLINIC | Age: 53
End: 2023-07-27
Payer: MEDICARE

## 2023-07-27 DIAGNOSIS — L03.90 CELLULITIS, UNSPECIFIED CELLULITIS SITE: Primary | ICD-10-CM

## 2023-07-27 PROCEDURE — 99213 OFFICE O/P EST LOW 20 MIN: CPT | Performed by: PHYSICIAN ASSISTANT

## 2023-07-27 RX ORDER — CLINDAMYCIN HYDROCHLORIDE 300 MG/1
300 CAPSULE ORAL 3 TIMES DAILY
Qty: 30 CAPSULE | Refills: 0 | Status: SHIPPED | OUTPATIENT
Start: 2023-07-27 | End: 2023-08-06

## 2023-07-27 ASSESSMENT — PATIENT HEALTH QUESTIONNAIRE - PHQ9
3. TROUBLE FALLING OR STAYING ASLEEP: 2
SUM OF ALL RESPONSES TO PHQ QUESTIONS 1-9: 5
7. TROUBLE CONCENTRATING ON THINGS, SUCH AS READING THE NEWSPAPER OR WATCHING TELEVISION: 0
1. LITTLE INTEREST OR PLEASURE IN DOING THINGS: 0
SUM OF ALL RESPONSES TO PHQ QUESTIONS 1-9: 5
SUM OF ALL RESPONSES TO PHQ QUESTIONS 1-9: 5
10. IF YOU CHECKED OFF ANY PROBLEMS, HOW DIFFICULT HAVE THESE PROBLEMS MADE IT FOR YOU TO DO YOUR WORK, TAKE CARE OF THINGS AT HOME, OR GET ALONG WITH OTHER PEOPLE: 1
9. THOUGHTS THAT YOU WOULD BE BETTER OFF DEAD, OR OF HURTING YOURSELF: 0
8. MOVING OR SPEAKING SO SLOWLY THAT OTHER PEOPLE COULD HAVE NOTICED. OR THE OPPOSITE, BEING SO FIGETY OR RESTLESS THAT YOU HAVE BEEN MOVING AROUND A LOT MORE THAN USUAL: 0
SUM OF ALL RESPONSES TO PHQ QUESTIONS 1-9: 5
4. FEELING TIRED OR HAVING LITTLE ENERGY: 3
2. FEELING DOWN, DEPRESSED OR HOPELESS: 0
6. FEELING BAD ABOUT YOURSELF - OR THAT YOU ARE A FAILURE OR HAVE LET YOURSELF OR YOUR FAMILY DOWN: 0
5. POOR APPETITE OR OVEREATING: 0
SUM OF ALL RESPONSES TO PHQ9 QUESTIONS 1 & 2: 0

## 2023-07-27 NOTE — PROGRESS NOTES
anxiety disorder    Myopia    Pain of right thumb    Morbid obesity (HCC)    CAD (coronary artery disease)    Fatty liver    Type 2 diabetes mellitus with hyperglycemia, with long-term current use of insulin (HCC)    Vestibular migraine    Lateral epicondylitis    Closed fracture of lower end of left radius with routine healing    Rupture of extensor tendon of left hand    Syncope and collapse    Other fatigue    Hypersomnia    Diastolic CHF, chronic (HCC)    RLS (restless legs syndrome)    Bruxism, sleep-related    Microalbuminuria    Hypercholesterolemia    Elevated cortisol level    S/P CABG x 4    Encounter for medication management    Persistent disorder of initiating or maintaining sleep    Incontinence    Pain in left leg    GERD (gastroesophageal reflux disease)    Psychiatric disorder    Chronic migraine with aura    Pain in left hand    Diabetes type 2, uncontrolled    Carpal tunnel syndrome of left wrist    Nausea    Meniere's disease of right ear    Tendon weakness    Oropharyngeal dysphagia    Abnormal results of other endocrine function studies    Atherosclerosis of native coronary artery of native heart with angina pectoris (HCC)    Vertigo    Postural imbalance    Hypertension    Chronic constipation    Bipolar disorder (HCC)    Displacement of cervical intervertebral disc without myelopathy    Irregular bowel habits    Stiffness of left hand joint    PUD (peptic ulcer disease)    Chronic pain    Diabetic peripheral neuropathy associated with type 2 diabetes mellitus (HCC)    Hypertriglyceridemia    Retained orthopedic hardware    Uncontrolled type 2 diabetes mellitus with hypoglycemia without coma (HCC)    YOON (dyspnea on exertion)    Polycythemia    Yeast dermatitis    Supraventricular tachycardia (HCC)    Moderate persistent asthma without complication    Unstable angina (HCC)    Cellulitis       Social History:   Social History     Tobacco Use    Smoking status: Never    Smokeless tobacco: Never

## 2023-08-04 ENCOUNTER — PATIENT MESSAGE (OUTPATIENT)
Dept: ENDOCRINOLOGY | Age: 53
End: 2023-08-04

## 2023-08-04 RX ORDER — TIRZEPATIDE 12.5 MG/.5ML
INJECTION, SOLUTION SUBCUTANEOUS
Qty: 2 ML | Refills: 11 | Status: SHIPPED | OUTPATIENT
Start: 2023-08-04

## 2023-08-04 NOTE — TELEPHONE ENCOUNTER
Pt can't get Mounjaro 15mg due to nationwide backorder.   She would like the 12.5mg dose while she waits for the 15mg

## 2023-08-17 ENCOUNTER — OFFICE VISIT (OUTPATIENT)
Age: 53
End: 2023-08-17
Payer: MEDICARE

## 2023-08-17 VITALS
SYSTOLIC BLOOD PRESSURE: 132 MMHG | BODY MASS INDEX: 31.07 KG/M2 | WEIGHT: 182 LBS | HEIGHT: 64 IN | DIASTOLIC BLOOD PRESSURE: 80 MMHG | HEART RATE: 82 BPM

## 2023-08-17 DIAGNOSIS — I10 PRIMARY HYPERTENSION: ICD-10-CM

## 2023-08-17 DIAGNOSIS — I25.119 ATHEROSCLEROSIS OF NATIVE CORONARY ARTERY OF NATIVE HEART WITH ANGINA PECTORIS (HCC): Primary | ICD-10-CM

## 2023-08-17 DIAGNOSIS — I50.32 DIASTOLIC CHF, CHRONIC (HCC): ICD-10-CM

## 2023-08-17 DIAGNOSIS — E78.00 HYPERCHOLESTEROLEMIA: ICD-10-CM

## 2023-08-17 PROCEDURE — 3075F SYST BP GE 130 - 139MM HG: CPT | Performed by: INTERNAL MEDICINE

## 2023-08-17 PROCEDURE — 3079F DIAST BP 80-89 MM HG: CPT | Performed by: INTERNAL MEDICINE

## 2023-08-17 PROCEDURE — 99214 OFFICE O/P EST MOD 30 MIN: CPT | Performed by: INTERNAL MEDICINE

## 2023-08-17 RX ORDER — NITROGLYCERIN 0.4 MG/1
TABLET SUBLINGUAL
Qty: 25 TABLET | Refills: 11 | Status: SHIPPED | OUTPATIENT
Start: 2023-08-17

## 2023-08-17 NOTE — PROGRESS NOTES
[address]  PHONE: [phone]      23    NAME:  Medardo Spain  : 1970  MRN: [id number]       SUBJECTIVE:   Medardo Spain is a 47 y.o. female seen for a follow up visit regarding the following:     Chief Complaint   Patient presents with    Coronary Artery Disease         HPI:    No cp or chicas. No orthopnea or pnd. No palpitations or syncope. Past Medical History, Past Surgical History, Family history, Social History, and Medications were all reviewed with the patient today and updated as necessary. Current Outpatient Medications   Medication Sig Dispense Refill    MOUNJARO 12.5 MG/0.5ML SOPN SC injection Inject 0.5 mLs into the skin once a week 2 mL 11    HUMULIN R U-500 KWIKPEN 500 UNIT/ML SOPN concentrated injection pen 200 units before breakfast, 100 units AC lunch and supper 54 mL 3    JARDIANCE 25 MG tablet Take 1 tablet by mouth daily 90 tablet 3    MOUNJARO 15 MG/0.5ML SOPN SC injection Inject 0.5 mLs into the skin once a week 6 mL 3    albuterol sulfate (PROAIR RESPICLICK) 596 (90 Base) MCG/ACT aerosol powder inhalation Inhale 1 puff into the lungs every 6 hours as needed for Wheezing or Shortness of Breath 1 each 11    atorvastatin (LIPITOR) 80 MG tablet Take 1 tablet by mouth daily 90 tablet 3    clopidogrel (PLAVIX) 75 MG tablet Take 1 tablet by mouth daily 90 tablet 3    dexlansoprazole (DEXILANT) 60 MG CPDR delayed release capsule Take 1 capsule by mouth daily 30 capsule 3    diazePAM (VALIUM) 2 MG tablet Take 1 tablet by mouth 2 times daily as needed for Anxiety for up to 120 days.  60 tablet 3    DULoxetine (CYMBALTA) 60 MG extended release capsule Take 2 capsules by mouth daily 30 capsule 3    fluticasone-umeclidin-vilant (TRELEGY ELLIPTA) 200-62.5-25 MCG/ACT AEPB inhaler Inhale 1 puff into the lungs daily 1 each 11    furosemide (LASIX) 40 MG tablet Take 1 tablet by mouth daily (Patient

## 2023-08-22 ENCOUNTER — OFFICE VISIT (OUTPATIENT)
Dept: NEUROLOGY | Age: 53
End: 2023-08-22

## 2023-08-22 ENCOUNTER — TELEPHONE (OUTPATIENT)
Dept: NEUROLOGY | Age: 53
End: 2023-08-22

## 2023-08-22 VITALS
OXYGEN SATURATION: 93 % | BODY MASS INDEX: 31.41 KG/M2 | WEIGHT: 183 LBS | SYSTOLIC BLOOD PRESSURE: 128 MMHG | DIASTOLIC BLOOD PRESSURE: 82 MMHG | HEART RATE: 70 BPM

## 2023-08-22 DIAGNOSIS — G43.809 VESTIBULAR MIGRAINE: Primary | ICD-10-CM

## 2023-08-22 RX ORDER — FREMANEZUMAB-VFRM 225 MG/1.5ML
225 INJECTION SUBCUTANEOUS
Qty: 4.5 ML | Refills: 0 | Status: SHIPPED | OUTPATIENT
Start: 2023-08-22 | End: 2023-09-21

## 2023-08-22 ASSESSMENT — ENCOUNTER SYMPTOMS
VOMITING: 1
EYES NEGATIVE: 1
ALLERGIC/IMMUNOLOGIC NEGATIVE: 1
NAUSEA: 1

## 2023-08-22 NOTE — ASSESSMENT & PLAN NOTE
Discussed I do not have literature to support utilizing Emgality twice monthly versus once monthly. Will switch to Ajovy, patient provided with sample as she is due to inject 8/23. Will send in script, this will likely need a PA. Continue Ubrevly at onset of migraine, repeat in 2 hours if necessary. Max of 200mg/24 hours. Patient with strong cardiac history so will need to avoid triptans.

## 2023-08-22 NOTE — PROGRESS NOTES
disease     cardiac cath 11/5/2020    Diabetic neuropathy (HCC)     Fatty liver     GERD (gastroesophageal reflux disease)     Heart failure ([address])     Hypercholesterolemia     Hypertension     Hypertriglyceridemia     Meniere's disease     Migraine     Morbid obesity ([address])     Obstructive sleep apnea     CPAP    Palpitations     Peptic ulcer disease 2009    Psychiatric disorder     bipolar    Syncope and collapse     TIA (transient ischemic attack)     pt denies- states she had a migraine causing face tingling     Past Surgical History:   Procedure Laterality Date    BREAST LUMPECTOMY      CARDIAC CATHETERIZATION  11/2020    CARDIAC CATHETERIZATION  2009    x 4    CARDIAC PROCEDURE N/A 8/22/2022    LEFT HEART CATH / CORONARY ANGIOGRAPHY W GRAFTS performed by Leopoldo England, MD at [address] [facility],[address]  2004    CORONARY ARTERY BYPASS GRAFT  11/18/2020    X 4    GYN  2010    ovaries rem    NEUROLOGICAL SURGERY      x3, lumbar region, cervical    ORTHOPEDIC SURGERY      left wrist surgery,back surgery(discectomy-removed)    OTHER SURGICAL HISTORY  11/2020    OPEN HEART SX     LISSA AND BSO (CERVIX REMOVED)  2000    TONSILLECTOMY  1979    as a child    UROLOGICAL SURGERY      bladder sling     Family History   Problem Relation Age of Onset    Diabetes Mother     Coronary Art Dis Mother     Coronary Art Dis Father     Diabetes Brother     Coronary Art Dis Paternal Grandfather     Breast Cancer Sister     Diabetes Sister        Current Medications:  Current Outpatient Medications   Medication Sig Dispense Refill    Fremanezumab-vfrm (AJOVY) 225 MG/1.5ML SOAJ Inject 225 mg into the skin every 30 days 4.5 mL 0    nitroGLYCERIN (NITROSTAT) 0.4 MG SL tablet PLACE ONE TABLET UNDER TONGUE AS NEEDED FOR CHEST PAIN. MAY REPEAT EVERY 5 MINUTES FOR 2 MORE DOSES.  CALL 911 ON SECOND DOSE. 25 tablet 11    MOUNJARO 12.5 MG/0.5ML SOPN SC injection Inject 0.5 mLs into the skin once a week

## 2023-10-03 ENCOUNTER — TELEMEDICINE (OUTPATIENT)
Dept: ENDOCRINOLOGY | Age: 53
End: 2023-10-03
Payer: MEDICARE

## 2023-10-03 ENCOUNTER — OFFICE VISIT (OUTPATIENT)
Dept: FAMILY MEDICINE CLINIC | Facility: CLINIC | Age: 53
End: 2023-10-03
Payer: MEDICARE

## 2023-10-03 VITALS
BODY MASS INDEX: 31.41 KG/M2 | SYSTOLIC BLOOD PRESSURE: 110 MMHG | HEART RATE: 69 BPM | DIASTOLIC BLOOD PRESSURE: 70 MMHG | WEIGHT: 183 LBS | OXYGEN SATURATION: 97 % | TEMPERATURE: 98 F

## 2023-10-03 DIAGNOSIS — E61.1 IRON DEFICIENCY: ICD-10-CM

## 2023-10-03 DIAGNOSIS — F51.01 PRIMARY INSOMNIA: ICD-10-CM

## 2023-10-03 DIAGNOSIS — M54.9 UPPER BACK PAIN: ICD-10-CM

## 2023-10-03 DIAGNOSIS — R11.0 NAUSEA: ICD-10-CM

## 2023-10-03 DIAGNOSIS — E87.1 HYPONATREMIA: ICD-10-CM

## 2023-10-03 DIAGNOSIS — M62.838 MUSCLE SPASM: Primary | ICD-10-CM

## 2023-10-03 DIAGNOSIS — M25.50 ARTHRALGIA, UNSPECIFIED JOINT: ICD-10-CM

## 2023-10-03 DIAGNOSIS — E78.49 OTHER HYPERLIPIDEMIA: ICD-10-CM

## 2023-10-03 DIAGNOSIS — I25.10 CORONARY ARTERY DISEASE DUE TO CALCIFIED CORONARY LESION: ICD-10-CM

## 2023-10-03 DIAGNOSIS — J45.40 MODERATE PERSISTENT ASTHMA WITHOUT COMPLICATION: ICD-10-CM

## 2023-10-03 DIAGNOSIS — I10 PRIMARY HYPERTENSION: ICD-10-CM

## 2023-10-03 DIAGNOSIS — E11.65 UNCONTROLLED TYPE 2 DIABETES MELLITUS WITH HYPERGLYCEMIA (HCC): ICD-10-CM

## 2023-10-03 DIAGNOSIS — E78.00 HYPERCHOLESTEROLEMIA: ICD-10-CM

## 2023-10-03 DIAGNOSIS — R53.81 MALAISE: ICD-10-CM

## 2023-10-03 DIAGNOSIS — E11.65 UNCONTROLLED TYPE 2 DIABETES MELLITUS WITH HYPERGLYCEMIA (HCC): Primary | ICD-10-CM

## 2023-10-03 DIAGNOSIS — F31.9 BIPOLAR 1 DISORDER (HCC): ICD-10-CM

## 2023-10-03 DIAGNOSIS — K59.09 OTHER CONSTIPATION: ICD-10-CM

## 2023-10-03 DIAGNOSIS — I25.84 CORONARY ARTERY DISEASE DUE TO CALCIFIED CORONARY LESION: ICD-10-CM

## 2023-10-03 DIAGNOSIS — R42 DIZZINESS: ICD-10-CM

## 2023-10-03 DIAGNOSIS — G62.9 PERIPHERAL POLYNEUROPATHY: ICD-10-CM

## 2023-10-03 DIAGNOSIS — I50.32 CHRONIC DIASTOLIC (CONGESTIVE) HEART FAILURE (HCC): ICD-10-CM

## 2023-10-03 DIAGNOSIS — E55.9 VITAMIN D DEFICIENCY: ICD-10-CM

## 2023-10-03 DIAGNOSIS — F33.0 MILD EPISODE OF RECURRENT MAJOR DEPRESSIVE DISORDER (HCC): ICD-10-CM

## 2023-10-03 DIAGNOSIS — R60.0 LOCALIZED EDEMA: ICD-10-CM

## 2023-10-03 DIAGNOSIS — F41.9 ANXIETY: ICD-10-CM

## 2023-10-03 DIAGNOSIS — K21.9 GASTROESOPHAGEAL REFLUX DISEASE, UNSPECIFIED WHETHER ESOPHAGITIS PRESENT: ICD-10-CM

## 2023-10-03 LAB
25(OH)D3 SERPL-MCNC: 48.9 NG/ML (ref 30–100)
ALBUMIN SERPL-MCNC: 4.1 G/DL (ref 3.5–5)
ALBUMIN/GLOB SERPL: 1.4 (ref 0.4–1.6)
ALP SERPL-CCNC: 78 U/L (ref 50–136)
ALT SERPL-CCNC: 60 U/L (ref 12–65)
ANION GAP SERPL CALC-SCNC: 7 MMOL/L (ref 2–11)
AST SERPL-CCNC: 27 U/L (ref 15–37)
BASOPHILS # BLD: 0.1 K/UL (ref 0–0.2)
BASOPHILS NFR BLD: 1 % (ref 0–2)
BILIRUB SERPL-MCNC: 1.1 MG/DL (ref 0.2–1.1)
BUN SERPL-MCNC: 12 MG/DL (ref 6–23)
CALCIUM SERPL-MCNC: 9.3 MG/DL (ref 8.3–10.4)
CHLORIDE SERPL-SCNC: 108 MMOL/L (ref 101–110)
CHOLEST SERPL-MCNC: 103 MG/DL
CO2 SERPL-SCNC: 26 MMOL/L (ref 21–32)
CREAT SERPL-MCNC: 0.6 MG/DL (ref 0.6–1)
DIFFERENTIAL METHOD BLD: ABNORMAL
EOSINOPHIL # BLD: 0.2 K/UL (ref 0–0.8)
EOSINOPHIL NFR BLD: 2 % (ref 0.5–7.8)
ERYTHROCYTE [DISTWIDTH] IN BLOOD BY AUTOMATED COUNT: 13.1 % (ref 11.9–14.6)
EST. AVERAGE GLUCOSE BLD GHB EST-MCNC: 174 MG/DL
FERRITIN SERPL-MCNC: 33 NG/ML (ref 8–388)
GLOBULIN SER CALC-MCNC: 3 G/DL (ref 2.8–4.5)
GLUCOSE SERPL-MCNC: 130 MG/DL (ref 65–100)
HBA1C MFR BLD: 7.7 % (ref 4.8–5.6)
HCT VFR BLD AUTO: 46.1 % (ref 35.8–46.3)
HDLC SERPL-MCNC: 46 MG/DL (ref 40–60)
HDLC SERPL: 2.2
HGB BLD-MCNC: 14.9 G/DL (ref 11.7–15.4)
IMM GRANULOCYTES # BLD AUTO: 0 K/UL (ref 0–0.5)
IMM GRANULOCYTES NFR BLD AUTO: 0 % (ref 0–5)
IRON SERPL-MCNC: 49 UG/DL (ref 35–150)
LDLC SERPL CALC-MCNC: 32.6 MG/DL
LYMPHOCYTES # BLD: 2.4 K/UL (ref 0.5–4.6)
LYMPHOCYTES NFR BLD: 27 % (ref 13–44)
MCH RBC QN AUTO: 28.2 PG (ref 26.1–32.9)
MCHC RBC AUTO-ENTMCNC: 32.3 G/DL (ref 31.4–35)
MCV RBC AUTO: 87.3 FL (ref 82–102)
MONOCYTES # BLD: 0.7 K/UL (ref 0.1–1.3)
MONOCYTES NFR BLD: 8 % (ref 4–12)
NEUTS SEG # BLD: 5.4 K/UL (ref 1.7–8.2)
NEUTS SEG NFR BLD: 62 % (ref 43–78)
NRBC # BLD: 0 K/UL (ref 0–0.2)
PLATELET # BLD AUTO: 306 K/UL (ref 150–450)
PMV BLD AUTO: 9.2 FL (ref 9.4–12.3)
POTASSIUM SERPL-SCNC: 3.9 MMOL/L (ref 3.5–5.1)
PROT SERPL-MCNC: 7.1 G/DL (ref 6.3–8.2)
RBC # BLD AUTO: 5.28 M/UL (ref 4.05–5.2)
SODIUM SERPL-SCNC: 140 MMOL/L (ref 133–143)
SODIUM SERPL-SCNC: 141 MMOL/L (ref 133–143)
TRIGL SERPL-MCNC: 122 MG/DL (ref 35–150)
VLDLC SERPL CALC-MCNC: 24.4 MG/DL (ref 6–23)
WBC # BLD AUTO: 8.8 K/UL (ref 4.3–11.1)

## 2023-10-03 PROCEDURE — 99214 OFFICE O/P EST MOD 30 MIN: CPT | Performed by: PHYSICIAN ASSISTANT

## 2023-10-03 PROCEDURE — 3078F DIAST BP <80 MM HG: CPT | Performed by: FAMILY MEDICINE

## 2023-10-03 PROCEDURE — 3074F SYST BP LT 130 MM HG: CPT | Performed by: FAMILY MEDICINE

## 2023-10-03 PROCEDURE — 3051F HG A1C>EQUAL 7.0%<8.0%: CPT | Performed by: FAMILY MEDICINE

## 2023-10-03 PROCEDURE — 99214 OFFICE O/P EST MOD 30 MIN: CPT | Performed by: FAMILY MEDICINE

## 2023-10-03 PROCEDURE — 3052F HG A1C>EQUAL 8.0%<EQUAL 9.0%: CPT | Performed by: PHYSICIAN ASSISTANT

## 2023-10-03 RX ORDER — TIZANIDINE 2 MG/1
2 TABLET ORAL 2 TIMES DAILY
Qty: 60 TABLET | Refills: 3 | Status: SHIPPED | OUTPATIENT
Start: 2023-10-03

## 2023-10-03 RX ORDER — HYDROCODONE BITARTRATE AND ACETAMINOPHEN 10; 325 MG/1; MG/1
1 TABLET ORAL EVERY 8 HOURS PRN
Qty: 90 TABLET | Refills: 0 | Status: SHIPPED | OUTPATIENT
Start: 2023-12-03 | End: 2024-01-02

## 2023-10-03 RX ORDER — EMPAGLIFLOZIN 25 MG/1
25 TABLET, FILM COATED ORAL DAILY
Qty: 90 TABLET | Refills: 3 | Status: SHIPPED | OUTPATIENT
Start: 2023-10-03

## 2023-10-03 RX ORDER — ALBUTEROL SULFATE 90 UG/1
1 POWDER, METERED RESPIRATORY (INHALATION) EVERY 6 HOURS PRN
Qty: 1 EACH | Refills: 11 | Status: SHIPPED | OUTPATIENT
Start: 2023-10-03

## 2023-10-03 RX ORDER — INSULIN LISPRO 100 [IU]/ML
INJECTION, SOLUTION INTRAVENOUS; SUBCUTANEOUS
Qty: 30 ML | Refills: 3 | Status: SHIPPED | OUTPATIENT
Start: 2023-10-03

## 2023-10-03 RX ORDER — ATORVASTATIN CALCIUM 80 MG/1
80 TABLET, FILM COATED ORAL DAILY
Qty: 90 TABLET | Refills: 3 | Status: SHIPPED | OUTPATIENT
Start: 2023-10-03

## 2023-10-03 RX ORDER — TIRZEPATIDE 15 MG/.5ML
15 INJECTION, SOLUTION SUBCUTANEOUS WEEKLY
Qty: 6 ML | Refills: 3 | Status: SHIPPED | OUTPATIENT
Start: 2023-10-03

## 2023-10-03 RX ORDER — DIAZEPAM 2 MG/1
2 TABLET ORAL 2 TIMES DAILY PRN
Qty: 60 TABLET | Refills: 3 | Status: SHIPPED | OUTPATIENT
Start: 2023-10-03 | End: 2024-01-31

## 2023-10-03 RX ORDER — LISINOPRIL 40 MG/1
40 TABLET ORAL
COMMUNITY
Start: 2023-09-20

## 2023-10-03 RX ORDER — FUROSEMIDE 40 MG/1
40 TABLET ORAL DAILY
Qty: 90 TABLET | Refills: 3 | Status: SHIPPED | OUTPATIENT
Start: 2023-10-03

## 2023-10-03 RX ORDER — TRAZODONE HYDROCHLORIDE 100 MG/1
100 TABLET ORAL DAILY
Qty: 30 TABLET | Refills: 3 | Status: SHIPPED | OUTPATIENT
Start: 2023-10-03

## 2023-10-03 RX ORDER — MONTELUKAST SODIUM 10 MG/1
10 TABLET ORAL NIGHTLY
Qty: 30 TABLET | Refills: 11 | Status: SHIPPED | OUTPATIENT
Start: 2023-10-03

## 2023-10-03 RX ORDER — HYDROCODONE BITARTRATE AND ACETAMINOPHEN 10; 325 MG/1; MG/1
1 TABLET ORAL EVERY 8 HOURS PRN
Qty: 90 TABLET | Refills: 0 | Status: SHIPPED | OUTPATIENT
Start: 2023-11-03 | End: 2023-12-03

## 2023-10-03 RX ORDER — HYDROCODONE BITARTRATE AND ACETAMINOPHEN 10; 325 MG/1; MG/1
1 TABLET ORAL EVERY 8 HOURS PRN
Qty: 90 TABLET | Refills: 0 | Status: SHIPPED | OUTPATIENT
Start: 2023-10-03 | End: 2023-11-02

## 2023-10-03 RX ORDER — CLOPIDOGREL BISULFATE 75 MG/1
75 TABLET ORAL DAILY
Qty: 90 TABLET | Refills: 3 | Status: SHIPPED | OUTPATIENT
Start: 2023-10-03

## 2023-10-03 RX ORDER — DULOXETIN HYDROCHLORIDE 60 MG/1
120 CAPSULE, DELAYED RELEASE ORAL DAILY
Qty: 30 CAPSULE | Refills: 3 | Status: SHIPPED | OUTPATIENT
Start: 2023-10-03

## 2023-10-03 RX ORDER — UBROGEPANT 100 MG/1
TABLET ORAL
COMMUNITY
Start: 2023-07-27

## 2023-10-03 RX ORDER — ONDANSETRON 4 MG/1
4 TABLET, ORALLY DISINTEGRATING ORAL 3 TIMES DAILY PRN
Qty: 21 TABLET | Refills: 0 | Status: SHIPPED | OUTPATIENT
Start: 2023-10-03

## 2023-10-03 RX ORDER — FLUTICASONE FUROATE, UMECLIDINIUM BROMIDE AND VILANTEROL TRIFENATATE 200; 62.5; 25 UG/1; UG/1; UG/1
1 POWDER RESPIRATORY (INHALATION) DAILY
Qty: 1 EACH | Refills: 11 | Status: SHIPPED | OUTPATIENT
Start: 2023-10-03

## 2023-10-03 RX ORDER — HYDROXYZINE HYDROCHLORIDE 25 MG/1
25 TABLET, FILM COATED ORAL 3 TIMES DAILY PRN
Qty: 30 TABLET | Refills: 3 | Status: SHIPPED | OUTPATIENT
Start: 2023-10-03

## 2023-10-03 RX ORDER — DEXLANSOPRAZOLE 60 MG/1
60 CAPSULE, DELAYED RELEASE ORAL DAILY
Qty: 30 CAPSULE | Refills: 3 | Status: SHIPPED | OUTPATIENT
Start: 2023-10-03

## 2023-10-03 RX ORDER — GABAPENTIN 600 MG/1
600 TABLET ORAL 2 TIMES DAILY
Qty: 90 TABLET | Refills: 3 | Status: SHIPPED | OUTPATIENT
Start: 2023-10-03 | End: 2024-03-31

## 2023-10-03 RX ORDER — LAMOTRIGINE 100 MG/1
100 TABLET ORAL DAILY
Qty: 30 TABLET | Refills: 3 | Status: SHIPPED | OUTPATIENT
Start: 2023-10-03

## 2023-10-03 RX ORDER — FREMANEZUMAB-VFRM 225 MG/1.5ML
INJECTION SUBCUTANEOUS
COMMUNITY
Start: 2023-08-31

## 2023-10-03 ASSESSMENT — ENCOUNTER SYMPTOMS
SHORTNESS OF BREATH: 0
SORE THROAT: 0
EYE PAIN: 0
COLOR CHANGE: 0
PHOTOPHOBIA: 0
NAUSEA: 0
ABDOMINAL PAIN: 0
BLOOD IN STOOL: 0
BACK PAIN: 1
ABDOMINAL DISTENTION: 0
COUGH: 0
BLURRED VISION: 0

## 2023-10-03 NOTE — PROGRESS NOTES
10/07/2021          37.7                                    01/05/2022      22.3                              06/27/2022      42.4      12/29/2022 40.5                   Diabetes labs                           10/22/2019 C-peptide 2.6 (fasting and concomitant BGL  131)      Allergies & Medications:  Reviewed in chart. Review of Systems    Vital Signs: There were no vitals taken for this visit. Return 3-4 months, for Diabetes DM2 Follow-Up. Portions of this note were generated with the assistance of voice recogniton software. As such, some errors in transcription may be present.

## 2023-10-04 NOTE — PROGRESS NOTES
General: No focal deficit present. Mental Status: She is alert and oriented to person, place, and time. Psychiatric:         Mood and Affect: Mood normal.                   An electronic signature was used to authenticate this note.     --Annamarie Mejia MD

## 2023-10-05 LAB — VIT B1 BLD-SCNC: 269.2 NMOL/L (ref 66.5–200)

## 2023-12-27 ENCOUNTER — TELEPHONE (OUTPATIENT)
Dept: ENDOCRINOLOGY | Age: 53
End: 2023-12-27

## 2024-01-08 RX ORDER — LISINOPRIL 40 MG/1
40 TABLET ORAL NIGHTLY
Qty: 90 TABLET | Refills: 3 | Status: SHIPPED | OUTPATIENT
Start: 2024-01-08

## 2024-01-09 DIAGNOSIS — F41.9 ANXIETY: ICD-10-CM

## 2024-01-09 DIAGNOSIS — G62.9 PERIPHERAL POLYNEUROPATHY: ICD-10-CM

## 2024-01-09 DIAGNOSIS — F51.01 PRIMARY INSOMNIA: ICD-10-CM

## 2024-01-09 DIAGNOSIS — M25.50 ARTHRALGIA, UNSPECIFIED JOINT: ICD-10-CM

## 2024-01-09 DIAGNOSIS — E11.65 UNCONTROLLED TYPE 2 DIABETES MELLITUS WITH HYPERGLYCEMIA (HCC): ICD-10-CM

## 2024-01-09 DIAGNOSIS — K21.9 GASTROESOPHAGEAL REFLUX DISEASE, UNSPECIFIED WHETHER ESOPHAGITIS PRESENT: ICD-10-CM

## 2024-01-09 DIAGNOSIS — F33.0 MILD EPISODE OF RECURRENT MAJOR DEPRESSIVE DISORDER (HCC): ICD-10-CM

## 2024-01-09 DIAGNOSIS — R11.0 NAUSEA: ICD-10-CM

## 2024-01-09 DIAGNOSIS — I25.84 CORONARY ARTERY DISEASE DUE TO CALCIFIED CORONARY LESION: ICD-10-CM

## 2024-01-09 DIAGNOSIS — R60.0 LOCALIZED EDEMA: ICD-10-CM

## 2024-01-09 DIAGNOSIS — F31.9 BIPOLAR 1 DISORDER (HCC): ICD-10-CM

## 2024-01-09 DIAGNOSIS — I25.10 CORONARY ARTERY DISEASE DUE TO CALCIFIED CORONARY LESION: ICD-10-CM

## 2024-01-09 DIAGNOSIS — J45.40 MODERATE PERSISTENT ASTHMA WITHOUT COMPLICATION: ICD-10-CM

## 2024-01-09 RX ORDER — HYDROCODONE BITARTRATE AND ACETAMINOPHEN 10; 325 MG/1; MG/1
1 TABLET ORAL EVERY 8 HOURS PRN
Qty: 90 TABLET | Refills: 0 | Status: SHIPPED | OUTPATIENT
Start: 2024-01-09 | End: 2024-02-08

## 2024-01-09 RX ORDER — FUROSEMIDE 40 MG/1
40 TABLET ORAL DAILY
Qty: 90 TABLET | Refills: 3 | Status: SHIPPED | OUTPATIENT
Start: 2024-01-09

## 2024-01-09 RX ORDER — ONDANSETRON 4 MG/1
4 TABLET, ORALLY DISINTEGRATING ORAL 3 TIMES DAILY PRN
Qty: 21 TABLET | Refills: 0 | Status: SHIPPED | OUTPATIENT
Start: 2024-01-09

## 2024-01-09 RX ORDER — DEXLANSOPRAZOLE 60 MG/1
60 CAPSULE, DELAYED RELEASE ORAL DAILY
Qty: 30 CAPSULE | Refills: 3 | Status: SHIPPED | OUTPATIENT
Start: 2024-01-09

## 2024-01-09 RX ORDER — FLUTICASONE FUROATE, UMECLIDINIUM BROMIDE AND VILANTEROL TRIFENATATE 200; 62.5; 25 UG/1; UG/1; UG/1
1 POWDER RESPIRATORY (INHALATION) DAILY
Qty: 1 EACH | Refills: 11 | Status: SHIPPED | OUTPATIENT
Start: 2024-01-09

## 2024-01-09 RX ORDER — HYDROXYZINE HYDROCHLORIDE 25 MG/1
25 TABLET, FILM COATED ORAL 3 TIMES DAILY PRN
Qty: 30 TABLET | Refills: 3 | Status: SHIPPED | OUTPATIENT
Start: 2024-01-09

## 2024-01-09 RX ORDER — GABAPENTIN 600 MG/1
600 TABLET ORAL 2 TIMES DAILY
Qty: 90 TABLET | Refills: 3 | Status: SHIPPED | OUTPATIENT
Start: 2024-01-09 | End: 2024-07-07

## 2024-01-09 RX ORDER — LAMOTRIGINE 100 MG/1
100 TABLET ORAL DAILY
Qty: 30 TABLET | Refills: 3 | Status: SHIPPED | OUTPATIENT
Start: 2024-01-09

## 2024-01-09 RX ORDER — TIZANIDINE 2 MG/1
2 TABLET ORAL 2 TIMES DAILY
Qty: 60 TABLET | Refills: 3 | Status: SHIPPED | OUTPATIENT
Start: 2024-01-09

## 2024-01-09 RX ORDER — ALBUTEROL SULFATE 90 UG/1
1 AEROSOL, METERED RESPIRATORY (INHALATION) EVERY 6 HOURS PRN
Qty: 18 G | Refills: 2 | Status: SHIPPED | OUTPATIENT
Start: 2024-01-09

## 2024-01-09 RX ORDER — ALIROCUMAB 75 MG/ML
75 INJECTION, SOLUTION SUBCUTANEOUS
Qty: 6 ADJUSTABLE DOSE PRE-FILLED PEN SYRINGE | Refills: 3 | Status: SHIPPED | OUTPATIENT
Start: 2024-01-09

## 2024-01-09 RX ORDER — DULOXETIN HYDROCHLORIDE 60 MG/1
120 CAPSULE, DELAYED RELEASE ORAL DAILY
Qty: 30 CAPSULE | Refills: 3 | Status: SHIPPED | OUTPATIENT
Start: 2024-01-09

## 2024-01-09 RX ORDER — ALBUTEROL SULFATE 90 UG/1
1 POWDER, METERED RESPIRATORY (INHALATION) EVERY 6 HOURS PRN
Qty: 1 EACH | Refills: 11 | Status: SHIPPED | OUTPATIENT
Start: 2024-01-09

## 2024-01-09 RX ORDER — DIAZEPAM 2 MG/1
2 TABLET ORAL 2 TIMES DAILY PRN
Qty: 60 TABLET | Refills: 3 | Status: SHIPPED | OUTPATIENT
Start: 2024-01-09 | End: 2024-05-08

## 2024-01-09 RX ORDER — TRAZODONE HYDROCHLORIDE 100 MG/1
100 TABLET ORAL DAILY
Qty: 30 TABLET | Refills: 3 | Status: SHIPPED | OUTPATIENT
Start: 2024-01-09

## 2024-01-09 RX ORDER — CLOPIDOGREL BISULFATE 75 MG/1
75 TABLET ORAL DAILY
Qty: 90 TABLET | Refills: 3 | Status: SHIPPED | OUTPATIENT
Start: 2024-01-09

## 2024-01-09 RX ORDER — MONTELUKAST SODIUM 10 MG/1
10 TABLET ORAL NIGHTLY
Qty: 30 TABLET | Refills: 11 | Status: SHIPPED | OUTPATIENT
Start: 2024-01-09

## 2024-01-09 ASSESSMENT — ENCOUNTER SYMPTOMS
ALLERGIC/IMMUNOLOGIC NEGATIVE: 1
VOMITING: 1
NAUSEA: 1
EYES NEGATIVE: 1

## 2024-01-09 NOTE — PROGRESS NOTES
Tammie Sunshine is a 53 y.o. female  with a history of vestibular migraines, menieres dx,  neuropathy, CABG, Heart failure, Diabetes, bipolar disorder, who presents with headaches.    CC: Headache      She is accompanied by her daughter.       HPI:        Since last visit, patient has been on Ajovy for prevention and ubrevely for rescue. She hasn't appreciated a difference in Ajovy versus Emgality. She reports 6-7/month versus 4-5 month on Emgality. Of note, she was taking emgality every 14 days instead of once per month.  If she does not take on the 24th each month she has sudden onset worsening headaches. She takes ubrevely 100mg at onset of her migraines, only has to take one. But if she delays, she will have to re-dose. She has as headache today. She has a desire to explore acupuncture.         Headaches are located occipital and radiate top of head forehead. They began years ago but worsened in 2018 or 2019.    Duration: Depends on how quickly she takes her Ubrevly. If she has to wait to take her Ubrevly, it could last the entire day. Ubrevly provides pain freedom in 20-30 minutes  Severity is mild, previously severe but there are days when she has to lie down in bed to alleviate the symptoms. Quality of headaches are described as Pressure.  Associated symptoms: First symptom is dizziness, followed by occipital and forehead pressure, photophobia,. Photophobia, movement makes worse. Occasional nausea and vomiting.  The headaches are exacerbated by bright, prolonged sunlight, skipping meals, seasonal changes.  Factors that relieve the headaches are room darkening curtains, ice packs, ubrevly, she has been using topical THC ointment to base of neck.  Most bothersome migraine symptoms dizziness. Previous Imaging MRI Brain 6/2020 with non-specific white matter changes and a deviated septum.       She reports quality rest at night, does endorse mouth breathing, History of deviated septum but does not desire

## 2024-01-10 ENCOUNTER — OFFICE VISIT (OUTPATIENT)
Dept: NEUROLOGY | Age: 54
End: 2024-01-10
Payer: MEDICARE

## 2024-01-10 VITALS
DIASTOLIC BLOOD PRESSURE: 84 MMHG | OXYGEN SATURATION: 94 % | BODY MASS INDEX: 32.79 KG/M2 | HEART RATE: 87 BPM | SYSTOLIC BLOOD PRESSURE: 134 MMHG | WEIGHT: 191 LBS

## 2024-01-10 DIAGNOSIS — G43.809 VESTIBULAR MIGRAINE: Primary | ICD-10-CM

## 2024-01-10 PROCEDURE — 3075F SYST BP GE 130 - 139MM HG: CPT | Performed by: NURSE PRACTITIONER

## 2024-01-10 PROCEDURE — 99214 OFFICE O/P EST MOD 30 MIN: CPT | Performed by: NURSE PRACTITIONER

## 2024-01-10 PROCEDURE — 3079F DIAST BP 80-89 MM HG: CPT | Performed by: NURSE PRACTITIONER

## 2024-01-10 RX ORDER — UBROGEPANT 100 MG/1
100 TABLET ORAL PRN
Qty: 16 TABLET | Refills: 3 | Status: SHIPPED | OUTPATIENT
Start: 2024-01-10

## 2024-01-10 RX ORDER — FREMANEZUMAB-VFRM 225 MG/1.5ML
225 INJECTION SUBCUTANEOUS
Qty: 4.5 ML | Refills: 1 | Status: SHIPPED | OUTPATIENT
Start: 2024-01-10 | End: 2024-02-09

## 2024-01-10 NOTE — ASSESSMENT & PLAN NOTE
Continue Ajovy for prevention. Continue Ubrevely 100mg for rescue. Discussed that should we change preventative agents in the future, would switch to Qulipta. Will see patient in 4 months.       Agree with trial of acupuncture. Recommended [facility]

## 2024-01-23 ENCOUNTER — CLINICAL DOCUMENTATION (OUTPATIENT)
Dept: NEUROLOGY | Age: 54
End: 2024-01-23

## 2024-01-24 ENCOUNTER — OFFICE VISIT (OUTPATIENT)
Dept: FAMILY MEDICINE CLINIC | Facility: CLINIC | Age: 54
End: 2024-01-24

## 2024-01-24 VITALS
WEIGHT: 191.2 LBS | DIASTOLIC BLOOD PRESSURE: 60 MMHG | OXYGEN SATURATION: 93 % | SYSTOLIC BLOOD PRESSURE: 110 MMHG | HEART RATE: 82 BPM | TEMPERATURE: 98.2 F | BODY MASS INDEX: 32.82 KG/M2

## 2024-01-24 DIAGNOSIS — E78.49 OTHER HYPERLIPIDEMIA: ICD-10-CM

## 2024-01-24 DIAGNOSIS — I25.10 CORONARY ARTERY DISEASE DUE TO CALCIFIED CORONARY LESION: ICD-10-CM

## 2024-01-24 DIAGNOSIS — K59.09 OTHER CONSTIPATION: ICD-10-CM

## 2024-01-24 DIAGNOSIS — F41.9 ANXIETY: ICD-10-CM

## 2024-01-24 DIAGNOSIS — F33.0 MILD EPISODE OF RECURRENT MAJOR DEPRESSIVE DISORDER (HCC): ICD-10-CM

## 2024-01-24 DIAGNOSIS — I50.32 DIASTOLIC CHF, CHRONIC (HCC): ICD-10-CM

## 2024-01-24 DIAGNOSIS — M54.6 ACUTE MIDLINE THORACIC BACK PAIN: ICD-10-CM

## 2024-01-24 DIAGNOSIS — K21.9 GASTROESOPHAGEAL REFLUX DISEASE, UNSPECIFIED WHETHER ESOPHAGITIS PRESENT: ICD-10-CM

## 2024-01-24 DIAGNOSIS — J45.40 MODERATE PERSISTENT ASTHMA WITHOUT COMPLICATION: ICD-10-CM

## 2024-01-24 DIAGNOSIS — F31.9 BIPOLAR 1 DISORDER (HCC): ICD-10-CM

## 2024-01-24 DIAGNOSIS — F51.01 PRIMARY INSOMNIA: ICD-10-CM

## 2024-01-24 DIAGNOSIS — R60.0 LOCALIZED EDEMA: ICD-10-CM

## 2024-01-24 DIAGNOSIS — E55.9 VITAMIN D DEFICIENCY: ICD-10-CM

## 2024-01-24 DIAGNOSIS — I25.84 CORONARY ARTERY DISEASE DUE TO CALCIFIED CORONARY LESION: ICD-10-CM

## 2024-01-24 DIAGNOSIS — I50.32 CHRONIC DIASTOLIC (CONGESTIVE) HEART FAILURE (HCC): ICD-10-CM

## 2024-01-24 DIAGNOSIS — M25.50 ARTHRALGIA, UNSPECIFIED JOINT: ICD-10-CM

## 2024-01-24 DIAGNOSIS — E11.65 UNCONTROLLED TYPE 2 DIABETES MELLITUS WITH HYPERGLYCEMIA (HCC): ICD-10-CM

## 2024-01-24 DIAGNOSIS — N63.11 MASS OF UPPER OUTER QUADRANT OF RIGHT BREAST: Primary | ICD-10-CM

## 2024-01-24 DIAGNOSIS — R11.0 NAUSEA: ICD-10-CM

## 2024-01-24 DIAGNOSIS — G62.9 PERIPHERAL POLYNEUROPATHY: ICD-10-CM

## 2024-01-24 DIAGNOSIS — E11.42 DIABETIC PERIPHERAL NEUROPATHY ASSOCIATED WITH TYPE 2 DIABETES MELLITUS (HCC): ICD-10-CM

## 2024-01-24 LAB
25(OH)D3 SERPL-MCNC: 42.4 NG/ML (ref 30–100)
ALBUMIN SERPL-MCNC: 3.9 G/DL (ref 3.5–5)
ALBUMIN/GLOB SERPL: 1.2 (ref 0.4–1.6)
ALP SERPL-CCNC: 72 U/L (ref 50–136)
ALT SERPL-CCNC: 35 U/L (ref 12–65)
ANION GAP SERPL CALC-SCNC: 5 MMOL/L (ref 2–11)
AST SERPL-CCNC: 15 U/L (ref 15–37)
BASOPHILS # BLD: 0.1 K/UL (ref 0–0.2)
BASOPHILS NFR BLD: 1 % (ref 0–2)
BILIRUB SERPL-MCNC: 1.2 MG/DL (ref 0.2–1.1)
BUN SERPL-MCNC: 14 MG/DL (ref 6–23)
CALCIUM SERPL-MCNC: 9.6 MG/DL (ref 8.3–10.4)
CHLORIDE SERPL-SCNC: 109 MMOL/L (ref 103–113)
CHOLEST SERPL-MCNC: 119 MG/DL
CO2 SERPL-SCNC: 26 MMOL/L (ref 21–32)
CREAT SERPL-MCNC: 0.7 MG/DL (ref 0.6–1)
DIFFERENTIAL METHOD BLD: NORMAL
EOSINOPHIL # BLD: 0.1 K/UL (ref 0–0.8)
EOSINOPHIL NFR BLD: 1 % (ref 0.5–7.8)
ERYTHROCYTE [DISTWIDTH] IN BLOOD BY AUTOMATED COUNT: 13.3 % (ref 11.9–14.6)
GLOBULIN SER CALC-MCNC: 3.3 G/DL (ref 2.8–4.5)
GLUCOSE SERPL-MCNC: 178 MG/DL (ref 65–100)
HCT VFR BLD AUTO: 42.3 % (ref 35.8–46.3)
HDLC SERPL-MCNC: 43 MG/DL (ref 40–60)
HDLC SERPL: 2.8
HGB BLD-MCNC: 13.8 G/DL (ref 11.7–15.4)
IMM GRANULOCYTES # BLD AUTO: 0 K/UL (ref 0–0.5)
IMM GRANULOCYTES NFR BLD AUTO: 0 % (ref 0–5)
LDLC SERPL CALC-MCNC: 23.8 MG/DL
LYMPHOCYTES # BLD: 2.7 K/UL (ref 0.5–4.6)
LYMPHOCYTES NFR BLD: 30 % (ref 13–44)
MCH RBC QN AUTO: 28.6 PG (ref 26.1–32.9)
MCHC RBC AUTO-ENTMCNC: 32.6 G/DL (ref 31.4–35)
MCV RBC AUTO: 87.8 FL (ref 82–102)
MONOCYTES # BLD: 0.6 K/UL (ref 0.1–1.3)
MONOCYTES NFR BLD: 7 % (ref 4–12)
NEUTS SEG # BLD: 5.6 K/UL (ref 1.7–8.2)
NEUTS SEG NFR BLD: 61 % (ref 43–78)
NRBC # BLD: 0 K/UL (ref 0–0.2)
PLATELET # BLD AUTO: 288 K/UL (ref 150–450)
PMV BLD AUTO: 9.4 FL (ref 9.4–12.3)
POTASSIUM SERPL-SCNC: 3.8 MMOL/L (ref 3.5–5.1)
PROT SERPL-MCNC: 7.2 G/DL (ref 6.3–8.2)
RBC # BLD AUTO: 4.82 M/UL (ref 4.05–5.2)
SODIUM SERPL-SCNC: 140 MMOL/L (ref 136–146)
TRIGL SERPL-MCNC: 261 MG/DL (ref 35–150)
VLDLC SERPL CALC-MCNC: 52.2 MG/DL (ref 6–23)
WBC # BLD AUTO: 9.1 K/UL (ref 4.3–11.1)

## 2024-01-24 RX ORDER — ONDANSETRON 4 MG/1
4 TABLET, ORALLY DISINTEGRATING ORAL 3 TIMES DAILY PRN
Qty: 21 TABLET | Refills: 0 | Status: SHIPPED | OUTPATIENT
Start: 2024-01-24

## 2024-01-24 RX ORDER — FUROSEMIDE 40 MG/1
40 TABLET ORAL DAILY
Qty: 90 TABLET | Refills: 3 | Status: SHIPPED | OUTPATIENT
Start: 2024-01-24

## 2024-01-24 RX ORDER — CLOPIDOGREL BISULFATE 75 MG/1
75 TABLET ORAL DAILY
Qty: 90 TABLET | Refills: 3 | Status: SHIPPED | OUTPATIENT
Start: 2024-01-24

## 2024-01-24 RX ORDER — LAMOTRIGINE 100 MG/1
100 TABLET ORAL DAILY
Qty: 30 TABLET | Refills: 3 | Status: SHIPPED | OUTPATIENT
Start: 2024-01-24

## 2024-01-24 RX ORDER — ALBUTEROL SULFATE 90 UG/1
1 AEROSOL, METERED RESPIRATORY (INHALATION) EVERY 6 HOURS PRN
Qty: 18 G | Refills: 2 | Status: SHIPPED | OUTPATIENT
Start: 2024-01-24

## 2024-01-24 RX ORDER — HYDROCODONE BITARTRATE AND ACETAMINOPHEN 10; 325 MG/1; MG/1
1 TABLET ORAL EVERY 8 HOURS PRN
Qty: 90 TABLET | Refills: 0 | Status: SHIPPED | OUTPATIENT
Start: 2024-03-09 | End: 2024-04-08

## 2024-01-24 RX ORDER — DEXLANSOPRAZOLE 60 MG/1
60 CAPSULE, DELAYED RELEASE ORAL DAILY
Qty: 30 CAPSULE | Refills: 3 | Status: SHIPPED | OUTPATIENT
Start: 2024-01-24

## 2024-01-24 RX ORDER — ALIROCUMAB 75 MG/ML
75 INJECTION, SOLUTION SUBCUTANEOUS
Qty: 6 ADJUSTABLE DOSE PRE-FILLED PEN SYRINGE | Refills: 3 | Status: SHIPPED | OUTPATIENT
Start: 2024-01-24

## 2024-01-24 RX ORDER — ALBUTEROL SULFATE 90 UG/1
1 POWDER, METERED RESPIRATORY (INHALATION) EVERY 6 HOURS PRN
Qty: 1 EACH | Refills: 11 | Status: SHIPPED | OUTPATIENT
Start: 2024-01-24

## 2024-01-24 RX ORDER — DIAZEPAM 2 MG/1
2 TABLET ORAL 2 TIMES DAILY PRN
Qty: 60 TABLET | Refills: 3 | Status: SHIPPED | OUTPATIENT
Start: 2024-01-24 | End: 2024-05-23

## 2024-01-24 RX ORDER — FLUTICASONE FUROATE, UMECLIDINIUM BROMIDE AND VILANTEROL TRIFENATATE 200; 62.5; 25 UG/1; UG/1; UG/1
1 POWDER RESPIRATORY (INHALATION) DAILY
Qty: 1 EACH | Refills: 11 | Status: SHIPPED | OUTPATIENT
Start: 2024-01-24

## 2024-01-24 RX ORDER — HYDROCODONE BITARTRATE AND ACETAMINOPHEN 10; 325 MG/1; MG/1
1 TABLET ORAL EVERY 8 HOURS PRN
Qty: 90 TABLET | Refills: 0 | Status: SHIPPED | OUTPATIENT
Start: 2024-02-09 | End: 2024-03-10

## 2024-01-24 RX ORDER — TIZANIDINE 2 MG/1
2 TABLET ORAL 2 TIMES DAILY
Qty: 60 TABLET | Refills: 3 | Status: SHIPPED | OUTPATIENT
Start: 2024-01-24

## 2024-01-24 RX ORDER — ATORVASTATIN CALCIUM 80 MG/1
80 TABLET, FILM COATED ORAL DAILY
Qty: 90 TABLET | Refills: 3 | Status: SHIPPED | OUTPATIENT
Start: 2024-01-24

## 2024-01-24 RX ORDER — GABAPENTIN 600 MG/1
600 TABLET ORAL 2 TIMES DAILY
Qty: 90 TABLET | Refills: 3 | Status: SHIPPED | OUTPATIENT
Start: 2024-01-24 | End: 2024-07-22

## 2024-01-24 RX ORDER — DULOXETIN HYDROCHLORIDE 60 MG/1
120 CAPSULE, DELAYED RELEASE ORAL DAILY
Qty: 30 CAPSULE | Refills: 3 | Status: SHIPPED | OUTPATIENT
Start: 2024-01-24

## 2024-01-24 RX ORDER — HYDROCODONE BITARTRATE AND ACETAMINOPHEN 10; 325 MG/1; MG/1
1 TABLET ORAL EVERY 8 HOURS PRN
Qty: 90 TABLET | Refills: 0 | Status: SHIPPED | OUTPATIENT
Start: 2024-04-09 | End: 2024-05-09

## 2024-01-24 RX ORDER — MONTELUKAST SODIUM 10 MG/1
10 TABLET ORAL NIGHTLY
Qty: 30 TABLET | Refills: 11 | Status: SHIPPED | OUTPATIENT
Start: 2024-01-24

## 2024-01-24 RX ORDER — TRAZODONE HYDROCHLORIDE 100 MG/1
100 TABLET ORAL DAILY
Qty: 30 TABLET | Refills: 3 | Status: SHIPPED | OUTPATIENT
Start: 2024-01-24

## 2024-01-24 RX ORDER — HYDROXYZINE HYDROCHLORIDE 25 MG/1
25 TABLET, FILM COATED ORAL 3 TIMES DAILY PRN
Qty: 30 TABLET | Refills: 3 | Status: SHIPPED | OUTPATIENT
Start: 2024-01-24

## 2024-01-24 ASSESSMENT — PATIENT HEALTH QUESTIONNAIRE - PHQ9
6. FEELING BAD ABOUT YOURSELF - OR THAT YOU ARE A FAILURE OR HAVE LET YOURSELF OR YOUR FAMILY DOWN: NOT AT ALL
SUM OF ALL RESPONSES TO PHQ QUESTIONS 1-9: 0
9. THOUGHTS THAT YOU WOULD BE BETTER OFF DEAD, OR OF HURTING YOURSELF: NOT AT ALL
8. MOVING OR SPEAKING SO SLOWLY THAT OTHER PEOPLE COULD HAVE NOTICED. OR THE OPPOSITE, BEING SO FIGETY OR RESTLESS THAT YOU HAVE BEEN MOVING AROUND A LOT MORE THAN USUAL: 0
4. FEELING TIRED OR HAVING LITTLE ENERGY: NOT AT ALL
7. TROUBLE CONCENTRATING ON THINGS, SUCH AS READING THE NEWSPAPER OR WATCHING TELEVISION: NOT AT ALL
10. IF YOU CHECKED OFF ANY PROBLEMS, HOW DIFFICULT HAVE THESE PROBLEMS MADE IT FOR YOU TO DO YOUR WORK, TAKE CARE OF THINGS AT HOME, OR GET ALONG WITH OTHER PEOPLE: NOT DIFFICULT AT ALL
10. IF YOU CHECKED OFF ANY PROBLEMS, HOW DIFFICULT HAVE THESE PROBLEMS MADE IT FOR YOU TO DO YOUR WORK, TAKE CARE OF THINGS AT HOME, OR GET ALONG WITH OTHER PEOPLE: 0
2. FEELING DOWN, DEPRESSED OR HOPELESS: 0
6. FEELING BAD ABOUT YOURSELF - OR THAT YOU ARE A FAILURE OR HAVE LET YOURSELF OR YOUR FAMILY DOWN: 0
1. LITTLE INTEREST OR PLEASURE IN DOING THINGS: NOT AT ALL
SUM OF ALL RESPONSES TO PHQ QUESTIONS 1-9: 0
5. POOR APPETITE OR OVEREATING: 0
SUM OF ALL RESPONSES TO PHQ QUESTIONS 1-9: 0
7. TROUBLE CONCENTRATING ON THINGS, SUCH AS READING THE NEWSPAPER OR WATCHING TELEVISION: 0
SUM OF ALL RESPONSES TO PHQ9 QUESTIONS 1 & 2: 0
3. TROUBLE FALLING OR STAYING ASLEEP: 0
8. MOVING OR SPEAKING SO SLOWLY THAT OTHER PEOPLE COULD HAVE NOTICED. OR THE OPPOSITE - BEING SO FIDGETY OR RESTLESS THAT YOU HAVE BEEN MOVING AROUND A LOT MORE THAN USUAL: NOT AT ALL
1. LITTLE INTEREST OR PLEASURE IN DOING THINGS: 0
4. FEELING TIRED OR HAVING LITTLE ENERGY: 0
3. TROUBLE FALLING OR STAYING ASLEEP: NOT AT ALL
5. POOR APPETITE OR OVEREATING: NOT AT ALL
2. FEELING DOWN, DEPRESSED OR HOPELESS: NOT AT ALL
9. THOUGHTS THAT YOU WOULD BE BETTER OFF DEAD, OR OF HURTING YOURSELF: 0

## 2024-01-25 LAB
EST. AVERAGE GLUCOSE BLD GHB EST-MCNC: 203 MG/DL
HBA1C MFR BLD: 8.7 % (ref 4.8–5.6)

## 2024-01-26 ASSESSMENT — ENCOUNTER SYMPTOMS
BLURRED VISION: 0
ABDOMINAL PAIN: 0
SHORTNESS OF BREATH: 0
PHOTOPHOBIA: 0
BLOOD IN STOOL: 0
ABDOMINAL DISTENTION: 0
COLOR CHANGE: 0
CHEST TIGHTNESS: 0
NAUSEA: 0
EYE PAIN: 0
SORE THROAT: 0

## 2024-01-26 NOTE — PROGRESS NOTES
HENT:      Head: Normocephalic and atraumatic.      Nose: Nose normal.   Eyes:      Extraocular Movements: Extraocular movements intact.      Conjunctiva/sclera: Conjunctivae normal.      Pupils: Pupils are equal, round, and reactive to light.   Cardiovascular:      Rate and Rhythm: Normal rate and regular rhythm.      Pulses: Normal pulses.      Heart sounds: Normal heart sounds.   Pulmonary:      Effort: Pulmonary effort is normal.      Breath sounds: Normal breath sounds.   Abdominal:      General: Abdomen is flat. Bowel sounds are normal.      Palpations: Abdomen is soft.   Skin:     General: Skin is warm and dry.   Neurological:      General: No focal deficit present.      Mental Status: She is alert and oriented to person, place, and time.   Psychiatric:         Mood and Affect: Mood normal.                   An electronic signature was used to authenticate this note.    --Neeraj Plunkett MD

## 2024-02-05 DIAGNOSIS — E11.65 UNCONTROLLED TYPE 2 DIABETES MELLITUS WITH HYPERGLYCEMIA (HCC): ICD-10-CM

## 2024-02-06 ENCOUNTER — OFFICE VISIT (OUTPATIENT)
Dept: ENDOCRINOLOGY | Age: 54
End: 2024-02-06
Payer: MEDICARE

## 2024-02-06 VITALS
HEART RATE: 76 BPM | BODY MASS INDEX: 32.82 KG/M2 | WEIGHT: 191.2 LBS | DIASTOLIC BLOOD PRESSURE: 74 MMHG | SYSTOLIC BLOOD PRESSURE: 136 MMHG | OXYGEN SATURATION: 96 %

## 2024-02-06 DIAGNOSIS — I10 PRIMARY HYPERTENSION: ICD-10-CM

## 2024-02-06 DIAGNOSIS — E55.9 VITAMIN D DEFICIENCY: ICD-10-CM

## 2024-02-06 DIAGNOSIS — E78.5 HYPERLIPIDEMIA ASSOCIATED WITH TYPE 2 DIABETES MELLITUS (HCC): ICD-10-CM

## 2024-02-06 DIAGNOSIS — E11.65 UNCONTROLLED TYPE 2 DIABETES MELLITUS WITH HYPERGLYCEMIA (HCC): Primary | ICD-10-CM

## 2024-02-06 DIAGNOSIS — I50.32 CHRONIC DIASTOLIC (CONGESTIVE) HEART FAILURE (HCC): ICD-10-CM

## 2024-02-06 DIAGNOSIS — E11.69 HYPERLIPIDEMIA ASSOCIATED WITH TYPE 2 DIABETES MELLITUS (HCC): ICD-10-CM

## 2024-02-06 PROCEDURE — 95251 CONT GLUC MNTR ANALYSIS I&R: CPT | Performed by: PHYSICIAN ASSISTANT

## 2024-02-06 PROCEDURE — 3075F SYST BP GE 130 - 139MM HG: CPT | Performed by: PHYSICIAN ASSISTANT

## 2024-02-06 PROCEDURE — 3078F DIAST BP <80 MM HG: CPT | Performed by: PHYSICIAN ASSISTANT

## 2024-02-06 PROCEDURE — 3052F HG A1C>EQUAL 8.0%<EQUAL 9.0%: CPT | Performed by: PHYSICIAN ASSISTANT

## 2024-02-06 PROCEDURE — 99214 OFFICE O/P EST MOD 30 MIN: CPT | Performed by: PHYSICIAN ASSISTANT

## 2024-02-06 RX ORDER — PEN NEEDLE, DIABETIC 31 G X1/4"
NEEDLE, DISPOSABLE MISCELLANEOUS
Qty: 400 EACH | Refills: 3 | Status: SHIPPED | OUTPATIENT
Start: 2024-02-06

## 2024-02-06 RX ORDER — SUCRALFATE 1 G/1
1 TABLET ORAL 3 TIMES DAILY
COMMUNITY

## 2024-02-06 RX ORDER — INSULIN HUMAN 500 [IU]/ML
75 INJECTION, SOLUTION SUBCUTANEOUS 2 TIMES DAILY WITH MEALS
Qty: 18 ML | Refills: 3 | Status: SHIPPED | OUTPATIENT
Start: 2024-02-06

## 2024-02-06 RX ORDER — TIRZEPATIDE 15 MG/.5ML
15 INJECTION, SOLUTION SUBCUTANEOUS WEEKLY
Qty: 6 ML | Refills: 3 | Status: SHIPPED | OUTPATIENT
Start: 2024-02-06 | End: 2024-02-06 | Stop reason: SDUPTHER

## 2024-02-06 RX ORDER — TIRZEPATIDE 15 MG/.5ML
15 INJECTION, SOLUTION SUBCUTANEOUS WEEKLY
Qty: 2 ML | Refills: 3 | OUTPATIENT
Start: 2024-02-06

## 2024-02-06 RX ORDER — TIRZEPATIDE 15 MG/.5ML
15 INJECTION, SOLUTION SUBCUTANEOUS WEEKLY
Qty: 6 ML | Refills: 3 | Status: SHIPPED | OUTPATIENT
Start: 2024-02-06

## 2024-02-06 RX ORDER — BLOOD SUGAR DIAGNOSTIC
STRIP MISCELLANEOUS
Qty: 200 EACH | Refills: 3 | Status: SHIPPED | OUTPATIENT
Start: 2024-02-06

## 2024-02-06 NOTE — PROGRESS NOTES
06/27/2022      42.4      12/29/2022 40.5     01/24/2024 42.4                 Diabetes labs                           10/22/2019 C-peptide 2.6 (fasting and concomitant BGL  131)      Allergies & Medications:  Reviewed in chart.        Review of Systems    Vital Signs:  /74 (Site: Left Lower Arm, Position: Sitting)   Pulse 76   Wt 86.7 kg (191 lb 3.2 oz)   SpO2 96%   BMI 32.82 kg/m²       Physical Exam  Constitutional:       Appearance: Normal appearance.      Comments: Central obesity   HENT:      Head: Normocephalic.   Neck:      Thyroid: No thyroid mass or thyromegaly.      Vascular: No carotid bruit.   Cardiovascular:      Rate and Rhythm: Normal rate and regular rhythm.   Pulmonary:      Effort: Pulmonary effort is normal.      Breath sounds: Normal breath sounds.   Abdominal:      Palpations: Abdomen is soft.   Musculoskeletal:      Cervical back: Neck supple.      Right lower leg: No edema.      Left lower leg: No edema.   Feet:      Right foot:      Protective Sensation: 3 sites tested.  3 sites sensed.      Skin integrity: Dry skin present.      Left foot:      Protective Sensation: 3 sites tested.  3 sites sensed.      Skin integrity: Dry skin present.   Lymphadenopathy:      Cervical: No cervical adenopathy.   Skin:     General: Skin is warm and dry.   Neurological:      General: No focal deficit present.      Mental Status: She is alert.      Sensory: Sensation is intact.   Psychiatric:         Mood and Affect: Mood normal.         Behavior: Behavior normal.         Thought Content: Thought content normal.         Judgment: Judgment normal.             Return in about 6 months (around 8/6/2024) for Diabetes DM2 Follow-Up.        Portions of this note were generated with the assistance of voice recogniton software.  As such, some errors in transcription may be present.

## 2024-02-15 ENCOUNTER — ENROLLMENT (OUTPATIENT)
Dept: PHARMACY | Facility: CLINIC | Age: 54
End: 2024-02-15

## 2024-02-20 RX ORDER — MELOXICAM 15 MG/1
15 TABLET ORAL DAILY
Qty: 30 TABLET | Refills: 2 | Status: SHIPPED | OUTPATIENT
Start: 2024-02-20

## 2024-03-08 ENCOUNTER — TELEPHONE (OUTPATIENT)
Dept: ENDOCRINOLOGY | Age: 54
End: 2024-03-08

## 2024-03-21 ENCOUNTER — PATIENT MESSAGE (OUTPATIENT)
Dept: NEUROLOGY | Age: 54
End: 2024-03-21

## 2024-03-22 RX ORDER — ATOGEPANT 60 MG/1
60 TABLET ORAL DAILY
Qty: 30 TABLET | Refills: 2 | Status: SHIPPED | OUTPATIENT
Start: 2024-03-22 | End: 2024-04-21

## 2024-03-22 NOTE — TELEPHONE ENCOUNTER
Patient with intractable headache on Ajovy.  Will discontinue.  Has tried and failed Emgality in the past.  Will switch to Qulipta.

## 2024-04-04 ENCOUNTER — TELEPHONE (OUTPATIENT)
Dept: NEUROLOGY | Age: 54
End: 2024-04-04

## 2024-04-15 ENCOUNTER — TELEPHONE (OUTPATIENT)
Dept: FAMILY MEDICINE CLINIC | Facility: CLINIC | Age: 54
End: 2024-04-15

## 2024-04-15 DIAGNOSIS — L60.0 INGROWN TOENAIL: Primary | ICD-10-CM

## 2024-04-15 DIAGNOSIS — Z79.4 TYPE 2 DIABETES MELLITUS WITH OTHER SPECIFIED COMPLICATION, WITH LONG-TERM CURRENT USE OF INSULIN (HCC): ICD-10-CM

## 2024-04-15 DIAGNOSIS — E11.69 TYPE 2 DIABETES MELLITUS WITH OTHER SPECIFIED COMPLICATION, WITH LONG-TERM CURRENT USE OF INSULIN (HCC): ICD-10-CM

## 2024-04-15 NOTE — TELEPHONE ENCOUNTER
Pt was calling to see about the referral she requested last week due to diabetic issues with her foot.  Went to go speak to Dr. Plunkett and he stated that she could try to get an appointment with Leelee Nicholson as he has no appointments available as someone needs to see the foot for a diagnosis.  Returned to the phone and pt had hung up.  Tried to call back and left a VM for patient to call back.    Thank you

## 2024-04-18 DIAGNOSIS — I10 PRIMARY HYPERTENSION: ICD-10-CM

## 2024-04-18 NOTE — TELEPHONE ENCOUNTER
The fax number for [facility] was loaded in Epic incorrectly. I have corrected it and refaxed the patient's referral to the correct number of [phone].

## 2024-04-18 NOTE — TELEPHONE ENCOUNTER
Requested Prescriptions     Pending Prescriptions Disp Refills    metoprolol succinate (TOPROL XL) 25 MG extended release tablet 90 tablet 0     Sig: Take 1 tablet by mouth daily    nitroGLYCERIN (NITROSTAT) 0.4 MG SL tablet 25 tablet 11     Sig: PLACE ONE TABLET UNDER TONGUE AS NEEDED FOR CHEST PAIN. MAY REPEAT EVERY 5 MINUTES FOR 2 MORE DOSES. CALL 911 ON SECOND DOSE.

## 2024-04-18 NOTE — TELEPHONE ENCOUNTER
MEDICATION REFILL REQUEST      Name of Medication:  nitroglycerine   Dose:  0.4 mg  Frequency:  PRN  Quantity:  --  Days' supply:  --      Pharmacy Name/Location:  [facility] in [place]  MEDICATION REFILL REQUEST      Name of Medication:    metoprolol    Dose:  25 mg  Frequency:  daily  Quantity:  90  Days' supply:  --      Pharmacy Name/Location:  [facility] in [place]

## 2024-04-19 RX ORDER — NITROGLYCERIN 0.4 MG/1
TABLET SUBLINGUAL
Qty: 25 TABLET | Refills: 11 | Status: SHIPPED | OUTPATIENT
Start: 2024-04-19 | End: 2024-04-19

## 2024-04-19 RX ORDER — METOPROLOL SUCCINATE 25 MG/1
25 TABLET, EXTENDED RELEASE ORAL DAILY
Qty: 90 TABLET | Refills: 0 | Status: SHIPPED | OUTPATIENT
Start: 2024-04-19

## 2024-04-19 RX ORDER — NITROGLYCERIN 0.4 MG/1
TABLET SUBLINGUAL
Qty: 25 TABLET | Refills: 11 | Status: SHIPPED | OUTPATIENT
Start: 2024-04-19

## 2024-04-19 RX ORDER — METOPROLOL SUCCINATE 25 MG/1
25 TABLET, EXTENDED RELEASE ORAL DAILY
Qty: 90 TABLET | Refills: 0 | Status: SHIPPED | OUTPATIENT
Start: 2024-04-19 | End: 2024-04-19

## 2024-04-25 ENCOUNTER — OFFICE VISIT (OUTPATIENT)
Dept: FAMILY MEDICINE CLINIC | Facility: CLINIC | Age: 54
End: 2024-04-25

## 2024-04-25 VITALS
HEART RATE: 71 BPM | OXYGEN SATURATION: 95 % | DIASTOLIC BLOOD PRESSURE: 78 MMHG | TEMPERATURE: 98.6 F | WEIGHT: 182.6 LBS | SYSTOLIC BLOOD PRESSURE: 120 MMHG | BODY MASS INDEX: 31.34 KG/M2

## 2024-04-25 DIAGNOSIS — R05.8 OTHER COUGH: ICD-10-CM

## 2024-04-25 DIAGNOSIS — I25.84 CORONARY ARTERY DISEASE DUE TO CALCIFIED CORONARY LESION: ICD-10-CM

## 2024-04-25 DIAGNOSIS — I25.10 CORONARY ARTERY DISEASE DUE TO CALCIFIED CORONARY LESION: ICD-10-CM

## 2024-04-25 DIAGNOSIS — F51.01 PRIMARY INSOMNIA: ICD-10-CM

## 2024-04-25 DIAGNOSIS — M25.50 ARTHRALGIA, UNSPECIFIED JOINT: ICD-10-CM

## 2024-04-25 DIAGNOSIS — R60.0 LOCALIZED EDEMA: ICD-10-CM

## 2024-04-25 DIAGNOSIS — F33.0 MILD EPISODE OF RECURRENT MAJOR DEPRESSIVE DISORDER (HCC): ICD-10-CM

## 2024-04-25 DIAGNOSIS — E78.49 OTHER HYPERLIPIDEMIA: ICD-10-CM

## 2024-04-25 DIAGNOSIS — E11.65 UNCONTROLLED TYPE 2 DIABETES MELLITUS WITH HYPERGLYCEMIA (HCC): ICD-10-CM

## 2024-04-25 DIAGNOSIS — J45.40 MODERATE PERSISTENT ASTHMA WITHOUT COMPLICATION: ICD-10-CM

## 2024-04-25 DIAGNOSIS — G62.9 PERIPHERAL POLYNEUROPATHY: ICD-10-CM

## 2024-04-25 DIAGNOSIS — R11.0 NAUSEA: ICD-10-CM

## 2024-04-25 DIAGNOSIS — E55.9 VITAMIN D DEFICIENCY: Primary | ICD-10-CM

## 2024-04-25 DIAGNOSIS — F31.9 BIPOLAR 1 DISORDER (HCC): ICD-10-CM

## 2024-04-25 DIAGNOSIS — K21.9 GASTROESOPHAGEAL REFLUX DISEASE, UNSPECIFIED WHETHER ESOPHAGITIS PRESENT: ICD-10-CM

## 2024-04-25 DIAGNOSIS — F41.9 ANXIETY: ICD-10-CM

## 2024-04-25 DIAGNOSIS — I25.119 ATHEROSCLEROSIS OF NATIVE CORONARY ARTERY OF NATIVE HEART WITH ANGINA PECTORIS (HCC): ICD-10-CM

## 2024-04-25 LAB
25(OH)D3 SERPL-MCNC: 27.9 NG/ML (ref 30–100)
ALBUMIN SERPL-MCNC: 4.1 G/DL (ref 3.5–5)
ALBUMIN/GLOB SERPL: 1.6 (ref 1–1.9)
ALP SERPL-CCNC: 66 U/L (ref 35–104)
ALT SERPL-CCNC: 24 U/L (ref 12–65)
ANION GAP SERPL CALC-SCNC: 11 MMOL/L (ref 9–18)
AST SERPL-CCNC: 19 U/L (ref 15–37)
BASOPHILS # BLD: 0.1 K/UL (ref 0–0.2)
BASOPHILS NFR BLD: 1 % (ref 0–2)
BILIRUB SERPL-MCNC: 1.3 MG/DL (ref 0–1.2)
BUN SERPL-MCNC: 11 MG/DL (ref 6–23)
CALCIUM SERPL-MCNC: 9.2 MG/DL (ref 8.8–10.2)
CHLORIDE SERPL-SCNC: 106 MMOL/L (ref 98–107)
CHOLEST SERPL-MCNC: 122 MG/DL (ref 0–200)
CO2 SERPL-SCNC: 24 MMOL/L (ref 20–28)
CREAT SERPL-MCNC: 0.56 MG/DL (ref 0.6–1.1)
DIFFERENTIAL METHOD BLD: ABNORMAL
EOSINOPHIL # BLD: 0.2 K/UL (ref 0–0.8)
EOSINOPHIL NFR BLD: 2 % (ref 0.5–7.8)
ERYTHROCYTE [DISTWIDTH] IN BLOOD BY AUTOMATED COUNT: 12.8 % (ref 11.9–14.6)
EST. AVERAGE GLUCOSE BLD GHB EST-MCNC: 181 MG/DL
GLOBULIN SER CALC-MCNC: 2.6 G/DL (ref 2.3–3.5)
GLUCOSE SERPL-MCNC: 131 MG/DL (ref 70–99)
HBA1C MFR BLD: 7.9 % (ref 0–5.6)
HCT VFR BLD AUTO: 42.6 % (ref 35.8–46.3)
HDLC SERPL-MCNC: 41 MG/DL (ref 40–60)
HDLC SERPL: 3 (ref 0–5)
HGB BLD-MCNC: 14 G/DL (ref 11.7–15.4)
IMM GRANULOCYTES # BLD AUTO: 0 K/UL (ref 0–0.5)
IMM GRANULOCYTES NFR BLD AUTO: 0 % (ref 0–5)
LDLC SERPL CALC-MCNC: 55 MG/DL (ref 0–100)
LYMPHOCYTES # BLD: 2.9 K/UL (ref 0.5–4.6)
LYMPHOCYTES NFR BLD: 33 % (ref 13–44)
MCH RBC QN AUTO: 28.7 PG (ref 26.1–32.9)
MCHC RBC AUTO-ENTMCNC: 32.9 G/DL (ref 31.4–35)
MCV RBC AUTO: 87.3 FL (ref 82–102)
MONOCYTES # BLD: 0.6 K/UL (ref 0.1–1.3)
MONOCYTES NFR BLD: 7 % (ref 4–12)
NEUTS SEG # BLD: 5.1 K/UL (ref 1.7–8.2)
NEUTS SEG NFR BLD: 57 % (ref 43–78)
NRBC # BLD: 0 K/UL (ref 0–0.2)
PLATELET # BLD AUTO: 317 K/UL (ref 150–450)
PMV BLD AUTO: 9.2 FL (ref 9.4–12.3)
POTASSIUM SERPL-SCNC: 4.2 MMOL/L (ref 3.5–5.1)
PROT SERPL-MCNC: 6.7 G/DL (ref 6.3–8.2)
RBC # BLD AUTO: 4.88 M/UL (ref 4.05–5.2)
SODIUM SERPL-SCNC: 141 MMOL/L (ref 136–145)
TRIGL SERPL-MCNC: 132 MG/DL (ref 0–150)
VLDLC SERPL CALC-MCNC: 26 MG/DL (ref 6–23)
WBC # BLD AUTO: 8.9 K/UL (ref 4.3–11.1)

## 2024-04-25 RX ORDER — TRAZODONE HYDROCHLORIDE 100 MG/1
100 TABLET ORAL DAILY
Qty: 30 TABLET | Refills: 3 | Status: SHIPPED | OUTPATIENT
Start: 2024-04-25

## 2024-04-25 RX ORDER — DULOXETIN HYDROCHLORIDE 60 MG/1
120 CAPSULE, DELAYED RELEASE ORAL DAILY
Qty: 30 CAPSULE | Refills: 3 | Status: SHIPPED | OUTPATIENT
Start: 2024-04-25

## 2024-04-25 RX ORDER — ALIROCUMAB 75 MG/ML
75 INJECTION, SOLUTION SUBCUTANEOUS
Qty: 6 ADJUSTABLE DOSE PRE-FILLED PEN SYRINGE | Refills: 3 | Status: SHIPPED | OUTPATIENT
Start: 2024-04-25

## 2024-04-25 RX ORDER — HYDROCODONE BITARTRATE AND ACETAMINOPHEN 10; 325 MG/1; MG/1
1 TABLET ORAL EVERY 8 HOURS PRN
Qty: 90 TABLET | Refills: 0 | Status: SHIPPED | OUTPATIENT
Start: 2024-06-25 | End: 2024-07-25

## 2024-04-25 RX ORDER — FLUTICASONE FUROATE, UMECLIDINIUM BROMIDE AND VILANTEROL TRIFENATATE 200; 62.5; 25 UG/1; UG/1; UG/1
1 POWDER RESPIRATORY (INHALATION) DAILY
Qty: 1 EACH | Refills: 11 | Status: SHIPPED | OUTPATIENT
Start: 2024-04-25

## 2024-04-25 RX ORDER — MELOXICAM 15 MG/1
15 TABLET ORAL DAILY
Qty: 30 TABLET | Refills: 2 | Status: SHIPPED | OUTPATIENT
Start: 2024-04-25

## 2024-04-25 RX ORDER — HYDROCODONE BITARTRATE AND ACETAMINOPHEN 10; 325 MG/1; MG/1
1 TABLET ORAL EVERY 8 HOURS PRN
Qty: 90 TABLET | Refills: 0 | Status: SHIPPED | OUTPATIENT
Start: 2024-05-25 | End: 2024-06-24

## 2024-04-25 RX ORDER — CLOPIDOGREL BISULFATE 75 MG/1
75 TABLET ORAL DAILY
Qty: 90 TABLET | Refills: 3 | Status: SHIPPED | OUTPATIENT
Start: 2024-04-25

## 2024-04-25 RX ORDER — FUROSEMIDE 40 MG/1
40 TABLET ORAL DAILY
Qty: 90 TABLET | Refills: 3 | Status: SHIPPED | OUTPATIENT
Start: 2024-04-25

## 2024-04-25 RX ORDER — GABAPENTIN 600 MG/1
600 TABLET ORAL 2 TIMES DAILY
Qty: 90 TABLET | Refills: 3 | Status: SHIPPED | OUTPATIENT
Start: 2024-04-25 | End: 2024-10-22

## 2024-04-25 RX ORDER — ATORVASTATIN CALCIUM 80 MG/1
80 TABLET, FILM COATED ORAL DAILY
Qty: 90 TABLET | Refills: 3 | Status: SHIPPED | OUTPATIENT
Start: 2024-04-25

## 2024-04-25 RX ORDER — HYDROCODONE BITARTRATE AND ACETAMINOPHEN 10; 325 MG/1; MG/1
1 TABLET ORAL EVERY 8 HOURS PRN
Qty: 90 TABLET | Refills: 0 | Status: SHIPPED | OUTPATIENT
Start: 2024-04-25 | End: 2024-05-25

## 2024-04-25 RX ORDER — DEXLANSOPRAZOLE 60 MG/1
60 CAPSULE, DELAYED RELEASE ORAL DAILY
Qty: 30 CAPSULE | Refills: 3 | Status: SHIPPED | OUTPATIENT
Start: 2024-04-25

## 2024-04-25 RX ORDER — HYDROXYZINE HYDROCHLORIDE 25 MG/1
25 TABLET, FILM COATED ORAL 3 TIMES DAILY PRN
Qty: 30 TABLET | Refills: 3 | Status: SHIPPED | OUTPATIENT
Start: 2024-04-25

## 2024-04-25 RX ORDER — LAMOTRIGINE 100 MG/1
100 TABLET ORAL DAILY
Qty: 30 TABLET | Refills: 3 | Status: SHIPPED | OUTPATIENT
Start: 2024-04-25

## 2024-04-25 RX ORDER — TIZANIDINE 2 MG/1
2 TABLET ORAL 2 TIMES DAILY
Qty: 60 TABLET | Refills: 3 | Status: SHIPPED | OUTPATIENT
Start: 2024-04-25

## 2024-04-25 RX ORDER — ONDANSETRON 4 MG/1
4 TABLET, ORALLY DISINTEGRATING ORAL 3 TIMES DAILY PRN
Qty: 21 TABLET | Refills: 0 | Status: SHIPPED | OUTPATIENT
Start: 2024-04-25

## 2024-04-25 RX ORDER — DIAZEPAM 2 MG/1
2 TABLET ORAL 2 TIMES DAILY PRN
Qty: 60 TABLET | Refills: 3 | Status: SHIPPED | OUTPATIENT
Start: 2024-04-25 | End: 2024-08-23

## 2024-04-25 RX ORDER — ALBUTEROL SULFATE 90 UG/1
1 AEROSOL, METERED RESPIRATORY (INHALATION) EVERY 6 HOURS PRN
Qty: 18 G | Refills: 2 | Status: SHIPPED | OUTPATIENT
Start: 2024-04-25

## 2024-04-25 SDOH — ECONOMIC STABILITY: FOOD INSECURITY: WITHIN THE PAST 12 MONTHS, YOU WORRIED THAT YOUR FOOD WOULD RUN OUT BEFORE YOU GOT MONEY TO BUY MORE.: NEVER TRUE

## 2024-04-25 SDOH — ECONOMIC STABILITY: INCOME INSECURITY: HOW HARD IS IT FOR YOU TO PAY FOR THE VERY BASICS LIKE FOOD, HOUSING, MEDICAL CARE, AND HEATING?: NOT VERY HARD

## 2024-04-25 SDOH — ECONOMIC STABILITY: FOOD INSECURITY: WITHIN THE PAST 12 MONTHS, THE FOOD YOU BOUGHT JUST DIDN'T LAST AND YOU DIDN'T HAVE MONEY TO GET MORE.: NEVER TRUE

## 2024-04-25 ASSESSMENT — ENCOUNTER SYMPTOMS
BLOOD IN STOOL: 0
BLURRED VISION: 0
NAUSEA: 0
ABDOMINAL PAIN: 0
COUGH: 0
ABDOMINAL DISTENTION: 0
SORE THROAT: 0
SHORTNESS OF BREATH: 0
COLOR CHANGE: 0
EYE PAIN: 0

## 2024-04-25 NOTE — PROGRESS NOTES
Tammie Sunshine  1970 is a 53 y.o. female ,Established patient, here for evaluation of the following chief complaint(s):   Chief Complaint   Patient presents with    3 Month Follow-Up    Medication Refill          1. Vitamin D deficiency  -     Vitamin D 25 Hydroxy; Future  2. Moderate persistent asthma without complication  -     albuterol sulfate HFA (PROVENTIL;VENTOLIN;PROAIR) 108 (90 Base) MCG/ACT inhaler; Inhale 1 puff into the lungs every 6 hours as needed for Wheezing TAKE 1 PUFF BY INHALATION EVERY FOUR (4) HOURS AS NEEDED FOR WHEEZING OR SHORTNESS OF BREATH., Disp-18 g, R-2Normal  -     fluticasone-umeclidin-vilant (TRELEGY ELLIPTA) 200-62.5-25 MCG/ACT AEPB inhaler; Inhale 1 puff into the lungs daily, Disp-1 each, R-11Normal  -     CBC with Auto Differential; Future  3. Other hyperlipidemia  -     atorvastatin (LIPITOR) 80 MG tablet; Take 1 tablet by mouth daily, Disp-90 tablet, R-3Normal  -     Lipid Panel; Future  4. Coronary artery disease due to calcified coronary lesion  -     atorvastatin (LIPITOR) 80 MG tablet; Take 1 tablet by mouth daily, Disp-90 tablet, R-3Normal  -     clopidogrel (PLAVIX) 75 MG tablet; Take 1 tablet by mouth daily, Disp-90 tablet, R-3Normal  5. Gastroesophageal reflux disease, unspecified whether esophagitis present  -     dexlansoprazole (DEXILANT) 60 MG CPDR delayed release capsule; Take 1 capsule by mouth daily, Disp-30 capsule, R-3Normal  6. Anxiety  -     diazePAM (VALIUM) 2 MG tablet; Take 1 tablet by mouth 2 times daily as needed for Anxiety for up to 120 days., Disp-60 tablet, R-3Normal  -     hydrOXYzine HCl (ATARAX) 25 MG tablet; Take 1 tablet by mouth 3 times daily as needed for Anxiety, Disp-30 tablet, R-3Normal  7. Mild episode of recurrent major depressive disorder (HCC)  -     DULoxetine (CYMBALTA) 60 MG extended release capsule; Take 2 capsules by mouth daily, Disp-30 capsule, R-3Normal  8. Bipolar 1 disorder (HCC)  -     DULoxetine (CYMBALTA) 60

## 2024-05-03 ENCOUNTER — TRANSCRIBE ORDERS (OUTPATIENT)
Dept: SCHEDULING | Age: 54
End: 2024-05-03

## 2024-05-03 DIAGNOSIS — R09.89 DIMINISHED PULSE: Primary | ICD-10-CM

## 2024-05-08 DIAGNOSIS — K59.09 OTHER CONSTIPATION: ICD-10-CM

## 2024-05-08 RX ORDER — LINACLOTIDE 145 UG/1
145 CAPSULE, GELATIN COATED ORAL DAILY
Qty: 30 CAPSULE | Refills: 3 | Status: SHIPPED | OUTPATIENT
Start: 2024-05-08

## 2024-05-16 ENCOUNTER — TELEPHONE (OUTPATIENT)
Dept: NEUROLOGY | Age: 54
End: 2024-05-16

## 2024-05-21 DIAGNOSIS — E11.65 UNCONTROLLED TYPE 2 DIABETES MELLITUS WITH HYPERGLYCEMIA (HCC): ICD-10-CM

## 2024-05-22 ENCOUNTER — PATIENT MESSAGE (OUTPATIENT)
Dept: ENDOCRINOLOGY | Age: 54
End: 2024-05-22

## 2024-05-22 RX ORDER — INSULIN HUMAN 500 [IU]/ML
INJECTION, SOLUTION SUBCUTANEOUS
Qty: 18 ML | Refills: 5 | OUTPATIENT
Start: 2024-05-22

## 2024-05-22 RX ORDER — TIRZEPATIDE 12.5 MG/.5ML
12.5 INJECTION, SOLUTION SUBCUTANEOUS WEEKLY
Qty: 6 ML | Refills: 3 | Status: SHIPPED | OUTPATIENT
Start: 2024-05-22

## 2024-05-22 NOTE — TELEPHONE ENCOUNTER
From: Tammie Sunshine  To: Arleen Gaviria  Sent: 5/22/2024 8:40 AM EDT  Subject: Good morning     Arleen  I am having a hard time  Getting Monjouro 15 I have a prescription for 15 can I get one for 12.5 please they have it in stock at [facility] thanks

## 2024-06-03 NOTE — PROGRESS NOTES
[facility] ENDOCRINOLOGY   AND   THYROID NODULE CLINIC    Arleen Gaviria PA-C  [facility] Endocrinology and Thyroid Nodule Clinic  [address] [place] [address], [address] [address]  Phone [phone]  Facsimile [phone]          Tammie Sunshine is a 53 y.o. female seen 6/4/2024 for follow up evaluation of Type 2 diabetes        Assessment and Plan:    Interpretation of 72 hour glucose monitor:  At least 72 hours of data were reviewed.  The patient utilizes a dexcom G7 continuous glucose monitoring system.  The average glucose during the reviewed timeframe was 193 with a standard deviation of 39.  There is a pattern of frequent postprandial hyperglycemia after meals.     1. Uncontrolled type 2 diabetes mellitus with hyperglycemia (HCC)  Glycemic control is suboptimal. Now using U-500 more regularly, rare need for correction, good tolerance of GLP-1 and SGLT2i    Encourage daily exercise    Increase U-500 from 75 units to 85 units TIDAC    Check thyroid screening lab due to compliant of fatigue, also see #5    - fluconazole (DIFLUCAN) 150 MG tablet; Take 1 tablet by mouth every 72 hours for 6 days  Dispense: 2 tablet; Refill: 0  - HUMULIN R U-500 KWIKPEN 500 UNIT/ML SOPN concentrated injection pen; Inject 85 Units into the skin 2 times daily (with meals)  Dispense: 18 mL; Refill: 3  - JARDIANCE 25 MG tablet; Take 1 tablet by mouth daily  Dispense: 90 tablet; Refill: 3  - MOUNJARO 15 MG/0.5ML SOPN SC injection; Inject 0.5 mLs into the skin once a week  Dispense: 6 mL; Refill: 3  - UNIFINE PENTIPS PLUS 31G X 6 MM MISC; USE WITH INSULIN UP TO 4 TIMES DAILY, E11.65  Dispense: 400 each; Refill: 3  - [place] CONTINUOUS GLUCOSE MONITORING ANALYSIS I&R  - HM DIABETES FOOT EXAM  - TSH with Reflex; Future      2. Primary hypertension  Stable  BP Readings from Last 3 Encounters:   06/04/24 118/72   04/25/24 120/78   02/06/24 136/74         3. Hyperlipidemia associated with type 2 diabetes mellitus (HCC)  Last

## 2024-06-04 ENCOUNTER — OFFICE VISIT (OUTPATIENT)
Dept: ENDOCRINOLOGY | Age: 54
End: 2024-06-04
Payer: MEDICARE

## 2024-06-04 VITALS
SYSTOLIC BLOOD PRESSURE: 118 MMHG | OXYGEN SATURATION: 93 % | DIASTOLIC BLOOD PRESSURE: 72 MMHG | HEIGHT: 64 IN | WEIGHT: 179.4 LBS | HEART RATE: 71 BPM | BODY MASS INDEX: 30.63 KG/M2

## 2024-06-04 DIAGNOSIS — E11.65 UNCONTROLLED TYPE 2 DIABETES MELLITUS WITH HYPERGLYCEMIA (HCC): ICD-10-CM

## 2024-06-04 DIAGNOSIS — I10 PRIMARY HYPERTENSION: ICD-10-CM

## 2024-06-04 DIAGNOSIS — I50.32 CHRONIC DIASTOLIC (CONGESTIVE) HEART FAILURE (HCC): ICD-10-CM

## 2024-06-04 DIAGNOSIS — E11.65 UNCONTROLLED TYPE 2 DIABETES MELLITUS WITH HYPERGLYCEMIA (HCC): Primary | ICD-10-CM

## 2024-06-04 DIAGNOSIS — E11.69 HYPERLIPIDEMIA ASSOCIATED WITH TYPE 2 DIABETES MELLITUS (HCC): ICD-10-CM

## 2024-06-04 DIAGNOSIS — E78.5 HYPERLIPIDEMIA ASSOCIATED WITH TYPE 2 DIABETES MELLITUS (HCC): ICD-10-CM

## 2024-06-04 DIAGNOSIS — E55.9 VITAMIN D DEFICIENCY: ICD-10-CM

## 2024-06-04 LAB — TSH W FREE THYROID IF ABNORMAL: 1.29 UIU/ML (ref 0.27–4.2)

## 2024-06-04 PROCEDURE — 3051F HG A1C>EQUAL 7.0%<8.0%: CPT | Performed by: PHYSICIAN ASSISTANT

## 2024-06-04 PROCEDURE — 1036F TOBACCO NON-USER: CPT | Performed by: PHYSICIAN ASSISTANT

## 2024-06-04 PROCEDURE — 95251 CONT GLUC MNTR ANALYSIS I&R: CPT | Performed by: PHYSICIAN ASSISTANT

## 2024-06-04 PROCEDURE — 3074F SYST BP LT 130 MM HG: CPT | Performed by: PHYSICIAN ASSISTANT

## 2024-06-04 PROCEDURE — 3017F COLORECTAL CA SCREEN DOC REV: CPT | Performed by: PHYSICIAN ASSISTANT

## 2024-06-04 PROCEDURE — 3078F DIAST BP <80 MM HG: CPT | Performed by: PHYSICIAN ASSISTANT

## 2024-06-04 PROCEDURE — G8427 DOCREV CUR MEDS BY ELIG CLIN: HCPCS | Performed by: PHYSICIAN ASSISTANT

## 2024-06-04 PROCEDURE — 99214 OFFICE O/P EST MOD 30 MIN: CPT | Performed by: PHYSICIAN ASSISTANT

## 2024-06-04 PROCEDURE — 2022F DILAT RTA XM EVC RTNOPTHY: CPT | Performed by: PHYSICIAN ASSISTANT

## 2024-06-04 PROCEDURE — G8417 CALC BMI ABV UP PARAM F/U: HCPCS | Performed by: PHYSICIAN ASSISTANT

## 2024-06-04 RX ORDER — FLUCONAZOLE 150 MG/1
150 TABLET ORAL
Qty: 2 TABLET | Refills: 0 | Status: SHIPPED | OUTPATIENT
Start: 2024-06-04 | End: 2024-06-10

## 2024-06-04 RX ORDER — EMPAGLIFLOZIN 25 MG/1
25 TABLET, FILM COATED ORAL DAILY
Qty: 90 TABLET | Refills: 3 | Status: SHIPPED | OUTPATIENT
Start: 2024-06-04

## 2024-06-04 RX ORDER — INSULIN HUMAN 500 [IU]/ML
85 INJECTION, SOLUTION SUBCUTANEOUS 2 TIMES DAILY WITH MEALS
Qty: 18 ML | Refills: 3 | Status: SHIPPED | OUTPATIENT
Start: 2024-06-04

## 2024-06-04 RX ORDER — TIRZEPATIDE 15 MG/.5ML
15 INJECTION, SOLUTION SUBCUTANEOUS WEEKLY
Qty: 6 ML | Refills: 3 | Status: SHIPPED | OUTPATIENT
Start: 2024-06-04

## 2024-06-04 RX ORDER — PEN NEEDLE, DIABETIC 31 G X1/4"
NEEDLE, DISPOSABLE MISCELLANEOUS
Qty: 400 EACH | Refills: 3 | Status: SHIPPED | OUTPATIENT
Start: 2024-06-04

## 2024-06-04 RX ORDER — ATOGEPANT 60 MG/1
1 TABLET ORAL DAILY
COMMUNITY
Start: 2024-05-08

## 2024-06-22 DIAGNOSIS — F33.0 MILD EPISODE OF RECURRENT MAJOR DEPRESSIVE DISORDER (HCC): ICD-10-CM

## 2024-06-22 DIAGNOSIS — F31.9 BIPOLAR 1 DISORDER (HCC): ICD-10-CM

## 2024-06-25 RX ORDER — DULOXETIN HYDROCHLORIDE 60 MG/1
120 CAPSULE, DELAYED RELEASE ORAL DAILY
Qty: 30 CAPSULE | Refills: 3 | Status: SHIPPED | OUTPATIENT
Start: 2024-06-25

## 2024-06-28 DIAGNOSIS — E11.65 UNCONTROLLED TYPE 2 DIABETES MELLITUS WITH HYPERGLYCEMIA (HCC): ICD-10-CM

## 2024-07-01 RX ORDER — EMPAGLIFLOZIN 25 MG/1
25 TABLET, FILM COATED ORAL DAILY
Qty: 100 TABLET | Refills: 3 | Status: SHIPPED | OUTPATIENT
Start: 2024-07-01

## 2024-07-03 ENCOUNTER — TELEPHONE (OUTPATIENT)
Age: 54
End: 2024-07-03

## 2024-07-03 ENCOUNTER — PATIENT MESSAGE (OUTPATIENT)
Age: 54
End: 2024-07-03

## 2024-07-03 NOTE — TELEPHONE ENCOUNTER
----- Message from Julia Martinez MA sent at 7/3/2024 12:42 PM EDT -----  Regarding: FW: Blood pressure  Contact: [phone]    ----- Message -----  From: Kalee Martínez MA  Sent: 7/3/2024  11:13 AM EDT  To: Julia Martinez MA  Subject: FW: Blood pressure                                 ----- Message -----  From: Tammie Sunshine  Sent: 7/3/2024  11:04 AM EDT  To: [facility] [[email or web address]] Cardiology Clinical Staff  Subject: Blood pressure                                   Gm I am freaking out my blood pressure has been low and I have been feeling like I’m gonna pass out 85/75  Yesterday it was 75/40  something I’m taking is lowering my blood pressure

## 2024-07-03 NOTE — TELEPHONE ENCOUNTER
Pt c/o low BP. 75/52/79.  Orthostatic, near syncope when stands.   Has lost 70#.   Takes Lisinopril 40mg daily.  Lasix 40mg QOD with KCL 20 mEq.   No swelling.   UCD f/u 8/29/24    Recent VS    Endo 6/4/24 118/72/71   PCP 4/25/24 120/78/71.  cgh

## 2024-07-06 ENCOUNTER — PATIENT MESSAGE (OUTPATIENT)
Dept: ENDOCRINOLOGY | Age: 54
End: 2024-07-06

## 2024-07-08 RX ORDER — ATOGEPANT 60 MG/1
1 TABLET ORAL DAILY
Qty: 90 TABLET | Refills: 3 | Status: SHIPPED | OUTPATIENT
Start: 2024-07-08 | End: 2024-08-07

## 2024-07-10 ENCOUNTER — TELEPHONE (OUTPATIENT)
Dept: FAMILY MEDICINE CLINIC | Facility: CLINIC | Age: 54
End: 2024-07-10

## 2024-07-10 DIAGNOSIS — M54.50 CHRONIC BILATERAL LOW BACK PAIN WITHOUT SCIATICA: Primary | ICD-10-CM

## 2024-07-10 DIAGNOSIS — G89.29 CHRONIC BILATERAL LOW BACK PAIN WITHOUT SCIATICA: Primary | ICD-10-CM

## 2024-07-10 RX ORDER — HYDROCODONE BITARTRATE AND ACETAMINOPHEN 10; 325 MG/1; MG/1
1 TABLET ORAL EVERY 8 HOURS PRN
Qty: 90 TABLET | Refills: 0 | Status: SHIPPED | OUTPATIENT
Start: 2024-07-10 | End: 2024-08-09

## 2024-07-10 NOTE — TELEPHONE ENCOUNTER
Medication sent in      - I have reviewed the patient’s controlled substance prescription history, as maintained in the [place] prescription monitoring program, so that the prescription(s) for a  controlled substance can be given.  - Patient is aware of addictive potential of medication.   - Patient is aware of respiratory suppression and is not experiencing any sedation with medication.   - Patient denies sedation or other side effects from medication  - Patient is instructed on compliance with dosing schedule and acknowledges that no early refill will be provided in the event of non-compliance, theft or loss of medication.   - Patient is aware that they may be subject to random drug testing and pill counts.

## 2024-07-10 NOTE — TELEPHONE ENCOUNTER
Pt is calling asking why she does not have any refills on her pain medication       Hydrocodone-acetaminophen NORCO  and her  does.      So she is wanting you to call in her medication       [facility] pharmacy on file,      Thank you !

## 2024-07-15 DIAGNOSIS — I10 PRIMARY HYPERTENSION: ICD-10-CM

## 2024-07-15 RX ORDER — METOPROLOL SUCCINATE 25 MG/1
25 TABLET, EXTENDED RELEASE ORAL DAILY
Qty: 30 TABLET | Refills: 0 | Status: SHIPPED | OUTPATIENT
Start: 2024-07-15

## 2024-07-17 DIAGNOSIS — E11.65 UNCONTROLLED TYPE 2 DIABETES MELLITUS WITH HYPERGLYCEMIA (HCC): ICD-10-CM

## 2024-07-17 NOTE — TELEPHONE ENCOUNTER
Mounjaro 15mg    This medication or product was previously approved on [id number] from 2024-07-08 to 2024-12-31. **Please note: This request was submitted electronically. Formulary lowering, tiering exception, cost reduction and/or pre-benefit determination review (including prospective Medicare hospice reviews) requests cannot be requested using this method of submission. Providers contact us at [phone] for further assistance.       KEY:[id number]      Patient notified via Rip[employer] message.

## 2024-07-18 RX ORDER — INSULIN HUMAN 500 [IU]/ML
INJECTION, SOLUTION SUBCUTANEOUS
Qty: 18 ML | Refills: 5 | Status: SHIPPED | OUTPATIENT
Start: 2024-07-18

## 2024-07-18 NOTE — TELEPHONE ENCOUNTER
Patient needs a refill on Humulin R U500 but she wants sent to [facility] [place].   [de-identified] : Mr. TONIA MATHUR is a pleasant 48 year old male with PMH of Hashimoto's thyroiditis now hypothyroidism, smoker who comes in to the office to follow up in his hypothyroidism. He was off his levothyroxine for months and I restgerted it 6 weeks ago, he is tolerating it well. Saw cardio, has pending work up. No complaints\par His symptoms are almost resolved now\par He would also like counseling for quitting smoking, he quit before after 1 month of Chantix for 2 year but he started smoking again 5 years ago. He needs cardiac clearance before prescribing Chantix\par Denies chest pain, shortness of breath, palpitations. history of seizures, heart conditions.\par Is seeing an ophthalmologist\par \par Had transaminitis on lab work, still has pending repeat labs.\par Previous PCP: Flynn Sheldon

## 2024-07-25 ENCOUNTER — OFFICE VISIT (OUTPATIENT)
Dept: FAMILY MEDICINE CLINIC | Facility: CLINIC | Age: 54
End: 2024-07-25

## 2024-07-25 VITALS
HEART RATE: 66 BPM | OXYGEN SATURATION: 96 % | DIASTOLIC BLOOD PRESSURE: 76 MMHG | BODY MASS INDEX: 29.81 KG/M2 | TEMPERATURE: 97.8 F | SYSTOLIC BLOOD PRESSURE: 126 MMHG | HEIGHT: 64 IN | WEIGHT: 174.6 LBS

## 2024-07-25 DIAGNOSIS — M25.50 ARTHRALGIA, UNSPECIFIED JOINT: ICD-10-CM

## 2024-07-25 DIAGNOSIS — G89.29 CHRONIC BILATERAL LOW BACK PAIN WITHOUT SCIATICA: ICD-10-CM

## 2024-07-25 DIAGNOSIS — G62.9 PERIPHERAL POLYNEUROPATHY: ICD-10-CM

## 2024-07-25 DIAGNOSIS — M54.50 CHRONIC BILATERAL LOW BACK PAIN WITHOUT SCIATICA: ICD-10-CM

## 2024-07-25 DIAGNOSIS — F41.9 ANXIETY: ICD-10-CM

## 2024-07-25 DIAGNOSIS — K21.9 GASTROESOPHAGEAL REFLUX DISEASE, UNSPECIFIED WHETHER ESOPHAGITIS PRESENT: ICD-10-CM

## 2024-07-25 DIAGNOSIS — F33.0 MILD EPISODE OF RECURRENT MAJOR DEPRESSIVE DISORDER (HCC): ICD-10-CM

## 2024-07-25 DIAGNOSIS — I25.10 CORONARY ARTERY DISEASE DUE TO CALCIFIED CORONARY LESION: ICD-10-CM

## 2024-07-25 DIAGNOSIS — G47.33 OSA (OBSTRUCTIVE SLEEP APNEA): Primary | ICD-10-CM

## 2024-07-25 DIAGNOSIS — F31.9 BIPOLAR 1 DISORDER (HCC): ICD-10-CM

## 2024-07-25 DIAGNOSIS — Z12.31 ENCOUNTER FOR SCREENING MAMMOGRAM FOR MALIGNANT NEOPLASM OF BREAST: ICD-10-CM

## 2024-07-25 DIAGNOSIS — I25.84 CORONARY ARTERY DISEASE DUE TO CALCIFIED CORONARY LESION: ICD-10-CM

## 2024-07-25 DIAGNOSIS — F51.01 PRIMARY INSOMNIA: ICD-10-CM

## 2024-07-25 DIAGNOSIS — E11.65 UNCONTROLLED TYPE 2 DIABETES MELLITUS WITH HYPERGLYCEMIA (HCC): ICD-10-CM

## 2024-07-25 DIAGNOSIS — J45.40 MODERATE PERSISTENT ASTHMA WITHOUT COMPLICATION: ICD-10-CM

## 2024-07-25 DIAGNOSIS — K59.09 OTHER CONSTIPATION: ICD-10-CM

## 2024-07-25 DIAGNOSIS — E78.49 OTHER HYPERLIPIDEMIA: ICD-10-CM

## 2024-07-25 DIAGNOSIS — E55.9 VITAMIN D DEFICIENCY: ICD-10-CM

## 2024-07-25 DIAGNOSIS — R60.0 LOCALIZED EDEMA: ICD-10-CM

## 2024-07-25 PROBLEM — I47.10 SUPRAVENTRICULAR TACHYCARDIA (HCC): Status: RESOLVED | Noted: 2022-06-14 | Resolved: 2024-07-25

## 2024-07-25 LAB
25(OH)D3 SERPL-MCNC: 31.5 NG/ML (ref 30–100)
ALBUMIN SERPL-MCNC: 4.1 G/DL (ref 3.5–5)
ALBUMIN/GLOB SERPL: 1.4 (ref 1–1.9)
ALP SERPL-CCNC: 72 U/L (ref 35–104)
ALT SERPL-CCNC: 26 U/L (ref 12–65)
ANION GAP SERPL CALC-SCNC: 11 MMOL/L (ref 9–18)
AST SERPL-CCNC: 19 U/L (ref 15–37)
BASOPHILS # BLD: 0.1 K/UL (ref 0–0.2)
BASOPHILS NFR BLD: 1 % (ref 0–2)
BILIRUB SERPL-MCNC: 1.2 MG/DL (ref 0–1.2)
BUN SERPL-MCNC: 13 MG/DL (ref 6–23)
CALCIUM SERPL-MCNC: 9.5 MG/DL (ref 8.8–10.2)
CHLORIDE SERPL-SCNC: 106 MMOL/L (ref 98–107)
CHOLEST SERPL-MCNC: 112 MG/DL (ref 0–200)
CO2 SERPL-SCNC: 23 MMOL/L (ref 20–28)
CREAT SERPL-MCNC: 0.59 MG/DL (ref 0.6–1.1)
DIFFERENTIAL METHOD BLD: ABNORMAL
EOSINOPHIL # BLD: 0.1 K/UL (ref 0–0.8)
EOSINOPHIL NFR BLD: 1 % (ref 0.5–7.8)
ERYTHROCYTE [DISTWIDTH] IN BLOOD BY AUTOMATED COUNT: 12.5 % (ref 11.9–14.6)
EST. AVERAGE GLUCOSE BLD GHB EST-MCNC: 177 MG/DL
GLOBULIN SER CALC-MCNC: 2.9 G/DL (ref 2.3–3.5)
GLUCOSE SERPL-MCNC: 136 MG/DL (ref 70–99)
HBA1C MFR BLD: 7.8 % (ref 0–5.6)
HCT VFR BLD AUTO: 46.5 % (ref 35.8–46.3)
HDLC SERPL-MCNC: 45 MG/DL (ref 40–60)
HDLC SERPL: 2.5 (ref 0–5)
HGB BLD-MCNC: 15.1 G/DL (ref 11.7–15.4)
IMM GRANULOCYTES # BLD AUTO: 0 K/UL (ref 0–0.5)
IMM GRANULOCYTES NFR BLD AUTO: 0 % (ref 0–5)
LDLC SERPL CALC-MCNC: 38 MG/DL (ref 0–100)
LYMPHOCYTES # BLD: 2.5 K/UL (ref 0.5–4.6)
LYMPHOCYTES NFR BLD: 27 % (ref 13–44)
MCH RBC QN AUTO: 28.7 PG (ref 26.1–32.9)
MCHC RBC AUTO-ENTMCNC: 32.5 G/DL (ref 31.4–35)
MCV RBC AUTO: 88.2 FL (ref 82–102)
MONOCYTES # BLD: 0.6 K/UL (ref 0.1–1.3)
MONOCYTES NFR BLD: 6 % (ref 4–12)
NEUTS SEG # BLD: 5.9 K/UL (ref 1.7–8.2)
NEUTS SEG NFR BLD: 65 % (ref 43–78)
NRBC # BLD: 0 K/UL (ref 0–0.2)
PLATELET # BLD AUTO: 308 K/UL (ref 150–450)
PMV BLD AUTO: 9.2 FL (ref 9.4–12.3)
POTASSIUM SERPL-SCNC: 4.4 MMOL/L (ref 3.5–5.1)
PROT SERPL-MCNC: 7 G/DL (ref 6.3–8.2)
RBC # BLD AUTO: 5.27 M/UL (ref 4.05–5.2)
SODIUM SERPL-SCNC: 140 MMOL/L (ref 136–145)
TRIGL SERPL-MCNC: 145 MG/DL (ref 0–150)
VLDLC SERPL CALC-MCNC: 29 MG/DL (ref 6–23)
WBC # BLD AUTO: 9.1 K/UL (ref 4.3–11.1)

## 2024-07-25 RX ORDER — DULOXETIN HYDROCHLORIDE 60 MG/1
120 CAPSULE, DELAYED RELEASE ORAL DAILY
Qty: 30 CAPSULE | Refills: 3 | Status: SHIPPED | OUTPATIENT
Start: 2024-07-25

## 2024-07-25 RX ORDER — FUROSEMIDE 40 MG/1
40 TABLET ORAL DAILY
Qty: 90 TABLET | Refills: 3 | Status: SHIPPED | OUTPATIENT
Start: 2024-07-25

## 2024-07-25 RX ORDER — DEXLANSOPRAZOLE 60 MG/1
60 CAPSULE, DELAYED RELEASE ORAL DAILY
Qty: 30 CAPSULE | Refills: 3 | Status: SHIPPED | OUTPATIENT
Start: 2024-07-25

## 2024-07-25 RX ORDER — HYDROCODONE BITARTRATE AND ACETAMINOPHEN 10; 325 MG/1; MG/1
1 TABLET ORAL EVERY 8 HOURS PRN
Qty: 90 TABLET | Refills: 0 | Status: SHIPPED | OUTPATIENT
Start: 2024-07-25 | End: 2024-08-24

## 2024-07-25 RX ORDER — GABAPENTIN 600 MG/1
600 TABLET ORAL 2 TIMES DAILY
Qty: 90 TABLET | Refills: 3 | Status: SHIPPED | OUTPATIENT
Start: 2024-07-25 | End: 2025-01-21

## 2024-07-25 RX ORDER — ATORVASTATIN CALCIUM 80 MG/1
80 TABLET, FILM COATED ORAL DAILY
Qty: 90 TABLET | Refills: 3 | Status: SHIPPED | OUTPATIENT
Start: 2024-07-25

## 2024-07-25 RX ORDER — LAMOTRIGINE 100 MG/1
100 TABLET ORAL DAILY
Qty: 30 TABLET | Refills: 3 | Status: SHIPPED | OUTPATIENT
Start: 2024-07-25

## 2024-07-25 RX ORDER — FLUTICASONE FUROATE, UMECLIDINIUM BROMIDE AND VILANTEROL TRIFENATATE 200; 62.5; 25 UG/1; UG/1; UG/1
1 POWDER RESPIRATORY (INHALATION) DAILY
Qty: 1 EACH | Refills: 11 | Status: SHIPPED | OUTPATIENT
Start: 2024-07-25

## 2024-07-25 RX ORDER — TRAZODONE HYDROCHLORIDE 100 MG/1
100 TABLET ORAL DAILY
Qty: 30 TABLET | Refills: 3 | Status: SHIPPED | OUTPATIENT
Start: 2024-07-25

## 2024-07-25 RX ORDER — MELOXICAM 15 MG/1
15 TABLET ORAL DAILY
Qty: 30 TABLET | Refills: 2 | Status: SHIPPED | OUTPATIENT
Start: 2024-07-25

## 2024-07-25 RX ORDER — HYDROXYZINE HYDROCHLORIDE 25 MG/1
25 TABLET, FILM COATED ORAL 3 TIMES DAILY PRN
Qty: 30 TABLET | Refills: 3 | Status: SHIPPED | OUTPATIENT
Start: 2024-07-25

## 2024-07-25 RX ORDER — ALBUTEROL SULFATE 90 UG/1
1 AEROSOL, METERED RESPIRATORY (INHALATION) EVERY 6 HOURS PRN
Qty: 18 G | Refills: 2 | Status: SHIPPED | OUTPATIENT
Start: 2024-07-25

## 2024-07-25 RX ORDER — HYDROCODONE BITARTRATE AND ACETAMINOPHEN 10; 325 MG/1; MG/1
1 TABLET ORAL EVERY 8 HOURS PRN
Qty: 90 TABLET | Refills: 0 | Status: SHIPPED | OUTPATIENT
Start: 2024-08-24 | End: 2024-09-23

## 2024-07-25 RX ORDER — CLOPIDOGREL BISULFATE 75 MG/1
75 TABLET ORAL DAILY
Qty: 90 TABLET | Refills: 3 | Status: SHIPPED | OUTPATIENT
Start: 2024-07-25

## 2024-07-25 RX ORDER — UBROGEPANT 100 MG/1
100 TABLET ORAL PRN
Qty: 16 TABLET | Refills: 3 | Status: SHIPPED | OUTPATIENT
Start: 2024-07-25

## 2024-07-25 RX ORDER — TIZANIDINE 2 MG/1
2 TABLET ORAL 2 TIMES DAILY
Qty: 60 TABLET | Refills: 3 | Status: SHIPPED | OUTPATIENT
Start: 2024-07-25

## 2024-07-25 RX ORDER — DIAZEPAM 2 MG/1
2 TABLET ORAL 2 TIMES DAILY PRN
Qty: 60 TABLET | Refills: 3 | Status: SHIPPED | OUTPATIENT
Start: 2024-07-25 | End: 2024-11-22

## 2024-07-25 RX ORDER — ALIROCUMAB 75 MG/ML
75 INJECTION, SOLUTION SUBCUTANEOUS
Qty: 6 ADJUSTABLE DOSE PRE-FILLED PEN SYRINGE | Refills: 3 | Status: SHIPPED | OUTPATIENT
Start: 2024-07-25

## 2024-07-25 RX ORDER — MONTELUKAST SODIUM 10 MG/1
10 TABLET ORAL NIGHTLY
Qty: 30 TABLET | Refills: 11 | Status: SHIPPED | OUTPATIENT
Start: 2024-07-25

## 2024-07-27 ASSESSMENT — ENCOUNTER SYMPTOMS
SHORTNESS OF BREATH: 0
BLOOD IN STOOL: 0
COUGH: 0
ABDOMINAL PAIN: 0
ABDOMINAL DISTENTION: 0
SORE THROAT: 0
COLOR CHANGE: 0
NAUSEA: 0
BACK PAIN: 1
FEELING OF CHOKING: 0
EYE PAIN: 0
PHOTOPHOBIA: 0

## 2024-07-27 NOTE — PROGRESS NOTES
Constitutional:  Negative for chills and fever.   HENT:  Negative for ear pain, hearing loss and sore throat.    Eyes:  Negative for photophobia and pain.   Respiratory:  Negative for cough and shortness of breath.    Cardiovascular:  Negative for chest pain, palpitations and leg swelling.   Gastrointestinal:  Negative for abdominal distention, abdominal pain, anorexia, blood in stool and nausea.   Genitourinary:  Negative for dysuria and urgency.   Musculoskeletal:  Positive for arthralgias and back pain. Negative for joint swelling and myalgias.   Skin:  Negative for color change, pallor and rash.   Neurological:  Negative for speech difficulty, weakness, light-headedness and headaches.   Hematological:  Negative for adenopathy.   Psychiatric/Behavioral:  Negative for agitation, confusion, hallucinations, self-injury and suicidal ideas.           Objective   Physical Exam  Constitutional:       Appearance: Normal appearance.   HENT:      Head: Normocephalic and atraumatic.      Nose: Nose normal.   Eyes:      Extraocular Movements: Extraocular movements intact.      Conjunctiva/sclera: Conjunctivae normal.      Pupils: Pupils are equal, round, and reactive to light.   Cardiovascular:      Rate and Rhythm: Normal rate and regular rhythm.      Pulses: Normal pulses.      Heart sounds: Normal heart sounds.   Pulmonary:      Effort: Pulmonary effort is normal.      Breath sounds: Normal breath sounds.   Abdominal:      General: Abdomen is flat. Bowel sounds are normal.      Palpations: Abdomen is soft.   Skin:     General: Skin is warm and dry.   Neurological:      General: No focal deficit present.      Mental Status: She is alert and oriented to person, place, and time.   Psychiatric:         Mood and Affect: Mood normal.                   An electronic signature was used to authenticate this note.    --Neeraj Plunkett MD

## 2024-07-29 ENCOUNTER — TELEPHONE (OUTPATIENT)
Dept: FAMILY MEDICINE CLINIC | Facility: CLINIC | Age: 54
End: 2024-07-29

## 2024-07-29 NOTE — TELEPHONE ENCOUNTER
[facility] is calling to check fax on medication       Prauluent 75MG/ML SOAJ injection Pen    Thank you

## 2024-07-30 ENCOUNTER — PATIENT MESSAGE (OUTPATIENT)
Dept: ENDOCRINOLOGY | Age: 54
End: 2024-07-30

## 2024-08-01 ENCOUNTER — TELEPHONE (OUTPATIENT)
Dept: FAMILY MEDICINE CLINIC | Facility: CLINIC | Age: 54
End: 2024-08-01

## 2024-08-05 RX ORDER — FLUCONAZOLE 150 MG/1
150 TABLET ORAL
Qty: 2 TABLET | Refills: 1 | Status: SHIPPED | OUTPATIENT
Start: 2024-08-05 | End: 2024-08-11

## 2024-08-05 NOTE — TELEPHONE ENCOUNTER
From: Tammie Sunshine  To: Arleen Gaviria  Sent: 7/30/2024 12:17 PM EDT  Subject: Praluent    I need a prior auth please

## 2024-08-07 DIAGNOSIS — E11.65 UNCONTROLLED TYPE 2 DIABETES MELLITUS WITH HYPERGLYCEMIA (HCC): ICD-10-CM

## 2024-08-07 RX ORDER — ALIROCUMAB 75 MG/ML
75 INJECTION, SOLUTION SUBCUTANEOUS
Qty: 2 ADJUSTABLE DOSE PRE-FILLED PEN SYRINGE | Refills: 3 | Status: SHIPPED | OUTPATIENT
Start: 2024-08-07

## 2024-08-15 DIAGNOSIS — I10 PRIMARY HYPERTENSION: ICD-10-CM

## 2024-08-15 NOTE — TELEPHONE ENCOUNTER
Requested Prescriptions     Pending Prescriptions Disp Refills    metoprolol succinate (TOPROL XL) 25 MG extended release tablet [Pharmacy Med Name: METOPROLOL ER SUCCINATE 25MG TABS] 90 tablet 3     Sig: TAKE 1 TABLET BY MOUTH DAILY

## 2024-08-16 RX ORDER — METOPROLOL SUCCINATE 25 MG/1
25 TABLET, EXTENDED RELEASE ORAL DAILY
Qty: 90 TABLET | Refills: 3 | Status: SHIPPED | OUTPATIENT
Start: 2024-08-16

## 2024-08-18 DIAGNOSIS — R06.83 SNORING: Primary | ICD-10-CM

## 2024-08-29 ENCOUNTER — OFFICE VISIT (OUTPATIENT)
Age: 54
End: 2024-08-29
Payer: MEDICARE

## 2024-08-29 VITALS
HEART RATE: 68 BPM | SYSTOLIC BLOOD PRESSURE: 124 MMHG | BODY MASS INDEX: 29.88 KG/M2 | HEIGHT: 64 IN | DIASTOLIC BLOOD PRESSURE: 80 MMHG | WEIGHT: 175 LBS

## 2024-08-29 DIAGNOSIS — I10 PRIMARY HYPERTENSION: ICD-10-CM

## 2024-08-29 DIAGNOSIS — E78.1 HYPERTRIGLYCERIDEMIA: ICD-10-CM

## 2024-08-29 DIAGNOSIS — R42 DIZZINESS: ICD-10-CM

## 2024-08-29 DIAGNOSIS — I25.119 ATHEROSCLEROSIS OF NATIVE CORONARY ARTERY OF NATIVE HEART WITH ANGINA PECTORIS (HCC): Primary | ICD-10-CM

## 2024-08-29 DIAGNOSIS — R55 PRE-SYNCOPE: ICD-10-CM

## 2024-08-29 PROCEDURE — 93000 ELECTROCARDIOGRAM COMPLETE: CPT | Performed by: INTERNAL MEDICINE

## 2024-08-29 PROCEDURE — G8417 CALC BMI ABV UP PARAM F/U: HCPCS | Performed by: INTERNAL MEDICINE

## 2024-08-29 PROCEDURE — 1036F TOBACCO NON-USER: CPT | Performed by: INTERNAL MEDICINE

## 2024-08-29 PROCEDURE — 99214 OFFICE O/P EST MOD 30 MIN: CPT | Performed by: INTERNAL MEDICINE

## 2024-08-29 PROCEDURE — G8428 CUR MEDS NOT DOCUMENT: HCPCS | Performed by: INTERNAL MEDICINE

## 2024-08-29 PROCEDURE — 3079F DIAST BP 80-89 MM HG: CPT | Performed by: INTERNAL MEDICINE

## 2024-08-29 PROCEDURE — 3074F SYST BP LT 130 MM HG: CPT | Performed by: INTERNAL MEDICINE

## 2024-08-29 PROCEDURE — 3017F COLORECTAL CA SCREEN DOC REV: CPT | Performed by: INTERNAL MEDICINE

## 2024-08-29 RX ORDER — POTASSIUM CHLORIDE 1500 MG/1
20 TABLET, EXTENDED RELEASE ORAL DAILY
Qty: 90 TABLET | Refills: 3 | Status: SHIPPED | OUTPATIENT
Start: 2024-08-29

## 2024-08-29 NOTE — PROGRESS NOTES
[facility] CARDIOLOGY  [address] [place] [address], [address]  [place] [address]  PHONE: [phone]      24    NAME:  Tammie Sunshine  : 1970  MRN: [id number]       SUBJECTIVE:   Tammie Sunshine is a 53 y.o. female seen for a follow up visit regarding the following:     Chief Complaint   Patient presents with    Atrial Fibrillation         HPI:    No cp. No orthopnea or pnd. No palpitations or syncope.  Occasional dizziness and mild chicas    Past Medical History, Past Surgical History, Family history, Social History, and Medications were all reviewed with the patient today and updated as necessary.     Current Outpatient Medications   Medication Sig Dispense Refill    potassium chloride (KLOR-CON M) 20 MEQ extended release tablet Take 1 tablet by mouth daily 90 tablet 3    metoprolol succinate (TOPROL XL) 25 MG extended release tablet TAKE 1 TABLET BY MOUTH DAILY 90 tablet 3    PRALUENT 75 MG/ML SOAJ injection pen Inject 1 mL into the skin every 14 days 2 Adjustable Dose Pre-filled Pen Syringe 3    atorvastatin (LIPITOR) 80 MG tablet Take 1 tablet by mouth daily 90 tablet 3    albuterol sulfate HFA (PROVENTIL;VENTOLIN;PROAIR) 108 (90 Base) MCG/ACT inhaler Inhale 1 puff into the lungs every 6 hours as needed for Wheezing TAKE 1 PUFF BY INHALATION EVERY FOUR (4) HOURS AS NEEDED FOR WHEEZING OR SHORTNESS OF BREATH. 18 g 2    clopidogrel (PLAVIX) 75 MG tablet Take 1 tablet by mouth daily 90 tablet 3    dexlansoprazole (DEXILANT) 60 MG CPDR delayed release capsule Take 1 capsule by mouth daily 30 capsule 3    diazePAM (VALIUM) 2 MG tablet Take 1 tablet by mouth 2 times daily as needed for Anxiety for up to 120 days. 60 tablet 3    DULoxetine (CYMBALTA) 60 MG extended release capsule Take 2 capsules by mouth daily 30 capsule 3    fluticasone-umeclidin-vilant (TRELEGY ELLIPTA) 200-62.5-25 MCG/ACT AEPB inhaler Inhale 1 puff into the lungs daily 1 each 11    furosemide (LASIX) 40 MG tablet Take 1  tablet by mouth daily (Patient taking differently: Take 1 tablet by mouth every other day) 90 tablet 3    gabapentin (NEURONTIN) 600 MG tablet Take 1 tablet by mouth 2 times daily for 180 days. 90 tablet 3    HYDROcodone-acetaminophen (NORCO)  MG per tablet Take 1 tablet by mouth every 8 hours as needed for Pain for up to 30 days. Intended supply: 30 days Max Daily Amount: 3 tablets 90 tablet 0    hydrOXYzine HCl (ATARAX) 25 MG tablet Take 1 tablet by mouth 3 times daily as needed for Anxiety 30 tablet 3    lamoTRIgine (LAMICTAL) 100 MG tablet Take 1 tablet by mouth daily 30 tablet 3    linaclotide (LINZESS) 145 MCG capsule Take 1 capsule by mouth daily (Patient taking differently: Take 1 capsule by mouth every other day) 30 capsule 3    meloxicam (MOBIC) 15 MG tablet Take 1 tablet by mouth daily 30 tablet 2    montelukast (SINGULAIR) 10 MG tablet Take 1 tablet by mouth nightly 30 tablet 11    tiZANidine (ZANAFLEX) 2 MG tablet Take 1 tablet by mouth in the morning and at bedtime 60 tablet 3    traZODone (DESYREL) 100 MG tablet Take 1 tablet by mouth daily 30 tablet 3    Ubrogepant (UBRELVY) 100 MG TABS Take 100 mg by mouth as needed (migraines) Take 1 tablet by mouth at onset of migraines, may repeat in 2 hours x 1 dose. Max dose of 2 tablets per day. 16 tablet 3    HUMULIN R U-500 KWIKPEN 500 UNIT/ML SOPN concentrated injection pen INJECT SUBCUTANEOUSLY 75 UNITS  TWICE DAILY ( WITH MEALS) 18 mL 5    JARDIANCE 25 MG tablet TAKE 1 TABLET BY MOUTH DAILY 100 tablet 3    MOUNJARO 15 MG/0.5ML SOPN SC injection Inject 0.5 mLs into the skin once a week 6 mL 3    ondansetron (ZOFRAN-ODT) 4 MG disintegrating tablet Take 1 tablet by mouth 3 times daily as needed for Nausea or Vomiting 21 tablet 0    nitroGLYCERIN (NITROSTAT) 0.4 MG SL tablet PLACE ONE TABLET UNDER TONGUE AS NEEDED FOR CHEST PAIN. MAY REPEAT EVERY 5 MINUTES FOR 2 MORE DOSES. CALL 911 ON SECOND DOSE. 25 tablet 11    albuterol sulfate (PROAIR RESPICLICK)

## 2024-09-04 ENCOUNTER — TELEPHONE (OUTPATIENT)
Dept: NEUROLOGY | Age: 54
End: 2024-09-04

## 2024-09-04 NOTE — TELEPHONE ENCOUNTER
Radha pt called and stated that she is having 2/3 migraines a week. She is taking Qulipta and Ubrelvy, she stated that she would like to have a migraine infusion. Can you put in an Infusion order for her?

## 2024-09-05 ENCOUNTER — CLINICAL DOCUMENTATION (OUTPATIENT)
Dept: NEUROLOGY | Age: 54
End: 2024-09-05

## 2024-09-05 RX ORDER — MAGNESIUM SULFATE IN WATER 40 MG/ML
2000 INJECTION, SOLUTION INTRAVENOUS ONCE
OUTPATIENT
Start: 2024-09-05

## 2024-09-05 RX ORDER — SODIUM CHLORIDE 9 MG/ML
5-250 INJECTION, SOLUTION INTRAVENOUS PRN
OUTPATIENT
Start: 2024-09-05

## 2024-09-05 RX ORDER — SODIUM CHLORIDE 0.9 % (FLUSH) 0.9 %
5-40 SYRINGE (ML) INJECTION PRN
OUTPATIENT
Start: 2024-09-05

## 2024-09-05 RX ORDER — ALBUTEROL SULFATE 90 UG/1
4 AEROSOL, METERED RESPIRATORY (INHALATION) PRN
OUTPATIENT
Start: 2024-09-05

## 2024-09-05 RX ORDER — ONDANSETRON 2 MG/ML
8 INJECTION INTRAMUSCULAR; INTRAVENOUS
OUTPATIENT
Start: 2024-09-05

## 2024-09-05 RX ORDER — HEPARIN 100 UNIT/ML
500 SYRINGE INTRAVENOUS PRN
OUTPATIENT
Start: 2024-09-05

## 2024-09-05 RX ORDER — EPINEPHRINE 1 MG/ML
0.3 INJECTION, SOLUTION, CONCENTRATE INTRAVENOUS PRN
OUTPATIENT
Start: 2024-09-05

## 2024-09-05 RX ORDER — SODIUM CHLORIDE 9 MG/ML
INJECTION, SOLUTION INTRAVENOUS CONTINUOUS
OUTPATIENT
Start: 2024-09-05

## 2024-09-05 RX ORDER — DIPHENHYDRAMINE HYDROCHLORIDE 50 MG/ML
50 INJECTION INTRAMUSCULAR; INTRAVENOUS
OUTPATIENT
Start: 2024-09-05

## 2024-09-05 RX ORDER — 0.9 % SODIUM CHLORIDE 0.9 %
1000 INTRAVENOUS SOLUTION INTRAVENOUS ONCE
OUTPATIENT
Start: 2024-09-05 | End: 2024-09-05

## 2024-09-05 RX ORDER — ACETAMINOPHEN 325 MG/1
650 TABLET ORAL
OUTPATIENT
Start: 2024-09-05

## 2024-09-05 RX ORDER — KETOROLAC TROMETHAMINE 30 MG/ML
60 INJECTION, SOLUTION INTRAMUSCULAR; INTRAVENOUS ONCE
Start: 2024-09-05

## 2024-09-11 DIAGNOSIS — F51.01 PRIMARY INSOMNIA: ICD-10-CM

## 2024-09-11 RX ORDER — TRAZODONE HYDROCHLORIDE 100 MG/1
100 TABLET ORAL DAILY
Qty: 30 TABLET | Refills: 3 | Status: SHIPPED | OUTPATIENT
Start: 2024-09-11

## 2024-09-13 ENCOUNTER — TELEPHONE (OUTPATIENT)
Age: 54
End: 2024-09-13

## 2024-09-13 NOTE — TELEPHONE ENCOUNTER
Spoke with pt she stated monitor was to be mailed after she returned from vacation. She is now back. Monitor enrolled to be sent to pts home.

## 2024-09-20 ENCOUNTER — NURSE ONLY (OUTPATIENT)
Age: 54
End: 2024-09-20

## 2024-09-20 VITALS
SYSTOLIC BLOOD PRESSURE: 136 MMHG | TEMPERATURE: 97.3 F | DIASTOLIC BLOOD PRESSURE: 89 MMHG | OXYGEN SATURATION: 96 % | HEART RATE: 72 BPM | RESPIRATION RATE: 16 BRPM

## 2024-09-20 DIAGNOSIS — G43.E11 INTRACTABLE CHRONIC MIGRAINE WITH AURA WITH STATUS MIGRAINOSUS: Primary | ICD-10-CM

## 2024-09-20 RX ORDER — KETOROLAC TROMETHAMINE 30 MG/ML
60 INJECTION, SOLUTION INTRAMUSCULAR; INTRAVENOUS ONCE
Status: CANCELLED
Start: 2024-09-20 | End: 2024-09-20

## 2024-09-20 RX ORDER — SODIUM CHLORIDE 9 MG/ML
INJECTION, SOLUTION INTRAVENOUS CONTINUOUS
OUTPATIENT
Start: 2024-09-20

## 2024-09-20 RX ORDER — EPINEPHRINE 1 MG/ML
0.3 INJECTION, SOLUTION, CONCENTRATE INTRAVENOUS PRN
Status: DISCONTINUED | OUTPATIENT
Start: 2024-09-20 | End: 2024-09-20 | Stop reason: HOSPADM

## 2024-09-20 RX ORDER — METHYLPREDNISOLONE SODIUM SUCCINATE 125 MG/2ML
250 INJECTION, POWDER, LYOPHILIZED, FOR SOLUTION INTRAMUSCULAR; INTRAVENOUS ONCE
Status: COMPLETED | OUTPATIENT
Start: 2024-09-20 | End: 2024-09-20

## 2024-09-20 RX ORDER — SODIUM CHLORIDE 9 MG/ML
5-250 INJECTION, SOLUTION INTRAVENOUS PRN
Status: DISCONTINUED | OUTPATIENT
Start: 2024-09-20 | End: 2024-09-20 | Stop reason: HOSPADM

## 2024-09-20 RX ORDER — ALBUTEROL SULFATE 90 UG/1
4 INHALANT RESPIRATORY (INHALATION) PRN
OUTPATIENT
Start: 2024-09-20

## 2024-09-20 RX ORDER — DIPHENHYDRAMINE HYDROCHLORIDE 50 MG/ML
50 INJECTION INTRAMUSCULAR; INTRAVENOUS
OUTPATIENT
Start: 2024-09-20

## 2024-09-20 RX ORDER — ONDANSETRON 2 MG/ML
8 INJECTION INTRAMUSCULAR; INTRAVENOUS
OUTPATIENT
Start: 2024-09-20

## 2024-09-20 RX ORDER — SODIUM CHLORIDE 9 MG/ML
5-250 INJECTION, SOLUTION INTRAVENOUS PRN
OUTPATIENT
Start: 2024-09-20

## 2024-09-20 RX ORDER — KETOROLAC TROMETHAMINE 30 MG/ML
60 INJECTION, SOLUTION INTRAMUSCULAR; INTRAVENOUS ONCE
Status: COMPLETED | OUTPATIENT
Start: 2024-09-20 | End: 2024-09-20

## 2024-09-20 RX ORDER — SODIUM CHLORIDE 0.9 % (FLUSH) 0.9 %
5-40 SYRINGE (ML) INJECTION PRN
OUTPATIENT
Start: 2024-09-20

## 2024-09-20 RX ORDER — HEPARIN 100 UNIT/ML
500 SYRINGE INTRAVENOUS PRN
Status: DISCONTINUED | OUTPATIENT
Start: 2024-09-20 | End: 2024-09-20 | Stop reason: HOSPADM

## 2024-09-20 RX ORDER — ACETAMINOPHEN 325 MG/1
650 TABLET ORAL
OUTPATIENT
Start: 2024-09-20

## 2024-09-20 RX ORDER — DIPHENHYDRAMINE HYDROCHLORIDE 50 MG/ML
50 INJECTION INTRAMUSCULAR; INTRAVENOUS
Status: DISCONTINUED | OUTPATIENT
Start: 2024-09-20 | End: 2024-09-20 | Stop reason: HOSPADM

## 2024-09-20 RX ORDER — ALBUTEROL SULFATE 90 UG/1
4 INHALANT RESPIRATORY (INHALATION) PRN
Status: DISCONTINUED | OUTPATIENT
Start: 2024-09-20 | End: 2024-09-20 | Stop reason: HOSPADM

## 2024-09-20 RX ORDER — ACETAMINOPHEN 325 MG/1
650 TABLET ORAL
Status: DISCONTINUED | OUTPATIENT
Start: 2024-09-20 | End: 2024-09-20 | Stop reason: HOSPADM

## 2024-09-20 RX ORDER — 0.9 % SODIUM CHLORIDE 0.9 %
1000 INTRAVENOUS SOLUTION INTRAVENOUS ONCE
Status: COMPLETED | OUTPATIENT
Start: 2024-09-20 | End: 2024-09-20

## 2024-09-20 RX ORDER — SODIUM CHLORIDE 0.9 % (FLUSH) 0.9 %
5-40 SYRINGE (ML) INJECTION PRN
Status: DISCONTINUED | OUTPATIENT
Start: 2024-09-20 | End: 2024-09-20 | Stop reason: HOSPADM

## 2024-09-20 RX ORDER — EPINEPHRINE 1 MG/ML
0.3 INJECTION, SOLUTION, CONCENTRATE INTRAVENOUS PRN
OUTPATIENT
Start: 2024-09-20

## 2024-09-20 RX ORDER — MAGNESIUM SULFATE IN WATER 40 MG/ML
2000 INJECTION, SOLUTION INTRAVENOUS ONCE
Status: CANCELLED | OUTPATIENT
Start: 2024-09-20

## 2024-09-20 RX ORDER — ONDANSETRON 2 MG/ML
8 INJECTION INTRAMUSCULAR; INTRAVENOUS
Status: DISCONTINUED | OUTPATIENT
Start: 2024-09-20 | End: 2024-09-20 | Stop reason: HOSPADM

## 2024-09-20 RX ORDER — SODIUM CHLORIDE 9 MG/ML
INJECTION, SOLUTION INTRAVENOUS CONTINUOUS
Status: DISCONTINUED | OUTPATIENT
Start: 2024-09-20 | End: 2024-09-20 | Stop reason: HOSPADM

## 2024-09-20 RX ORDER — MAGNESIUM SULFATE IN WATER 40 MG/ML
2000 INJECTION, SOLUTION INTRAVENOUS ONCE
Status: COMPLETED | OUTPATIENT
Start: 2024-09-20 | End: 2024-09-20

## 2024-09-20 RX ORDER — 0.9 % SODIUM CHLORIDE 0.9 %
1000 INTRAVENOUS SOLUTION INTRAVENOUS ONCE
OUTPATIENT
Start: 2024-09-20 | End: 2024-09-20

## 2024-09-20 RX ORDER — METHYLPREDNISOLONE SODIUM SUCCINATE 125 MG/2ML
250 INJECTION, POWDER, LYOPHILIZED, FOR SOLUTION INTRAMUSCULAR; INTRAVENOUS ONCE
Status: CANCELLED
Start: 2024-09-20 | End: 2024-09-20

## 2024-09-20 RX ORDER — HEPARIN 100 UNIT/ML
500 SYRINGE INTRAVENOUS PRN
OUTPATIENT
Start: 2024-09-20

## 2024-09-20 RX ADMIN — METHYLPREDNISOLONE SODIUM SUCCINATE 250 MG: 125 INJECTION, POWDER, LYOPHILIZED, FOR SOLUTION INTRAMUSCULAR; INTRAVENOUS at 09:05

## 2024-09-20 RX ADMIN — Medication 1000 ML: at 08:55

## 2024-09-20 RX ADMIN — MAGNESIUM SULFATE IN WATER 2000 MG: 40 INJECTION, SOLUTION INTRAVENOUS at 09:25

## 2024-09-20 RX ADMIN — KETOROLAC TROMETHAMINE 60 MG: 30 INJECTION, SOLUTION INTRAMUSCULAR; INTRAVENOUS at 09:08

## 2024-09-24 ENCOUNTER — TELEMEDICINE (OUTPATIENT)
Dept: ENDOCRINOLOGY | Age: 54
End: 2024-09-24
Payer: MEDICARE

## 2024-09-24 DIAGNOSIS — E11.65 UNCONTROLLED TYPE 2 DIABETES MELLITUS WITH HYPERGLYCEMIA (HCC): Primary | ICD-10-CM

## 2024-09-24 DIAGNOSIS — E11.69 HYPERLIPIDEMIA ASSOCIATED WITH TYPE 2 DIABETES MELLITUS (HCC): ICD-10-CM

## 2024-09-24 DIAGNOSIS — I10 PRIMARY HYPERTENSION: ICD-10-CM

## 2024-09-24 DIAGNOSIS — E55.9 VITAMIN D DEFICIENCY: ICD-10-CM

## 2024-09-24 DIAGNOSIS — E78.5 HYPERLIPIDEMIA ASSOCIATED WITH TYPE 2 DIABETES MELLITUS (HCC): ICD-10-CM

## 2024-09-24 DIAGNOSIS — I50.32 CHRONIC DIASTOLIC (CONGESTIVE) HEART FAILURE (HCC): ICD-10-CM

## 2024-09-24 PROCEDURE — 95251 CONT GLUC MNTR ANALYSIS I&R: CPT | Performed by: PHYSICIAN ASSISTANT

## 2024-09-24 PROCEDURE — G8427 DOCREV CUR MEDS BY ELIG CLIN: HCPCS | Performed by: PHYSICIAN ASSISTANT

## 2024-09-24 PROCEDURE — 99214 OFFICE O/P EST MOD 30 MIN: CPT | Performed by: PHYSICIAN ASSISTANT

## 2024-09-24 PROCEDURE — 3017F COLORECTAL CA SCREEN DOC REV: CPT | Performed by: PHYSICIAN ASSISTANT

## 2024-09-24 PROCEDURE — 2022F DILAT RTA XM EVC RTNOPTHY: CPT | Performed by: PHYSICIAN ASSISTANT

## 2024-09-24 PROCEDURE — 3051F HG A1C>EQUAL 7.0%<8.0%: CPT | Performed by: PHYSICIAN ASSISTANT

## 2024-09-24 RX ORDER — BLOOD SUGAR DIAGNOSTIC
STRIP MISCELLANEOUS
Qty: 200 EACH | Refills: 3 | Status: SHIPPED | OUTPATIENT
Start: 2024-09-24

## 2024-09-24 RX ORDER — PEN NEEDLE, DIABETIC 31 G X1/4"
NEEDLE, DISPOSABLE MISCELLANEOUS
Qty: 400 EACH | Refills: 3 | Status: SHIPPED | OUTPATIENT
Start: 2024-09-24

## 2024-09-24 RX ORDER — EMPAGLIFLOZIN 25 MG/1
25 TABLET, FILM COATED ORAL DAILY
Qty: 100 TABLET | Refills: 3 | Status: SHIPPED | OUTPATIENT
Start: 2024-09-24

## 2024-09-24 RX ORDER — INSULIN LISPRO 100 [IU]/ML
INJECTION, SOLUTION INTRAVENOUS; SUBCUTANEOUS
Qty: 30 ML | Refills: 3 | Status: SHIPPED | OUTPATIENT
Start: 2024-09-24

## 2024-09-24 RX ORDER — LANCETS 33 GAUGE
EACH MISCELLANEOUS
Qty: 400 EACH | Refills: 3 | Status: SHIPPED | OUTPATIENT
Start: 2024-09-24

## 2024-09-24 RX ORDER — TIRZEPATIDE 15 MG/.5ML
15 INJECTION, SOLUTION SUBCUTANEOUS WEEKLY
Qty: 6 ML | Refills: 3 | Status: SHIPPED | OUTPATIENT
Start: 2024-09-24

## 2024-10-06 ENCOUNTER — HOSPITAL ENCOUNTER (OUTPATIENT)
Dept: SLEEP MEDICINE | Age: 54
Discharge: HOME | End: 2024-10-09
Payer: MEDICARE

## 2024-10-06 PROCEDURE — 95810 POLYSOM 6/> YRS 4/> PARAM: CPT

## 2024-10-11 ENCOUNTER — TELEPHONE (OUTPATIENT)
Dept: FAMILY MEDICINE CLINIC | Facility: CLINIC | Age: 54
End: 2024-10-11

## 2024-10-11 NOTE — TELEPHONE ENCOUNTER
Pt called and stated that she doesn't know what is going on either with us or the pharmacy, but they are stating they have not received her prescription for the following medication:    Hydrocodone-acetaminohen(([place])  mg    [facility] pharmacy on file.    Please advise.    Thank you

## 2024-10-12 DIAGNOSIS — M25.50 ARTHRALGIA, UNSPECIFIED JOINT: Primary | ICD-10-CM

## 2024-10-12 RX ORDER — HYDROCODONE BITARTRATE AND ACETAMINOPHEN 10; 325 MG/1; MG/1
1 TABLET ORAL EVERY 8 HOURS PRN
Qty: 90 TABLET | Refills: 0 | Status: SHIPPED | OUTPATIENT
Start: 2024-10-12 | End: 2024-11-11

## 2024-10-17 ENCOUNTER — TELEPHONE (OUTPATIENT)
Dept: SLEEP MEDICINE | Age: 54
End: 2024-10-17

## 2024-10-17 NOTE — TELEPHONE ENCOUNTER
Patient needs New Pt PSG appt/ AHI -0.4       Lowest SaO2- 83% -   Need cardiac and pulmonary evaluation for causes of hypoxemia        Forward to schedulers

## 2024-10-21 NOTE — PROGRESS NOTES
[facility] sleep disorder center  [address]  [place] [address]  [phone]    Patient Name:  Tammie Sunshine  YOB: 1970      Office Visit 10/22/2024    CHIEF COMPLAINT:    Chief Complaint   Patient presents with    New Patient    Sleep Study    Results       HISTORY OF PRESENT ILLNESS:     The patient present for management of obstructive sleep apnea.    The patient underwent a recent diagnostic polysomnography because of symptoms including snoring, witnessed apneas, morning headaches, daytime fatigue , gastroesophageal reflux, difficulty initiating and maintaining sleep.  There is no history of cataplexy, hypnagogic hallucinations, or sleep paralysis.  In addition, there is  history of frequent leg movements, kicking at night, or an inability to keep the legs still.     The diagnostic polysomnography was notable for a respiratory disturbance index of 0.4/hour.  Oxygen desaturations are low as 83% were noted.  Significant cardiac arrhythmias were not evident.  The patient was noted to have intermittent leg movement disorder with a PLM index 2.4/hour.  The total oxygen desaturation was 117 minutes below 89%.  The study was reviewed with the patient and her  and provided her a copy of that.  Furthermore, the patient was diagnosed with the sleep disordered breathing a few years ago and was on CPAP machine but she was making significant lifestyle modification to the point she lost approximately 87 pounds over the last few years.  This may explain a lot of changes in her study although does not exclude her sleep apnea altogether.  We discussed the pathophysiology of sleep apnea and suggested reevaluation of her sleep again by home sleep study and doing pulmonary evaluation again with complete PFT and pulmonary follow-up.  She is known to have moderate persistent asthma and she is compliant with her medication including Trelegy inhaler which she used her rescue

## 2024-10-22 ENCOUNTER — OFFICE VISIT (OUTPATIENT)
Dept: SLEEP MEDICINE | Age: 54
End: 2024-10-22
Payer: MEDICARE

## 2024-10-22 VITALS
BODY MASS INDEX: 29.53 KG/M2 | HEIGHT: 64 IN | WEIGHT: 173 LBS | RESPIRATION RATE: 14 BRPM | OXYGEN SATURATION: 97 % | DIASTOLIC BLOOD PRESSURE: 76 MMHG | SYSTOLIC BLOOD PRESSURE: 114 MMHG

## 2024-10-22 DIAGNOSIS — G47.00 PERSISTENT DISORDER OF INITIATING OR MAINTAINING SLEEP: ICD-10-CM

## 2024-10-22 DIAGNOSIS — G47.33 OSA (OBSTRUCTIVE SLEEP APNEA): Primary | ICD-10-CM

## 2024-10-22 DIAGNOSIS — G25.81 RLS (RESTLESS LEGS SYNDROME): ICD-10-CM

## 2024-10-22 DIAGNOSIS — G47.34 NOCTURNAL HYPOXEMIA: ICD-10-CM

## 2024-10-22 DIAGNOSIS — G47.61 PLMD (PERIODIC LIMB MOVEMENT DISORDER): ICD-10-CM

## 2024-10-22 DIAGNOSIS — G47.63 BRUXISM, SLEEP-RELATED: ICD-10-CM

## 2024-10-22 DIAGNOSIS — G62.9 PERIPHERAL POLYNEUROPATHY: ICD-10-CM

## 2024-10-22 DIAGNOSIS — J45.40 MODERATE PERSISTENT ASTHMA WITHOUT COMPLICATION: ICD-10-CM

## 2024-10-22 PROCEDURE — G8417 CALC BMI ABV UP PARAM F/U: HCPCS | Performed by: INTERNAL MEDICINE

## 2024-10-22 PROCEDURE — G8484 FLU IMMUNIZE NO ADMIN: HCPCS | Performed by: INTERNAL MEDICINE

## 2024-10-22 PROCEDURE — 3017F COLORECTAL CA SCREEN DOC REV: CPT | Performed by: INTERNAL MEDICINE

## 2024-10-22 PROCEDURE — 3074F SYST BP LT 130 MM HG: CPT | Performed by: INTERNAL MEDICINE

## 2024-10-22 PROCEDURE — 1036F TOBACCO NON-USER: CPT | Performed by: INTERNAL MEDICINE

## 2024-10-22 PROCEDURE — 99204 OFFICE O/P NEW MOD 45 MIN: CPT | Performed by: INTERNAL MEDICINE

## 2024-10-22 PROCEDURE — G8427 DOCREV CUR MEDS BY ELIG CLIN: HCPCS | Performed by: INTERNAL MEDICINE

## 2024-10-22 PROCEDURE — G2211 COMPLEX E/M VISIT ADD ON: HCPCS | Performed by: INTERNAL MEDICINE

## 2024-10-22 PROCEDURE — 3078F DIAST BP <80 MM HG: CPT | Performed by: INTERNAL MEDICINE

## 2024-10-25 SDOH — HEALTH STABILITY: PHYSICAL HEALTH: ON AVERAGE, HOW MANY DAYS PER WEEK DO YOU ENGAGE IN MODERATE TO STRENUOUS EXERCISE (LIKE A BRISK WALK)?: 5 DAYS

## 2024-10-25 SDOH — HEALTH STABILITY: PHYSICAL HEALTH: ON AVERAGE, HOW MANY MINUTES DO YOU ENGAGE IN EXERCISE AT THIS LEVEL?: 30 MIN

## 2024-10-25 ASSESSMENT — PATIENT HEALTH QUESTIONNAIRE - PHQ9
5. POOR APPETITE OR OVEREATING: NOT AT ALL
4. FEELING TIRED OR HAVING LITTLE ENERGY: SEVERAL DAYS
3. TROUBLE FALLING OR STAYING ASLEEP: SEVERAL DAYS
1. LITTLE INTEREST OR PLEASURE IN DOING THINGS: SEVERAL DAYS
6. FEELING BAD ABOUT YOURSELF - OR THAT YOU ARE A FAILURE OR HAVE LET YOURSELF OR YOUR FAMILY DOWN: NOT AT ALL
SUM OF ALL RESPONSES TO PHQ QUESTIONS 1-9: 4
10. IF YOU CHECKED OFF ANY PROBLEMS, HOW DIFFICULT HAVE THESE PROBLEMS MADE IT FOR YOU TO DO YOUR WORK, TAKE CARE OF THINGS AT HOME, OR GET ALONG WITH OTHER PEOPLE: SOMEWHAT DIFFICULT
SUM OF ALL RESPONSES TO PHQ QUESTIONS 1-9: 4
SUM OF ALL RESPONSES TO PHQ9 QUESTIONS 1 & 2: 2
7. TROUBLE CONCENTRATING ON THINGS, SUCH AS READING THE NEWSPAPER OR WATCHING TELEVISION: NOT AT ALL
SUM OF ALL RESPONSES TO PHQ QUESTIONS 1-9: 4
8. MOVING OR SPEAKING SO SLOWLY THAT OTHER PEOPLE COULD HAVE NOTICED. OR THE OPPOSITE, BEING SO FIGETY OR RESTLESS THAT YOU HAVE BEEN MOVING AROUND A LOT MORE THAN USUAL: NOT AT ALL
SUM OF ALL RESPONSES TO PHQ QUESTIONS 1-9: 4
2. FEELING DOWN, DEPRESSED OR HOPELESS: SEVERAL DAYS
9. THOUGHTS THAT YOU WOULD BE BETTER OFF DEAD, OR OF HURTING YOURSELF: NOT AT ALL

## 2024-10-25 ASSESSMENT — LIFESTYLE VARIABLES
HOW MANY STANDARD DRINKS CONTAINING ALCOHOL DO YOU HAVE ON A TYPICAL DAY: 1 OR 2
HOW OFTEN DO YOU HAVE SIX OR MORE DRINKS ON ONE OCCASION: 1
HOW OFTEN DO YOU HAVE A DRINK CONTAINING ALCOHOL: 2
HOW OFTEN DO YOU HAVE A DRINK CONTAINING ALCOHOL: MONTHLY OR LESS
HOW MANY STANDARD DRINKS CONTAINING ALCOHOL DO YOU HAVE ON A TYPICAL DAY: 1

## 2024-10-28 ENCOUNTER — OFFICE VISIT (OUTPATIENT)
Dept: FAMILY MEDICINE CLINIC | Facility: CLINIC | Age: 54
End: 2024-10-28

## 2024-10-28 VITALS
HEART RATE: 87 BPM | TEMPERATURE: 98.1 F | OXYGEN SATURATION: 98 % | BODY MASS INDEX: 28.89 KG/M2 | SYSTOLIC BLOOD PRESSURE: 120 MMHG | HEIGHT: 64 IN | WEIGHT: 169.2 LBS | DIASTOLIC BLOOD PRESSURE: 72 MMHG

## 2024-10-28 DIAGNOSIS — J45.40 MODERATE PERSISTENT ASTHMA WITHOUT COMPLICATION: ICD-10-CM

## 2024-10-28 DIAGNOSIS — F33.0 MILD EPISODE OF RECURRENT MAJOR DEPRESSIVE DISORDER (HCC): ICD-10-CM

## 2024-10-28 DIAGNOSIS — F31.9 BIPOLAR 1 DISORDER (HCC): ICD-10-CM

## 2024-10-28 DIAGNOSIS — M54.50 CHRONIC BILATERAL LOW BACK PAIN WITHOUT SCIATICA: ICD-10-CM

## 2024-10-28 DIAGNOSIS — F51.01 PRIMARY INSOMNIA: ICD-10-CM

## 2024-10-28 DIAGNOSIS — I25.10 CORONARY ARTERY DISEASE DUE TO CALCIFIED CORONARY LESION: Primary | ICD-10-CM

## 2024-10-28 DIAGNOSIS — I25.84 CORONARY ARTERY DISEASE DUE TO CALCIFIED CORONARY LESION: Primary | ICD-10-CM

## 2024-10-28 DIAGNOSIS — R11.0 NAUSEA: ICD-10-CM

## 2024-10-28 DIAGNOSIS — R60.0 LOCALIZED EDEMA: ICD-10-CM

## 2024-10-28 DIAGNOSIS — E55.9 VITAMIN D DEFICIENCY: ICD-10-CM

## 2024-10-28 DIAGNOSIS — M25.50 ARTHRALGIA, UNSPECIFIED JOINT: ICD-10-CM

## 2024-10-28 DIAGNOSIS — K59.09 OTHER CONSTIPATION: ICD-10-CM

## 2024-10-28 DIAGNOSIS — E78.00 HYPERCHOLESTEROLEMIA: ICD-10-CM

## 2024-10-28 DIAGNOSIS — G89.29 CHRONIC BILATERAL LOW BACK PAIN WITHOUT SCIATICA: ICD-10-CM

## 2024-10-28 DIAGNOSIS — E11.65 UNCONTROLLED TYPE 2 DIABETES MELLITUS WITH HYPERGLYCEMIA (HCC): ICD-10-CM

## 2024-10-28 DIAGNOSIS — G62.9 PERIPHERAL POLYNEUROPATHY: ICD-10-CM

## 2024-10-28 DIAGNOSIS — Z00.00 MEDICARE ANNUAL WELLNESS VISIT, SUBSEQUENT: ICD-10-CM

## 2024-10-28 DIAGNOSIS — F41.9 ANXIETY: ICD-10-CM

## 2024-10-28 DIAGNOSIS — E78.49 OTHER HYPERLIPIDEMIA: ICD-10-CM

## 2024-10-28 DIAGNOSIS — H65.01 NON-RECURRENT ACUTE SEROUS OTITIS MEDIA OF RIGHT EAR: ICD-10-CM

## 2024-10-28 LAB
25(OH)D3 SERPL-MCNC: 40.4 NG/ML (ref 30–100)
ALBUMIN SERPL-MCNC: 4.2 G/DL (ref 3.5–5)
ALBUMIN/GLOB SERPL: 1.5 (ref 1–1.9)
ALP SERPL-CCNC: 83 U/L (ref 35–104)
ALT SERPL-CCNC: 31 U/L (ref 8–45)
ANION GAP SERPL CALC-SCNC: 12 MMOL/L (ref 9–18)
AST SERPL-CCNC: 22 U/L (ref 15–37)
BASOPHILS # BLD: 0.1 K/UL (ref 0–0.2)
BASOPHILS NFR BLD: 1 % (ref 0–2)
BILIRUB SERPL-MCNC: 1.5 MG/DL (ref 0–1.2)
BUN SERPL-MCNC: 18 MG/DL (ref 6–23)
CALCIUM SERPL-MCNC: 9.8 MG/DL (ref 8.8–10.2)
CHLORIDE SERPL-SCNC: 107 MMOL/L (ref 98–107)
CHOLEST SERPL-MCNC: 123 MG/DL (ref 0–200)
CO2 SERPL-SCNC: 24 MMOL/L (ref 20–28)
CREAT SERPL-MCNC: 0.58 MG/DL (ref 0.6–1.1)
DIFFERENTIAL METHOD BLD: ABNORMAL
EOSINOPHIL # BLD: 0.2 K/UL (ref 0–0.8)
EOSINOPHIL NFR BLD: 2 % (ref 0.5–7.8)
ERYTHROCYTE [DISTWIDTH] IN BLOOD BY AUTOMATED COUNT: 12.8 % (ref 11.9–14.6)
EST. AVERAGE GLUCOSE BLD GHB EST-MCNC: 154 MG/DL
GLOBULIN SER CALC-MCNC: 2.8 G/DL (ref 2.3–3.5)
GLUCOSE SERPL-MCNC: 131 MG/DL (ref 70–99)
HBA1C MFR BLD: 7 % (ref 0–5.6)
HCT VFR BLD AUTO: 44.2 % (ref 35.8–46.3)
HDLC SERPL-MCNC: 46 MG/DL (ref 40–60)
HDLC SERPL: 2.7 (ref 0–5)
HGB BLD-MCNC: 14.7 G/DL (ref 11.7–15.4)
IMM GRANULOCYTES # BLD AUTO: 0 K/UL (ref 0–0.5)
IMM GRANULOCYTES NFR BLD AUTO: 0 % (ref 0–5)
LDLC SERPL CALC-MCNC: 51 MG/DL (ref 0–100)
LYMPHOCYTES # BLD: 2.5 K/UL (ref 0.5–4.6)
LYMPHOCYTES NFR BLD: 24 % (ref 13–44)
MCH RBC QN AUTO: 28.8 PG (ref 26.1–32.9)
MCHC RBC AUTO-ENTMCNC: 33.3 G/DL (ref 31.4–35)
MCV RBC AUTO: 86.7 FL (ref 82–102)
MONOCYTES # BLD: 0.7 K/UL (ref 0.1–1.3)
MONOCYTES NFR BLD: 7 % (ref 4–12)
NEUTS SEG # BLD: 7 K/UL (ref 1.7–8.2)
NEUTS SEG NFR BLD: 66 % (ref 43–78)
NRBC # BLD: 0 K/UL (ref 0–0.2)
PLATELET # BLD AUTO: 352 K/UL (ref 150–450)
PMV BLD AUTO: 9.1 FL (ref 9.4–12.3)
POTASSIUM SERPL-SCNC: 4.3 MMOL/L (ref 3.5–5.1)
PROT SERPL-MCNC: 7 G/DL (ref 6.3–8.2)
RBC # BLD AUTO: 5.1 M/UL (ref 4.05–5.2)
SODIUM SERPL-SCNC: 144 MMOL/L (ref 136–145)
TRIGL SERPL-MCNC: 130 MG/DL (ref 0–150)
VLDLC SERPL CALC-MCNC: 26 MG/DL (ref 6–23)
WBC # BLD AUTO: 10.4 K/UL (ref 4.3–11.1)

## 2024-10-28 RX ORDER — HYDROCODONE BITARTRATE AND ACETAMINOPHEN 10; 325 MG/1; MG/1
1 TABLET ORAL EVERY 8 HOURS PRN
Qty: 90 TABLET | Refills: 0 | Status: SHIPPED | OUTPATIENT
Start: 2025-01-10 | End: 2025-02-09

## 2024-10-28 RX ORDER — ALIROCUMAB 75 MG/ML
75 INJECTION, SOLUTION SUBCUTANEOUS
Qty: 2 ADJUSTABLE DOSE PRE-FILLED PEN SYRINGE | Refills: 3 | Status: SHIPPED | OUTPATIENT
Start: 2024-10-28

## 2024-10-28 RX ORDER — ONDANSETRON 4 MG/1
4 TABLET, ORALLY DISINTEGRATING ORAL 3 TIMES DAILY PRN
Qty: 21 TABLET | Refills: 0 | Status: SHIPPED | OUTPATIENT
Start: 2024-10-28

## 2024-10-28 RX ORDER — DULOXETIN HYDROCHLORIDE 60 MG/1
120 CAPSULE, DELAYED RELEASE ORAL DAILY
Qty: 30 CAPSULE | Refills: 3 | Status: SHIPPED | OUTPATIENT
Start: 2024-10-28

## 2024-10-28 RX ORDER — LAMOTRIGINE 100 MG/1
100 TABLET ORAL DAILY
Qty: 30 TABLET | Refills: 3 | Status: SHIPPED | OUTPATIENT
Start: 2024-10-28

## 2024-10-28 RX ORDER — GABAPENTIN 600 MG/1
600 TABLET ORAL 2 TIMES DAILY
Qty: 90 TABLET | Refills: 3 | Status: SHIPPED | OUTPATIENT
Start: 2024-10-28 | End: 2025-04-26

## 2024-10-28 RX ORDER — HYDROCODONE BITARTRATE AND ACETAMINOPHEN 10; 325 MG/1; MG/1
1 TABLET ORAL EVERY 8 HOURS PRN
Qty: 90 TABLET | Refills: 0 | Status: SHIPPED | OUTPATIENT
Start: 2024-12-11 | End: 2025-01-10

## 2024-10-28 RX ORDER — UBROGEPANT 100 MG/1
100 TABLET ORAL PRN
Qty: 16 TABLET | Refills: 3 | Status: SHIPPED | OUTPATIENT
Start: 2024-10-28

## 2024-10-28 RX ORDER — TRAZODONE HYDROCHLORIDE 100 MG/1
100 TABLET ORAL DAILY
Qty: 30 TABLET | Refills: 3 | Status: SHIPPED | OUTPATIENT
Start: 2024-10-28

## 2024-10-28 RX ORDER — ATORVASTATIN CALCIUM 80 MG/1
80 TABLET, FILM COATED ORAL DAILY
Qty: 90 TABLET | Refills: 3 | Status: SHIPPED | OUTPATIENT
Start: 2024-10-28

## 2024-10-28 RX ORDER — DOXYCYCLINE HYCLATE 100 MG
100 TABLET ORAL 2 TIMES DAILY
Qty: 20 TABLET | Refills: 0 | Status: SHIPPED | OUTPATIENT
Start: 2024-10-28 | End: 2024-11-07

## 2024-10-28 RX ORDER — CLOPIDOGREL BISULFATE 75 MG/1
75 TABLET ORAL DAILY
Qty: 90 TABLET | Refills: 3 | Status: SHIPPED | OUTPATIENT
Start: 2024-10-28

## 2024-10-28 RX ORDER — ALBUTEROL SULFATE 90 UG/1
1 INHALANT RESPIRATORY (INHALATION) EVERY 6 HOURS PRN
Qty: 18 G | Refills: 2 | Status: SHIPPED | OUTPATIENT
Start: 2024-10-28

## 2024-10-28 RX ORDER — FLUTICASONE FUROATE, UMECLIDINIUM BROMIDE AND VILANTEROL TRIFENATATE 200; 62.5; 25 UG/1; UG/1; UG/1
1 POWDER RESPIRATORY (INHALATION) DAILY
Qty: 1 EACH | Refills: 11 | Status: SHIPPED | OUTPATIENT
Start: 2024-10-28

## 2024-10-28 RX ORDER — MELOXICAM 15 MG/1
15 TABLET ORAL DAILY
Qty: 30 TABLET | Refills: 2 | Status: SHIPPED | OUTPATIENT
Start: 2024-10-28

## 2024-10-28 RX ORDER — DIAZEPAM 2 MG/1
2 TABLET ORAL 2 TIMES DAILY PRN
Qty: 60 TABLET | Refills: 3 | Status: SHIPPED | OUTPATIENT
Start: 2024-10-28 | End: 2025-02-25

## 2024-10-28 RX ORDER — MONTELUKAST SODIUM 10 MG/1
10 TABLET ORAL NIGHTLY
Qty: 30 TABLET | Refills: 11 | Status: SHIPPED | OUTPATIENT
Start: 2024-10-28

## 2024-10-28 RX ORDER — ALBUTEROL SULFATE 90 UG/1
1 POWDER, METERED RESPIRATORY (INHALATION) EVERY 6 HOURS PRN
Qty: 1 EACH | Refills: 11 | Status: SHIPPED | OUTPATIENT
Start: 2024-10-28

## 2024-10-28 RX ORDER — HYDROCODONE BITARTRATE AND ACETAMINOPHEN 10; 325 MG/1; MG/1
1 TABLET ORAL EVERY 8 HOURS PRN
Qty: 90 TABLET | Refills: 0 | Status: SHIPPED | OUTPATIENT
Start: 2024-11-11 | End: 2024-12-11

## 2024-10-28 RX ORDER — TIZANIDINE 2 MG/1
2 TABLET ORAL 2 TIMES DAILY
Qty: 60 TABLET | Refills: 3 | Status: SHIPPED | OUTPATIENT
Start: 2024-10-28

## 2024-10-28 RX ORDER — HYDROXYZINE HYDROCHLORIDE 25 MG/1
25 TABLET, FILM COATED ORAL 3 TIMES DAILY PRN
Qty: 30 TABLET | Refills: 3 | Status: SHIPPED | OUTPATIENT
Start: 2024-10-28

## 2024-10-28 RX ORDER — FUROSEMIDE 40 MG/1
40 TABLET ORAL DAILY
Qty: 90 TABLET | Refills: 3 | Status: SHIPPED | OUTPATIENT
Start: 2024-10-28

## 2024-10-28 NOTE — ANESTHESIA PREPROCEDURE EVALUATION
Relevant Problems   No relevant active problems       Anesthetic History   No history of anesthetic complications            Review of Systems / Medical History  Pertinent labs reviewed    Pulmonary        Sleep apnea: CPAP    Asthma : well controlled       Neuro/Psych         Headaches (migraine) and psychiatric history    Comments: Neuropathy Cardiovascular    Hypertension          CAD (severe multivessel dz with preserved EF)    Exercise tolerance: <4 METS: Limited SOB       GI/Hepatic/Renal     GERD: well controlled           Endo/Other    Diabetes: type 2, using insulin    Morbid obesity and arthritis     Other Findings            Physical Exam    Airway  Mallampati: III  TM Distance: 4 - 6 cm  Neck ROM: normal range of motion, short neck   Mouth opening: Normal     Cardiovascular  Regular rate and rhythm,  S1 and S2 normal,  no murmur, click, rub, or gallop             Dental  No notable dental hx       Pulmonary  Breath sounds clear to auscultation               Abdominal  GI exam deferred       Other Findings            Anesthetic Plan    ASA: 4  Anesthesia type: general    Monitoring Plan: Arterial line, BIS, CVP and TORRES    Post procedure ventilation   Induction: Intravenous  Anesthetic plan and risks discussed with: Patient and Spouse oral

## 2024-10-29 ASSESSMENT — ENCOUNTER SYMPTOMS
BACK PAIN: 1
COLOR CHANGE: 0
PHOTOPHOBIA: 0
SORE THROAT: 0
BLURRED VISION: 0
ABDOMINAL DISTENTION: 0
SHORTNESS OF BREATH: 0
EYE PAIN: 0
ABDOMINAL PAIN: 0
COUGH: 0
NAUSEA: 0
BLOOD IN STOOL: 0

## 2024-10-29 NOTE — PROGRESS NOTES
Medicare AWV, Follow-up (Pt is fasting- left ear has been bothering her- ), and Medication Refill    Assessment & Plan   Coronary artery disease due to calcified coronary lesion  -     CBC with Auto Differential; Future  -     Comprehensive Metabolic Panel; Future  -     atorvastatin (LIPITOR) 80 MG tablet; Take 1 tablet by mouth daily, Disp-90 tablet, R-3Normal  -     clopidogrel (PLAVIX) 75 MG tablet; Take 1 tablet by mouth daily, Disp-90 tablet, R-3Normal  Uncontrolled type 2 diabetes mellitus with hyperglycemia (HCC)  -     Hemoglobin A1C; Future  -     PRALUENT 75 MG/ML SOAJ injection pen; Inject 1 mL into the skin every 14 days, Disp-2 Adjustable Dose Pre-filled Pen Syringe, R-3, DAWNormal  Vitamin D deficiency  -     Vitamin D 25 Hydroxy; Future  Hypercholesterolemia  -     Lipid Panel; Future  Moderate persistent asthma without complication  -     albuterol sulfate (PROAIR RESPICLICK) 108 (90 Base) MCG/ACT aerosol powder inhalation; Inhale 1 puff into the lungs every 6 hours as needed for Wheezing or Shortness of Breath, Disp-1 each, R-11Normal  -     albuterol sulfate HFA (PROVENTIL;VENTOLIN;PROAIR) 108 (90 Base) MCG/ACT inhaler; Inhale 1 puff into the lungs every 6 hours as needed for Wheezing TAKE 1 PUFF BY INHALATION EVERY FOUR (4) HOURS AS NEEDED FOR WHEEZING OR SHORTNESS OF BREATH., Disp-18 g, R-2Normal  -     fluticasone-umeclidin-vilant (TRELEGY ELLIPTA) 200-62.5-25 MCG/ACT AEPB inhaler; Inhale 1 puff into the lungs daily, Disp-1 each, R-11Normal  -     montelukast (SINGULAIR) 10 MG tablet; Take 1 tablet by mouth nightly, Disp-30 tablet, R-11Normal  Other hyperlipidemia  -     atorvastatin (LIPITOR) 80 MG tablet; Take 1 tablet by mouth daily, Disp-90 tablet, R-3Normal  Anxiety  -     diazePAM (VALIUM) 2 MG tablet; Take 1 tablet by mouth 2 times daily as needed for Anxiety for up to 120 days., Disp-60 tablet, R-3Normal  -     hydrOXYzine HCl (ATARAX) 25 MG tablet; Take 1 tablet by mouth 3 times daily

## 2024-10-29 NOTE — PATIENT INSTRUCTIONS
preventive eye exam performed by an eye specialist is recommended every 1-2 years to screen for glaucoma; cataracts, macular degeneration, and other eye disorders.  A preventive dental visit is recommended every 6 months.  Try to get at least 150 minutes of exercise per week or 10,000 steps per day on a pedometer .  Order or download the FREE \"Exercise & Physical Activity: Your Everyday Guide\" from [employer] [place] [employer]. Call 1-369.345.7461 or search [employer] [place] [employer] online.  You need 7471-8887 mg of calcium and 8858-0641 IU of vitamin D per day. It is possible to meet your calcium requirement with diet alone, but a vitamin D supplement is usually necessary to meet this goal.  When exposed to the sun, use a sunscreen that protects against both UVA and UVB radiation with an SPF of 30 or greater. Reapply every 2 to 3 hours or after sweating, drying off with a towel, or swimming.  Always wear a seat belt when traveling in a car. Always wear a helmet when riding a bicycle or motorcycle.

## 2024-11-04 ENCOUNTER — TELEPHONE (OUTPATIENT)
Dept: ENDOCRINOLOGY | Age: 54
End: 2024-11-04

## 2024-11-04 DIAGNOSIS — H66.009 ACUTE SUPPURATIVE OTITIS MEDIA WITHOUT SPONTANEOUS RUPTURE OF EAR DRUM, RECURRENCE NOT SPECIFIED, UNSPECIFIED LATERALITY: Primary | ICD-10-CM

## 2024-11-04 RX ORDER — AZITHROMYCIN 250 MG/1
TABLET, FILM COATED ORAL
Qty: 6 TABLET | Refills: 0 | Status: SHIPPED | OUTPATIENT
Start: 2024-11-04 | End: 2024-11-14

## 2024-11-25 DIAGNOSIS — E11.65 UNCONTROLLED TYPE 2 DIABETES MELLITUS WITH HYPERGLYCEMIA (HCC): ICD-10-CM

## 2024-11-25 RX ORDER — FLUCONAZOLE 150 MG/1
150 TABLET ORAL
Qty: 2 TABLET | Refills: 0 | OUTPATIENT
Start: 2024-11-25 | End: 2024-12-01

## 2024-11-27 ENCOUNTER — HOSPITAL ENCOUNTER (OUTPATIENT)
Dept: SLEEP CENTER | Age: 54
Discharge: HOME | End: 2024-11-30

## 2024-12-05 DIAGNOSIS — J45.40 MODERATE PERSISTENT ASTHMA WITHOUT COMPLICATION: ICD-10-CM

## 2024-12-06 RX ORDER — MONTELUKAST SODIUM 10 MG/1
10 TABLET ORAL NIGHTLY
Qty: 90 TABLET | Refills: 3 | Status: SHIPPED | OUTPATIENT
Start: 2024-12-06

## 2024-12-06 NOTE — TELEPHONE ENCOUNTER
Patient Refill Request     Last Office Visit: 05/16/2023 with Dr. Hidalgo     When they were supposed to follow up: 6 months     Upcoming Office Visit: 02/04/2025 with Dr. Hidalgo     Refills Requested: Singulair 10 mg     Type of refill: 90 day     Requested Pharmacy: [email or web address]      Is prescription pended? Yes

## 2024-12-09 DIAGNOSIS — J45.40 MODERATE PERSISTENT ASTHMA WITHOUT COMPLICATION: ICD-10-CM

## 2024-12-09 RX ORDER — IPRATROPIUM BROMIDE AND ALBUTEROL SULFATE 2.5; .5 MG/3ML; MG/3ML
1 SOLUTION RESPIRATORY (INHALATION) EVERY 6 HOURS PRN
Qty: 360 ML | Refills: 11 | Status: SHIPPED | OUTPATIENT
Start: 2024-12-09

## 2024-12-10 ENCOUNTER — TELEPHONE (OUTPATIENT)
Dept: SLEEP MEDICINE | Age: 54
End: 2024-12-10

## 2024-12-10 DIAGNOSIS — G47.33 OSA (OBSTRUCTIVE SLEEP APNEA): Primary | ICD-10-CM

## 2024-12-10 NOTE — TELEPHONE ENCOUNTER
Patient had recent sleep study showing mild sleep apnea. Cpap therapy is recommended per sleep interp.  Patient has agreed to start CPAP therapy. Order sent to [facility].     Trena Lemus MA

## 2025-01-07 ENCOUNTER — PATIENT MESSAGE (OUTPATIENT)
Dept: ENDOCRINOLOGY | Age: 55
End: 2025-01-07

## 2025-01-09 ENCOUNTER — TELEPHONE (OUTPATIENT)
Dept: ENDOCRINOLOGY | Age: 55
End: 2025-01-09

## 2025-01-09 NOTE — TELEPHONE ENCOUNTER
Outcome  Approved today by [employer] 2017  Approved. This drug has been approved under the Member's Medicare Part D benefit. Approved quantity: 6 units per 84 day(s). You may fill up to a 90 day supply except for those on Specialty Tier 5, which can be filled up to a 30 day supply. Please call the pharmacy to process the prescription claim.  Authorization Expiration Date: 12/31/2099

## 2025-01-15 NOTE — TELEPHONE ENCOUNTER
myChart response:     Verio glucose monitor sent to [facility] [place]    Let me know if any issues    ~Arleen

## 2025-01-22 RX ORDER — MELOXICAM 15 MG/1
15 TABLET ORAL DAILY
Qty: 30 TABLET | Refills: 2 | Status: SHIPPED | OUTPATIENT
Start: 2025-01-22

## 2025-01-28 ENCOUNTER — OFFICE VISIT (OUTPATIENT)
Dept: FAMILY MEDICINE CLINIC | Facility: CLINIC | Age: 55
End: 2025-01-28

## 2025-01-28 VITALS
HEART RATE: 75 BPM | SYSTOLIC BLOOD PRESSURE: 100 MMHG | OXYGEN SATURATION: 98 % | DIASTOLIC BLOOD PRESSURE: 64 MMHG | WEIGHT: 168 LBS | HEIGHT: 64 IN | TEMPERATURE: 98 F | BODY MASS INDEX: 28.68 KG/M2

## 2025-01-28 DIAGNOSIS — M25.50 ARTHRALGIA, UNSPECIFIED JOINT: ICD-10-CM

## 2025-01-28 DIAGNOSIS — R11.0 NAUSEA: ICD-10-CM

## 2025-01-28 DIAGNOSIS — G89.29 CHRONIC BILATERAL LOW BACK PAIN WITHOUT SCIATICA: ICD-10-CM

## 2025-01-28 DIAGNOSIS — M54.50 CHRONIC BILATERAL LOW BACK PAIN WITHOUT SCIATICA: ICD-10-CM

## 2025-01-28 DIAGNOSIS — F51.01 PRIMARY INSOMNIA: ICD-10-CM

## 2025-01-28 DIAGNOSIS — F31.9 BIPOLAR 1 DISORDER (HCC): ICD-10-CM

## 2025-01-28 DIAGNOSIS — M54.42 ACUTE LOW BACK PAIN WITH LEFT-SIDED SCIATICA, UNSPECIFIED BACK PAIN LATERALITY: ICD-10-CM

## 2025-01-28 DIAGNOSIS — J45.40 MODERATE PERSISTENT ASTHMA WITHOUT COMPLICATION: ICD-10-CM

## 2025-01-28 DIAGNOSIS — E78.5 HYPERLIPIDEMIA ASSOCIATED WITH TYPE 2 DIABETES MELLITUS (HCC): ICD-10-CM

## 2025-01-28 DIAGNOSIS — E11.65 UNCONTROLLED TYPE 2 DIABETES MELLITUS WITH HYPERGLYCEMIA (HCC): ICD-10-CM

## 2025-01-28 DIAGNOSIS — F33.0 MILD EPISODE OF RECURRENT MAJOR DEPRESSIVE DISORDER (HCC): ICD-10-CM

## 2025-01-28 DIAGNOSIS — E78.49 OTHER HYPERLIPIDEMIA: ICD-10-CM

## 2025-01-28 DIAGNOSIS — E11.69 HYPERLIPIDEMIA ASSOCIATED WITH TYPE 2 DIABETES MELLITUS (HCC): ICD-10-CM

## 2025-01-28 DIAGNOSIS — I25.84 CORONARY ARTERY DISEASE DUE TO CALCIFIED CORONARY LESION: ICD-10-CM

## 2025-01-28 DIAGNOSIS — F41.9 ANXIETY: ICD-10-CM

## 2025-01-28 DIAGNOSIS — E55.9 VITAMIN D DEFICIENCY: ICD-10-CM

## 2025-01-28 DIAGNOSIS — K59.09 OTHER CONSTIPATION: ICD-10-CM

## 2025-01-28 DIAGNOSIS — K21.9 GASTROESOPHAGEAL REFLUX DISEASE, UNSPECIFIED WHETHER ESOPHAGITIS PRESENT: ICD-10-CM

## 2025-01-28 DIAGNOSIS — M25.552 LEFT HIP PAIN: Primary | ICD-10-CM

## 2025-01-28 DIAGNOSIS — G62.9 PERIPHERAL POLYNEUROPATHY: ICD-10-CM

## 2025-01-28 DIAGNOSIS — I25.10 CORONARY ARTERY DISEASE DUE TO CALCIFIED CORONARY LESION: ICD-10-CM

## 2025-01-28 DIAGNOSIS — R60.0 LOCALIZED EDEMA: ICD-10-CM

## 2025-01-28 LAB
25(OH)D3 SERPL-MCNC: 38.1 NG/ML (ref 30–100)
ALBUMIN SERPL-MCNC: 4 G/DL (ref 3.5–5)
ALBUMIN/GLOB SERPL: 1.5 (ref 1–1.9)
ALP SERPL-CCNC: 65 U/L (ref 35–104)
ALT SERPL-CCNC: 28 U/L (ref 8–45)
ANION GAP SERPL CALC-SCNC: 12 MMOL/L (ref 7–16)
AST SERPL-CCNC: 18 U/L (ref 15–37)
BASOPHILS # BLD: 0.07 K/UL (ref 0–0.2)
BASOPHILS NFR BLD: 0.8 % (ref 0–2)
BILIRUB SERPL-MCNC: 1.2 MG/DL (ref 0–1.2)
BUN SERPL-MCNC: 14 MG/DL (ref 6–23)
CALCIUM SERPL-MCNC: 9.9 MG/DL (ref 8.8–10.2)
CHLORIDE SERPL-SCNC: 105 MMOL/L (ref 98–107)
CHOLEST SERPL-MCNC: 117 MG/DL (ref 0–200)
CO2 SERPL-SCNC: 24 MMOL/L (ref 20–29)
CREAT SERPL-MCNC: 0.63 MG/DL (ref 0.6–1.1)
DIFFERENTIAL METHOD BLD: ABNORMAL
EOSINOPHIL # BLD: 0.16 K/UL (ref 0–0.8)
EOSINOPHIL NFR BLD: 1.8 % (ref 0.5–7.8)
ERYTHROCYTE [DISTWIDTH] IN BLOOD BY AUTOMATED COUNT: 12.4 % (ref 11.9–14.6)
EST. AVERAGE GLUCOSE BLD GHB EST-MCNC: 144 MG/DL
GLOBULIN SER CALC-MCNC: 2.6 G/DL (ref 2.3–3.5)
GLUCOSE SERPL-MCNC: 127 MG/DL (ref 70–99)
HBA1C MFR BLD: 6.7 % (ref 0–5.6)
HCT VFR BLD AUTO: 45.4 % (ref 35.8–46.3)
HDLC SERPL-MCNC: 48 MG/DL (ref 40–60)
HDLC SERPL: 2.4 (ref 0–5)
HGB BLD-MCNC: 15 G/DL (ref 11.7–15.4)
IMM GRANULOCYTES # BLD AUTO: 0.02 K/UL (ref 0–0.5)
IMM GRANULOCYTES NFR BLD AUTO: 0.2 % (ref 0–5)
LDLC SERPL CALC-MCNC: 42 MG/DL (ref 0–100)
LYMPHOCYTES # BLD: 2.82 K/UL (ref 0.5–4.6)
LYMPHOCYTES NFR BLD: 31.3 % (ref 13–44)
MCH RBC QN AUTO: 29.1 PG (ref 26.1–32.9)
MCHC RBC AUTO-ENTMCNC: 33 G/DL (ref 31.4–35)
MCV RBC AUTO: 88 FL (ref 82–102)
MONOCYTES # BLD: 0.55 K/UL (ref 0.1–1.3)
MONOCYTES NFR BLD: 6.1 % (ref 4–12)
NEUTS SEG # BLD: 5.38 K/UL (ref 1.7–8.2)
NEUTS SEG NFR BLD: 59.8 % (ref 43–78)
NRBC # BLD: 0 K/UL (ref 0–0.2)
PLATELET # BLD AUTO: 294 K/UL (ref 150–450)
PMV BLD AUTO: 9 FL (ref 9.4–12.3)
POTASSIUM SERPL-SCNC: 4.5 MMOL/L (ref 3.5–5.1)
PROT SERPL-MCNC: 6.6 G/DL (ref 6.3–8.2)
RBC # BLD AUTO: 5.16 M/UL (ref 4.05–5.2)
SODIUM SERPL-SCNC: 141 MMOL/L (ref 136–145)
TRIGL SERPL-MCNC: 136 MG/DL (ref 0–150)
VLDLC SERPL CALC-MCNC: 27 MG/DL (ref 6–23)
WBC # BLD AUTO: 9 K/UL (ref 4.3–11.1)

## 2025-01-28 RX ORDER — HYDROCODONE BITARTRATE AND ACETAMINOPHEN 10; 325 MG/1; MG/1
1 TABLET ORAL EVERY 8 HOURS PRN
Qty: 90 TABLET | Refills: 0 | Status: SHIPPED | OUTPATIENT
Start: 2025-03-29 | End: 2025-04-28

## 2025-01-28 RX ORDER — TRAZODONE HYDROCHLORIDE 100 MG/1
100 TABLET ORAL DAILY
Qty: 30 TABLET | Refills: 3 | Status: SHIPPED | OUTPATIENT
Start: 2025-01-28

## 2025-01-28 RX ORDER — FUROSEMIDE 40 MG/1
40 TABLET ORAL DAILY
Qty: 90 TABLET | Refills: 3 | Status: SHIPPED | OUTPATIENT
Start: 2025-01-28

## 2025-01-28 RX ORDER — ALIROCUMAB 75 MG/ML
75 INJECTION, SOLUTION SUBCUTANEOUS
Qty: 2 ADJUSTABLE DOSE PRE-FILLED PEN SYRINGE | Refills: 3 | Status: SHIPPED | OUTPATIENT
Start: 2025-01-28

## 2025-01-28 RX ORDER — DULOXETIN HYDROCHLORIDE 60 MG/1
120 CAPSULE, DELAYED RELEASE ORAL DAILY
Qty: 30 CAPSULE | Refills: 3 | Status: SHIPPED | OUTPATIENT
Start: 2025-01-28

## 2025-01-28 RX ORDER — HYDROCODONE BITARTRATE AND ACETAMINOPHEN 10; 325 MG/1; MG/1
1 TABLET ORAL EVERY 8 HOURS PRN
Qty: 90 TABLET | Refills: 0 | Status: SHIPPED | OUTPATIENT
Start: 2025-02-27 | End: 2025-03-29

## 2025-01-28 RX ORDER — ATORVASTATIN CALCIUM 80 MG/1
80 TABLET, FILM COATED ORAL DAILY
Qty: 90 TABLET | Refills: 3 | Status: SHIPPED | OUTPATIENT
Start: 2025-01-28

## 2025-01-28 RX ORDER — GABAPENTIN 600 MG/1
600 TABLET ORAL 2 TIMES DAILY
Qty: 90 TABLET | Refills: 3 | Status: SHIPPED | OUTPATIENT
Start: 2025-01-28 | End: 2025-07-27

## 2025-01-28 RX ORDER — ALBUTEROL SULFATE 90 UG/1
1 POWDER, METERED RESPIRATORY (INHALATION) EVERY 6 HOURS PRN
Qty: 1 EACH | Refills: 11 | Status: SHIPPED | OUTPATIENT
Start: 2025-01-28

## 2025-01-28 RX ORDER — DEXLANSOPRAZOLE 60 MG/1
60 CAPSULE, DELAYED RELEASE ORAL DAILY
Qty: 30 CAPSULE | Refills: 3 | Status: SHIPPED | OUTPATIENT
Start: 2025-01-28

## 2025-01-28 RX ORDER — TIZANIDINE 2 MG/1
2 TABLET ORAL 2 TIMES DAILY
Qty: 60 TABLET | Refills: 3 | Status: SHIPPED | OUTPATIENT
Start: 2025-01-28 | End: 2025-01-30 | Stop reason: DRUGHIGH

## 2025-01-28 RX ORDER — FLUTICASONE FUROATE, UMECLIDINIUM BROMIDE AND VILANTEROL TRIFENATATE 200; 62.5; 25 UG/1; UG/1; UG/1
1 POWDER RESPIRATORY (INHALATION) DAILY
Qty: 1 EACH | Refills: 11 | Status: SHIPPED | OUTPATIENT
Start: 2025-01-28

## 2025-01-28 RX ORDER — UBROGEPANT 100 MG/1
100 TABLET ORAL PRN
Qty: 16 TABLET | Refills: 3 | Status: SHIPPED | OUTPATIENT
Start: 2025-01-28

## 2025-01-28 RX ORDER — LAMOTRIGINE 100 MG/1
100 TABLET ORAL DAILY
Qty: 30 TABLET | Refills: 3 | Status: SHIPPED | OUTPATIENT
Start: 2025-01-28

## 2025-01-28 RX ORDER — MELOXICAM 15 MG/1
15 TABLET ORAL DAILY
Qty: 30 TABLET | Refills: 2 | Status: SHIPPED | OUTPATIENT
Start: 2025-01-28

## 2025-01-28 RX ORDER — ONDANSETRON 4 MG/1
4 TABLET, ORALLY DISINTEGRATING ORAL 3 TIMES DAILY PRN
Qty: 21 TABLET | Refills: 0 | Status: SHIPPED | OUTPATIENT
Start: 2025-01-28

## 2025-01-28 RX ORDER — HYDROCODONE BITARTRATE AND ACETAMINOPHEN 10; 325 MG/1; MG/1
1 TABLET ORAL EVERY 8 HOURS PRN
Qty: 90 TABLET | Refills: 0 | Status: SHIPPED | OUTPATIENT
Start: 2025-01-28 | End: 2025-02-27

## 2025-01-28 RX ORDER — CLOPIDOGREL BISULFATE 75 MG/1
75 TABLET ORAL DAILY
Qty: 90 TABLET | Refills: 3 | Status: SHIPPED | OUTPATIENT
Start: 2025-01-28

## 2025-01-28 RX ORDER — IPRATROPIUM BROMIDE AND ALBUTEROL SULFATE 2.5; .5 MG/3ML; MG/3ML
1 SOLUTION RESPIRATORY (INHALATION) EVERY 6 HOURS PRN
Qty: 360 ML | Refills: 11 | Status: SHIPPED | OUTPATIENT
Start: 2025-01-28

## 2025-01-28 RX ORDER — DIAZEPAM 2 MG/1
2 TABLET ORAL 2 TIMES DAILY PRN
Qty: 60 TABLET | Refills: 3 | Status: SHIPPED | OUTPATIENT
Start: 2025-01-28 | End: 2025-05-28

## 2025-01-28 RX ORDER — HYDROXYZINE HYDROCHLORIDE 25 MG/1
25 TABLET, FILM COATED ORAL 3 TIMES DAILY PRN
Qty: 30 TABLET | Refills: 3 | Status: SHIPPED | OUTPATIENT
Start: 2025-01-28

## 2025-01-28 SDOH — ECONOMIC STABILITY: FOOD INSECURITY: WITHIN THE PAST 12 MONTHS, THE FOOD YOU BOUGHT JUST DIDN'T LAST AND YOU DIDN'T HAVE MONEY TO GET MORE.: NEVER TRUE

## 2025-01-28 SDOH — ECONOMIC STABILITY: FOOD INSECURITY: WITHIN THE PAST 12 MONTHS, YOU WORRIED THAT YOUR FOOD WOULD RUN OUT BEFORE YOU GOT MONEY TO BUY MORE.: NEVER TRUE

## 2025-01-28 ASSESSMENT — PATIENT HEALTH QUESTIONNAIRE - PHQ9
8. MOVING OR SPEAKING SO SLOWLY THAT OTHER PEOPLE COULD HAVE NOTICED. OR THE OPPOSITE, BEING SO FIGETY OR RESTLESS THAT YOU HAVE BEEN MOVING AROUND A LOT MORE THAN USUAL: NOT AT ALL
6. FEELING BAD ABOUT YOURSELF - OR THAT YOU ARE A FAILURE OR HAVE LET YOURSELF OR YOUR FAMILY DOWN: NOT AT ALL
SUM OF ALL RESPONSES TO PHQ QUESTIONS 1-9: 4
1. LITTLE INTEREST OR PLEASURE IN DOING THINGS: SEVERAL DAYS
2. FEELING DOWN, DEPRESSED OR HOPELESS: SEVERAL DAYS
SUM OF ALL RESPONSES TO PHQ QUESTIONS 1-9: 4
5. POOR APPETITE OR OVEREATING: NOT AT ALL
9. THOUGHTS THAT YOU WOULD BE BETTER OFF DEAD, OR OF HURTING YOURSELF: NOT AT ALL
7. TROUBLE CONCENTRATING ON THINGS, SUCH AS READING THE NEWSPAPER OR WATCHING TELEVISION: NOT AT ALL
SUM OF ALL RESPONSES TO PHQ QUESTIONS 1-9: 4
SUM OF ALL RESPONSES TO PHQ9 QUESTIONS 1 & 2: 2
3. TROUBLE FALLING OR STAYING ASLEEP: SEVERAL DAYS
4. FEELING TIRED OR HAVING LITTLE ENERGY: SEVERAL DAYS
10. IF YOU CHECKED OFF ANY PROBLEMS, HOW DIFFICULT HAVE THESE PROBLEMS MADE IT FOR YOU TO DO YOUR WORK, TAKE CARE OF THINGS AT HOME, OR GET ALONG WITH OTHER PEOPLE: SOMEWHAT DIFFICULT
SUM OF ALL RESPONSES TO PHQ QUESTIONS 1-9: 4

## 2025-01-30 ENCOUNTER — OFFICE VISIT (OUTPATIENT)
Dept: ENDOCRINOLOGY | Age: 55
End: 2025-01-30
Payer: MEDICARE

## 2025-01-30 VITALS
OXYGEN SATURATION: 96 % | WEIGHT: 171 LBS | HEIGHT: 64 IN | SYSTOLIC BLOOD PRESSURE: 148 MMHG | RESPIRATION RATE: 18 BRPM | DIASTOLIC BLOOD PRESSURE: 72 MMHG | HEART RATE: 72 BPM | BODY MASS INDEX: 29.19 KG/M2

## 2025-01-30 DIAGNOSIS — I10 PRIMARY HYPERTENSION: ICD-10-CM

## 2025-01-30 DIAGNOSIS — E11.65 UNCONTROLLED TYPE 2 DIABETES MELLITUS WITH HYPERGLYCEMIA (HCC): Primary | ICD-10-CM

## 2025-01-30 DIAGNOSIS — I50.32 CHRONIC DIASTOLIC (CONGESTIVE) HEART FAILURE (HCC): ICD-10-CM

## 2025-01-30 DIAGNOSIS — E78.5 HYPERLIPIDEMIA ASSOCIATED WITH TYPE 2 DIABETES MELLITUS (HCC): ICD-10-CM

## 2025-01-30 DIAGNOSIS — E11.69 HYPERLIPIDEMIA ASSOCIATED WITH TYPE 2 DIABETES MELLITUS (HCC): ICD-10-CM

## 2025-01-30 DIAGNOSIS — E55.9 VITAMIN D DEFICIENCY: ICD-10-CM

## 2025-01-30 PROCEDURE — 3077F SYST BP >= 140 MM HG: CPT | Performed by: PHYSICIAN ASSISTANT

## 2025-01-30 PROCEDURE — 3044F HG A1C LEVEL LT 7.0%: CPT | Performed by: PHYSICIAN ASSISTANT

## 2025-01-30 PROCEDURE — 99214 OFFICE O/P EST MOD 30 MIN: CPT | Performed by: PHYSICIAN ASSISTANT

## 2025-01-30 PROCEDURE — 95251 CONT GLUC MNTR ANALYSIS I&R: CPT | Performed by: PHYSICIAN ASSISTANT

## 2025-01-30 PROCEDURE — 3078F DIAST BP <80 MM HG: CPT | Performed by: PHYSICIAN ASSISTANT

## 2025-01-30 RX ORDER — LANCETS
EACH MISCELLANEOUS
Qty: 200 EACH | Refills: 1 | Status: SHIPPED | OUTPATIENT
Start: 2025-01-30

## 2025-01-30 RX ORDER — ATOGEPANT 60 MG/1
1 TABLET ORAL DAILY
COMMUNITY
Start: 2025-01-09

## 2025-01-30 RX ORDER — FLUCONAZOLE 150 MG/1
150 TABLET ORAL
Qty: 3 TABLET | Refills: 1 | Status: SHIPPED | OUTPATIENT
Start: 2025-01-30

## 2025-01-30 RX ORDER — BLOOD SUGAR DIAGNOSTIC
STRIP MISCELLANEOUS
Qty: 200 EACH | Refills: 3 | Status: SHIPPED | OUTPATIENT
Start: 2025-01-30

## 2025-01-30 ASSESSMENT — ENCOUNTER SYMPTOMS
SHORTNESS OF BREATH: 0
NAUSEA: 0
BLOOD IN STOOL: 0
BLURRED VISION: 0
COLOR CHANGE: 0
SORE THROAT: 0
ABDOMINAL PAIN: 0
COUGH: 0
EYE PAIN: 0
PHOTOPHOBIA: 0
ABDOMINAL DISTENTION: 0

## 2025-01-30 NOTE — PROGRESS NOTES
[facility] ENDOCRINOLOGY   AND   THYROID NODULE CLINIC    rAleen Gaviria PA-C  [facility]od[facility]  [address] [place] [address], [address]  [place] [address]  Phone [phone]  Facsimile [phone]          Tammie Sunshine is a 54 y.o. female seen 1/30/2025 for follow up evaluation of Type 2 diabetes        Assessment and Plan:          Interpretation of 72 hour glucose monitor:  At least 72 hours of data were reviewed.  The patient utilizes a dexcom G7 continuous glucose monitoring system.  The average glucose during the reviewed timeframe was 157 with a standard deviation of 40.  There is a pattern of occasional postprandial hyperglycemia after meals.       Assessment & Plan        1. Uncontrolled type 2 diabetes mellitus with hyperglycemia (HCC)  A1c improved from 7.8 to 6.7. Significant weight loss from 240 to 173 pounds.     Cholesterol levels normal. MPV low, platelet count normal. On aspirin therapy, most likely cause. Discuss with PCP if concerned   - Continue Mounjaro 15 mg and Jardiance 25 mg  - previously discontinued Humalog U-500, continue Humalog by correction scale  - Administer Humalog before meals and at bedtime if blood glucose >150  - Prescription for test strips and lancets for backup meter  - Advised strength training and cardio workouts  - Continue vitamin D3, 2000 IU daily    Vaginal Irritation  Possibly linked to diabetes medications (jardiance and pannus most likely etiology).  - Use natural fiber materials like linen or cotton as a barrier  - Consult gynecologist or Dr. Granda   - diflucan refilled     - ONETOUCH VERIO strip; Use to check glucose and calibrate CGM up to 2 times daily, E11.65  Dispense: 200 each; Refill: 3  - Accu-Chek FastClix Lancets MISC; Use to check glucose and confirm CGM up to twice daily, E11.65  Dispense: 200 each; Refill: 1  - fluconazole (DIFLUCAN) 150 MG tablet; Take 1 tablet by mouth every 72 hours  Dispense: 3

## 2025-01-31 ENCOUNTER — TELEPHONE (OUTPATIENT)
Dept: FAMILY MEDICINE CLINIC | Facility: CLINIC | Age: 55
End: 2025-01-31

## 2025-01-31 NOTE — PROGRESS NOTES
photophobia and pain.   Respiratory:  Negative for cough and shortness of breath.    Cardiovascular:  Negative for chest pain, palpitations, leg swelling and PND.   Gastrointestinal:  Negative for abdominal distention, abdominal pain, blood in stool and nausea.   Genitourinary:  Negative for dysuria and urgency.   Musculoskeletal:  Positive for arthralgias. Negative for joint swelling and myalgias.   Skin:  Negative for color change, pallor and rash.   Neurological:  Negative for speech difficulty, weakness, light-headedness and headaches.   Hematological:  Negative for adenopathy.   Psychiatric/Behavioral:  Negative for agitation, confusion, hallucinations, self-injury and suicidal ideas.           Objective   Physical Exam  Constitutional:       Appearance: Normal appearance.   HENT:      Head: Normocephalic and atraumatic.      Nose: Nose normal.   Eyes:      Extraocular Movements: Extraocular movements intact.      Conjunctiva/sclera: Conjunctivae normal.      Pupils: Pupils are equal, round, and reactive to light.   Cardiovascular:      Rate and Rhythm: Normal rate and regular rhythm.      Pulses: Normal pulses.      Heart sounds: Normal heart sounds.   Pulmonary:      Effort: Pulmonary effort is normal.      Breath sounds: Normal breath sounds.   Abdominal:      General: Abdomen is flat. Bowel sounds are normal.      Palpations: Abdomen is soft.   Skin:     General: Skin is warm and dry.   Neurological:      General: No focal deficit present.      Mental Status: She is alert and oriented to person, place, and time.   Psychiatric:         Mood and Affect: Mood normal.                   An electronic signature was used to authenticate this note.    --Neeraj Plunkett MD

## 2025-01-31 NOTE — TELEPHONE ENCOUNTER
Pt called and stated that the wait list for her referral apt will be in 8 weeks, pt would like to know if Dr. Plunkett can send  the referral  somewhere else to have  it earlier.     Thank you,

## 2025-02-03 DIAGNOSIS — M79.604 PAIN IN BOTH LOWER EXTREMITIES: Primary | ICD-10-CM

## 2025-02-03 DIAGNOSIS — M79.605 PAIN IN BOTH LOWER EXTREMITIES: Primary | ICD-10-CM

## 2025-02-04 ENCOUNTER — PATIENT MESSAGE (OUTPATIENT)
Dept: ENDOCRINOLOGY | Age: 55
End: 2025-02-04

## 2025-02-04 ENCOUNTER — OFFICE VISIT (OUTPATIENT)
Dept: PULMONOLOGY | Age: 55
End: 2025-02-04
Payer: MEDICARE

## 2025-02-04 VITALS
DIASTOLIC BLOOD PRESSURE: 80 MMHG | RESPIRATION RATE: 20 BRPM | HEART RATE: 80 BPM | BODY MASS INDEX: 28.68 KG/M2 | WEIGHT: 168 LBS | SYSTOLIC BLOOD PRESSURE: 130 MMHG | TEMPERATURE: 98 F | OXYGEN SATURATION: 96 % | HEIGHT: 64 IN

## 2025-02-04 DIAGNOSIS — E66.811 CLASS 1 OBESITY DUE TO EXCESS CALORIES WITH SERIOUS COMORBIDITY AND BODY MASS INDEX (BMI) OF 34.0 TO 34.9 IN ADULT: ICD-10-CM

## 2025-02-04 DIAGNOSIS — E66.09 CLASS 1 OBESITY DUE TO EXCESS CALORIES WITH SERIOUS COMORBIDITY AND BODY MASS INDEX (BMI) OF 34.0 TO 34.9 IN ADULT: ICD-10-CM

## 2025-02-04 DIAGNOSIS — G47.33 OSA (OBSTRUCTIVE SLEEP APNEA): ICD-10-CM

## 2025-02-04 DIAGNOSIS — J45.40 MODERATE PERSISTENT ASTHMA WITHOUT COMPLICATION: Primary | ICD-10-CM

## 2025-02-04 DIAGNOSIS — G47.34 NOCTURNAL HYPOXEMIA: ICD-10-CM

## 2025-02-04 PROCEDURE — 3079F DIAST BP 80-89 MM HG: CPT | Performed by: INTERNAL MEDICINE

## 2025-02-04 PROCEDURE — 3075F SYST BP GE 130 - 139MM HG: CPT | Performed by: INTERNAL MEDICINE

## 2025-02-04 PROCEDURE — 99214 OFFICE O/P EST MOD 30 MIN: CPT | Performed by: INTERNAL MEDICINE

## 2025-02-04 PROCEDURE — G2211 COMPLEX E/M VISIT ADD ON: HCPCS | Performed by: INTERNAL MEDICINE

## 2025-02-04 RX ORDER — FLUTICASONE FUROATE, UMECLIDINIUM BROMIDE AND VILANTEROL TRIFENATATE 200; 62.5; 25 UG/1; UG/1; UG/1
1 POWDER RESPIRATORY (INHALATION) DAILY
Qty: 1 EACH | Refills: 11 | Status: SHIPPED | OUTPATIENT
Start: 2025-02-04

## 2025-02-04 RX ORDER — IPRATROPIUM BROMIDE AND ALBUTEROL SULFATE 2.5; .5 MG/3ML; MG/3ML
1 SOLUTION RESPIRATORY (INHALATION) EVERY 4 HOURS
Qty: 120 EACH | Refills: 11 | Status: SHIPPED | OUTPATIENT
Start: 2025-02-04

## 2025-02-04 ASSESSMENT — ASTHMA QUESTIONNAIRES
QUESTION_1 LAST FOUR WEEKS HOW MUCH OF THE TIME DID YOUR ASTHMA KEEP YOU FROM GETTING AS MUCH DONE AT WORK, SCHOOL OR AT HOME: A LITTLE OF THE TIME
QUESTION_2 LAST FOUR WEEKS HOW OFTEN HAVE YOU HAD SHORTNESS OF BREATH: ONCE OR TWICE A WEEK
ACT_TOTALSCORE: 15
QUESTION_4 LAST FOUR WEEKS HOW OFTEN HAVE YOU USED YOUR RESCUE INHALER OR NEBULIZER MEDICATION (SUCH AS ALBUTEROL): 1 OR 2 TIMES PER DAY
QUESTION_5 LAST FOUR WEEKS HOW WOULD YOU RATE YOUR ASTHMA CONTROL: SOMEWHAT CONTROLED
QUESTION_3 LAST FOUR WEEKS HOW OFTEN DID YOUR ASTHMA SYMPTOMS (WHEEZING, COUGHING, SHORTNESS OF BREATH, CHEST TIGHTNESS OR PAIN) WAKE YOU UP AT NIGHT OR EARLIER THAN USUAL IN THE MORNING: 2 OR 3 NIGHTS A WEEK

## 2025-02-04 NOTE — PROGRESS NOTES
[facility]  [address]  [place] [address]  [phone]    Name:  Tammie Sunshine  YOB: 1970   MRN: [id number]      Office Visit: 2/4/2025     ASSESSMENT AND PLAN:  (Medical Decision Making)    Impression: 54 y.o. female female with moderate persistent asthma still using albuterol and DuoNebs multiple times on a daily basis despite normalized PFTs and seemingly dramatically improved symptoms.      ICD-10-CM    1. Moderate persistent asthma without complication  J45.40 fluticasone-umeclidin-vilant (TRELEGY ELLIPTA) 200-62.5-25 MCG/ACT AEPB inhaler     ipratropium 0.5 mg-albuterol 2.5 mg (DUONEB) 0.5-2.5 (3) MG/3ML SOLN nebulizer solution      2. SHELBY (obstructive sleep apnea)  G47.33 Pulse oximetry, overnight      3. Nocturnal hypoxemia  G47.34 Pulse oximetry, overnight      4. Class 1 obesity due to excess calories with serious comorbidity and body mass index (BMI) of 34.0 to 34.9 in adult  E66.811     E66.09     Z68.34          Assessment & Plan  1. Moderate persistent asthma: Stable. August 2022 PFTs normal on Trelegy. Using Trelegy 200 mcg daily and albuterol as needed, once or twice daily. Discontinued Singulair previously due to lack of benefit perceived.   - Order comprehensive PFTs  - Continue Trelegy 200 mcg daily  - Use albuterol as needed    2. Sleep apnea: Low oxygen saturation 83%, AHI <1, but 7.9 on HST. Using CPAP since Christmas. Had difficulty using after a recent bronchitis.   - Restart use and check JOSE to assure nocturnal hypoxemia is resolved.      No orders of the defined types were placed in this encounter.  Follow-up and Dispositions    Return in about 3 months (around 5/4/2025) for FTs next available, Dr. Hidalgo or BREEZY Bassett.       Callie Hidalgo MD    No specialty comments available.  No problem-specific Assessment & Plan notes found for this encounter.      The patient (or guardian, if applicable) and other individuals in

## 2025-02-07 ENCOUNTER — TELEPHONE (OUTPATIENT)
Dept: SLEEP MEDICINE | Age: 55
End: 2025-02-07

## 2025-02-07 NOTE — TELEPHONE ENCOUNTER
Patient is following up on her [employer] message asking about the letter for her cruise to get free wifi

## 2025-02-07 NOTE — TELEPHONE ENCOUNTER
Pt called stating that the pressure on her machine is too high and she cannot use it. Tried to explain to her that she has only used it for a total of 43 minutes since set up (10/29/24) Pt got upset stating that it makes her sick, Explained that we can lower the pressure but that she has to put in a better effort to try and use it, perhaps using it during the day so her body can get used to it. Pt voiced understanding.

## 2025-02-17 ENCOUNTER — PATIENT MESSAGE (OUTPATIENT)
Dept: ENDOCRINOLOGY | Age: 55
End: 2025-02-17

## 2025-02-17 NOTE — TELEPHONE ENCOUNTER
Cherelle response:    I am sorry to infer that this is upsetting you.    Unfortunately, because dexcom transmits data via bluetooth to your phone, and should work regardless of your distance from the shore, there is no medical reason for me to write you a letter of medical necessity for wifi for your upcoming cruise.     To write a letter would be committing fraud and I'm not willing to do that.    If you are adamant, you can purchase wifi at your own expense. I believe you will find the dexcom will read without wifi.    I hope you enjoy your cruise and stay out of the [facility] as well    ~Arleen

## 2025-02-19 ENCOUNTER — NURSE ONLY (OUTPATIENT)
Dept: PULMONOLOGY | Age: 55
End: 2025-02-19

## 2025-02-19 DIAGNOSIS — J45.40 MODERATE PERSISTENT ASTHMA WITHOUT COMPLICATION: Primary | ICD-10-CM

## 2025-02-19 LAB
FEF25-27, POC: 2.72 L/S
FET, POC: NORMAL
FEV 1 , POC: 2.39 L
FEV1/FVC, POC: NORMAL
FVC, POC: NORMAL
LUNG AGE, POC: NORMAL
PEF, POC: 7.05 L/S

## 2025-02-20 ENCOUNTER — TELEPHONE (OUTPATIENT)
Dept: FAMILY MEDICINE CLINIC | Facility: CLINIC | Age: 55
End: 2025-02-20

## 2025-02-20 NOTE — TELEPHONE ENCOUNTER
Pt would like a call back in regards to the message she sent regarding her need for WiFi for her cruise.  She needs this soon as they leave next Saturday.    Please call and advise.    Thank you

## 2025-02-25 RX ORDER — ATOGEPANT 60 MG/1
1 TABLET ORAL DAILY
Qty: 30 TABLET | Refills: 1 | Status: SHIPPED | OUTPATIENT
Start: 2025-02-25

## 2025-03-09 DIAGNOSIS — F31.9 BIPOLAR 1 DISORDER (HCC): ICD-10-CM

## 2025-03-09 DIAGNOSIS — F33.0 MILD EPISODE OF RECURRENT MAJOR DEPRESSIVE DISORDER: ICD-10-CM

## 2025-03-10 RX ORDER — DULOXETIN HYDROCHLORIDE 60 MG/1
120 CAPSULE, DELAYED RELEASE ORAL DAILY
Qty: 30 CAPSULE | Refills: 3 | Status: SHIPPED | OUTPATIENT
Start: 2025-03-10

## 2025-03-24 ENCOUNTER — TELEPHONE (OUTPATIENT)
Dept: NEUROLOGY | Age: 55
End: 2025-03-24

## 2025-03-24 NOTE — TELEPHONE ENCOUNTER
Spoke with patient and she has not heard anything about her PA for the Qulipta, so I reached out to [place]ita and I still have not heard anything. I gave the patient samples to  hopefully help her with her migraine that she's been having since this past Saturday. I asked the pt would she be interested in an infusion here at our office since she stated that the ER was not an option for her, because she did not want to get the flu or covid. I informed the pt that I would inform you and about the infusion and go from there. The pt did state that she had an infusion once that Dr. Peters ordered and it knocked her out for two days, so this is a concern for her. She would need to arrange transportation and change some appointments.

## 2025-03-25 ENCOUNTER — TELEPHONE (OUTPATIENT)
Dept: NEUROLOGY | Age: 55
End: 2025-03-25

## 2025-03-25 NOTE — TELEPHONE ENCOUNTER
PA for Qulipta approved by [facility] until further notice.

## 2025-03-26 ENCOUNTER — TELEPHONE (OUTPATIENT)
Dept: NEUROLOGY | Age: 55
End: 2025-03-26

## 2025-03-26 RX ORDER — ACETAMINOPHEN 325 MG/1
650 TABLET ORAL
OUTPATIENT
Start: 2025-03-26

## 2025-03-26 RX ORDER — ONDANSETRON 2 MG/ML
8 INJECTION INTRAMUSCULAR; INTRAVENOUS
OUTPATIENT
Start: 2025-03-26

## 2025-03-26 RX ORDER — KETOROLAC TROMETHAMINE 30 MG/ML
30 INJECTION, SOLUTION INTRAMUSCULAR; INTRAVENOUS ONCE
OUTPATIENT
Start: 2025-03-26 | End: 2025-03-26

## 2025-03-26 RX ORDER — SODIUM CHLORIDE 9 MG/ML
INJECTION, SOLUTION INTRAVENOUS CONTINUOUS
OUTPATIENT
Start: 2025-03-26

## 2025-03-26 RX ORDER — METOCLOPRAMIDE HYDROCHLORIDE 5 MG/ML
10 INJECTION INTRAMUSCULAR; INTRAVENOUS ONCE
OUTPATIENT
Start: 2025-03-26

## 2025-03-26 RX ORDER — EPINEPHRINE 1 MG/ML
0.3 INJECTION, SOLUTION, CONCENTRATE INTRAVENOUS PRN
OUTPATIENT
Start: 2025-03-26

## 2025-03-26 RX ORDER — ALBUTEROL SULFATE 90 UG/1
4 INHALANT RESPIRATORY (INHALATION) PRN
OUTPATIENT
Start: 2025-03-26

## 2025-03-26 RX ORDER — SODIUM CHLORIDE 0.9 % (FLUSH) 0.9 %
5-40 SYRINGE (ML) INJECTION PRN
OUTPATIENT
Start: 2025-03-26

## 2025-03-26 RX ORDER — HYDROCORTISONE SODIUM SUCCINATE 100 MG/2ML
100 INJECTION INTRAMUSCULAR; INTRAVENOUS
OUTPATIENT
Start: 2025-03-26

## 2025-03-26 RX ORDER — SODIUM CHLORIDE 9 MG/ML
5-250 INJECTION, SOLUTION INTRAVENOUS PRN
OUTPATIENT
Start: 2025-03-26

## 2025-03-26 RX ORDER — DIPHENHYDRAMINE HYDROCHLORIDE 50 MG/ML
25 INJECTION, SOLUTION INTRAMUSCULAR; INTRAVENOUS ONCE
OUTPATIENT
Start: 2025-03-26 | End: 2025-03-26

## 2025-03-26 RX ORDER — MAGNESIUM SULFATE IN WATER 40 MG/ML
2000 INJECTION, SOLUTION INTRAVENOUS ONCE
OUTPATIENT
Start: 2025-03-26

## 2025-03-26 RX ORDER — HEPARIN 100 UNIT/ML
500 SYRINGE INTRAVENOUS PRN
OUTPATIENT
Start: 2025-03-26

## 2025-03-26 RX ORDER — DIPHENHYDRAMINE HYDROCHLORIDE 50 MG/ML
50 INJECTION, SOLUTION INTRAMUSCULAR; INTRAVENOUS
OUTPATIENT
Start: 2025-03-26

## 2025-03-27 ENCOUNTER — TELEPHONE (OUTPATIENT)
Dept: NEUROLOGY | Age: 55
End: 2025-03-27

## 2025-03-27 NOTE — TELEPHONE ENCOUNTER
Pt called this morning to cancel her migraine infusions, stated her headache was gone and she did not need the infusions. The appointments were canceled and the referral closed at this time.

## 2025-04-08 ENCOUNTER — OFFICE VISIT (OUTPATIENT)
Age: 55
End: 2025-04-08
Payer: MEDICARE

## 2025-04-08 VITALS
HEIGHT: 64 IN | HEART RATE: 78 BPM | SYSTOLIC BLOOD PRESSURE: 104 MMHG | WEIGHT: 171.8 LBS | DIASTOLIC BLOOD PRESSURE: 52 MMHG | BODY MASS INDEX: 29.33 KG/M2

## 2025-04-08 DIAGNOSIS — I25.119 ATHEROSCLEROSIS OF NATIVE CORONARY ARTERY OF NATIVE HEART WITH ANGINA PECTORIS: ICD-10-CM

## 2025-04-08 DIAGNOSIS — I10 PRIMARY HYPERTENSION: ICD-10-CM

## 2025-04-08 DIAGNOSIS — I25.810 CORONARY ARTERY DISEASE INVOLVING CORONARY BYPASS GRAFT OF NATIVE HEART WITHOUT ANGINA PECTORIS: Primary | ICD-10-CM

## 2025-04-08 DIAGNOSIS — I50.32 DIASTOLIC CHF, CHRONIC (HCC): ICD-10-CM

## 2025-04-08 DIAGNOSIS — E78.00 HYPERCHOLESTEROLEMIA: ICD-10-CM

## 2025-04-08 PROCEDURE — 3074F SYST BP LT 130 MM HG: CPT | Performed by: INTERNAL MEDICINE

## 2025-04-08 PROCEDURE — 99214 OFFICE O/P EST MOD 30 MIN: CPT | Performed by: INTERNAL MEDICINE

## 2025-04-08 PROCEDURE — 3078F DIAST BP <80 MM HG: CPT | Performed by: INTERNAL MEDICINE

## 2025-04-08 NOTE — PROGRESS NOTES
INHALE 3ML VIA NEBULIZER EVERY 6 HOURS 360 mL 11    lamoTRIgine (LAMICTAL) 100 MG tablet Take 1 tablet by mouth daily 30 tablet 3    linaclotide (LINZESS) 145 MCG capsule Take 1 capsule by mouth daily 30 capsule 3    meloxicam (MOBIC) 15 MG tablet Take 1 tablet by mouth daily 30 tablet 2    ondansetron (ZOFRAN-ODT) 4 MG disintegrating tablet Take 1 tablet by mouth 3 times daily as needed for Nausea or Vomiting 21 tablet 0    PRALUENT 75 MG/ML SOAJ injection pen Inject 1 mL into the skin every 14 days 2 Adjustable Dose Pre-filled Pen Syringe 3    traZODone (DESYREL) 100 MG tablet Take 1 tablet by mouth daily 30 tablet 3    Ubrogepant (UBRELVY) 100 MG TABS Take 100 mg by mouth as needed (migraines) Take 1 tablet by mouth at onset of migraines, may repeat in 2 hours x 1 dose. Max dose of 2 tablets per day. 16 tablet 3    HYDROcodone-acetaminophen (NORCO)  MG per tablet Take 1 tablet by mouth every 8 hours as needed for Pain for up to 30 days. Intended supply: 30 days Max Daily Amount: 3 tablets 90 tablet 0    tiZANidine (ZANAFLEX) 4 MG tablet Take 1 tablet by mouth 3 times daily as needed (muscle pain) 30 tablet 0    Blood Glucose Monitoring Suppl (ONETOUCH VERIO IQ SYSTEM) w/Device KIT Use to check glucose and calibrate CGM 2 times daily, E11.65 1 kit 0    albuterol sulfate HFA (PROVENTIL;VENTOLIN;PROAIR) 108 (90 Base) MCG/ACT inhaler Inhale 1 puff into the lungs every 6 hours as needed for Wheezing TAKE 1 PUFF BY INHALATION EVERY FOUR (4) HOURS AS NEEDED FOR WHEEZING OR SHORTNESS OF BREATH. 18 g 2    JARDIANCE 25 MG tablet Take 1 tablet by mouth daily 100 tablet 3    MOUNJARO 15 MG/0.5ML SOPN SC injection Inject 0.5 mLs into the skin once a week 6 mL 3    HUMALOG KWIKPEN 100 UNIT/ML SOPN Use with correction scale AC/HS 2/50>150, max daily dose 30 units 30 mL 3    UNIFINE PENTIPS PLUS 31G X 6 MM MISC USE WITH INSULIN UP TO 4 TIMES DAILY, E11.65 400 each 3    potassium chloride (KLOR-CON M) 20 MEQ extended

## 2025-04-22 DIAGNOSIS — J45.40 MODERATE PERSISTENT ASTHMA WITHOUT COMPLICATION: ICD-10-CM

## 2025-04-22 RX ORDER — FLUTICASONE FUROATE, UMECLIDINIUM BROMIDE AND VILANTEROL TRIFENATATE 200; 62.5; 25 UG/1; UG/1; UG/1
1 POWDER RESPIRATORY (INHALATION) DAILY
Qty: 60 EACH | Refills: 5 | Status: SHIPPED | OUTPATIENT
Start: 2025-04-22

## 2025-04-24 ENCOUNTER — OFFICE VISIT (OUTPATIENT)
Dept: FAMILY MEDICINE CLINIC | Facility: CLINIC | Age: 55
End: 2025-04-24

## 2025-04-24 VITALS
WEIGHT: 171.2 LBS | TEMPERATURE: 98.2 F | HEIGHT: 64 IN | DIASTOLIC BLOOD PRESSURE: 68 MMHG | OXYGEN SATURATION: 97 % | SYSTOLIC BLOOD PRESSURE: 100 MMHG | HEART RATE: 69 BPM | BODY MASS INDEX: 29.23 KG/M2

## 2025-04-24 DIAGNOSIS — K59.09 OTHER CONSTIPATION: ICD-10-CM

## 2025-04-24 DIAGNOSIS — G89.29 CHRONIC BILATERAL LOW BACK PAIN WITHOUT SCIATICA: ICD-10-CM

## 2025-04-24 DIAGNOSIS — M54.50 CHRONIC BILATERAL LOW BACK PAIN WITHOUT SCIATICA: ICD-10-CM

## 2025-04-24 DIAGNOSIS — F31.9 BIPOLAR 1 DISORDER (HCC): ICD-10-CM

## 2025-04-24 DIAGNOSIS — F33.0 MILD EPISODE OF RECURRENT MAJOR DEPRESSIVE DISORDER: ICD-10-CM

## 2025-04-24 DIAGNOSIS — M25.50 ARTHRALGIA, UNSPECIFIED JOINT: ICD-10-CM

## 2025-04-24 DIAGNOSIS — F51.01 PRIMARY INSOMNIA: ICD-10-CM

## 2025-04-24 DIAGNOSIS — I25.84 CORONARY ARTERY DISEASE DUE TO CALCIFIED CORONARY LESION: ICD-10-CM

## 2025-04-24 DIAGNOSIS — R11.0 NAUSEA: ICD-10-CM

## 2025-04-24 DIAGNOSIS — E78.49 OTHER HYPERLIPIDEMIA: ICD-10-CM

## 2025-04-24 DIAGNOSIS — K21.9 GASTROESOPHAGEAL REFLUX DISEASE, UNSPECIFIED WHETHER ESOPHAGITIS PRESENT: ICD-10-CM

## 2025-04-24 DIAGNOSIS — J01.90 ACUTE BACTERIAL SINUSITIS: ICD-10-CM

## 2025-04-24 DIAGNOSIS — G62.9 PERIPHERAL POLYNEUROPATHY: ICD-10-CM

## 2025-04-24 DIAGNOSIS — R60.0 LOCALIZED EDEMA: ICD-10-CM

## 2025-04-24 DIAGNOSIS — F41.9 ANXIETY: ICD-10-CM

## 2025-04-24 DIAGNOSIS — E55.9 VITAMIN D DEFICIENCY: Primary | ICD-10-CM

## 2025-04-24 DIAGNOSIS — J45.40 MODERATE PERSISTENT ASTHMA WITHOUT COMPLICATION: ICD-10-CM

## 2025-04-24 DIAGNOSIS — B96.89 ACUTE BACTERIAL SINUSITIS: ICD-10-CM

## 2025-04-24 DIAGNOSIS — E11.65 UNCONTROLLED TYPE 2 DIABETES MELLITUS WITH HYPERGLYCEMIA (HCC): ICD-10-CM

## 2025-04-24 DIAGNOSIS — I25.10 CORONARY ARTERY DISEASE DUE TO CALCIFIED CORONARY LESION: ICD-10-CM

## 2025-04-24 LAB
25(OH)D3 SERPL-MCNC: 53.1 NG/ML (ref 30–100)
ALBUMIN SERPL-MCNC: 4.1 G/DL (ref 3.5–5)
ALBUMIN/GLOB SERPL: 1.4 (ref 1–1.9)
ALP SERPL-CCNC: 65 U/L (ref 35–104)
ALT SERPL-CCNC: 35 U/L (ref 8–45)
ANION GAP SERPL CALC-SCNC: 13 MMOL/L (ref 7–16)
AST SERPL-CCNC: 23 U/L (ref 15–37)
BASOPHILS # BLD: 0.09 K/UL (ref 0–0.2)
BASOPHILS NFR BLD: 1 % (ref 0–2)
BILIRUB SERPL-MCNC: 1.2 MG/DL (ref 0–1.2)
BUN SERPL-MCNC: 10 MG/DL (ref 6–23)
CALCIUM SERPL-MCNC: 9.7 MG/DL (ref 8.8–10.2)
CHLORIDE SERPL-SCNC: 106 MMOL/L (ref 98–107)
CHOLEST SERPL-MCNC: 109 MG/DL (ref 0–200)
CO2 SERPL-SCNC: 24 MMOL/L (ref 20–29)
CREAT SERPL-MCNC: 0.56 MG/DL (ref 0.6–1.1)
CREAT UR-MCNC: 57.1 MG/DL (ref 28–217)
DIFFERENTIAL METHOD BLD: ABNORMAL
EOSINOPHIL # BLD: 0.23 K/UL (ref 0–0.8)
EOSINOPHIL NFR BLD: 2.4 % (ref 0.5–7.8)
ERYTHROCYTE [DISTWIDTH] IN BLOOD BY AUTOMATED COUNT: 12.5 % (ref 11.9–14.6)
EST. AVERAGE GLUCOSE BLD GHB EST-MCNC: 147 MG/DL
GLOBULIN SER CALC-MCNC: 2.9 G/DL (ref 2.3–3.5)
GLUCOSE SERPL-MCNC: 107 MG/DL (ref 70–99)
HBA1C MFR BLD: 6.8 % (ref 0–5.6)
HCT VFR BLD AUTO: 44.8 % (ref 35.8–46.3)
HDLC SERPL-MCNC: 43 MG/DL (ref 40–60)
HDLC SERPL: 2.5 (ref 0–5)
HGB BLD-MCNC: 14.8 G/DL (ref 11.7–15.4)
IMM GRANULOCYTES # BLD AUTO: 0.04 K/UL (ref 0–0.5)
IMM GRANULOCYTES NFR BLD AUTO: 0.4 % (ref 0–5)
LDLC SERPL CALC-MCNC: 41 MG/DL (ref 0–100)
LYMPHOCYTES # BLD: 3.01 K/UL (ref 0.5–4.6)
LYMPHOCYTES NFR BLD: 31.9 % (ref 13–44)
MCH RBC QN AUTO: 29.1 PG (ref 26.1–32.9)
MCHC RBC AUTO-ENTMCNC: 33 G/DL (ref 31.4–35)
MCV RBC AUTO: 88.2 FL (ref 82–102)
MICROALBUMIN UR-MCNC: <1.2 MG/DL (ref 0–20)
MICROALBUMIN/CREAT UR-RTO: NORMAL MG/G (ref 0–30)
MONOCYTES # BLD: 0.73 K/UL (ref 0.1–1.3)
MONOCYTES NFR BLD: 7.7 % (ref 4–12)
NEUTS SEG # BLD: 5.33 K/UL (ref 1.7–8.2)
NEUTS SEG NFR BLD: 56.6 % (ref 43–78)
NRBC # BLD: 0 K/UL (ref 0–0.2)
PLATELET # BLD AUTO: 305 K/UL (ref 150–450)
PMV BLD AUTO: 9 FL (ref 9.4–12.3)
POTASSIUM SERPL-SCNC: 4.7 MMOL/L (ref 3.5–5.1)
PROT SERPL-MCNC: 7 G/DL (ref 6.3–8.2)
RBC # BLD AUTO: 5.08 M/UL (ref 4.05–5.2)
SODIUM SERPL-SCNC: 142 MMOL/L (ref 136–145)
TRIGL SERPL-MCNC: 127 MG/DL (ref 0–150)
VLDLC SERPL CALC-MCNC: 25 MG/DL (ref 6–23)
WBC # BLD AUTO: 9.4 K/UL (ref 4.3–11.1)

## 2025-04-24 RX ORDER — HYDROCODONE BITARTRATE AND ACETAMINOPHEN 10; 325 MG/1; MG/1
1 TABLET ORAL EVERY 8 HOURS PRN
Qty: 90 TABLET | Refills: 0 | Status: SHIPPED | OUTPATIENT
Start: 2025-04-28 | End: 2025-05-28

## 2025-04-24 RX ORDER — UBROGEPANT 100 MG/1
100 TABLET ORAL PRN
Qty: 16 TABLET | Refills: 3 | Status: SHIPPED | OUTPATIENT
Start: 2025-04-24

## 2025-04-24 RX ORDER — DIAZEPAM 2 MG/1
2 TABLET ORAL 2 TIMES DAILY PRN
Qty: 60 TABLET | Refills: 3 | Status: SHIPPED | OUTPATIENT
Start: 2025-04-28 | End: 2025-08-26

## 2025-04-24 RX ORDER — GABAPENTIN 600 MG/1
600 TABLET ORAL 2 TIMES DAILY
Qty: 90 TABLET | Refills: 3 | Status: SHIPPED | OUTPATIENT
Start: 2025-04-24 | End: 2025-10-21

## 2025-04-24 RX ORDER — HYDROXYZINE HYDROCHLORIDE 25 MG/1
25 TABLET, FILM COATED ORAL 3 TIMES DAILY PRN
Qty: 30 TABLET | Refills: 3 | Status: SHIPPED | OUTPATIENT
Start: 2025-04-24

## 2025-04-24 RX ORDER — ALIROCUMAB 75 MG/ML
75 INJECTION, SOLUTION SUBCUTANEOUS
Qty: 2 ADJUSTABLE DOSE PRE-FILLED PEN SYRINGE | Refills: 3 | Status: SHIPPED | OUTPATIENT
Start: 2025-04-24

## 2025-04-24 RX ORDER — MELOXICAM 15 MG/1
15 TABLET ORAL DAILY
Qty: 30 TABLET | Refills: 2 | Status: SHIPPED | OUTPATIENT
Start: 2025-04-24

## 2025-04-24 RX ORDER — DULOXETIN HYDROCHLORIDE 60 MG/1
120 CAPSULE, DELAYED RELEASE ORAL DAILY
Qty: 30 CAPSULE | Refills: 3 | Status: SHIPPED | OUTPATIENT
Start: 2025-04-24

## 2025-04-24 RX ORDER — DOXYCYCLINE HYCLATE 100 MG
100 TABLET ORAL 2 TIMES DAILY
Qty: 20 TABLET | Refills: 0 | Status: SHIPPED | OUTPATIENT
Start: 2025-04-24 | End: 2025-05-04

## 2025-04-24 RX ORDER — RIMEGEPANT SULFATE 75 MG/75MG
TABLET, ORALLY DISINTEGRATING ORAL
Refills: 0 | OUTPATIENT
Start: 2025-04-24

## 2025-04-24 RX ORDER — ATORVASTATIN CALCIUM 80 MG/1
80 TABLET, FILM COATED ORAL DAILY
Qty: 90 TABLET | Refills: 3 | Status: SHIPPED | OUTPATIENT
Start: 2025-04-24

## 2025-04-24 RX ORDER — DEXLANSOPRAZOLE 60 MG/1
60 CAPSULE, DELAYED RELEASE ORAL DAILY
Qty: 30 CAPSULE | Refills: 3 | Status: SHIPPED | OUTPATIENT
Start: 2025-04-24

## 2025-04-24 RX ORDER — TRAZODONE HYDROCHLORIDE 100 MG/1
100 TABLET ORAL DAILY
Qty: 30 TABLET | Refills: 3 | Status: SHIPPED | OUTPATIENT
Start: 2025-04-24

## 2025-04-24 RX ORDER — HYDROCODONE BITARTRATE AND ACETAMINOPHEN 10; 325 MG/1; MG/1
1 TABLET ORAL EVERY 8 HOURS PRN
Qty: 90 TABLET | Refills: 0 | Status: SHIPPED | OUTPATIENT
Start: 2025-06-27 | End: 2025-07-27

## 2025-04-24 RX ORDER — ONDANSETRON 4 MG/1
4 TABLET, ORALLY DISINTEGRATING ORAL 3 TIMES DAILY PRN
Qty: 21 TABLET | Refills: 0 | Status: SHIPPED | OUTPATIENT
Start: 2025-04-24

## 2025-04-24 RX ORDER — LAMOTRIGINE 100 MG/1
100 TABLET ORAL DAILY
Qty: 30 TABLET | Refills: 3 | Status: SHIPPED | OUTPATIENT
Start: 2025-04-24

## 2025-04-24 RX ORDER — ALBUTEROL SULFATE 90 UG/1
1 INHALANT RESPIRATORY (INHALATION) EVERY 6 HOURS PRN
Qty: 18 G | Refills: 2 | Status: SHIPPED | OUTPATIENT
Start: 2025-04-24

## 2025-04-24 RX ORDER — IPRATROPIUM BROMIDE AND ALBUTEROL SULFATE 2.5; .5 MG/3ML; MG/3ML
1 SOLUTION RESPIRATORY (INHALATION) EVERY 6 HOURS PRN
Qty: 360 ML | Refills: 11 | Status: SHIPPED | OUTPATIENT
Start: 2025-04-24

## 2025-04-24 RX ORDER — FLUTICASONE FUROATE, UMECLIDINIUM BROMIDE AND VILANTEROL TRIFENATATE 200; 62.5; 25 UG/1; UG/1; UG/1
1 POWDER RESPIRATORY (INHALATION) DAILY
Qty: 1 EACH | Refills: 11 | Status: SHIPPED | OUTPATIENT
Start: 2025-04-24

## 2025-04-24 RX ORDER — FUROSEMIDE 40 MG/1
40 TABLET ORAL DAILY
Qty: 90 TABLET | Refills: 3 | Status: SHIPPED | OUTPATIENT
Start: 2025-04-24

## 2025-04-24 RX ORDER — HYDROCODONE BITARTRATE AND ACETAMINOPHEN 10; 325 MG/1; MG/1
1 TABLET ORAL EVERY 8 HOURS PRN
Qty: 90 TABLET | Refills: 0 | Status: SHIPPED | OUTPATIENT
Start: 2025-05-28 | End: 2025-06-27

## 2025-04-24 ASSESSMENT — ENCOUNTER SYMPTOMS
SINUS COMPLAINT: 1
SHORTNESS OF BREATH: 0
ABDOMINAL PAIN: 0
SORE THROAT: 0
PHOTOPHOBIA: 0
BACK PAIN: 1
ABDOMINAL DISTENTION: 0
COLOR CHANGE: 0
BLURRED VISION: 0
EYE PAIN: 0
BLOOD IN STOOL: 0
COUGH: 0
NAUSEA: 0

## 2025-04-25 ENCOUNTER — RESULTS FOLLOW-UP (OUTPATIENT)
Dept: FAMILY MEDICINE CLINIC | Facility: CLINIC | Age: 55
End: 2025-04-25

## 2025-04-25 NOTE — PROGRESS NOTES
Tammie Sunshine  1970 is a 54 y.o. female ,Established patient, here for evaluation of the following chief complaint(s):   Chief Complaint   Patient presents with    3 Month Follow-Up     Pt is fasting---    Medication Refill          1. Vitamin D deficiency  -     Vitamin D 25 Hydroxy; Future  2. Moderate persistent asthma without complication  -     albuterol sulfate HFA (PROVENTIL;VENTOLIN;PROAIR) 108 (90 Base) MCG/ACT inhaler; Inhale 1 puff into the lungs every 6 hours as needed for Wheezing TAKE 1 PUFF BY INHALATION EVERY FOUR (4) HOURS AS NEEDED FOR WHEEZING OR SHORTNESS OF BREATH., Disp-18 g, R-2Normal  -     fluticasone-umeclidin-vilant (TRELEGY ELLIPTA) 200-62.5-25 MCG/ACT AEPB inhaler; Inhale 1 puff into the lungs daily, Disp-1 each, R-11Normal  -     ipratropium 0.5 mg-albuterol 2.5 mg (DUONEB) 0.5-2.5 (3) MG/3ML SOLN nebulizer solution; Inhale 3 mLs into the lungs every 6 hours as needed for Shortness of Breath INHALE 3ML VIA NEBULIZER EVERY 6 HOURS, Disp-360 mL, R-11Normal  -     CBC with Auto Differential; Future  3. Coronary artery disease due to calcified coronary lesion  -     atorvastatin (LIPITOR) 80 MG tablet; Take 1 tablet by mouth daily, Disp-90 tablet, R-3Normal  4. Other hyperlipidemia  -     atorvastatin (LIPITOR) 80 MG tablet; Take 1 tablet by mouth daily, Disp-90 tablet, R-3Normal  -     Lipid Panel; Future  5. Gastroesophageal reflux disease, unspecified whether esophagitis present  -     dexlansoprazole (DEXILANT) 60 MG CPDR delayed release capsule; Take 1 capsule by mouth daily, Disp-30 capsule, R-3Normal  6. Mild episode of recurrent major depressive disorder  -     DULoxetine (CYMBALTA) 60 MG extended release capsule; Take 2 capsules by mouth daily, Disp-30 capsule, R-3Normal  7. Bipolar 1 disorder (HCC)  -     DULoxetine (CYMBALTA) 60 MG extended release capsule; Take 2 capsules by mouth daily, Disp-30 capsule, R-3Normal  -     lamoTRIgine (LAMICTAL) 100 MG tablet;

## 2025-05-01 RX ORDER — MELOXICAM 15 MG/1
15 TABLET ORAL DAILY
Qty: 30 TABLET | Refills: 2 | OUTPATIENT
Start: 2025-05-01

## 2025-05-12 DIAGNOSIS — M25.50 ARTHRALGIA, UNSPECIFIED JOINT: Primary | ICD-10-CM

## 2025-05-12 DIAGNOSIS — F41.9 ANXIETY: ICD-10-CM

## 2025-05-12 RX ORDER — HYDROCODONE BITARTRATE AND ACETAMINOPHEN 10; 325 MG/1; MG/1
1 TABLET ORAL EVERY 8 HOURS PRN
Qty: 90 TABLET | Refills: 0 | Status: SHIPPED | OUTPATIENT
Start: 2025-06-27 | End: 2025-07-27

## 2025-05-12 RX ORDER — HYDROCODONE BITARTRATE AND ACETAMINOPHEN 10; 325 MG/1; MG/1
1 TABLET ORAL EVERY 8 HOURS PRN
Qty: 90 TABLET | Refills: 0 | Status: SHIPPED | OUTPATIENT
Start: 2025-05-28 | End: 2025-06-27

## 2025-05-12 RX ORDER — DIAZEPAM 2 MG/1
2 TABLET ORAL 2 TIMES DAILY PRN
Qty: 60 TABLET | Refills: 3 | Status: SHIPPED | OUTPATIENT
Start: 2025-05-12 | End: 2025-09-09

## 2025-05-12 RX ORDER — HYDROCODONE BITARTRATE AND ACETAMINOPHEN 10; 325 MG/1; MG/1
1 TABLET ORAL EVERY 8 HOURS PRN
Qty: 90 TABLET | Refills: 0 | Status: SHIPPED | OUTPATIENT
Start: 2025-07-27 | End: 2025-08-26

## 2025-05-12 NOTE — TELEPHONE ENCOUNTER
Patient called for Medication Refill Request    Name of Medication : HYDROcodone-acetaminophen (NORCO)     Strength of Medication:  MG per tablet     Directions: Take 1 tablet by mouth every 8 hours as needed for Pain for up to 30 days.     30 day or 90 day supply: 30 days    And     Name of Medication : diazePAM (VALIUM)     Strength of Medication: 2 MG tablet     Directions: Take 1 tablet by mouth 2 times daily as needed for Anxiety for up to 120 days.     30 day or 90 day supply: 30 days    Preferred Pharmacy: [facility] in [address] [place] [phone]     Last Appt. Date: 4/24/2025    Next Appt. Date: 7/24/2025    Additional Information For Provider: Stated other pharmacy is no longer filling one of her medications

## 2025-05-13 ENCOUNTER — TELEPHONE (OUTPATIENT)
Dept: FAMILY MEDICINE CLINIC | Facility: CLINIC | Age: 55
End: 2025-05-13

## 2025-05-13 DIAGNOSIS — M25.50 ARTHRALGIA, UNSPECIFIED JOINT: ICD-10-CM

## 2025-05-13 RX ORDER — HYDROCODONE BITARTRATE AND ACETAMINOPHEN 10; 325 MG/1; MG/1
1 TABLET ORAL EVERY 8 HOURS PRN
Qty: 90 TABLET | Refills: 0 | Status: CANCELLED | OUTPATIENT
Start: 2025-05-13 | End: 2025-06-12

## 2025-05-13 NOTE — TELEPHONE ENCOUNTER
Pharmacy called and stated that the prescription for the patients Norco is actually due today and not at the end of the month.    They wanted to know if you would resend it with the correct date?    Please advise.    Thank  you

## 2025-05-14 DIAGNOSIS — M54.50 CHRONIC BILATERAL LOW BACK PAIN WITHOUT SCIATICA: ICD-10-CM

## 2025-05-14 DIAGNOSIS — M25.50 ARTHRALGIA, UNSPECIFIED JOINT: Primary | ICD-10-CM

## 2025-05-14 DIAGNOSIS — G89.29 CHRONIC BILATERAL LOW BACK PAIN WITHOUT SCIATICA: ICD-10-CM

## 2025-05-14 RX ORDER — HYDROCODONE BITARTRATE AND ACETAMINOPHEN 10; 325 MG/1; MG/1
1 TABLET ORAL EVERY 8 HOURS PRN
Qty: 90 TABLET | Refills: 0 | Status: SHIPPED | OUTPATIENT
Start: 2025-05-14 | End: 2025-06-13

## 2025-05-28 RX ORDER — TIRZEPATIDE 15 MG/.5ML
INJECTION, SOLUTION SUBCUTANEOUS
Qty: 6 ML | Refills: 5 | Status: SHIPPED | OUTPATIENT
Start: 2025-05-28

## 2025-06-18 ENCOUNTER — OFFICE VISIT (OUTPATIENT)
Dept: NEUROLOGY | Age: 55
End: 2025-06-18
Payer: MEDICARE

## 2025-06-18 VITALS
OXYGEN SATURATION: 95 % | HEART RATE: 71 BPM | BODY MASS INDEX: 28.67 KG/M2 | SYSTOLIC BLOOD PRESSURE: 104 MMHG | WEIGHT: 167 LBS | DIASTOLIC BLOOD PRESSURE: 66 MMHG

## 2025-06-18 DIAGNOSIS — R42 DIZZINESS: ICD-10-CM

## 2025-06-18 DIAGNOSIS — G43.109 MIGRAINE WITH AURA AND WITHOUT STATUS MIGRAINOSUS, NOT INTRACTABLE: Primary | ICD-10-CM

## 2025-06-18 PROCEDURE — 3078F DIAST BP <80 MM HG: CPT | Performed by: NURSE PRACTITIONER

## 2025-06-18 PROCEDURE — 99214 OFFICE O/P EST MOD 30 MIN: CPT | Performed by: NURSE PRACTITIONER

## 2025-06-18 PROCEDURE — 3074F SYST BP LT 130 MM HG: CPT | Performed by: NURSE PRACTITIONER

## 2025-06-18 RX ORDER — UBROGEPANT 100 MG/1
TABLET ORAL
Qty: 16 TABLET | Refills: 8 | Status: SHIPPED | OUTPATIENT
Start: 2025-06-18

## 2025-06-18 RX ORDER — ATOGEPANT 60 MG/1
1 TABLET ORAL DAILY
Qty: 30 TABLET | Refills: 8 | Status: SHIPPED | OUTPATIENT
Start: 2025-06-18

## 2025-06-18 ASSESSMENT — ENCOUNTER SYMPTOMS
ALLERGIC/IMMUNOLOGIC NEGATIVE: 1
NAUSEA: 1
EYES NEGATIVE: 1
VOMITING: 1

## 2025-06-18 NOTE — ASSESSMENT & PLAN NOTE
Patient with symptoms present improvement on Qulipta.  Continue Qulipta for prevention.  Continue Ubrelvy for rescue.Take 1 tablet at onset of migraine, may be repeated in 2 hours for a max of 2 doses in 24 hours.

## 2025-06-18 NOTE — PROGRESS NOTES
Tammie Sunshine is a 54 y.o. female  with a history of vestibular migraines, menieres dx,  neuropathy, CABG, Heart failure, Diabetes, bipolar disorder, who presents with headaches.    CC: Headache      She is accompanied by her daughter.       History of Present Illness      The patient presents for evaluation of migraines.She reports 5-6 migraines per month with Qulipta. Frequency reduced by 75% with this combination.     She experienced a severe migraine episode that lasted for approximately 2 weeks, which she attributes to a lack of medication due to insurance issues. The severity of the migraine was such that she considered seeking emergency care for a Toradol injection. However, the migraine subsided on the day she had scheduled an infusion. She has found relief from her migraines through infusions, but notes that those containing Benadryl induce sleepiness and often result in a resurgence of the migraine within 2 to 3 days post-infusion. She recalls an instance where an infusion containing Benadryl caused her to sleep for 18 hours, after which she woke up with a more intense headache than before the treatment.        She takes ubrevely 100mg at onset of her migraines, only has to take one. But if she delays, she will have to re-dose. She has as headache today.       Headaches are located occipital and radiate top of head forehead. They began years ago but worsened in 2018 or 2019.    Duration: Depends on how quickly she takes her Ubrevly. If she has to wait to take her Ubrevly, it could last the entire day. Ubrevly provides pain freedom in 20-30 minutes  Severity is mild, previously severe but there are days when she has to lie down in bed to alleviate the symptoms. Quality of headaches are described as Pressure.  Associated symptoms: First symptom is dizziness, followed by occipital and forehead pressure, photophobia,. Photophobia, movement makes worse. Occasional nausea and vomiting.  The headaches

## 2025-06-27 NOTE — TELEPHONE ENCOUNTER
Patient is requesting FMLA but will like to know if she needs to be seen?   See second note 05-25-23.  Handled/wc

## 2025-07-02 ENCOUNTER — OFFICE VISIT (OUTPATIENT)
Dept: ENDOCRINOLOGY | Age: 55
End: 2025-07-02
Payer: MEDICARE

## 2025-07-02 VITALS
HEART RATE: 74 BPM | BODY MASS INDEX: 28.85 KG/M2 | HEIGHT: 64 IN | OXYGEN SATURATION: 98 % | SYSTOLIC BLOOD PRESSURE: 122 MMHG | DIASTOLIC BLOOD PRESSURE: 70 MMHG | WEIGHT: 169 LBS

## 2025-07-02 DIAGNOSIS — I50.32 CHRONIC DIASTOLIC (CONGESTIVE) HEART FAILURE (HCC): ICD-10-CM

## 2025-07-02 DIAGNOSIS — E11.65 UNCONTROLLED TYPE 2 DIABETES MELLITUS WITH HYPERGLYCEMIA (HCC): ICD-10-CM

## 2025-07-02 DIAGNOSIS — E55.9 VITAMIN D DEFICIENCY: ICD-10-CM

## 2025-07-02 DIAGNOSIS — E11.65 UNCONTROLLED TYPE 2 DIABETES MELLITUS WITH HYPERGLYCEMIA (HCC): Primary | ICD-10-CM

## 2025-07-02 DIAGNOSIS — E11.69 HYPERLIPIDEMIA ASSOCIATED WITH TYPE 2 DIABETES MELLITUS (HCC): ICD-10-CM

## 2025-07-02 DIAGNOSIS — I10 PRIMARY HYPERTENSION: ICD-10-CM

## 2025-07-02 DIAGNOSIS — E78.5 HYPERLIPIDEMIA ASSOCIATED WITH TYPE 2 DIABETES MELLITUS (HCC): ICD-10-CM

## 2025-07-02 LAB
ALBUMIN SERPL-MCNC: 3.9 G/DL (ref 3.5–5)
ALBUMIN/GLOB SERPL: 1.2 (ref 1–1.9)
ALP SERPL-CCNC: 74 U/L (ref 35–104)
ALT SERPL-CCNC: 29 U/L (ref 8–45)
ANION GAP SERPL CALC-SCNC: 12 MMOL/L (ref 7–16)
AST SERPL-CCNC: 23 U/L (ref 15–37)
BASOPHILS # BLD: 0.08 K/UL (ref 0–0.2)
BASOPHILS NFR BLD: 0.8 % (ref 0–2)
BILIRUB SERPL-MCNC: 1.1 MG/DL (ref 0–1.2)
BUN SERPL-MCNC: 14 MG/DL (ref 6–23)
CALCIUM SERPL-MCNC: 9.5 MG/DL (ref 8.8–10.2)
CHLORIDE SERPL-SCNC: 109 MMOL/L (ref 98–107)
CO2 SERPL-SCNC: 19 MMOL/L (ref 20–29)
CREAT SERPL-MCNC: 0.62 MG/DL (ref 0.6–1.1)
DIFFERENTIAL METHOD BLD: ABNORMAL
EOSINOPHIL # BLD: 0.26 K/UL (ref 0–0.8)
EOSINOPHIL NFR BLD: 2.6 % (ref 0.5–7.8)
ERYTHROCYTE [DISTWIDTH] IN BLOOD BY AUTOMATED COUNT: 12.1 % (ref 11.9–14.6)
GLOBULIN SER CALC-MCNC: 3.2 G/DL (ref 2.3–3.5)
GLUCOSE SERPL-MCNC: 129 MG/DL (ref 70–99)
HCT VFR BLD AUTO: 43.5 % (ref 35.8–46.3)
HGB BLD-MCNC: 14.6 G/DL (ref 11.7–15.4)
IMM GRANULOCYTES # BLD AUTO: 0.05 K/UL (ref 0–0.5)
IMM GRANULOCYTES NFR BLD AUTO: 0.5 % (ref 0–5)
LYMPHOCYTES # BLD: 2.78 K/UL (ref 0.5–4.6)
LYMPHOCYTES NFR BLD: 28 % (ref 13–44)
MCH RBC QN AUTO: 29.3 PG (ref 26.1–32.9)
MCHC RBC AUTO-ENTMCNC: 33.6 G/DL (ref 31.4–35)
MCV RBC AUTO: 87.2 FL (ref 82–102)
MONOCYTES # BLD: 0.79 K/UL (ref 0.1–1.3)
MONOCYTES NFR BLD: 8 % (ref 4–12)
NEUTS SEG # BLD: 5.96 K/UL (ref 1.7–8.2)
NEUTS SEG NFR BLD: 60.1 % (ref 43–78)
NRBC # BLD: 0 K/UL (ref 0–0.2)
PLATELET # BLD AUTO: 338 K/UL (ref 150–450)
PMV BLD AUTO: 8.8 FL (ref 9.4–12.3)
POTASSIUM SERPL-SCNC: 4.2 MMOL/L (ref 3.5–5.1)
PROT SERPL-MCNC: 7.1 G/DL (ref 6.3–8.2)
RBC # BLD AUTO: 4.99 M/UL (ref 4.05–5.2)
SODIUM SERPL-SCNC: 139 MMOL/L (ref 136–145)
TSH W FREE THYROID IF ABNORMAL: 1.42 UIU/ML (ref 0.27–4.2)
WBC # BLD AUTO: 9.9 K/UL (ref 4.3–11.1)

## 2025-07-02 PROCEDURE — 3078F DIAST BP <80 MM HG: CPT | Performed by: PHYSICIAN ASSISTANT

## 2025-07-02 PROCEDURE — 99214 OFFICE O/P EST MOD 30 MIN: CPT | Performed by: PHYSICIAN ASSISTANT

## 2025-07-02 PROCEDURE — 3074F SYST BP LT 130 MM HG: CPT | Performed by: PHYSICIAN ASSISTANT

## 2025-07-02 PROCEDURE — 3044F HG A1C LEVEL LT 7.0%: CPT | Performed by: PHYSICIAN ASSISTANT

## 2025-07-02 PROCEDURE — 95251 CONT GLUC MNTR ANALYSIS I&R: CPT | Performed by: PHYSICIAN ASSISTANT

## 2025-07-02 RX ORDER — PEN NEEDLE, DIABETIC 31 GX5/16"
NEEDLE, DISPOSABLE MISCELLANEOUS
Qty: 600 EACH | Refills: 1 | Status: SHIPPED | OUTPATIENT
Start: 2025-07-02

## 2025-07-02 RX ORDER — EMPAGLIFLOZIN 25 MG/1
25 TABLET, FILM COATED ORAL DAILY
Qty: 100 TABLET | Refills: 3 | Status: SHIPPED | OUTPATIENT
Start: 2025-07-02

## 2025-07-02 RX ORDER — TIRZEPATIDE 15 MG/.5ML
15 INJECTION, SOLUTION SUBCUTANEOUS
Qty: 6 ML | Refills: 1 | Status: SHIPPED | OUTPATIENT
Start: 2025-07-02

## 2025-07-02 RX ORDER — CLOPIDOGREL BISULFATE 75 MG/1
75 TABLET ORAL DAILY
COMMUNITY
Start: 2025-06-23

## 2025-07-02 NOTE — PROGRESS NOTES
[facility] ENDOCRINOLOGY   AND   THYROID NODULE CLINIC    Arleen Gaviria PA-C  [facility]ocr[facility] and Thyroid [facility]odule [facility]  [address], [address]  [place] [address]  Phone [phone]  Facsimile [phone]          Tammie Sunshine is a 54 y.o. female seen 7/2/2025 for follow up evaluation of Type 2 diabetes        Assessment and Plan:          Interpretation of 72 hour glucose monitor:  At least 72 hours of data were reviewed.  The patient utilizes a dexcom G7 continuous glucose monitoring system.  The average glucose during the reviewed timeframe was 141 with a standard deviation of 35.  There is a pattern of occasional postprandial hyperglycemia after meals.       Assessment & Plan        1. Uncontrolled type 2 diabetes mellitus with hyperglycemia (HCC)  Well-controlled.  Diagnostic plan: Blood work today to assess thyroid function, kidney health, and CBC.  Treatment plan: Continue using CGM device, place in less mobile area, activate adhesive by rubbing in circular motion. Regular exercise recommended to overcome weight loss plateau. Incorporate protein into diet to manage blood sugar spikes and support health. Prescription for alcohol pads.    Blood work today to assess thyroid function, kidney health, and CBC.    - Comprehensive Metabolic Panel; Future  - TSH reflex to FT4; Future  - CBC with Auto Differential; Future  - Alcohol Swabs (ALCOHOL PREP) PADS; Use with insulin and glucose monitor up to 6 times daily, E11.65  Dispense: 600 each; Refill: 1  - JARDIANCE 25 MG tablet; Take 1 tablet by mouth daily  Dispense: 100 tablet; Refill: 3  - MOUNJARO 15 MG/0.5ML SOAJ pen; Inject 15 mg into the skin every 7 days  Dispense: 6 mL; Refill: 1  - HM DIABETES FOOT EXAM  - GLUCOSE MONITOR, 72 HOUR, PHYS INTERP    2. Primary hypertension  Stable  BP Readings from Last 3 Encounters:   07/02/25 122/70   06/18/25 104/66   04/24/25 100/68     3. Hyperlipidemia associated with type 2

## 2025-07-03 ENCOUNTER — RESULTS FOLLOW-UP (OUTPATIENT)
Dept: ENDOCRINOLOGY | Age: 55
End: 2025-07-03

## 2025-07-07 NOTE — TELEPHONE ENCOUNTER
Cherelle response:    The MPV is the size of the platelets, yours is below range and has been steadily low or decreasing. I do not have much to comment about this. If you are noticing bleeding when you brush your teeth or with other basic activities then you can certainly discuss this with your primary care.         There is no O2 but the CO2 is low. I would not consider this lab to be of great value on a screening test in the outpatient environment.    Good to see you last week    ~Arleen

## 2025-07-11 DIAGNOSIS — M25.50 ARTHRALGIA, UNSPECIFIED JOINT: ICD-10-CM

## 2025-07-11 RX ORDER — HYDROCODONE BITARTRATE AND ACETAMINOPHEN 10; 325 MG/1; MG/1
1 TABLET ORAL EVERY 8 HOURS PRN
Qty: 90 TABLET | Refills: 0 | Status: SHIPPED | OUTPATIENT
Start: 2025-07-11 | End: 2025-08-10

## 2025-07-15 ENCOUNTER — TELEPHONE (OUTPATIENT)
Dept: NEUROLOGY | Age: 55
End: 2025-07-15

## 2025-07-17 ENCOUNTER — COMMUNITY OUTREACH (OUTPATIENT)
Dept: FAMILY MEDICINE CLINIC | Facility: CLINIC | Age: 55
End: 2025-07-17

## 2025-07-17 NOTE — PROGRESS NOTES
Patient's HM shows they are overdue for Colorectal Screening.   Care Everywhere and  files searched.   updated with 12/20/21 colonoscopy.

## 2025-07-23 ENCOUNTER — PATIENT MESSAGE (OUTPATIENT)
Dept: ENDOCRINOLOGY | Age: 55
End: 2025-07-23

## 2025-07-23 ENCOUNTER — TELEPHONE (OUTPATIENT)
Dept: ENDOCRINOLOGY | Age: 55
End: 2025-07-23

## 2025-07-23 RX ORDER — INSULIN ASPART 100 [IU]/ML
INJECTION, SOLUTION INTRAVENOUS; SUBCUTANEOUS
Qty: 30 ML | Refills: 3 | Status: SHIPPED | OUTPATIENT
Start: 2025-07-23

## 2025-07-23 NOTE — TELEPHONE ENCOUNTER
Cherelle response:    Due to your insurance, we changed your name brand humalog to Aspart- generic novolog    Let me know if any issues getting or tolerating this medication    ~Arleen

## 2025-07-24 ENCOUNTER — OFFICE VISIT (OUTPATIENT)
Dept: FAMILY MEDICINE CLINIC | Facility: CLINIC | Age: 55
End: 2025-07-24
Payer: MEDICARE

## 2025-07-24 ENCOUNTER — TELEPHONE (OUTPATIENT)
Age: 55
End: 2025-07-24

## 2025-07-24 VITALS
TEMPERATURE: 98.3 F | WEIGHT: 166.8 LBS | BODY MASS INDEX: 28.48 KG/M2 | HEIGHT: 64 IN | OXYGEN SATURATION: 96 % | HEART RATE: 75 BPM | DIASTOLIC BLOOD PRESSURE: 58 MMHG | SYSTOLIC BLOOD PRESSURE: 102 MMHG | RESPIRATION RATE: 16 BRPM

## 2025-07-24 DIAGNOSIS — K21.9 GASTROESOPHAGEAL REFLUX DISEASE, UNSPECIFIED WHETHER ESOPHAGITIS PRESENT: ICD-10-CM

## 2025-07-24 DIAGNOSIS — I25.10 CORONARY ARTERY DISEASE DUE TO LIPID RICH PLAQUE: ICD-10-CM

## 2025-07-24 DIAGNOSIS — M54.6 ACUTE MIDLINE THORACIC BACK PAIN: ICD-10-CM

## 2025-07-24 DIAGNOSIS — F31.9 BIPOLAR 1 DISORDER (HCC): ICD-10-CM

## 2025-07-24 DIAGNOSIS — I25.10 CORONARY ARTERY DISEASE DUE TO CALCIFIED CORONARY LESION: ICD-10-CM

## 2025-07-24 DIAGNOSIS — E11.9 TYPE 2 DIABETES MELLITUS WITHOUT COMPLICATION, WITHOUT LONG-TERM CURRENT USE OF INSULIN (HCC): ICD-10-CM

## 2025-07-24 DIAGNOSIS — F33.0 MILD EPISODE OF RECURRENT MAJOR DEPRESSIVE DISORDER: ICD-10-CM

## 2025-07-24 DIAGNOSIS — I25.84 CORONARY ARTERY DISEASE DUE TO CALCIFIED CORONARY LESION: ICD-10-CM

## 2025-07-24 DIAGNOSIS — J45.40 MODERATE PERSISTENT ASTHMA WITHOUT COMPLICATION: ICD-10-CM

## 2025-07-24 DIAGNOSIS — F41.9 ANXIETY: ICD-10-CM

## 2025-07-24 DIAGNOSIS — I25.83 CORONARY ARTERY DISEASE DUE TO LIPID RICH PLAQUE: ICD-10-CM

## 2025-07-24 DIAGNOSIS — R60.0 LOCALIZED EDEMA: ICD-10-CM

## 2025-07-24 DIAGNOSIS — M25.50 ARTHRALGIA, UNSPECIFIED JOINT: ICD-10-CM

## 2025-07-24 DIAGNOSIS — G62.9 PERIPHERAL POLYNEUROPATHY: ICD-10-CM

## 2025-07-24 DIAGNOSIS — E78.49 OTHER HYPERLIPIDEMIA: ICD-10-CM

## 2025-07-24 DIAGNOSIS — E55.9 VITAMIN D DEFICIENCY: Primary | ICD-10-CM

## 2025-07-24 LAB
25(OH)D3 SERPL-MCNC: 37.8 NG/ML (ref 30–100)
ALBUMIN SERPL-MCNC: 3.8 G/DL (ref 3.5–5)
ALBUMIN/GLOB SERPL: 1.4 (ref 1–1.9)
ALP SERPL-CCNC: 73 U/L (ref 35–104)
ALT SERPL-CCNC: 28 U/L (ref 8–45)
ANION GAP SERPL CALC-SCNC: 12 MMOL/L (ref 7–16)
AST SERPL-CCNC: 21 U/L (ref 15–37)
BASOPHILS # BLD: 0.07 K/UL (ref 0–0.2)
BASOPHILS NFR BLD: 1.1 % (ref 0–2)
BILIRUB SERPL-MCNC: 1.2 MG/DL (ref 0–1.2)
BUN SERPL-MCNC: 10 MG/DL (ref 6–23)
CALCIUM SERPL-MCNC: 9.5 MG/DL (ref 8.8–10.2)
CHLORIDE SERPL-SCNC: 107 MMOL/L (ref 98–107)
CHOLEST SERPL-MCNC: 107 MG/DL (ref 0–200)
CO2 SERPL-SCNC: 22 MMOL/L (ref 20–29)
CREAT SERPL-MCNC: 0.55 MG/DL (ref 0.6–1.1)
DIFFERENTIAL METHOD BLD: ABNORMAL
EOSINOPHIL # BLD: 0.2 K/UL (ref 0–0.8)
EOSINOPHIL NFR BLD: 3.2 % (ref 0.5–7.8)
ERYTHROCYTE [DISTWIDTH] IN BLOOD BY AUTOMATED COUNT: 12.5 % (ref 11.9–14.6)
EST. AVERAGE GLUCOSE BLD GHB EST-MCNC: 145 MG/DL
GLOBULIN SER CALC-MCNC: 2.7 G/DL (ref 2.3–3.5)
GLUCOSE SERPL-MCNC: 117 MG/DL (ref 70–99)
HBA1C MFR BLD: 6.7 % (ref 0–5.6)
HCT VFR BLD AUTO: 43.3 % (ref 35.8–46.3)
HDLC SERPL-MCNC: 44 MG/DL (ref 40–60)
HDLC SERPL: 2.4 (ref 0–5)
HGB BLD-MCNC: 14.4 G/DL (ref 11.7–15.4)
IMM GRANULOCYTES # BLD AUTO: 0.02 K/UL (ref 0–0.5)
IMM GRANULOCYTES NFR BLD AUTO: 0.3 % (ref 0–5)
LDLC SERPL CALC-MCNC: 46 MG/DL (ref 0–100)
LYMPHOCYTES # BLD: 2.31 K/UL (ref 0.5–4.6)
LYMPHOCYTES NFR BLD: 37.1 % (ref 13–44)
MCH RBC QN AUTO: 29 PG (ref 26.1–32.9)
MCHC RBC AUTO-ENTMCNC: 33.3 G/DL (ref 31.4–35)
MCV RBC AUTO: 87.3 FL (ref 82–102)
MONOCYTES # BLD: 0.55 K/UL (ref 0.1–1.3)
MONOCYTES NFR BLD: 8.8 % (ref 4–12)
NEUTS SEG # BLD: 3.07 K/UL (ref 1.7–8.2)
NEUTS SEG NFR BLD: 49.5 % (ref 43–78)
NRBC # BLD: 0 K/UL (ref 0–0.2)
PLATELET # BLD AUTO: 267 K/UL (ref 150–450)
PMV BLD AUTO: 9.3 FL (ref 9.4–12.3)
POTASSIUM SERPL-SCNC: 4.2 MMOL/L (ref 3.5–5.1)
PROT SERPL-MCNC: 6.4 G/DL (ref 6.3–8.2)
RBC # BLD AUTO: 4.96 M/UL (ref 4.05–5.2)
SODIUM SERPL-SCNC: 142 MMOL/L (ref 136–145)
TRIGL SERPL-MCNC: 85 MG/DL (ref 0–150)
VLDLC SERPL CALC-MCNC: 17 MG/DL (ref 6–23)
WBC # BLD AUTO: 6.2 K/UL (ref 4.3–11.1)

## 2025-07-24 PROCEDURE — 3078F DIAST BP <80 MM HG: CPT | Performed by: FAMILY MEDICINE

## 2025-07-24 PROCEDURE — 99214 OFFICE O/P EST MOD 30 MIN: CPT | Performed by: FAMILY MEDICINE

## 2025-07-24 PROCEDURE — 3044F HG A1C LEVEL LT 7.0%: CPT | Performed by: FAMILY MEDICINE

## 2025-07-24 PROCEDURE — 93000 ELECTROCARDIOGRAM COMPLETE: CPT | Performed by: FAMILY MEDICINE

## 2025-07-24 PROCEDURE — 3074F SYST BP LT 130 MM HG: CPT | Performed by: FAMILY MEDICINE

## 2025-07-24 RX ORDER — FUROSEMIDE 40 MG/1
40 TABLET ORAL DAILY
Qty: 90 TABLET | Refills: 3 | Status: SHIPPED | OUTPATIENT
Start: 2025-07-24

## 2025-07-24 RX ORDER — DULOXETIN HYDROCHLORIDE 60 MG/1
120 CAPSULE, DELAYED RELEASE ORAL DAILY
Qty: 30 CAPSULE | Refills: 3 | Status: SHIPPED | OUTPATIENT
Start: 2025-07-24

## 2025-07-24 RX ORDER — ALBUTEROL SULFATE 90 UG/1
1 INHALANT RESPIRATORY (INHALATION) EVERY 6 HOURS PRN
Qty: 18 G | Refills: 2 | Status: SHIPPED | OUTPATIENT
Start: 2025-07-24

## 2025-07-24 RX ORDER — ATORVASTATIN CALCIUM 80 MG/1
80 TABLET, FILM COATED ORAL DAILY
Qty: 90 TABLET | Refills: 3 | Status: SHIPPED | OUTPATIENT
Start: 2025-07-24

## 2025-07-24 RX ORDER — GABAPENTIN 600 MG/1
600 TABLET ORAL 2 TIMES DAILY
Qty: 90 TABLET | Refills: 3 | Status: SHIPPED | OUTPATIENT
Start: 2025-07-24 | End: 2026-01-20

## 2025-07-24 RX ORDER — DEXLANSOPRAZOLE 60 MG/1
60 CAPSULE, DELAYED RELEASE ORAL DAILY
Qty: 30 CAPSULE | Refills: 3 | Status: SHIPPED | OUTPATIENT
Start: 2025-07-24

## 2025-07-24 RX ORDER — DIAZEPAM 2 MG/1
2 TABLET ORAL 2 TIMES DAILY PRN
Qty: 60 TABLET | Refills: 3 | Status: SHIPPED | OUTPATIENT
Start: 2025-07-24 | End: 2025-11-21

## 2025-07-24 RX ORDER — HYDROCODONE BITARTRATE AND ACETAMINOPHEN 10; 325 MG/1; MG/1
1 TABLET ORAL EVERY 8 HOURS PRN
Qty: 90 TABLET | Refills: 0 | Status: SHIPPED | OUTPATIENT
Start: 2025-08-12 | End: 2025-09-11

## 2025-07-24 ASSESSMENT — ENCOUNTER SYMPTOMS
EYE PAIN: 0
COLOR CHANGE: 0
BACK PAIN: 1
BLOOD IN STOOL: 0
COUGH: 0
NAUSEA: 0
ABDOMINAL DISTENTION: 0
PHOTOPHOBIA: 0
SHORTNESS OF BREATH: 0
SORE THROAT: 0
ABDOMINAL PAIN: 0

## 2025-07-24 NOTE — TELEPHONE ENCOUNTER
Patient called stating she has the following issues :    PCP visit earlier today Dr Walker office  Finding as follows,  Sinus Rhythm with incomplete with left bundle branch block  Is this something to worry about?  Patient is very concerned

## 2025-07-24 NOTE — PROGRESS NOTES
Tammie Sunshine  1970 is a 54 y.o. female ,Established patient, here for evaluation of the following chief complaint(s):   Chief Complaint   Patient presents with    Follow-up     3 month f/u            1. Vitamin D deficiency  -     Vitamin D 25 Hydroxy; Future  2. Other hyperlipidemia  -     Lipid Panel; Future  -     atorvastatin (LIPITOR) 80 MG tablet; Take 1 tablet by mouth daily, Disp-90 tablet, R-3Normal  3. Type 2 diabetes mellitus without complication, without long-term current use of insulin ([facility])  -     CBC with Auto Differential; Future  -     Comprehensive Metabolic Panel; Future  -     Hemoglobin A1C; Future  4. Coronary artery disease due to lipid rich plaque  -     EKG 12 Lead  5. Acute midline thoracic back pain  -     XR CHEST PA LAT (2 VIEWS); Future  -     XR THORACIC SPINE (2 VIEWS); Future  6. Moderate persistent asthma without complication  -     albuterol sulfate HFA (PROVENTIL;VENTOLIN;PROAIR) 108 (90 Base) MCG/ACT inhaler; Inhale 1 puff into the lungs every 6 hours as needed for Wheezing TAKE 1 PUFF BY INHALATION EVERY FOUR (4) HOURS AS NEEDED FOR WHEEZING OR SHORTNESS OF BREATH., Disp-18 g, R-2Normal  7. Coronary artery disease due to calcified coronary lesion  -     atorvastatin (LIPITOR) 80 MG tablet; Take 1 tablet by mouth daily, Disp-90 tablet, R-3Normal  8. Gastroesophageal reflux disease, unspecified whether esophagitis present  -     dexlansoprazole (DEXILANT) 60 MG CPDR delayed release capsule; Take 1 capsule by mouth daily, Disp-30 capsule, R-3Normal  9. Anxiety  -     diazePAM (VALIUM) 2 MG tablet; Take 1 tablet by mouth 2 times daily as needed for Anxiety for up to 120 days., Disp-60 tablet, R-3Normal  10. Mild episode of recurrent major depressive disorder  -     DULoxetine (CYMBALTA) 60 MG extended release capsule; Take 2 capsules by mouth daily, Disp-30 capsule, R-3Normal  11. Bipolar 1 disorder (HCC)  -     DULoxetine (CYMBALTA) 60 MG extended release

## 2025-07-24 NOTE — TELEPHONE ENCOUNTER
Patient called stating that she saw Dr. Plunkett. He did an EKG and it came back as incomplete with left bundle branch block. Patient is wants to know if this is something she should be concerned about since she has had open heart surgery. //

## 2025-07-28 NOTE — TELEPHONE ENCOUNTER
Cherelle response:    Hi,     This is duglas. According to my last note you are taking Jardiance and moujaro 15mg regularly, using humalog for correction (now changed to Aspart which is generic novolog)    I no longer have an active prescription for U-500 insulin as I understood you no longer needed it.    I was not planning to send U-500 to [facility] in , just the apart that replaced humalog for correction scale insulin.    Let me know if you understood our plan to be any different.    ~Duglas

## 2025-07-29 ENCOUNTER — PATIENT MESSAGE (OUTPATIENT)
Dept: ENDOCRINOLOGY | Age: 55
End: 2025-07-29

## 2025-07-29 NOTE — TELEPHONE ENCOUNTER
Cherelle response:    Oh no! I am sorry to hear that you are not feeling well and that they found the blockage.    I just looked at your CMP lab from 7/24. Your kidney function and electrolytes look good.     Jardiance is actually helpful to the vessels that feed your heart so if you can tolerate this, I would continue.    It does make you pee which can lower your blood pressure. Can you take your BP. I would take it sitting, then stand up wait a minute and take it again to see if you are getting dizzy because your blood pressure is dropping.    I would focus on drinking water. If your weakness and dizziness is associated with low blood pressure, I'll connect with Dr. Lemus about adjusting your bp meds.     Please let me know if drinking more water helps and if you are getting low BP readings when dizzy or doing the sit down-stand up testing    ~Arleen

## 2025-07-29 NOTE — TELEPHONE ENCOUNTER
Cherelle response:    If you don't see anything to explain your symptoms when checking your BP, you can come off of the jardiance for a week, and see if you feel better then restart it and see if you have the same symptoms.     If you do, we can try farxiga instead to see if you get the same benefit without the side effects    Keep me looped    ~Arleen

## 2025-07-30 DIAGNOSIS — K21.9 GASTROESOPHAGEAL REFLUX DISEASE, UNSPECIFIED WHETHER ESOPHAGITIS PRESENT: ICD-10-CM

## 2025-07-30 DIAGNOSIS — I25.84 CORONARY ARTERY DISEASE DUE TO CALCIFIED CORONARY LESION: ICD-10-CM

## 2025-07-30 DIAGNOSIS — E78.49 OTHER HYPERLIPIDEMIA: ICD-10-CM

## 2025-07-30 DIAGNOSIS — F41.9 ANXIETY: ICD-10-CM

## 2025-07-30 DIAGNOSIS — I25.10 CORONARY ARTERY DISEASE DUE TO CALCIFIED CORONARY LESION: ICD-10-CM

## 2025-07-30 DIAGNOSIS — M25.50 ARTHRALGIA, UNSPECIFIED JOINT: ICD-10-CM

## 2025-07-30 DIAGNOSIS — J45.40 MODERATE PERSISTENT ASTHMA WITHOUT COMPLICATION: ICD-10-CM

## 2025-07-30 DIAGNOSIS — G62.9 PERIPHERAL POLYNEUROPATHY: ICD-10-CM

## 2025-07-30 DIAGNOSIS — R60.0 LOCALIZED EDEMA: ICD-10-CM

## 2025-07-31 RX ORDER — FUROSEMIDE 40 MG/1
40 TABLET ORAL DAILY
Qty: 90 TABLET | Refills: 3 | Status: SHIPPED | OUTPATIENT
Start: 2025-07-31

## 2025-07-31 RX ORDER — GABAPENTIN 600 MG/1
600 TABLET ORAL 2 TIMES DAILY
Qty: 90 TABLET | Refills: 3 | Status: SHIPPED | OUTPATIENT
Start: 2025-07-31 | End: 2026-01-27

## 2025-07-31 RX ORDER — DIAZEPAM 2 MG/1
2 TABLET ORAL 2 TIMES DAILY PRN
Qty: 60 TABLET | Refills: 3 | Status: SHIPPED | OUTPATIENT
Start: 2025-07-31 | End: 2025-11-28

## 2025-07-31 RX ORDER — DEXLANSOPRAZOLE 60 MG/1
60 CAPSULE, DELAYED RELEASE ORAL DAILY
Qty: 30 CAPSULE | Refills: 3 | Status: SHIPPED | OUTPATIENT
Start: 2025-07-31

## 2025-07-31 RX ORDER — HYDROCODONE BITARTRATE AND ACETAMINOPHEN 10; 325 MG/1; MG/1
1 TABLET ORAL EVERY 8 HOURS PRN
Qty: 90 TABLET | Refills: 0 | Status: SHIPPED | OUTPATIENT
Start: 2025-08-12 | End: 2025-09-11

## 2025-07-31 RX ORDER — ALBUTEROL SULFATE 90 UG/1
1 INHALANT RESPIRATORY (INHALATION) EVERY 6 HOURS PRN
Qty: 18 G | Refills: 2 | Status: SHIPPED | OUTPATIENT
Start: 2025-07-31

## 2025-07-31 RX ORDER — ATORVASTATIN CALCIUM 80 MG/1
80 TABLET, FILM COATED ORAL DAILY
Qty: 90 TABLET | Refills: 3 | Status: SHIPPED | OUTPATIENT
Start: 2025-07-31

## 2025-08-01 ENCOUNTER — PATIENT MESSAGE (OUTPATIENT)
Dept: ENDOCRINOLOGY | Age: 55
End: 2025-08-01

## 2025-08-04 RX ORDER — FLUCONAZOLE 150 MG/1
150 TABLET ORAL
Qty: 2 TABLET | Refills: 0 | Status: SHIPPED | OUTPATIENT
Start: 2025-08-04 | End: 2025-08-10

## 2025-08-04 RX ORDER — DAPAGLIFLOZIN 10 MG/1
10 TABLET, FILM COATED ORAL EVERY MORNING
Qty: 90 TABLET | Refills: 3 | Status: SHIPPED | OUTPATIENT
Start: 2025-08-04

## (undated) DEVICE — OXYGENATOR 1.5 M2 SURF AREA 0.5 TO 0.5 LPM FLO RATE W/

## (undated) DEVICE — SUT SLK 2-0SH 30IN BLK --

## (undated) DEVICE — 1/4 FORCE SURGICAL SPRING CLIP: Brand: STEALTH® SPRING CLIP

## (undated) DEVICE — CLAMP INSERT: Brand: STEALTH® CLAMP INSERT

## (undated) DEVICE — Device

## (undated) DEVICE — SUTURE SILK PERMAHAND PRECUT 6 X 30 IN SZ 1 BLK BRAID A307H

## (undated) DEVICE — CATHETER DIAG 6FR L100CM LUMN ID0.056IN JL4 CRV 0 SIDE H

## (undated) DEVICE — CLIP LIG SM TI 20 BLU HNDL FOR OPN AND ENDOSCP SGL APPL

## (undated) DEVICE — PREP SKN CHLRAPRP APL 26ML STR --

## (undated) DEVICE — SUTURE ETHBND EXCEL 2-0 L30IN NONABSORBABLE GRN L26MM SH MX563

## (undated) DEVICE — SUTURE VCRL SZ 4-0 L27IN ABSRB UD L19MM PS-2 3/8 CIR PRIM J426H

## (undated) DEVICE — CATHETER DIAG AD 6FR L100CM 0.038IN POLYUR L COR BYPS

## (undated) DEVICE — AORTIC PUNCHES ARE USED TO CREATE A UNIFORM OPENING IN BLOOD VESSELS DURING CARDIOVASCULAR SURGERY. THE VESSEL GRAFT IS INSERTED INTO THE CREATED OPENING AND SUTURED TO THE VESSEL WALL. AORTIC LANCETS ARE USED TO MAKE A SMALL UNIFORM CUT IN A BLOOD VESSEL TO FACILITATE INSERTION OF AN AORTIC PUNCH.  PUNCHES COME IN VARIOUS LENGTHS, DIAMETERS AND TIP CONFIGURATIONS.: Brand: CLEANCUT ROTATING AORTIC PUNCH

## (undated) DEVICE — CATHETER THOR 24FR L22IN PVC 5 EYELET STR ATRAUM

## (undated) DEVICE — STERILE HOOK LOCK LATEX FREE ELASTIC BANDAGE 6INX5YD: Brand: HOOK LOCK™

## (undated) DEVICE — SS SUTURE, 3 PER SLEEVE: Brand: MYO/WIRE II

## (undated) DEVICE — SUTURE PDS II SZ 1 L36IN ABSRB VLT L48MM CTX 1/2 CIR Z371T

## (undated) DEVICE — APPLIER CLP L9.38IN M LIG TI DISP STR RNG HNDL LIGACLP

## (undated) DEVICE — PERFUSION PACK XCOATING [76320] [TERUMO CARDIOVASCULAR]

## (undated) DEVICE — DRAPE SLUSH DISC W44XL66IN ST FOR RND BSIN HUSH SLUSH SYS

## (undated) DEVICE — 48" PROBE COVER W/GEL, ULTRASOUND, STERILE: Brand: SITE-RITE

## (undated) DEVICE — CLIP INT SM TI EZ LD LIG SYS WECK HORZ

## (undated) DEVICE — SOLUTION IRRIG 3000ML 0.9% SOD CHL FLX CONT 0797208] ICU MEDICAL INC]

## (undated) DEVICE — CATHETER COR DIAG AMPLATZ L 1.0 CRV 6FR 100CM 0 SIDE H

## (undated) DEVICE — SURGIFOAM SPNG SZ 100

## (undated) DEVICE — BLADE SURG NO20 S STL STR DISP GLASSVAN

## (undated) DEVICE — BLADE SURG NO15 S STL STR DISP GLASSVAN

## (undated) DEVICE — INTRODUCER SHTH 6FR L11CM 0.038IN STD SIDEPRT EXTN 3 W

## (undated) DEVICE — 3M™ STERI-STRIP™ REINFORCED ADHESIVE SKIN CLOSURES, R1547, 1/2 IN X 4 IN (12 MM X 100 MM), 6 STRIPS/ENVELOPE: Brand: 3M™ STERI-STRIP™

## (undated) DEVICE — CATHETER DIAG 6FR L110CM PIGTAILS CRV STYL PIG145 DXTERITY

## (undated) DEVICE — CATHETER THOR 32FR L23IN PVC 6 EYELET STR ATRAUM

## (undated) DEVICE — BLADE SCALP SURG BARD-PARK 11 --

## (undated) DEVICE — AMD ANTIMICROBIAL BANDAGE ROLL,6 PLY: Brand: KERLIX

## (undated) DEVICE — SUTURE NONABSORBABLE L24IN SZ 7-0 M0-5 BV175-8 EP 24 BLU M8745

## (undated) DEVICE — CATHETER ETER TY TEMP SENS F MBO W URIN M FOLLOWS CDC GUIDELINES TO

## (undated) DEVICE — SPONGE LAP 18X18IN STRL -- 5/PK

## (undated) DEVICE — MEDI-TRACE CADENCE ADULT, DEFIBRILLATION ELECTRODE -RTS  (10 PR/PK) - ZOLL: Brand: MEDI-TRACE CADENCE

## (undated) DEVICE — BASIC SINGLE BASIN-LF: Brand: MEDLINE INDUSTRIES, INC.

## (undated) DEVICE — BUTTON SWITCH PENCIL BLADE ELECTRODE, HOLSTER: Brand: EDGE

## (undated) DEVICE — BLADE OPHTH MINI BEAV SHRP --

## (undated) DEVICE — Device: Brand: JELCO

## (undated) DEVICE — SUT PROL 4-0 30IN SH1 DA BLU --

## (undated) DEVICE — 3000CC GUARDIAN II: Brand: GUARDIAN

## (undated) DEVICE — STERILE HOOK LOCK LATEX FREE ELASTIC BANDAGE 4INX5YD: Brand: HOOK LOCK™

## (undated) DEVICE — SUT PROL 3-0 36IN SH DA BLU --

## (undated) DEVICE — SUTURE VCRL SZ 3-0 L36IN ABSRB UD L36MM CT-1 1/2 CIR J944H

## (undated) DEVICE — SUTURE S STL SZ 6 L18IN NONABSORBABLE SIL L48MM CCS 1/2 CIR M654G

## (undated) DEVICE — GUIDE NDL ST W/ 14 X 147CM TELESCOPICALLY FLD CIV FLX CVR

## (undated) DEVICE — SOLUTION IV 1000ML 0.9% SOD CHL

## (undated) DEVICE — AMD ANTIMICROBIAL GAUZE SPONGES,12 PLY USP TYPE VII, 0.2% POLYHEXAMETHYLENE BIGUANIDE HCI (PHMB): Brand: CURITY

## (undated) DEVICE — SUTURE PROL SZ 6-0 L30IN NONABSORBABLE BLU L13MM CC-1 3/8 M8707

## (undated) DEVICE — BLADE SAW W10XL54MM FOR PRI REPEAT STRNOTMY

## (undated) DEVICE — PAD,NON-ADHERENT,3X8,STERILE,LF,1/PK: Brand: MEDLINE

## (undated) DEVICE — CANNULA PERF L1.8MM TIP L1MM S STL SHFT BLB SHP TIP FEM

## (undated) DEVICE — GLOVE SURG SZ 7 L11.2IN THK9.8MIL STRW LTX POLYMER BEAD CUF

## (undated) DEVICE — BLADE SCALP SURG BARD-PARK 10 --

## (undated) DEVICE — CANNULA INJ L2.5IN BLNT TIP 3MM CLR BODY W/ 1 W VLV DLP

## (undated) DEVICE — APPLIER CLP LIG SM TI PREM SURGCLP SUPER INTLOK 20 DISP

## (undated) DEVICE — SUTURE ETHBND EXCEL SZ 0 L18IN NONABSORBABLE GRN L36MM CT-1 CX21D

## (undated) DEVICE — CONNECTOR IV 3/8X3/8X3/8 Y

## (undated) DEVICE — SUTURE PERMAHAND SZ 2-0 L12X18IN NONABSORBABLE BLK SILK A185H

## (undated) DEVICE — 3M™ IOBAN™ 2 ANTIMICROBIAL INCISE DRAPE 6648EZ: Brand: IOBAN™ 2

## (undated) DEVICE — CABG DR WILLIAMS: Brand: MEDLINE INDUSTRIES, INC.

## (undated) DEVICE — CATHETER DIAG 6FR L100CM LUMN ID0.056IN JR4 CRV 0 SIDE H

## (undated) DEVICE — CATH URETH INTMIT ROB 14FR FUN -- USE ITEM 179521

## (undated) DEVICE — GAUZE,SPONGE,4"X4",16PLY,STRL,LF,10/TRAY: Brand: MEDLINE

## (undated) DEVICE — SUTURE PROL DBL ARMED 8 0 BV130 5 24IN N ABSRB MFIL BLU M8739

## (undated) DEVICE — REM POLYHESIVE ADULT PATIENT RETURN ELECTRODE: Brand: VALLEYLAB